# Patient Record
Sex: FEMALE | Race: WHITE | Employment: OTHER | URBAN - METROPOLITAN AREA
[De-identification: names, ages, dates, MRNs, and addresses within clinical notes are randomized per-mention and may not be internally consistent; named-entity substitution may affect disease eponyms.]

---

## 2021-12-26 PROBLEM — I63.9 STROKE (HCC): Status: ACTIVE | Noted: 2021-12-26

## 2021-12-31 ENCOUNTER — HOSPITAL ENCOUNTER (INPATIENT)
Age: 86
LOS: 20 days | Discharge: HOME | DRG: 057 | End: 2022-01-20
Attending: INTERNAL MEDICINE | Admitting: FAMILY MEDICINE
Payer: MEDICARE

## 2021-12-31 DIAGNOSIS — M17.0 PRIMARY OSTEOARTHRITIS OF BOTH KNEES: Chronic | ICD-10-CM

## 2021-12-31 DIAGNOSIS — I10 ESSENTIAL HYPERTENSION: Chronic | ICD-10-CM

## 2021-12-31 DIAGNOSIS — N39.0 URINARY TRACT INFECTION DUE TO EXTENDED-SPECTRUM BETA LACTAMASE (ESBL) PRODUCING ESCHERICHIA COLI: ICD-10-CM

## 2021-12-31 DIAGNOSIS — G81.94 HEMIPARESIS OF LEFT NONDOMINANT SIDE DUE TO ACUTE CEREBROVASCULAR DISEASE (HCC): ICD-10-CM

## 2021-12-31 DIAGNOSIS — I67.89 HEMIPARESIS OF LEFT NONDOMINANT SIDE DUE TO ACUTE CEREBROVASCULAR DISEASE (HCC): ICD-10-CM

## 2021-12-31 DIAGNOSIS — B96.29 URINARY TRACT INFECTION DUE TO EXTENDED-SPECTRUM BETA LACTAMASE (ESBL) PRODUCING ESCHERICHIA COLI: ICD-10-CM

## 2021-12-31 DIAGNOSIS — I63.9 ACUTE ISCHEMIC STROKE (HCC): Primary | ICD-10-CM

## 2021-12-31 DIAGNOSIS — E78.2 HYPERCHOLESTEROLEMIA WITH HYPERTRIGLYCERIDEMIA: Chronic | ICD-10-CM

## 2021-12-31 DIAGNOSIS — Z78.9 IMPAIRED MOBILITY AND ADLS: ICD-10-CM

## 2021-12-31 DIAGNOSIS — Z74.09 IMPAIRED MOBILITY AND ADLS: ICD-10-CM

## 2021-12-31 DIAGNOSIS — Z16.12 URINARY TRACT INFECTION DUE TO EXTENDED-SPECTRUM BETA LACTAMASE (ESBL) PRODUCING ESCHERICHIA COLI: ICD-10-CM

## 2021-12-31 PROCEDURE — 77030040393 HC DRSG OPTIFOAM GENT MDII -B

## 2021-12-31 PROCEDURE — 74011250637 HC RX REV CODE- 250/637: Performed by: FAMILY MEDICINE

## 2021-12-31 PROCEDURE — 74011000250 HC RX REV CODE- 250: Performed by: FAMILY MEDICINE

## 2021-12-31 PROCEDURE — 2709999900 HC NON-CHARGEABLE SUPPLY

## 2021-12-31 PROCEDURE — 65310000000 HC RM PRIVATE REHAB

## 2021-12-31 RX ORDER — POTASSIUM CHLORIDE 750 MG/1
20 TABLET, FILM COATED, EXTENDED RELEASE ORAL
Status: DISCONTINUED | OUTPATIENT
Start: 2022-01-01 | End: 2021-12-31

## 2021-12-31 RX ORDER — MONTELUKAST SODIUM 10 MG/1
10 TABLET ORAL DAILY
Status: DISCONTINUED | OUTPATIENT
Start: 2022-01-01 | End: 2022-01-20 | Stop reason: HOSPADM

## 2021-12-31 RX ORDER — DORZOLAMIDE HCL 20 MG/ML
1 SOLUTION/ DROPS OPHTHALMIC 3 TIMES DAILY
Status: DISCONTINUED | OUTPATIENT
Start: 2021-12-31 | End: 2022-01-20 | Stop reason: HOSPADM

## 2021-12-31 RX ORDER — SERTRALINE HYDROCHLORIDE 50 MG/1
50 TABLET, FILM COATED ORAL DAILY
Status: DISCONTINUED | OUTPATIENT
Start: 2022-01-01 | End: 2022-01-20 | Stop reason: HOSPADM

## 2021-12-31 RX ORDER — BISACODYL 5 MG
10 TABLET, DELAYED RELEASE (ENTERIC COATED) ORAL
Status: DISCONTINUED | OUTPATIENT
Start: 2021-12-31 | End: 2022-01-20 | Stop reason: HOSPADM

## 2021-12-31 RX ORDER — ATORVASTATIN CALCIUM 40 MG/1
80 TABLET, FILM COATED ORAL
Status: DISCONTINUED | OUTPATIENT
Start: 2021-12-31 | End: 2022-01-04

## 2021-12-31 RX ORDER — ADHESIVE BANDAGE
30 BANDAGE TOPICAL DAILY PRN
Status: DISCONTINUED | OUTPATIENT
Start: 2021-12-31 | End: 2022-01-20 | Stop reason: HOSPADM

## 2021-12-31 RX ORDER — OXYBUTYNIN CHLORIDE 5 MG/1
10 TABLET, EXTENDED RELEASE ORAL
Status: DISCONTINUED | OUTPATIENT
Start: 2021-12-31 | End: 2022-01-15

## 2021-12-31 RX ORDER — LOPERAMIDE HYDROCHLORIDE 2 MG/1
2 CAPSULE ORAL AS NEEDED
Status: DISCONTINUED | OUTPATIENT
Start: 2021-12-31 | End: 2022-01-20 | Stop reason: HOSPADM

## 2021-12-31 RX ORDER — DOCUSATE SODIUM 100 MG/1
100 CAPSULE, LIQUID FILLED ORAL 2 TIMES DAILY
Status: DISCONTINUED | OUTPATIENT
Start: 2021-12-31 | End: 2022-01-20 | Stop reason: HOSPADM

## 2021-12-31 RX ORDER — POTASSIUM CHLORIDE 20 MEQ/1
20 TABLET, EXTENDED RELEASE ORAL
Status: COMPLETED | OUTPATIENT
Start: 2022-01-01 | End: 2022-01-05

## 2021-12-31 RX ORDER — ACETAMINOPHEN 325 MG/1
650 TABLET ORAL
Status: DISCONTINUED | OUTPATIENT
Start: 2021-12-31 | End: 2022-01-13

## 2021-12-31 RX ORDER — ASPIRIN 325 MG
325 TABLET ORAL DAILY
Status: DISCONTINUED | OUTPATIENT
Start: 2022-01-01 | End: 2022-01-04

## 2021-12-31 RX ORDER — LATANOPROST 50 UG/ML
1 SOLUTION/ DROPS OPHTHALMIC
Status: DISCONTINUED | OUTPATIENT
Start: 2021-12-31 | End: 2022-01-20 | Stop reason: HOSPADM

## 2021-12-31 RX ORDER — CLOPIDOGREL BISULFATE 75 MG/1
75 TABLET ORAL DAILY
Status: DISCONTINUED | OUTPATIENT
Start: 2022-01-01 | End: 2022-01-04

## 2021-12-31 RX ADMIN — DORZOLAMIDE HYDROCHLORIDE 1 DROP: 20 SOLUTION/ DROPS OPHTHALMIC at 21:23

## 2021-12-31 RX ADMIN — LATANOPROST 1 DROP: 50 SOLUTION OPHTHALMIC at 21:23

## 2021-12-31 RX ADMIN — ATORVASTATIN CALCIUM 80 MG: 40 TABLET, FILM COATED ORAL at 21:23

## 2021-12-31 RX ADMIN — OXYBUTYNIN CHLORIDE 10 MG: 5 TABLET, EXTENDED RELEASE ORAL at 21:23

## 2021-12-31 RX ADMIN — DORZOLAMIDE HYDROCHLORIDE 1 DROP: 20 SOLUTION/ DROPS OPHTHALMIC at 18:18

## 2021-12-31 RX ADMIN — DOCUSATE SODIUM 100 MG: 100 CAPSULE, LIQUID FILLED ORAL at 18:18

## 2021-12-31 RX ADMIN — ACETAMINOPHEN 650 MG: 325 TABLET ORAL at 19:40

## 2022-01-01 LAB
ALBUMIN SERPL-MCNC: 2.7 G/DL (ref 3.4–5)
ALBUMIN/GLOB SERPL: 0.8 {RATIO} (ref 0.8–1.7)
ALP SERPL-CCNC: 80 U/L (ref 45–117)
ALT SERPL-CCNC: 44 U/L (ref 13–56)
ANION GAP SERPL CALC-SCNC: 7 MMOL/L (ref 3–18)
APPEARANCE UR: CLEAR
AST SERPL-CCNC: 41 U/L (ref 10–38)
BASOPHILS # BLD: 0 K/UL (ref 0–0.1)
BASOPHILS NFR BLD: 0 % (ref 0–2)
BILIRUB SERPL-MCNC: 0.4 MG/DL (ref 0.2–1)
BILIRUB UR QL: NEGATIVE
BUN SERPL-MCNC: 25 MG/DL (ref 7–18)
BUN/CREAT SERPL: 33 (ref 12–20)
CALCIUM SERPL-MCNC: 8.5 MG/DL (ref 8.5–10.1)
CHLORIDE SERPL-SCNC: 106 MMOL/L (ref 100–111)
CO2 SERPL-SCNC: 27 MMOL/L (ref 21–32)
COLOR UR: YELLOW
CREAT SERPL-MCNC: 0.76 MG/DL (ref 0.6–1.3)
DIFFERENTIAL METHOD BLD: ABNORMAL
EOSINOPHIL # BLD: 0.1 K/UL (ref 0–0.4)
EOSINOPHIL NFR BLD: 1 % (ref 0–5)
ERYTHROCYTE [DISTWIDTH] IN BLOOD BY AUTOMATED COUNT: 13.3 % (ref 11.6–14.5)
GLOBULIN SER CALC-MCNC: 3.3 G/DL (ref 2–4)
GLUCOSE SERPL-MCNC: 94 MG/DL (ref 74–99)
GLUCOSE UR STRIP.AUTO-MCNC: NEGATIVE MG/DL
HCT VFR BLD AUTO: 38.1 % (ref 35–45)
HGB BLD-MCNC: 12.6 G/DL (ref 12–16)
HGB UR QL STRIP: NEGATIVE
IMM GRANULOCYTES # BLD AUTO: 0 K/UL (ref 0–0.04)
IMM GRANULOCYTES NFR BLD AUTO: 0 % (ref 0–0.5)
KETONES UR QL STRIP.AUTO: NEGATIVE MG/DL
LEUKOCYTE ESTERASE UR QL STRIP.AUTO: NEGATIVE
LYMPHOCYTES # BLD: 1.1 K/UL (ref 0.9–3.6)
LYMPHOCYTES NFR BLD: 16 % (ref 21–52)
MAGNESIUM SERPL-MCNC: 1.6 MG/DL (ref 1.6–2.6)
MCH RBC QN AUTO: 28.6 PG (ref 24–34)
MCHC RBC AUTO-ENTMCNC: 33.1 G/DL (ref 31–37)
MCV RBC AUTO: 86.4 FL (ref 78–100)
MONOCYTES # BLD: 0.8 K/UL (ref 0.05–1.2)
MONOCYTES NFR BLD: 12 % (ref 3–10)
NEUTS SEG # BLD: 5 K/UL (ref 1.8–8)
NEUTS SEG NFR BLD: 71 % (ref 40–73)
NITRITE UR QL STRIP.AUTO: NEGATIVE
NRBC # BLD: 0 K/UL (ref 0–0.01)
NRBC BLD-RTO: 0 PER 100 WBC
PH UR STRIP: 7.5 [PH] (ref 5–8)
PLATELET # BLD AUTO: 198 K/UL (ref 135–420)
PMV BLD AUTO: 11.5 FL (ref 9.2–11.8)
POTASSIUM SERPL-SCNC: 3.8 MMOL/L (ref 3.5–5.5)
PROT SERPL-MCNC: 6 G/DL (ref 6.4–8.2)
PROT UR STRIP-MCNC: NEGATIVE MG/DL
RBC # BLD AUTO: 4.41 M/UL (ref 4.2–5.3)
SODIUM SERPL-SCNC: 140 MMOL/L (ref 136–145)
SP GR UR REFRACTOMETRY: 1.02 (ref 1–1.03)
UROBILINOGEN UR QL STRIP.AUTO: 0.2 EU/DL (ref 0.2–1)
WBC # BLD AUTO: 7.1 K/UL (ref 4.6–13.2)

## 2022-01-01 PROCEDURE — 85025 COMPLETE CBC W/AUTO DIFF WBC: CPT

## 2022-01-01 PROCEDURE — 65310000000 HC RM PRIVATE REHAB

## 2022-01-01 PROCEDURE — 80053 COMPREHEN METABOLIC PANEL: CPT

## 2022-01-01 PROCEDURE — 97166 OT EVAL MOD COMPLEX 45 MIN: CPT

## 2022-01-01 PROCEDURE — 74011250637 HC RX REV CODE- 250/637: Performed by: FAMILY MEDICINE

## 2022-01-01 PROCEDURE — 97530 THERAPEUTIC ACTIVITIES: CPT

## 2022-01-01 PROCEDURE — 2709999900 HC NON-CHARGEABLE SUPPLY

## 2022-01-01 PROCEDURE — 96125 COGNITIVE TEST BY HC PRO: CPT

## 2022-01-01 PROCEDURE — 83735 ASSAY OF MAGNESIUM: CPT

## 2022-01-01 PROCEDURE — 36415 COLL VENOUS BLD VENIPUNCTURE: CPT

## 2022-01-01 PROCEDURE — 81003 URINALYSIS AUTO W/O SCOPE: CPT

## 2022-01-01 PROCEDURE — 92522 EVALUATE SPEECH PRODUCTION: CPT

## 2022-01-01 PROCEDURE — 96105 ASSESSMENT OF APHASIA: CPT

## 2022-01-01 PROCEDURE — 97535 SELF CARE MNGMENT TRAINING: CPT

## 2022-01-01 RX ORDER — ONDANSETRON 4 MG/1
4 TABLET, ORALLY DISINTEGRATING ORAL
Status: DISCONTINUED | OUTPATIENT
Start: 2022-01-01 | End: 2022-01-20 | Stop reason: HOSPADM

## 2022-01-01 RX ORDER — HYDRALAZINE HYDROCHLORIDE 25 MG/1
25 TABLET, FILM COATED ORAL
Status: DISCONTINUED | OUTPATIENT
Start: 2022-01-01 | End: 2022-01-20 | Stop reason: HOSPADM

## 2022-01-01 RX ORDER — LOSARTAN POTASSIUM 25 MG/1
25 TABLET ORAL DAILY
Status: DISCONTINUED | OUTPATIENT
Start: 2022-01-01 | End: 2022-01-04

## 2022-01-01 RX ADMIN — POTASSIUM CHLORIDE 20 MEQ: 1500 TABLET, EXTENDED RELEASE ORAL at 10:15

## 2022-01-01 RX ADMIN — HYDRALAZINE HYDROCHLORIDE 25 MG: 25 TABLET, FILM COATED ORAL at 14:36

## 2022-01-01 RX ADMIN — DOCUSATE SODIUM 100 MG: 100 CAPSULE, LIQUID FILLED ORAL at 10:15

## 2022-01-01 RX ADMIN — OXYBUTYNIN CHLORIDE 10 MG: 5 TABLET, EXTENDED RELEASE ORAL at 21:53

## 2022-01-01 RX ADMIN — LATANOPROST 1 DROP: 50 SOLUTION OPHTHALMIC at 21:57

## 2022-01-01 RX ADMIN — ACETAMINOPHEN 650 MG: 325 TABLET ORAL at 00:32

## 2022-01-01 RX ADMIN — MAGNESIUM HYDROXIDE 30 ML: 400 SUSPENSION ORAL at 11:16

## 2022-01-01 RX ADMIN — SERTRALINE 50 MG: 50 TABLET, FILM COATED ORAL at 10:15

## 2022-01-01 RX ADMIN — CLOPIDOGREL BISULFATE 75 MG: 75 TABLET ORAL at 10:15

## 2022-01-01 RX ADMIN — DORZOLAMIDE HYDROCHLORIDE 1 DROP: 20 SOLUTION/ DROPS OPHTHALMIC at 21:56

## 2022-01-01 RX ADMIN — DORZOLAMIDE HYDROCHLORIDE 1 DROP: 20 SOLUTION/ DROPS OPHTHALMIC at 10:17

## 2022-01-01 RX ADMIN — DORZOLAMIDE HYDROCHLORIDE 1 DROP: 20 SOLUTION/ DROPS OPHTHALMIC at 17:08

## 2022-01-01 RX ADMIN — LOSARTAN POTASSIUM 25 MG: 25 TABLET, FILM COATED ORAL at 11:07

## 2022-01-01 RX ADMIN — ASPIRIN 325 MG ORAL TABLET 325 MG: 325 PILL ORAL at 10:15

## 2022-01-01 RX ADMIN — ATORVASTATIN CALCIUM 80 MG: 40 TABLET, FILM COATED ORAL at 21:53

## 2022-01-01 RX ADMIN — MONTELUKAST 10 MG: 10 TABLET, FILM COATED ORAL at 10:15

## 2022-01-01 NOTE — ROUTINE PROCESS
Hanna Mcgregor is a 82 y.o.  female admitted on 12/31/2021 from [place]. Report received from [facility], [address] [place] [address]. Transportation was provided by ambulance, and the patient was transferred to her room via [employer]. Patient was assisted to bed with assist of 2. The patient was oriented to her room. Fall risk precautions were discussed with the patient; she was instructed to call for help prior to getting up. The following fall risk precautions were initiated: bed/ chair alarms, door signage, yellow bracelet and socks as well as completion of the [place] tool in the patient's room. Four eyes nurse skin assessment was performed by Manuela Romo LPN and Lazaro Foster.  Skin problems noted: WINSTON Reinoso LPN

## 2022-01-01 NOTE — PROGRESS NOTES
Problem: Pressure Injury - Risk of  Goal: *Prevention of pressure injury  Description: Document Lebron Scale and appropriate interventions in the flowsheet. Outcome: Progressing Towards Goal  Note: Pressure Injury Interventions:  Sensory Interventions: Assess changes in LOC    Moisture Interventions: Absorbent underpads    Activity Interventions: Chair cushion,Increase time out of bed,PT/OT evaluation,Pressure redistribution bed/mattress(bed type)    Mobility Interventions: HOB 30 degrees or less    Nutrition Interventions: Document food/fluid/supplement intake    Friction and Shear Interventions: Apply protective barrier, creams and emollients                Problem: Patient Education: Go to Patient Education Activity  Goal: Patient/Family Education  Outcome: Progressing Towards Goal     Problem: Falls - Risk of  Goal: *Absence of Falls  Description: Document Yadira Fall Risk and appropriate interventions in the flowsheet.   Outcome: Progressing Towards Goal  Note: Fall Risk Interventions:  Mobility Interventions: Assess mobility with egress test         Medication Interventions: Assess postural VS orthostatic hypotension,Bed/chair exit alarm    Elimination Interventions: Bed/chair exit alarm,Call light in reach              Problem: Patient Education: Go to Patient Education Activity  Goal: Patient/Family Education  Outcome: Progressing Towards Goal     Problem: General Medical Care Plan  Goal: *Vital signs within specified parameters  Outcome: Progressing Towards Goal  Goal: *Labs within defined limits  Outcome: Progressing Towards Goal  Goal: *Absence of infection signs and symptoms  Outcome: Progressing Towards Goal  Goal: *Optimal pain control at patient's stated goal  Outcome: Progressing Towards Goal  Goal: *Skin integrity maintained  Outcome: Progressing Towards Goal  Goal: *Fluid volume balance  Outcome: Progressing Towards Goal  Goal: *Optimize nutritional status  Outcome: Progressing Towards Goal  Goal: *Anxiety reduced or absent  Outcome: Progressing Towards Goal  Goal: *Progressive mobility and function (eg: ADL's)  Outcome: Progressing Towards Goal     Problem: Patient Education: Go to Patient Education Activity  Goal: Patient/Family Education  Outcome: Progressing Towards Goal     Problem: Patient Education: Go to Patient Education Activity  Goal: Patient/Family Education  Outcome: Progressing Towards Goal

## 2022-01-01 NOTE — CONSULTS
Comprehensive Nutrition Assessment    Type and Reason for Visit: Initial,Positive nutrition screen,Consult    Nutrition Recommendations/Plan:   - Add supplement: Ensure Enlive TID  - Continue all other nutrition interventions. Encourage/ monitor po intake of meals and supplements. Monitor diet tolerance. Nutrition Assessment:  Pt reported poor appetite/ po intake PTA and since admission. Ate very little from breakfast today; trying to eat lunch at time of visit. Had spit up/ tiny amount of vomit right before visiting with pt; informed RN. Pt has been nauseated today. Pt agreeable to nutrition supplement. Malnutrition Assessment:  Malnutrition Status: At risk for malnutrition (specify) (poor po intake PTA)      Nutrition History and Allergies: Past medical hx:  HTN, HLD, major depression, GERD, recent CVA. Poor appetite/ meal intake PTA to acute care hospital and while in the hospital.   UBW is 132, 135-136 lb; pt reported having lost wt PTA. No known food allergies     Estimated Daily Nutrient Needs:  Energy (kcal): 4635-0820; Weight Used for Energy Requirements: Admission (62.7 kg)  Protein (g): 50-63; Weight Used for Protein Requirements: Admission (x0.8-1)  Fluid (ml/day): 6716-1642; Method Used for Fluid Requirements: 1 ml/kcal      Nutrition Related Findings:  BM 12/28. No edema. Pertinent meds: KCl.    K low normal, being replaced      Wounds:    None       Current Nutrition Therapies:  ADULT DIET Regular; 3 carb choices (45 gm/meal); Low Fat/Low Chol/High Fiber/ANGELINE    Anthropometric Measures:  · Height:  4' 11\" (149.9 cm)  · Current Body Wt:  62.7 kg (138 lb 3.7 oz) (12/28/2021)   · Admission Body Wt:  138 lb 3.7 oz    · Usual Body Wt:   (135-136 lb)     · Ideal Body Wt:  95 lbs:  145.5 %   · Adjusted Body Weight:   ; Weight Adjustment for: No adjustment    · BMI Category: Overweight (BMI 25.0-29. 9)       Nutrition Diagnosis:   · Inadequate oral intake related to early satiety,altered GI function (nausea, little vomiting) as evidenced by intake 0-25%,poor intake prior to admission      Nutrition Interventions:   Food and/or Nutrient Delivery: Continue current diet,Start oral nutrition supplement,Mineral supplement  Nutrition Education and Counseling: No recommendations at this time,Education not indicated  Coordination of Nutrition Care: Continue to monitor while inpatient    Goals:  PO nutrition intake will meet >75% of patient's estimated nutrition needs within the next 7 days       Nutrition Monitoring and Evaluation:   Behavioral-Environmental Outcomes: None identified  Food/Nutrient Intake Outcomes: Diet advancement/tolerance,Food and nutrient intake,Vitamin/mineral intake,Supplement intake  Physical Signs/Symptoms Outcomes: Biochemical data,Meal time behavior,GI status,Nausea/vomiting,Nutrition focused physical findings    Discharge Planning:     Too soon to determine     Electronically signed by Sumaya Mcocy RD on 1/1/2022 at 1:55 PM    Contact: [phone]

## 2022-01-01 NOTE — H&P
History & Physical    Patient: Dmitriy Zarate MRN: [id number]  CSN: [id number]    YOB: 1935  Age: 82 y.o. Sex: female      DOA: 12/31/2021  Inpatient rehab    Impairment group :Stroke:Left body involvement (Rt Brain)  EPHRAIM: stroke  Diagnosis: cerebral infarction,unspecified    HPI:     Dmitriy Zarate is a 82 y.o.  female who has history of hypertension hyperlipidemia major depression. Patient was admitted to [facility] 12/26/2021 after she wake up from sleep and felt unsteady nausea and vertigo with left hearing loss    Patient in the ER had sign of cerebellar dysfunction ataxia of the left upper extremity left lower extremity and dysmetria    CT scan in the ER was negative no acute stroke blood pressure was elevated to 211/99. Patient had follow-up MRI confirming acute stroke acute infarct in the right corona radiata. Patient loaded with Plavix and started on aspirin 81 mg daily atorvastatin dose was increased    Patient had dizziness was treated with eye patch. Echo showed left ventricular hypertrophy mild to moderate aortic stenosis    Patient had PT and OT evaluation was recommendation for inpatient rehab patient was admitted to inpatient rehab at [facility] 12/31/2021     Past medical history:  Depression  Hyperlipidemia  Gastroesophageal flux disease  Chronic arthritis  Rotator cuff injury bilateral shoulderGlucoma    Past surgical   Hysterectomy  Vaginal vault repair    Family history   Mother had Crohn's disease. No colon cancer in the family    Social History     Socioeconomic History    Marital status:        Prior to Admission medications    Medication Sig Start Date End Date Taking? Authorizing Provider   aspirin (ASPIRIN) 325 mg tablet Take 1 Tablet by mouth daily. 1/1/22   Jose Manuel Feng MD   atorvastatin (LIPITOR) 80 mg tablet Take 1 Tablet by mouth nightly.  12/31/21   Jose Manuel Feng MD   clopidogreL (PLAVIX) 75 mg tab Take 1 Tablet by mouth daily. 1/1/22   Yolanda Heath MD   dorzolamide (TRUSOPT) 2 % ophthalmic solution Administer 1 Drop to both eyes three (3) times daily. 12/31/21   Yolanda Heath MD   potassium chloride SR (K-TAB) 20 mEq tablet Take 1 Tablet by mouth daily for 5 days. 12/31/21 1/5/22  Yolanda Heath MD   latanoprost (XALATAN) 0.005 % ophthalmic solution Administer 1 Drop to both eyes. Aidee Abrams MD   montelukast (SINGULAIR) 10 mg tablet Take 10 mg by mouth daily. Aidee Abrams MD   sertraline (ZOLOFT) 50 mg tablet Take 50 mg by mouth daily. Aidee Abrams MD   loperamide (IMODIUM) 2 mg capsule Take 2 mg by mouth as needed for Diarrhea. Aidee Abrams MD   mirabegron ER (Myrbetriq) 25 mg ER tablet Take 25 mg by mouth daily. Aidee Abrams MD   oxybutynin chloride XL (DITROPAN XL) 10 mg CR tablet Take 10 mg by mouth nightly. Aidee Abrams MD   cetirizine (ZYRTEC) 10 mg tablet Take 10 mg by mouth daily. Aidee Abrams MD       Allergies   Allergen Reactions    Novocain [Procaine] Swelling       Review of Systems  GENERAL: Patient alert, awake and oriented times 3, able to communicate full sentences and not in distress. HEENT: No change in vision, no earache, tinnitus, sore throat or sinus congestion. wear glasses h/o glucoma   NECK: No pain or stiffness. CARDIOVASCULAR: No chest pain or pressure. No palpitations. Hypertension  PULMONARY: No shortness of breath, cough or wheeze. GASTROINTESTINAL: No abdominal pain, nausea, vomiting or diarrhea, melena or       bright red blood per rectum. H/o reflux  GENITOURINARY: positive  frequency, urgency, hesitancy . Slight  dysuria. started 2 days ago   MUSCULOSKELETAL: B/L Rotator cuff injury   DERMATOLOGIC: No rash, no itching, no lesions. ENDOCRINE: No polyuria, polydipsia, no heat or cold intolerance. No recent change in    weight. HEMATOLOGICAL: No anemia or easy bruising or bleeding.    NEUROLOGIC: No headache, seizures, dizziness and Lt sided weakness   PSYCHIATRIC: h/o depression, anxiety,no mood disorder, no loss of interest in normal       activity or change in sleep pattern. Physical Exam:     Physical Exam:  Visit Vitals  BP (!) 149/75 (BP 1 Location: Right upper arm, BP Patient Position: Supine)   Pulse 70   Temp 97.4 °F (36.3 °C)   Resp 16   SpO2 94%           Temp (24hrs), Av.6 °F (36.4 °C), Min:97.3 °F (36.3 °C), Max:98.2 °F (36.8 °C)    No intake/output data recorded. 1901 -  0700  In: 5 [P.O.:540]  Out: -     General:  Alert, cooperative, no distress, appears stated age. Head:  Normocephalic, without obvious abnormality, atraumatic. Eyes:  Conjunctivae/corneas clear. PERRL, EOMs intact. Nose: Nares normal. No drainage or sinus tenderness. Throat: Lips, mucosa, and tongue dry   Neck: Supple, symmetrical, trachea midline, no adenopathy, thyroid: no enlargement/tenderness/nodules, no carotid bruit and no JVD. Back:   ROM normal. No CVA tenderness. Lungs:   Clear to auscultation bilaterally. Chest wall:  No tenderness or deformity. Heart:  Regular rate and rhythm, S1, S2 normal, no murmur, click, rub or gallop. Abdomen: Soft, non-tender. Bowel sounds normal. No masses,  No organomegaly. Extremities: Extremities normal, atraumatic, no cyanosis or edema. Pulses: 2+ and symmetric all extremities. Skin: Skin color, texture, turgor normal.    Neurologic: CNII-XII intact. No focal motor or sensory deficit. RUE4+/5 . LUE 4/5. RLE4+/5 LLE  3/5       Labs Reviewed: All lab results for the last 24 hours reviewed.   CT, MRI, CXR and EKG    Procedures/imaging: see electronic medical records for all procedures/Xrays and details which were not copied into this note but were reviewed prior to creation of Plan  Recent Results (from the past 24 hour(s))   CBC WITH AUTOMATED DIFF    Collection Time: 22  5:27 AM   Result Value Ref Range    WBC 7.1 4.6 - 13.2 K/uL    RBC 4.41 4.20 - 5.30 M/uL    HGB 12.6 12.0 - 16.0 g/dL    HCT 38.1 35.0 - 45.0 %    MCV 86.4 78.0 - 100.0 FL    MCH 28.6 24.0 - 34.0 PG    MCHC 33.1 31.0 - 37.0 g/dL    RDW 13.3 11.6 - 14.5 %    PLATELET 277 725 - 543 K/uL    MPV 11.5 9.2 - 11.8 FL    NRBC 0.0 0  WBC    ABSOLUTE NRBC 0.00 0.00 - 0.01 K/uL    NEUTROPHILS 71 40 - 73 %    LYMPHOCYTES 16 (L) 21 - 52 %    MONOCYTES 12 (H) 3 - 10 %    EOSINOPHILS 1 0 - 5 %    BASOPHILS 0 0 - 2 %    IMMATURE GRANULOCYTES 0 0.0 - 0.5 %    ABS. NEUTROPHILS 5.0 1.8 - 8.0 K/UL    ABS. LYMPHOCYTES 1.1 0.9 - 3.6 K/UL    ABS. MONOCYTES 0.8 0.05 - 1.2 K/UL    ABS. EOSINOPHILS 0.1 0.0 - 0.4 K/UL    ABS. BASOPHILS 0.0 0.0 - 0.1 K/UL    ABS. IMM. GRANS. 0.0 0.00 - 0.04 K/UL    DF AUTOMATED     METABOLIC PANEL, COMPREHENSIVE    Collection Time: 01/01/22  5:27 AM   Result Value Ref Range    Sodium 140 136 - 145 mmol/L    Potassium 3.8 3.5 - 5.5 mmol/L    Chloride 106 100 - 111 mmol/L    CO2 27 21 - 32 mmol/L    Anion gap 7 3.0 - 18 mmol/L    Glucose 94 74 - 99 mg/dL    BUN 25 (H) 7.0 - 18 MG/DL    Creatinine 0.76 0.6 - 1.3 MG/DL    BUN/Creatinine ratio 33 (H) 12 - 20      GFR est AA >60 >60 ml/min/1.73m2    GFR est non-AA >60 >60 ml/min/1.73m2    Calcium 8.5 8.5 - 10.1 MG/DL    Bilirubin, total 0.4 0.2 - 1.0 MG/DL    ALT (SGPT) 44 13 - 56 U/L    AST (SGOT) 41 (H) 10 - 38 U/L    Alk.  phosphatase 80 45 - 117 U/L    Protein, total 6.0 (L) 6.4 - 8.2 g/dL    Albumin 2.7 (L) 3.4 - 5.0 g/dL    Globulin 3.3 2.0 - 4.0 g/dL    A-G Ratio 0.8 0.8 - 1.7     MAGNESIUM    Collection Time: 01/01/22  5:27 AM   Result Value Ref Range    Magnesium 1.6 1.6 - 2.6 mg/dL         Assessment/Plan     Active Problems:    CVA (cerebral vascular accident) ([facility] [address] (12/31/2021)         Plan  Right CVA with residual left-sided weakness  Aspirin 325 mg was Plavix 75 mg once daily for 3 months and then aspirin  Follow-up neurology after discharge from rehab  Lipitor 80 mg nightly  PT and OT  Speech evaluation  Fall precaution aspiration precaution    H/o hypertension  Patient not on any blood pressure medication  Start losartan 25 mg daily  Monitor blood pressure use hydralazine 25 mg 3 times daily as needed systolic blood pressure above 160    Hyperlipidemia  Lipitor 80 mg nightly follow-up liver function    Depression and anxiety  Zoloft 50 mg daily    Glaucoma  Xalatan eyedrops 1 drop both eyes at bedtime    Urinary incontinence  Patient has some symptom of urinary tract infection  Recheck UA  Continue Ditropan XL 10 mg once a day    Follow-up lab work CBC CMP mag    DVT/GI Prophylaxis: SCD's and H2B/PPI  Part of this note was dictated use Dragon medical software. Please excuse errors that have escaped final proofreading.     Time for admission more than 65 minutes included review previous record,hospital record ,test result previous discharge jaylene , ,discussion with patient and exam. Discussion with nursing staff and documentation    Marie Shultz MD  1/1/2022  9:35 AM

## 2022-01-01 NOTE — ROUTINE PROCESS
SHIFT CHANGE NOTE FOR [facility]    Bedside and Verbal shift change report given to 405Sharon Medeiros Sarbjit (oncoming nurse) by Lidya Hassan RN (offgoing nurse). Report included the following information SBAR, Kardex, MAR and Recent Results. Situation:   Code Status: Full Code   Hospital Day: 1   Problem List:   Hospital Problems  Never Reviewed          Codes Class Noted POA    CVA (cerebral vascular accident) [facility]) ICD-10-CM: I63.9  ICD-9-CM: 434.91  12/31/2021 Unknown              Background:   Past Medical History: No past medical history on file.      Assessment:   Changes in Assessment throughout shift: No change to previous assessment     Patient has a central line: no Reasons if yes:  n/a  Insertion date: n/a Last dressing date: n/a   Patient has Edwards Cath: no Reasons if yes:  n/a   Insertion date: n/a     Last Vitals:     Vitals:    12/31/21 1612 12/31/21 2044 12/31/21 2128   BP: (!) 156/88 (!) 182/95 (!) 157/76   Pulse: 84 77    Resp: 16 18    Temp: 97.3 °F (36.3 °C) 97.5 °F (36.4 °C)    SpO2: 96% 95%         PAIN    Pain Assessment    Pain Intensity 1: 0 (01/01/22 0404) Pain Intensity 1: 2 (12/29/14 1105)    Pain Location 1: Head Pain Location 1: Abdomen    Pain Intervention(s) 1: Medication (see MAR) Pain Intervention(s) 1: Medication (see MAR)  Patient Stated Pain Goal: 0 Patient Stated Pain Goal: 0  o Intervention effective: yes  o Other actions taken for pain: Medication (see MAR)     Skin Assessment  Skin color    Condition/Temperature    Integrity    Turgor    Weekly Pressure Ulcer Documentation  Pressure  Injury Documentation: No Pressure Injury Noted-Pressure Ulcer Prevention Initiated  Wound Prevention & Protection Methods  Orientation of wound Orientation of Wound Prevention: Posterior  Location of Prevention Location of Wound Prevention: Buttocks  Dressing Present Dressing Present : No  Dressing Status    Wound Offloading       INTAKE/OUPUT  Date 12/31/21 0700 - 01/01/22 0659 01/01/22 0700 - 01/02/22 0659   Shift 0700-1859 1900-0659 24 Hour Total 8412-9364 9645-8916 24 Hour Total   INTAKE   P.O. 240 200 440        P. O. 240 200 440      Shift Total 240 200 440      OUTPUT   Urine           Urine Occurrence(s) 0 x 3 x 3 x      Emesis/NG output           Emesis Occurrence(s)  0 x 0 x      Stool           Stool Occurrence(s) 0 x 0 x 0 x      Shift Total          200 440      Weight (kg)             Recommendations:  1. Patient needs and requests: pain management; toileting    2. Pending tests/procedures: routine labs     3. Functional Level/Equipment: Partial (two people) /      Fall Precautions:   Fall risk precautions were reinforced with the patient; she was instructed to call for help prior to getting up. The following fall risk precautions were continued: bed/ chair alarms, door signage, yellow bracelet and socks as well as update of the Belinda Prieto tool in the patient's room. Yadira Score: 3    HEALS Safety Check    A safety check occurred in the patient's room between off going nurse and oncoming nurse listed above. The safety check included the below items  Area Items   H  High Alert Medications - Verify all high alert medication drips (heparin, PCA, etc.)   E  Equipment - Suction is set up for ALL patients (with norah)  - Red plugs utilized for all equipment (IV pumps, etc.)  - WOWs wiped down at end of shift.  - Room stocked with oxygen, suction, and other unit-specific supplies   A  Alarms - Bed alarm is set for fall risk patients  - Ensure chair alarm is in place and activated if patient is up in a chair   L  Lines - Check IV for any infiltration  - Edwards bag is empty if patient has a Edwards   - Tubing and IV bags are labeled   S  Safety   - Room is clean, patient is clean, and equipment is clean. - Hallways are clear from equipment besides carts.    - Fall bracelet on for fall risk patients  - Ensure room is clear and free of clutter  - Suction is set up for ALL patients (with norah)  - Hallways are clear from equipment besides carts.    - Isolation precautions followed, supplies available outside room, sign posted     Doron Aviles RN

## 2022-01-01 NOTE — PROGRESS NOTES
Problem: Mobility Impaired (Adult and Pediatric)  Goal: *Therapy Goal (Edit Goal, Insert Text)  Description: Physical Therapy Short Term Goals  Initiated 1/1/2022 and to be accomplished within 7 day(s) on 1/7/2022  1. Patient will move from supine to sit and sit to supine  in bed with minimal assistance/contact guard assist.    2.  Patient will transfer from bed to chair and chair to bed with minimal assistance using the least restrictive device. 3.  Patient will perform sit to stand with minimal assistance. 4.  Patient will ambulate with minimal assistance/contact guard assist for 50 feet with the least restrictive device before needing seated rest.   5.  Patient will participate in formal balance assessment Caroline Query Balance Scale/Postural Assessment Scale for Stroke). Physical Therapy Long Term Goals  Initiated 1/1/2022 and to be accomplished within 21 day(s) on 1/22/2022  1. Patient will move from supine to sit and sit to supine , scoot up and down, and roll side to side in bed with modified independence. 2.  Patient will transfer from bed to chair and chair to bed with modified independence using the least restrictive device. 3.  Patient will perform sit to stand with modified independence. 4.  Patient will ambulate with modified independence for 150 feet with the least restrictive device. 5.  Patient will negotiate one curb step with CG/SBA , using appropriate AD for safe community mobility. Outcome: Progressing Towards Goal   PHYSICAL THERAPY EVALUATION    Patient: Ingrid Galvan (61 y.o. female)  Date: 1/1/2022  Diagnosis: CVA (cerebral vascular accident) [facility]) [I63.9] <principal problem not specified>  Precautions: Fall  Chart, physical therapy assessment, plan of care and goals were reviewed. Time in:0810  Time out:0910  Time In: 0940  Time Out: 1010    Patient seen for: Balance activities; Patient education;Transfer training; Wheelchair mobility    Pain:  Patient indicating soreness bilat knees with initial standing and sit<->stand transitions that eases as she continues to move and \"loosen up. \"    Patient identified with name and : yes    SUBJECTIVE:     Patient stated I have been trying to go to the bathroom.  Patient agreeable to treatment. Reporting inconsistent lightheadedness/dizziness that has been helped at times with eye patch but other times not helped. Patient denied significant lightheadedness/dizziness during first session with transferring to [facility] and with gait, however, after eating her breakfast reporting increased dizziness and nausea. BP taken and was elevated so patient assisted back to bed and nurse notified. Patient reporting improved symptoms once lying down (less nausea and dizziness). Patient appropriate in her conversation with PT in relaying information about her situation and hospital course. 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 Problem List:    Decreased strength B LE, left LE more impaired than Right  [x]     Decreased strength trunk/core  [x]     Decreased AROM   [x]     Decreased PROM  [x]    Decreased endurance  [x]     Decreased balance sitting  [x]     Decreased balance standing  [x]     Pain   [x]     Slow ambulation velocity  [x]    Decreased coordination  [x]    Decreased safety awareness  [x]      Functional Limitations:   Decreased independence with bed mobility  [x]     Decreased independence with functional transfers  [x]     Decreased independence with ambulation  [x]     Decreased independence with stair negotiation  [x]       Previous Functional Level: Mod I with rollator for gait, transfers, bed mobility. Had aide assist her with shower transfer and showering.  Lives at ILF, uses elevator to go to 3rd floor where she exercises multiple times per week on arm/leg stepper machine for at least 20 minute durations. States that she fixes herself breakfast usually, otherwise walks to dining room for meals. Home Environment: Home Environment: Independent living  [address] [place] [address] St: One story  Living Alone: Yes  Support Systems: Child(jovany)  Patient Expects to be Discharged to[de-identified] Independent living facility  Current DME Used/Available at Home: Walker, rolling  Tub or Shower Type: Shower (small step to step over)    Barriers to Learning/Limitations: yes;  sensory deficits-vision/hearing/speech and physical  Compensate with: visual, verbal, tactile, kinesthetic cues/model         Outcome Measures: See functional scores, balance measures appropriate for future session.       MMT Initial Assessment   Right Lower Extremity Left Lower Extremity   Hip Flexion 4 3   Knee Extension 4+ 4   Knee Flexion 4+ 4   Ankle Dorsiflexion 5 4+   0/5 No palpable muscle contraction  1/5 Palpable muscle contraction, no joint movement  2-/5 Less than full range of motion in gravity eliminated position  2/5 Able to complete full range of motion in gravity eliminated position  2+/5 Able to initiate movement against gravity  3-/5 More than half but not full range of motion against gravity  3/5 Able to complete full range of motion against gravity  3+/5 Completes full range of motion against gravity with minimal resistance  4-/5 Completes full range of motion against gravity with minimal-moderate resistance  4/5 Completes full range of motion against gravity with moderate resistance  4+/5 Completes full range of motion against gravity with moderate-maximum resistance  5/5 Completes full range of motion against gravity with maximum resistance        GROSS ASSESSMENT Initial Assessment 1/1/2022   AROM Generally decreased, functional   Strength Generally decreased, functional   Coordination Within functional limits Tone Normal   Sensation Impaired (impaired to light touch bilat toes)   PROM Generally decreased, functional       POSTURE Initial Assessment 1/1/2022   Posture (WDL) Exceptions to [facility]   Posture Assessment Forward head;Scapular winging;Rounded shoulders       BALANCE Initial Assessment 1/1/2022    Sitting - Static: Good (unsupported)  Sitting - Dynamic: Good (unsupported); Occassional;Fair (occasional)  Standing - Static: Fair;Occasional;Poor  Standing - Dynamic : Impaired  Other (comment): standing balance to be further assessed       BED/CHAIR/WHEELCHAIR TRANSFERS Initial Assessment 1/1/2022   Rolling Right 5 (Stand-by assistance)   Rolling Left 5 (Stand-by assistance)   Supine to Sit 3 (Moderate assistance)   Sit to Stand Moderate assistance   Sit to Supine 4 (Minimal assistance)   Transfer Assist Score 3 (Moderate assistance )   Transfer Type Other: stand step with RW   Comments  Bed mobility with head of bed flat and without bedrails. Assistance at upper trunk for supine->sit, cues for pushing through bilat UE rather than attempt to grab for PT for assistance. Min A for repositioning back into bed for sit->supine. Stand step with RW cues for safe use of RW (not stepping outside JIL, turning RW completely), needing mod A for steadying support and RW management for safety. Sit to stand needing mod A from bed surface and BSC, needing mod/max A to get up into standing from w/c height surface.     Car Transfer Not tested (Patient feeling nauseous during second session)   Car Type NT       WHEELCHAIR MOBILITY/MANAGEMENT Initial Assessment 1/1/2022   Able to Propel 55 feet   Assist Level 2 (mod/max A using bilat feet, occasionally UE to propel)   Curbs/ramps assistance required 0 (Not tested)   Wheelchair set up assistance required 2 (Maximal assistance)   Wheelchair management Manages left brake,Manages right brake (needing assistance)   Comments Patient with slow propulsion, at times delayed lifting left LE to propel with feet. GAIT Initial Assessment 1/1/2022   Gait Description (WDL) Exceptions to WDL   Gait Abnormalities Decreased step clearance; Step to gait;Trendelenburg;Trunk sway increased (genu valgum bilat, knees braced together entire gait cycle)       WALKING INDEPENDENCE Initial Assessment 1/1/2022   Assistive device Walker, rolling,Gait belt   Ambulation assistance - level surface 3 (Moderate assistance)   Distance 18 Feet (ft)   Comments  Patient with very forward flexed trunk and gaze, RW pushed too far ahead needing cues and assistance at trunk for safety. Ambulation assistance - unlevel surface  (NT)       STEPS/STAIRS Initial Assessment 1/1/2022   Steps/Stairs ambulated 0   Rail Use  (NT )   Assistance Level 0 (Not tested)   Comments  Patient not yet tested on curb/stairs due to increased nausea when seen second session. Curbs/Ramps  (NT)       ASSESSMENT :  Based on the objective data described above, the patient presents with decreased LE strength (left worse than right), impaired balance, endurance, posture, increased knee pain bilaterally with transitional movements in weightbearing, leading to impaired gait and difficulty performing safe and independent functional mobility. Patient will benefit from skilled intervention to address the above impairments. Patient's rehabilitation potential is considered to be Good  Factors which may influence rehabilitation potential include:   []         None noted  []         Mental ability/status  [x]         Medical condition  [x]         Home/family situation and support systems  []         Safety awareness  [x]         Pain tolerance/management  []         Other:      PLAN :  See STG and LTG above. Order received from MD for physical therapy services and chart reviewed. Pt to be seen 5 times per week for 3 hours of total therapy per day for 3 weeks.   Thank you for the referral.    Pt would benefit from skilled physical therapy in order to improve independent functional mobility within the home. Interventions may include range of motion (AROM, PROM B LE/trunk), motor function (B LE/trunk strengthening/coordination), activity tolerance (vitals, oxygen saturation levels), bed mobility training, balance activities, gait training (progressive ambulation program), wheelchair management and functional transfer training. Discharge Recommendations: Home Health and caregiver aide as appropriate  Further Equipment Recommendations for Discharge: rolling walker, may need w/c pending progress       Activity Tolerance:   Fair due to fatigue, dizziness/nausea during second session. Please refer to the flowsheet for vital signs taken during this treatment. Patient Vitals during session:   Position Pulse BP SpO2   01/01/22 0957 Supine 88 (!) 171/92 --   01/01/22 0945 Sitting in w/c 87 (!) 162/97 93 %         After treatment:   [x] Patient left in no apparent distress in bed  [] Patient left in no apparent distress sitting up in chair  [] Patient left in no apparent distress in w/c mobilizing under own power  [] Patient left in no apparent distress dining area  [] Patient left in no apparent distress mobilizing under own power  [x] Call bell left within reach, son in room  [x] Nursing notified  [] Caregiver present  [] Bed alarm activated   [] Chair alarm activated. COMMUNICATION/EDUCATION:   [x]         Fall prevention education was provided and the patient/caregiver indicated understanding. [x]         Patient/family have participated as able in goal setting and plan of care. [x]         Patient/family agree to work toward stated goals and plan of care. []         Patient understands intent and goals of therapy, but is neutral about his/her participation. []         Patient is unable to participate in goal setting and plan of care.     Thank you for this referral.  Brii Rogers, PT  1/1/2022

## 2022-01-01 NOTE — ROUTINE PROCESS
0800 Pt. Awake sitting up in bed no change in assessment . 0930 Pt. Sitting up in chair eating breakfast.  1200 Dr. Perez Numbers notified of elevated BP and c/o nausea. 1330 able to transfer from bed to chair with assist.  1500 no change in assessment. 1800 Pt. Sitting up in bed eating dinner.

## 2022-01-01 NOTE — PROGRESS NOTES
conducted an initial consultation and Spiritual Assessment for Radha Doyle, who is a 82 y.o.,female. Patients Primary Language is: [place]. According to the patients EMR Zoroastrianism Affiliation is: Tenriism. The reason the Patient came to the hospital is:   Patient Active Problem List    Diagnosis Date Noted    CVA (cerebral vascular accident) ([facility] [address] 12/31/2021    Stroke ([facility] [address] 12/26/2021        The  provided the following Interventions:  Initiated a relationship of care and support with patient in room 191 of the rehab unit. Listened empathically as patient shared her story and hopes for being in the rehab unit. Patient was having breakfast at the time of this visit. There is no advance directive on file for patient. Patient is hopefull for a short recovery time so that she can get back to her home. Provided information about Spiritual Care Services. Offered prayer and assurance of continued prayers on patients behalf. The following outcomes were achieved:  Patient shared limited information about her medical narrative and spiritual journey/beliefs. Patient processed feeling about current hospitalization. Patient expressed gratitude for pastoral care visit. Assessment:  Patient does not have any Yazidism/cultural needs that will affect patients preferences in health care. There are no further spiritual or Yazidism issues which require Spiritual Care Services interventions at this time. Plan:  Chaplains will continue to follow and will provide pastoral care on an as needed/requested basis    . Alexandre Castro   Spiritual Care   [phone]

## 2022-01-01 NOTE — ROUTINE PROCESS
SHIFT CHANGE NOTE FOR [facility]    Bedside and Verbal shift change report given to Scotty NOEL (oncoming nurse) by Raynald Cabot, LPN (offgoing nurse). Report included the following information SBAR, Kardex, MAR and Recent Results. Situation:   Code Status: Full Code   Hospital Day: 0   Problem List:   Hospital Problems  Never Reviewed          Codes Class Noted POA    CVA (cerebral vascular accident) [facility]) ICD-10-CM: I63.9  ICD-9-CM: 434.91  12/31/2021 Unknown              Background:   Past Medical History: No past medical history on file.      Assessment:   Changes in Assessment throughout shift:       Patient has a central line: no Reasons if yes: na  Insertion date:na Last dressing date:na   Patient has Ovalle Cath: no Reasons if yes: na   Insertion date:na  Shift ovalle care completed: NO     Last Vitals:     Vitals:    12/31/21 1612   BP: (!) 156/88   Pulse: 84   Resp: 16   Temp: 97.3 °F (36.3 °C)   SpO2: 96%        PAIN    Pain Assessment    Pain Intensity 1: 1 (12/31/21 1941) Pain Intensity 1: 2 (12/29/14 1105)    Pain Location 1: Head Pain Location 1: Abdomen    Pain Intervention(s) 1: Medication (see MAR) Pain Intervention(s) 1: Medication (see MAR)  Patient Stated Pain Goal: 0 Patient Stated Pain Goal: 0  o Intervention effective: yes  o Other actions taken for pain: Medication (see MAR)     Skin Assessment  Skin color    Condition/Temperature    Integrity    Turgor    Weekly Pressure Ulcer Documentation  Pressure  Injury Documentation: No Pressure Injury Noted-Pressure Ulcer Prevention Initiated  Wound Prevention & Protection Methods  Orientation of wound Orientation of Wound Prevention: Posterior  Location of Prevention Location of Wound Prevention: Buttocks  Dressing Present Dressing Present : No  Dressing Status    Wound Offloading       INTAKE/OUPUT  Date 12/30/21 1900 - 12/31/21 0659(Not Admitted) 12/31/21 0700 - 01/01/22 0659   Shift 9791-0043 24 Hour Total 0277-7820 6518-1433 24 Hour Total INTAKE   P.O.   240  240     P. O.   240  240   Shift Total   240  240   OUTPUT   Urine          Urine Occurrence(s)   0 x  0 x   Stool          Stool Occurrence(s)   0 x  0 x   Shift Total        NET   240  240   Weight (kg)            Recommendations:  1. Patient needs and requests: met    2. Pending tests/procedures: labs     3. Functional Level/Equipment: Partial (two people) /      Fall Precautions:   Fall risk precautions were reinforced with the patient; she was instructed to call for help prior to getting up. The following fall risk precautions were continued: bed/ chair alarms, door signage, yellow bracelet and socks as well as update of the Prakash Braxtons tool in the patient's room. Yadira Score: 3    HEALS Safety Check    A safety check occurred in the patient's room between off going nurse and oncoming nurse listed above. The safety check included the below items  Area Items   H  High Alert Medications - Verify all high alert medication drips (heparin, PCA, etc.)   E  Equipment - Suction is set up for ALL patients (with norah)  - Red plugs utilized for all equipment (IV pumps, etc.)  - WOWs wiped down at end of shift.  - Room stocked with oxygen, suction, and other unit-specific supplies   A  Alarms - Bed alarm is set for fall risk patients  - Ensure chair alarm is in place and activated if patient is up in a chair   L  Lines - Check IV for any infiltration  - Edwards bag is empty if patient has a Edwards   - Tubing and IV bags are labeled   S  Safety   - Room is clean, patient is clean, and equipment is clean. - Hallways are clear from equipment besides carts. - Fall bracelet on for fall risk patients  - Ensure room is clear and free of clutter  - Suction is set up for ALL patients (with norah)  - Hallways are clear from equipment besides carts.    - Isolation precautions followed, supplies available outside room, sign posted     Neisha Alfonso LPN

## 2022-01-01 NOTE — PROGRESS NOTES
Problem: Self Care Deficits Care Plan (Adult)  Goal: *Therapy Goal (Edit Goal, Insert Text)  2022 1324 by Hank Bailey OT  Note: Occupational Therapy Goals   Long Term Goals  Initiated 22 and to be accomplished within 3 week(s)  1. Pt will perform self-feeding with independence. 2. Pt will perform grooming with independence. 3. Pt will perform UB bathing with supervision. 4. Pt will perform LB bathing with supervision. 5. Pt will perform tub/shower transfer with supervision. 6. Pt will perform UB dressing with Axel using compensatory strategies. 7. Pt will perform LB dressing with Axel and AE. 8. Pt will perform toileting task with Axel and AE. 9. Pt will perform toilet transfer with Axel and AE. Short Term Goals   Initiated 22 and to be accomplished within 7 day(s), reassess 22  1. Pt will perform self-feeding with Axel. 2. Pt will perform grooming with Axel. 3. Pt will perform UB bathing with SBA. 4. Pt will perform LB bathing with Fidel and UE support. 5. Pt will perform tub/shower transfer with Fidel and AE. 6. Pt will perform UB dressing with SBA using compensatory strategies. 7. Pt will perform LB dressing with Fidel and AE. 8. Pt will perform toileting task with Fidel and AE. 9. Pt will perform toilet transfer with Fidel and AE.   2022 1324 by Hank Bailey OT  Outcome: Progressing Towards Goal  Goal: Interventions  Outcome: Progressing Towards Goal   OCCUPATIONAL THERAPY EVALUATION    Patient: Bill Johnson 82 y.o. Date: 2022  Primary Diagnosis: CVA (cerebral vascular accident) [facility]) [I63.9]    Patient identified with name and : yes    Past Medical History: No past medical history on file. Precautions: falls    Time In: 1030  Time Out[de-identified] 1130    Pain:  Pt reports 0/10 pain or discomfort prior to treatment. Pt reports 0/10 pain or discomfort post treatment. SUBJECTIVE:   Patient stated not fuzzy but it's not quite in focus.  (referring to vision in L eye)    OBJECTIVE DATA SUMMARY:   Observed Jill's nodes to R hand digit 1 due to osteoarthritis and deformities  Pt demonstrates BLE deformities of IR of hips, prior rotator cuff injuries that limited BUE AROM prior to hospitalization (chronic)    [x]  Right hand dominant   []  Left hand dominant    Therapy Diagnosis:   Difficulty with ADLs  [x]     Difficulty with functional transfers  [x]     Difficulty with ambulation  [x]     Difficulty with IADLs  [x]       Problem List:    Decreased strength B UE  [x]     Decreased strength trunk/core  [x]     Decreased AROM   [x]     Decreased endurance  [x]     Decreased balance sitting  []     Decreased balance standing  [x]     Pain   []     Decreased PROM  []       Functional Limitations:   Decreased independence with ADL  [x]     Decreased independence with functional transfers  [x]     Decreased independence with ambulation  [x]     Decreased independence with IADL  [x]       Previous Functional Level: Pt prior independent with self cares with exception of bathing. Pt has aid that assists with showering Tues. And Friday. Pt performs functional mobility with rollator independently.     Home Environment: Home Situation  Home Environment: Independent living  One/Two Story Residence: One story  Living Alone: Yes  Support Systems: Child(jovany)  Patient Expects to be Discharged to[de-identified] Independent living facility  Current DME Used/Available at Home: Walker, rolling  Tub or Shower Type: Shower (small step to step over)    Barriers to Learning/Limitations: None  Compensate with: visual, verbal, tactile, kinesthetic cues/model    Outcome Measures:      MMT Initial Assessment   Right Upper Extremity  Left Upper Extremity    UE AROM WFL with exception of BUE shoulders due to prior rotator cuff injuries, ~5 degrees AROM shoulder flexion WFL with exception of BUE shoulders due to prior rotator cuff injuries, ~5 degrees AROM shoulder flexion   Shoulder flexion 2-/5 (chronic due to prior rotator cuff injuries) 2-/5 (chronic due to prior rotator cuff injuries)   Shoulder extension 2-/5 (chronic due to prior rotator cuff injuries) 2-/5 (chronic due to prior rotator cuff injuries)   Shoulder ABDuction     Shoulder ADDUction     Elbow Flexion 4-/5 3+/5   Elbow Extension 4-/5 3+/5   Wrist Extension/Flexion      3+/5 3+/5   0/5 No palpable muscle contraction  1/5 Palpable muscle contraction, no joint movement  2-/5 Less than full range of motion in gravity eliminated position  2/5 Able to complete full range of motion in gravity eliminated position  2+/5 Able to initiate movement against gravity  3-/5 More than half but not full range of motion against gravity  3/5 Able to complete full range of motion against gravity  3+/5 Completes full range of motion against gravity with minimal resistance  4-/5 Completes full range of motion against gravity with minimal resistance  4/5 Completes full range of motion against gravity with moderate resistance  5/5 Completes full range of motion against gravity with maximum resistance    Sensation: appears Intact  Coordination: BUE impaired    FIM SCORES Initial Assessment   Bladder - level of assist     Bladder - accident frequency score     Bowel - level of assist     Bowel - accident frequency score     Please see IRC Interdisciplinary Eval: Coordination/Balance Section for details regarding FIM score description.     COGNITION/PERCEPTION Initial Assessment   Reading Status Literate   Writing Status [facility]   Arousal/Alertness Alert   Orientation Level Oriented X4   Visual Fields     Praxis     Body Scheme       COMPREHENSION MODE Initial Assessment   Primary Mode of Comprehension Auditory   Hearing Aide None   Corrective Lenses Reading glasses   Score 5     EXPRESSION Initial Assessment   Primary Mode of Expression Verbal   Score 6   Comments       SOCIAL INTERACTION/PRAGMATICS Initial Assessment   Score 6   Comments       PROBLEM SOLVING Initial Assessment   Score 5   Comments       MEMORY Initial Assessment   Score 6   Comments appears intact     EATING Initial Assessment   Functional Level 5   Comments       GROOMING Initial Assessment   Functional Level 5    Oral Hygiene FIM:5   Tasks completed by patient Oral care, washing hands   Comments       BATHING Initial Assessment   Functional Level 4   Body parts patient bathed     Comments Pt performed UB bathing via sponge bath with Fidel to wash back and LB bathing with modA to wash BLE feet and buttocks thoroughly at [address] and assistance for stability     TUB/SHOWER TRANSFER INDEPENDENCE Initial Assessment   Score 0 (NT due to safety concerns)   Comments       UPPER BODY DRESSING/UNDRESSING Initial Assessment   Functional Level 4   Items applied/Steps completed Bra (3 steps),Pullover (4 steps)   Comments Pt performed UB dressing with Fidel to don bra in seated position at EOB       LOWER BODY DRESSING/UNDRESSING Initial Assessment   Functional Level 2    Sock and/or Shoe Management FIM:1   Items applied/Steps completed Elastic waist pants (3 steps),Shoe, left (1 step),Shoe, right (1 step),Sock, left (1 step),Sock, right (1 step),Underpants (3 steps)   Comments Pt completed LB dressing with maxA to don BLE socks and TA to don pull tab brief, pt thread BLE feet through pants and pulled up over BLE hips and buttocks with modA.      TOILETING Initial Assessment   Functional Level 2   Comments Pt performed toileting with FWW to [facility] with maxA to perform toileting hygiene and CM due to decreased stability and strength     TOILET TRANSFER INDEPENDENCE Initial Assessment   Transfer score 3   Comments Pt performed toilet transfer with modA using FWW, pt required assistance due to instability and deformity of BLE legs (IR of BLE hips) and decreased strength and activity tolerance     INSTRUMENTAL ADL Initial Assessment (PLOF)   Meal preparation Independent   Homemaking Independent   Medicine Management Independent Financial Management Independent        ASSESSMENT :  Based on the objective data described above, the patient presents with impaired standing balance, impaired activity tolerance, Left side weakness, impaired left vision, decreased BUE strength, impaired BUE FM coordination secondary to arthritis. Pt would benefit from skilled occupational therapy in order to improve independent functional mobility/ADLs,/IADLs within the home. Interventions may include range of motion (AROM, PROM B UE), motor function (B UE/ strengthening/coordination), activity tolerance (vitals, oxygen saturation levels), balance training, ADL/IADL training and functional transfer training. Please see IRC; Interdisciplinary Eval, Care Plan, and Patient Education for further information regarding occupational therapy examination and plan of care. PLAN :  Recommendations and Planned Interventions:  [x]               Self Care Training                   [x]        Therapeutic Activities  [x]               Functional Mobility Training    [x]        Cognitive Retraining  [x]               Therapeutic Exercises            [x]        Endurance Activities  [x]               Balance Training                     [x]        Neuromuscular Re-Education  [x]               Visual/Perceptual Training      [x]   Home Safety Training  [x]               Patient Education                    [x]        Family Training/Education  []               Other (comment):    Frequency/Duration: Patient will be followed by occupational therapy 1-2 times per day/4-7 days per week to address goals. Discharge Recommendations: Home Health  Further Equipment Recommendations for Discharge: bedside commode and N/A     Please refer to the flow sheet for vital signs taken during this treatment.   After treatment:   [] Patient left in no apparent distress sitting up in chair  [x] Patient left in no apparent distress in bed  [x] Call bell left within reach  [] Nursing notified  [] Caregiver present  [] Bed alarm activated    COMMUNICATION/EDUCATION:   [x] Home safety education was provided and the patient/caregiver indicated understanding. [x] Patient/family have participated as able in goal setting and plan of care. [x] Patient/family agree to work toward stated goals and plan of care. [x] Patient understands intent and goals of therapy, but is neutral about his/her participation. [] Patient is unable to participate in goal setting and plan of care. Order received from MD for occupational therapy services and chart reviewed. Pt to be seen 5 times per week for 3 hours of total therapy per day for 3 weeks.   Thank you for the referral.    Thank you for this referral.  Columba Reeder OT

## 2022-01-01 NOTE — PROGRESS NOTES
Speech LAnguage Pathology evaluation    Patient: Mil Jon (46 y.o. female)  Date: 1/1/2022  Primary Diagnosis: CVA (cerebral vascular accident) [facility]) [I63.9]       Precautions:    Fall  Time in: 1300  Time Out: 1345    Pain:  Pain Scale 1: Visual  Pain Intensity 1: 0     SUBJECTIVE:   Patient stated I live at [facility]. OBJECTIVE:   No past medical history on file. No past surgical history on file. Prior Level of Function/Home Situation:  Patient received some help with ADLs and finances.   Home Situation  Home Environment: Independent living  One/Two Story Residence: One story  Living Alone: Yes  Support Systems: Child(jovany)  Patient Expects to be Discharged to[de-identified] Independent living facility  Current DME Used/Available at Home: Luzchandan Khan, rollator  Tub or Shower Type: Shower (grab bars and seat in and outside shower)  Mental Status:  Neurologic State: Alert  Orientation Level: Disoriented X4  Cognition: Follows commands  Perception:  (Patient reports things are \"blurry\" on the left.)  Perseveration: No perseveration noted  Safety/Judgement: Fall prevention  Motor Speech:  Oral-Motor Structure/Motor Speech  Face: No impairment  Labial: No impairment  Dentition: Upper & lower dentures  Oral Hygiene: WFL  Lingual: No impairment  Velum: No impairment  Mandible: No impairment  Dysarthric Characteristics: None  Intelligibility: No impairment  Intonation: WFL  Rate: WFl  Prosody: WFL  Overall Impairment Severity: None  Language Comprehension and Expression:  Auditory Comprehension  Auditory Impairment: No   Hearing Aid: None  Verbal Expression  Primary Mode of Expression: Verbal  Initiation: No impairment  Naming: No impairment  Sentence Formulation: No impairment  Conversation: No impairment  Overall Impairment: None  Reading Comprehension  Visual Impairment: Glasses/contacts  Pre-Morbid Reading Status: Literate  Scanning/Tracking : No impairment  Words : Yes  Sentence: No Impairment  Paragraph : No impairment  Oral Reading: No impairment  Overall Impairment Severity: None  Written Expression  Pre-Morbid Dominant Hand: Right  Pre-Morbid Writing Skills: Impaired  Neuro-Linguistics:  Verbal Reasoning Tasks: No Impairment  Verbal Problem Solving: No Impairment  Verbal Organization: No Impairment  Thought Organization: WFL  Mathematical:  (DNT)  Memory:  (Mild impairment-reported to be her baseline)  Attention : No Impairement  Pragmatics:  Pragmatics Impairment: No impairment  Voice:  Patient's voice quality is WFL for her age and gender. After treatment:   [x]              Patient left in no apparent distress sitting up in chair  []              Patient left in no apparent distress in bed  [x]              Call bell left within reach  [x]              Nursing notified  []              Caregiver present  []              Bed alarm activated    ASSESSMENT:   Based on the objective data described below, the patient presents with speech, language and cognitive skills at her reported baseline. Patient will not be followed by SLP at this time. Patients rehabilitation potential is considered to be Excellent  Factors which may influence rehabilitation potential include:   []              None noted  []              Mental ability/status  [x]              Medical condition  []              Home/family situation and support systems  [x]              Safety awareness  []              Pain tolerance/management  []              Other:     PLAN:   Recommendations and Planned Interventions:  Mrs. Chalino March demonstrates speech, language and cognitive functioning which are at her prior baseline. Frequency/Duration: Patient will be followed by speech-language pathology 1 time for evaluation only. Discharge Recommendations: Home Health    Estimated LOS: 2-3 weeks. COMMUNICATION/EDUCATION:   [x]  Patient/family have participated as able in goal setting and plan of care.   []  Patient/family agree to work toward stated goals and plan of care. []  Patient understands intent and goals of therapy, but is neutral about his/her participation. []  Patient is unable to participate in goal setting and plan of care.     Thank you for this referral.  Farida Bagley, SLP  Time Calculation: 45 mins

## 2022-01-02 LAB
ALBUMIN SERPL-MCNC: 3.1 G/DL (ref 3.4–5)
ALBUMIN/GLOB SERPL: 0.9 {RATIO} (ref 0.8–1.7)
ALP SERPL-CCNC: 89 U/L (ref 45–117)
ALT SERPL-CCNC: 46 U/L (ref 13–56)
ANION GAP SERPL CALC-SCNC: 7 MMOL/L (ref 3–18)
AST SERPL-CCNC: 40 U/L (ref 10–38)
BILIRUB SERPL-MCNC: 0.6 MG/DL (ref 0.2–1)
BUN SERPL-MCNC: 21 MG/DL (ref 7–18)
BUN/CREAT SERPL: 27 (ref 12–20)
CALCIUM SERPL-MCNC: 9.3 MG/DL (ref 8.5–10.1)
CHLORIDE SERPL-SCNC: 106 MMOL/L (ref 100–111)
CO2 SERPL-SCNC: 27 MMOL/L (ref 21–32)
CREAT SERPL-MCNC: 0.77 MG/DL (ref 0.6–1.3)
GLOBULIN SER CALC-MCNC: 3.4 G/DL (ref 2–4)
GLUCOSE SERPL-MCNC: 90 MG/DL (ref 74–99)
POTASSIUM SERPL-SCNC: 4 MMOL/L (ref 3.5–5.5)
PROT SERPL-MCNC: 6.5 G/DL (ref 6.4–8.2)
SODIUM SERPL-SCNC: 140 MMOL/L (ref 136–145)

## 2022-01-02 PROCEDURE — 74011250636 HC RX REV CODE- 250/636: Performed by: FAMILY MEDICINE

## 2022-01-02 PROCEDURE — 65310000000 HC RM PRIVATE REHAB

## 2022-01-02 PROCEDURE — 36415 COLL VENOUS BLD VENIPUNCTURE: CPT

## 2022-01-02 PROCEDURE — 2709999900 HC NON-CHARGEABLE SUPPLY

## 2022-01-02 PROCEDURE — 74011250637 HC RX REV CODE- 250/637: Performed by: FAMILY MEDICINE

## 2022-01-02 PROCEDURE — 80053 COMPREHEN METABOLIC PANEL: CPT

## 2022-01-02 RX ORDER — MECLIZINE HCL 12.5 MG 12.5 MG/1
12.5 TABLET ORAL 2 TIMES DAILY
Status: DISCONTINUED | OUTPATIENT
Start: 2022-01-02 | End: 2022-01-14

## 2022-01-02 RX ADMIN — LOSARTAN POTASSIUM 25 MG: 25 TABLET, FILM COATED ORAL at 09:13

## 2022-01-02 RX ADMIN — POTASSIUM CHLORIDE 20 MEQ: 1500 TABLET, EXTENDED RELEASE ORAL at 09:14

## 2022-01-02 RX ADMIN — CLOPIDOGREL BISULFATE 75 MG: 75 TABLET ORAL at 09:14

## 2022-01-02 RX ADMIN — HYDRALAZINE HYDROCHLORIDE 25 MG: 25 TABLET, FILM COATED ORAL at 21:44

## 2022-01-02 RX ADMIN — ATORVASTATIN CALCIUM 80 MG: 40 TABLET, FILM COATED ORAL at 21:44

## 2022-01-02 RX ADMIN — DORZOLAMIDE HYDROCHLORIDE 1 DROP: 20 SOLUTION/ DROPS OPHTHALMIC at 09:18

## 2022-01-02 RX ADMIN — MECLIZINE 12.5 MG: 12.5 TABLET ORAL at 17:24

## 2022-01-02 RX ADMIN — OXYBUTYNIN CHLORIDE 10 MG: 5 TABLET, EXTENDED RELEASE ORAL at 21:45

## 2022-01-02 RX ADMIN — DORZOLAMIDE HYDROCHLORIDE 1 DROP: 20 SOLUTION/ DROPS OPHTHALMIC at 15:03

## 2022-01-02 RX ADMIN — LATANOPROST 1 DROP: 50 SOLUTION OPHTHALMIC at 21:51

## 2022-01-02 RX ADMIN — DORZOLAMIDE HYDROCHLORIDE 1 DROP: 20 SOLUTION/ DROPS OPHTHALMIC at 21:47

## 2022-01-02 RX ADMIN — SERTRALINE 50 MG: 50 TABLET, FILM COATED ORAL at 09:14

## 2022-01-02 RX ADMIN — DOCUSATE SODIUM 100 MG: 100 CAPSULE, LIQUID FILLED ORAL at 17:27

## 2022-01-02 RX ADMIN — ONDANSETRON 4 MG: 4 TABLET, ORALLY DISINTEGRATING ORAL at 15:03

## 2022-01-02 RX ADMIN — ASPIRIN 325 MG ORAL TABLET 325 MG: 325 PILL ORAL at 09:14

## 2022-01-02 RX ADMIN — MONTELUKAST 10 MG: 10 TABLET, FILM COATED ORAL at 09:14

## 2022-01-02 NOTE — ROUTINE PROCESS
SHIFT CHANGE NOTE FOR [facility]    Bedside and Verbal shift change report given to BERT Nelson (oncoming nurse) by Zuleima Chua (offgoing nurse). Report included the following information SBAR, Kardex, MAR and Recent Results. Situation:   Code Status: Full Code   Hospital Day: 2   Problem List:   Hospital Problems  Never Reviewed          Codes Class Noted POA    CVA (cerebral vascular accident) [facility]) ICD-10-CM: I63.9  ICD-9-CM: 434.91  12/31/2021 Unknown              Background:   Past Medical History: No past medical history on file.      Assessment:   Changes in Assessment throughout shift: No change to previous assessment    Patient has a central line: no   Patient has Edwards Cath: no      Last Vitals:     Vitals:    01/01/22 1510 01/01/22 2141 01/02/22 0800 01/02/22 0910   BP: (!) 172/88 (!) 152/80 (!) 145/83 (!) 145/72   Pulse: 87 82 77 90   Resp: 18 18 18    Temp: 97.7 °F (36.5 °C) 97.7 °F (36.5 °C) 97.8 °F (36.6 °C)    SpO2: 94% 93% 97%    Weight:       Height:            PAIN    Pain Assessment    Pain Intensity 1: 0 (01/02/22 0747) Pain Intensity 1: 2 (12/29/14 1105)    Pain Location 1: Head Pain Location 1: Abdomen    Pain Intervention(s) 1: Medication (see MAR) Pain Intervention(s) 1: Medication (see MAR)  Patient Stated Pain Goal: 0 Patient Stated Pain Goal: 0  o Intervention effective: yes  o Other actions taken for pain:       Skin Assessment  Skin color    Condition/Temperature    Integrity    Turgor    Weekly Pressure Ulcer Documentation  Pressure  Injury Documentation: No Pressure Injury Noted-Pressure Ulcer Prevention Initiated  Wound Prevention & Protection Methods  Orientation of wound Orientation of Wound Prevention: Posterior  Location of Prevention Location of Wound Prevention: Buttocks,Sacrum/Coccyx  Dressing Present Dressing Present : No  Dressing Status    Wound Offloading Wound Offloading (Prevention Methods): Bed, pressure redistribution/air,Pillows,Repositioning     INTAKE/OUPUT  Date 01/01/22 0700 - 01/02/22 0659 01/02/22 0700 - 01/03/22 0659   Shift 9221-8666 2980-0775 24 Hour Total 3881-4448 0088-2552 24 Hour Total   INTAKE   P.O. 720 240 960        P. O. 720 240 960      Shift Total(mL/kg) 720(11.5) 240(3.8) 960(15.3)      OUTPUT   Urine(mL/kg/hr)           Urine Occurrence(s) 6 x 5 x 11 x 1 x  1 x   Emesis/NG output           Emesis Occurrence(s) 1 x  1 x      Stool           Stool Occurrence(s) 2 x 0 x 2 x 0 x  0 x   Shift Total(mL/kg)          240 960      Weight (kg) 62.7 62.7 62.7 62.7 62.7 62.7       Recommendations:  1. Patient needs and requests: Eye patch    2. Pending tests/procedures: Routine labs     3. Functional Level/Equipment: Partial (one person) / Bed (comment); Wheelchair    Fall Precautions:   Fall risk precautions were reinforced with the patient; she was instructed to call for help prior to getting up. The following fall risk precautions were continued: bed/ chair alarms, door signage, yellow bracelet and socks as well as update of the Zaida Abo tool in the patient's room. Yadira Score: 3    HEALS Safety Check    A safety check occurred in the patient's room between off going nurse and oncoming nurse listed above. The safety check included the below items  Area Items   H  High Alert Medications - Verify all high alert medication drips (heparin, PCA, etc.)   E  Equipment - Suction is set up for ALL patients (with yanker)  - Red plugs utilized for all equipment (IV pumps, etc.)  - WOWs wiped down at end of shift.  - Room stocked with oxygen, suction, and other unit-specific supplies   A  Alarms - Bed alarm is set for fall risk patients  - Ensure chair alarm is in place and activated if patient is up in a chair   L  Lines - Check IV for any infiltration  - Edwards bag is empty if patient has a Edwards   - Tubing and IV bags are labeled   S  Safety   - Room is clean, patient is clean, and equipment is clean. - Hallways are clear from equipment besides carts.    - Fall bracelet on for fall risk patients  - Ensure room is clear and free of clutter  - Suction is set up for ALL patients (with norah)  - Hallways are clear from equipment besides carts.    - Isolation precautions followed, supplies available outside room, sign posted     Nisa Carmona

## 2022-01-02 NOTE — PROGRESS NOTES
Problem: Falls - Risk of  Goal: *Absence of Falls  Description: Document [place] Tyson Fall Risk and appropriate interventions in the flowsheet.   Outcome: Progressing Towards Goal  Note: Fall Risk Interventions:  Mobility Interventions: Assess mobility with egress test,Bed/chair exit alarm,Communicate number of staff needed for ambulation/transfer,OT consult for ADLs,Patient to call before getting OOB,PT Consult for mobility concerns,PT Consult for assist device competence,Strengthening exercises (ROM-active/passive),Utilize gait belt for transfers/ambulation,Utilize walker, cane, or other assistive device         Medication Interventions: Bed/chair exit alarm,Evaluate medications/consider consulting pharmacy,Patient to call before getting OOB,Teach patient to arise slowly,Utilize gait belt for transfers/ambulation    Elimination Interventions: Bed/chair exit alarm,Call light in reach,Patient to call for help with toileting needs,Stay With Me (per policy)              Problem: Inpatient Rehab (Adult)  Goal: *LTG: Avoids infection 100% of time related to deficits  Outcome: Progressing Towards Goal  Goal: *LTG: Verbalize understanding of diagnosis and risk factors for recurring stroke  Outcome: Progressing Towards Goal  Goal: *LTG: Absence of DVT during hospitalization  Outcome: Progressing Towards Goal  Goal: *LTG: Maintains Skin Integrity With No Evidence of Pressure Injury 100% of Time  Outcome: Progressing Towards Goal

## 2022-01-02 NOTE — ROUTINE PROCESS
SHIFT CHANGE NOTE FOR [facility]    Bedside and Verbal shift change report given to Eric Pina (oncoming nurse) by Sherin Huddleston RN (offgoing nurse). Report included the following information SBAR, Kardex, MAR and Recent Results. Situation:   Code Status: Full Code   Hospital Day: 2   Problem List:   Hospital Problems  Never Reviewed          Codes Class Noted POA    CVA (cerebral vascular accident) [facility]) ICD-10-CM: I63.9  ICD-9-CM: 434.91  12/31/2021 Unknown              Background:   Past Medical History: No past medical history on file.      Assessment:   Changes in Assessment throughout shift: No change to previous assessment     Patient has a central line: no Reasons if yes: n/a  Insertion date:n/a Last dressing date:n/a   Patient has Ovalle Cath: no Reasons if yes: n/a   Insertion date:n/a  Shift ovalle care completed: NO     Last Vitals:     Vitals:    01/01/22 1410 01/01/22 1411 01/01/22 1510 01/01/22 2141   BP:   (!) 172/88 (!) 152/80   Pulse:   87 82   Resp:   18 18   Temp:   97.7 °F (36.5 °C) 97.7 °F (36.5 °C)   SpO2:   94% 93%   Weight:  62.7 kg (138 lb 3.2 oz)     Height: 4' 11\" (1.499 m)           PAIN    Pain Assessment    Pain Intensity 1: 0 (01/02/22 0401) Pain Intensity 1: 2 (12/29/14 1105)    Pain Location 1: Head Pain Location 1: Abdomen    Pain Intervention(s) 1: Medication (see MAR) Pain Intervention(s) 1: Medication (see MAR)  Patient Stated Pain Goal: 0 Patient Stated Pain Goal: 0  o Intervention effective: no complaints of pain  o Other actions taken for pain:       Skin Assessment  Skin color    Condition/Temperature    Integrity    Turgor    Weekly Pressure Ulcer Documentation  Pressure  Injury Documentation: No Pressure Injury Noted-Pressure Ulcer Prevention Initiated  Wound Prevention & Protection Methods  Orientation of wound Orientation of Wound Prevention: Posterior  Location of Prevention Location of Wound Prevention: Buttocks,Sacrum/Coccyx  Dressing Present Dressing Present : No  Dressing Status    Wound Offloading Wound Offloading (Prevention Methods): Bed, pressure reduction mattress     INTAKE/OUPUT  Date 01/01/22 0700 - 01/02/22 0659 01/02/22 0700 - 01/03/22 0659   Shift 0700-1859 1900-0659 24 Hour Total 0700-1859 1900-0659 24 Hour Total   INTAKE   P.O. 720 120 840        P. O. 720 120 840      Shift Total(mL/kg) 720(11.5) 120(1.9) 840(13.4)      OUTPUT   Urine(mL/kg/hr)           Urine Occurrence(s) 6 x 4 x 10 x      Emesis/NG output           Emesis Occurrence(s) 1 x  1 x      Stool           Stool Occurrence(s) 2 x 0 x 2 x      Shift Total(mL/kg)          120 840      Weight (kg) 62.7 62.7 62.7 62.7 62.7 62.7       Recommendations:  1. Patient needs and requests: assistance with ADL's    2. Pending tests/procedures: none     3. Functional Level/Equipment: Partial (one person) / Marlene Bocanegra; Wheelchair;Bedside Commode    Fall Precautions:   Fall risk precautions were reinforced with the patient; she was instructed to call for help prior to getting up. The following fall risk precautions were continued: bed/ chair alarms, door signage, yellow bracelet and socks as well as update of the Rubi Duverney tool in the patient's room. Yadira Score: 3    HEALS Safety Check    A safety check occurred in the patient's room between off going nurse and oncoming nurse listed above.     The safety check included the below items  Area Items   H  High Alert Medications - Verify all high alert medication drips (heparin, PCA, etc.)   E  Equipment - Suction is set up for ALL patients (with yanker)  - Red plugs utilized for all equipment (IV pumps, etc.)  - WOWs wiped down at end of shift.  - Room stocked with oxygen, suction, and other unit-specific supplies   A  Alarms - Bed alarm is set for fall risk patients  - Ensure chair alarm is in place and activated if patient is up in a chair   L  Lines - Check IV for any infiltration  - Edwards bag is empty if patient has a Edwards   - Tubing and IV bags are labeled   S  Safety   - Room is clean, patient is clean, and equipment is clean. - Hallways are clear from equipment besides carts. - Fall bracelet on for fall risk patients  - Ensure room is clear and free of clutter  - Suction is set up for ALL patients (with davieker)  - Hallways are clear from equipment besides carts.    - Isolation precautions followed, supplies available outside room, sign posted     Nicholas Roach, BERT

## 2022-01-02 NOTE — PROGRESS NOTES
Progress Note    Patient: Rosendo Lang MRN: [id number]  CSN: [id number]    YOB: 1935  Age: 82 y.o. Sex: female    DOA: 12/31/2021 LOS:  LOS: 2 days                    Subjective:   Inpatient rehab   Impairment group :Stroke:Left body involvement (Rt Brain)  EPHRAIM: stroke  Diagnosis: cerebral infarction,unspecified    Review of systems  General: No fevers or chills. Cardiovascular: No chest pain or pressure. No palpitations. Pulmonary: No shortness of breath, cough or wheeze. Gastrointestinal: POSITIVE abdominal pain, AND  Nausea after eating, vomiting or diarrhea. Genitourinary: positive frequency, urgency, hesitancy or dysuria. Urine analysis negative  Musculoskeletal: No joint or muscle pain, no back pain, no recent trauma. Neurologic: No headache,generalized weakness dizziness when moving head. Pt wearing lt [facility] patch at [facility] helpmhjordan sx discussed with nursing staffw trip kraus pt if they have patch     Objective:     Physical Exam:  Visit Vitals  BP (!) 158/86 (BP 1 Location: Right upper arm, BP Patient Position: Supine)   Pulse 81   Temp 98.4 °F (36.9 °C)   Resp 18   Ht 4' 11\" (1.499 m)   Wt 62.7 kg (138 lb 3.2 oz)   SpO2 94%   BMI 27.91 kg/m²        General:         Alert,  no acute distress    HEENT: NC, Atraumatic. PERRLA, anicteric sclerae. Lungs: CTA Bilaterally. No Wheezing/Rhonchi/Rales. Heart:  Regular  rhythm,  No murmur, No Rubs, No Gallops  Abdomen: Soft, Non distended, Non tender.  +Bowel sounds, no HSM. Had BM   Extremities: No lower limb edema   Psych:   . Not anxious or agitated. Neurologic:  CN 2-12 grossly intact, Alert and oriented X 3. No acute neurological                                 Deficits,     Intake and Output:  Current Shift:  No intake/output data recorded.   Last three shifts:  12/31 1901 - 01/02 0700  In: 1260 [P.O.:1260]  Out: -     Labs: Results:       Chemistry Recent Labs     01/02/22  0559 01/01/22  0527 12/31/21  0046   GLU 90 94 89   NA 140 140 138   K 4.0 3.8 3.6    106 105   CO2 27 27 27   BUN 21* 25* 31*   CREA 0.77 0.76 0.7   CA 9.3 8.5 9.3   AGAP 7 7 7   BUCR 27* 33*  --    AP 89 80  --    TP 6.5 6.0*  --    ALB 3.1* 2.7* 3.0*   GLOB 3.4 3.3  --    AGRAT 0.9 0.8  --       CBC w/Diff Recent Labs     01/01/22  0527 12/31/21  0046   WBC 7.1 7.3   RBC 4.41 4.58   HGB 12.6 13.1   HCT 38.1 39.5    203   GRANS 71 63.6   LYMPH 16* 22.0*   EOS 1 0.8      Cardiac Enzymes No results for input(s): CPK, CKND1, GUSTABO in the last 72 hours. No lab exists for component: CKRMB, TROIP   Coagulation No results for input(s): PTP, INR, APTT, INREXT in the last 72 hours. Lipid Panel Lab Results   Component Value Date/Time    Cholesterol, total 220 (H) 12/27/2021 01:25 AM    HDL Cholesterol 48 12/27/2021 01:25 AM    LDL, calculated 136 (H) 12/27/2021 01:25 AM    Triglyceride 182 (H) 12/27/2021 01:25 AM    CHOL/HDL Ratio 4.6 (H) 12/27/2021 01:25 AM      BNP No results for input(s): BNPP in the last 72 hours.    Liver Enzymes Recent Labs     01/02/22  0559   TP 6.5   ALB 3.1*   AP 89      Thyroid Studies No results found for: T4, T3U, TSH, TSHEXT       Procedures/imaging: see electronic medical records for all procedures/Xrays and details which were not copied into this note but were reviewed prior to creation of Plan    Medications:   Current Facility-Administered Medications   Medication Dose Route Frequency    losartan (COZAAR) tablet 25 mg  25 mg Oral DAILY    hydrALAZINE (APRESOLINE) tablet 25 mg  25 mg Oral TID PRN    ondansetron (ZOFRAN ODT) tablet 4 mg  4 mg Oral Q8H PRN    docusate sodium (COLACE) capsule 100 mg  100 mg Oral BID    magnesium hydroxide (MILK OF MAGNESIA) 400 mg/5 mL oral suspension 30 mL  30 mL Oral DAILY PRN    bisacodyL (DULCOLAX) tablet 10 mg  10 mg Oral Q48H PRN    aspirin tablet 325 mg  325 mg Oral DAILY    atorvastatin (LIPITOR) tablet 80 mg  80 mg Oral QHS    clopidogreL (PLAVIX) tablet 75 mg  75 mg Oral DAILY  dorzolamide (TRUSOPT) 2 % ophthalmic solution 1 Drop  1 Drop Both Eyes TID    latanoprost (XALATAN) 0.005 % ophthalmic solution 1 Drop  1 Drop Both Eyes QHS    loperamide (IMODIUM) capsule 2 mg  2 mg Oral PRN    montelukast (SINGULAIR) tablet 10 mg  10 mg Oral DAILY    oxybutynin chloride XL (DITROPAN XL) tablet 10 mg  10 mg Oral QHS    sertraline (ZOLOFT) tablet 50 mg  50 mg Oral DAILY    potassium chloride (K-DUR, KLOR-CON M20) SR tablet 20 mEq  20 mEq Oral DAILY WITH BREAKFAST    acetaminophen (TYLENOL) tablet 650 mg  650 mg Oral Q4H PRN       Assessment/Plan     Active Problems:    CVA (cerebral vascular accident) ([address] (12/31/2021)        Plan  Right CVA with residual left-sided weakness  Aspirin 325 mg was Plavix 75 mg once daily for 3 months and then aspirin  Follow-up neurology after discharge from rehab  Lipitor 80 mg nightly  PT and OT  Speech evaluation  Fall precaution aspiration precaution  Try Antivert for dizziness  Continue to monitor sx   Discussed with nursing staff       H/o hypertension  Patient not on any blood pressure medication  Start losartan 25 mg daily  Monitor blood pressure use hydralazine 25 mg 3 times daily as needed systolic blood pressure above 160  Recheck BMP in AM might increase losartan dose   Check vitamin d 25 hydroxy level     Hyperlipidemia  Lipitor 80 mg nightly follow-up liver function     Depression and anxiety  Zoloft 50 mg daily     Glaucoma  Xalatan eyedrops 1 drop both eyes at bedtime     Urinary incontinence  Patient has some symptom of urinary tract infection  Recheck UA negative for infection   Continue Ditropan XL 10 mg once a day     Mason Montana MD  1/2/2022 3:41 PM

## 2022-01-02 NOTE — ROUTINE PROCESS
SHIFT CHANGE NOTE FOR [facility]    Bedside and Verbal shift change report given to Juan Diego Felix RN (oncoming nurse) by Britney Crews RN (offgoing nurse). Report included the following information SBAR, Kardex, MAR and Recent Results. Situation:   Code Status: Full Code   Hospital Day: 1   Problem List:   Hospital Problems  Never Reviewed          Codes Class Noted POA    CVA (cerebral vascular accident) [facility]) ICD-10-CM: I63.9  ICD-9-CM: 434.91  12/31/2021 Unknown              Background:   Past Medical History: No past medical history on file.      Assessment:   Changes in Assessment throughout shift: No change to previous assessment     Patient has a central line: no Reasons if yes: na  Insertion date:na Last dressing date:na   Patient has Ovalle Cath: no Reasons if yes: na   Insertion date:na  Shift ovalle care completed: NO     Last Vitals:     Vitals:    01/01/22 1317 01/01/22 1410 01/01/22 1411 01/01/22 1510   BP: (!) 173/96   (!) 172/88   Pulse:    87   Resp:    18   Temp:    97.7 °F (36.5 °C)   SpO2:    94%   Weight:   62.7 kg (138 lb 3.2 oz)    Height:  4' 11\" (1.499 m)          PAIN    Pain Assessment    Pain Intensity 1: 0 (01/01/22 1600) Pain Intensity 1: 2 (12/29/14 1105)    Pain Location 1: Head Pain Location 1: Abdomen    Pain Intervention(s) 1: Medication (see MAR) Pain Intervention(s) 1: Medication (see MAR)  Patient Stated Pain Goal: 0 Patient Stated Pain Goal: 0  o Intervention effective: yes  o Other actions taken for pain:       Skin Assessment  Skin color    Condition/Temperature    Integrity    Turgor    Weekly Pressure Ulcer Documentation  Pressure  Injury Documentation: No Pressure Injury Noted-Pressure Ulcer Prevention Initiated  Wound Prevention & Protection Methods  Orientation of wound Orientation of Wound Prevention: Posterior  Location of Prevention Location of Wound Prevention: Buttocks  Dressing Present Dressing Present : No  Dressing Status    Wound Offloading  INTAKE/OUPUT  Date 12/31/21 1900 - 01/01/22 0659 01/01/22 0700 - 01/02/22 0659   Shift 1900-0659 24 Hour Total 0700-1859 1900-0659 24 Hour Total   INTAKE   P.O. 300 540 720  720     P. O. 300 540 720  720   Shift Total(mL/kg) 300 540 720(11.5)  720(11.5)   OUTPUT   Urine          Urine Occurrence(s) 4 x 4 x 6 x  6 x   Emesis/NG output          Emesis Occurrence(s) 0 x 0 x 1 x  1 x   Stool          Stool Occurrence(s) 0 x 0 x 2 x  2 x   Shift Total(mL/kg)         540 720  720   Weight (kg)   62.7 62.7 62.7       Recommendations:  1. Patient needs and requests: na    2. Pending tests/procedures: na     3. Functional Level/Equipment:   /      Fall Precautions:   Fall risk precautions were reinforced with the patient; she was instructed to call for help prior to getting up. The following fall risk precautions were continued: bed/ chair alarms, door signage, yellow bracelet and socks as well as update of the Perdomo Hedge tool in the patient's room. Yadira Score: 3    HEALS Safety Check    A safety check occurred in the patient's room between off going nurse and oncoming nurse listed above. The safety check included the below items  Area Items   H  High Alert Medications - Verify all high alert medication drips (heparin, PCA, etc.)   E  Equipment - Suction is set up for ALL patients (with yanker)  - Red plugs utilized for all equipment (IV pumps, etc.)  - WOWs wiped down at end of shift.  - Room stocked with oxygen, suction, and other unit-specific supplies   A  Alarms - Bed alarm is set for fall risk patients  - Ensure chair alarm is in place and activated if patient is up in a chair   L  Lines - Check IV for any infiltration  - Edwards bag is empty if patient has a Edwards   - Tubing and IV bags are labeled   S  Safety   - Room is clean, patient is clean, and equipment is clean. - Hallways are clear from equipment besides carts.    - Fall bracelet on for fall risk patients  - Ensure room is clear and free of clutter  - Suction is set up for ALL patients (with norah)  - Hallways are clear from equipment besides carts.    - Isolation precautions followed, supplies available outside room, sign posted     Keenan Skinner RN

## 2022-01-03 PROBLEM — Z74.09 IMPAIRED MOBILITY AND ADLS: Status: ACTIVE | Noted: 2021-12-26

## 2022-01-03 PROBLEM — I67.89 VERTIGO DUE TO ACUTE CEREBROVASCULAR DISEASE: Status: ACTIVE | Noted: 2021-12-26

## 2022-01-03 PROBLEM — I67.89 HEMIPARESIS OF LEFT NONDOMINANT SIDE DUE TO ACUTE CEREBROVASCULAR DISEASE (HCC): Status: ACTIVE | Noted: 2021-12-26

## 2022-01-03 PROBLEM — G81.94 HEMIPARESIS OF LEFT NONDOMINANT SIDE DUE TO ACUTE CEREBROVASCULAR DISEASE (HCC): Status: ACTIVE | Noted: 2021-12-26

## 2022-01-03 PROBLEM — R42 VERTIGO DUE TO ACUTE CEREBROVASCULAR DISEASE: Status: ACTIVE | Noted: 2021-12-26

## 2022-01-03 PROBLEM — I67.89 ATAXIA DUE TO ACUTE CEREBROVASCULAR DISEASE: Status: ACTIVE | Noted: 2021-12-26

## 2022-01-03 PROBLEM — Z78.9 IMPAIRED MOBILITY AND ADLS: Status: ACTIVE | Noted: 2021-12-26

## 2022-01-03 PROBLEM — R27.0 ATAXIA DUE TO ACUTE CEREBROVASCULAR DISEASE: Status: ACTIVE | Noted: 2021-12-26

## 2022-01-03 LAB
25(OH)D3 SERPL-MCNC: 23.1 NG/ML (ref 30–100)
ANION GAP SERPL CALC-SCNC: 4 MMOL/L (ref 3–18)
BUN SERPL-MCNC: 20 MG/DL (ref 7–18)
BUN/CREAT SERPL: 27 (ref 12–20)
CALCIUM SERPL-MCNC: 9.1 MG/DL (ref 8.5–10.1)
CHLORIDE SERPL-SCNC: 107 MMOL/L (ref 100–111)
CO2 SERPL-SCNC: 25 MMOL/L (ref 21–32)
CREAT SERPL-MCNC: 0.75 MG/DL (ref 0.6–1.3)
GLUCOSE BLD STRIP.AUTO-MCNC: 103 MG/DL (ref 70–110)
GLUCOSE SERPL-MCNC: 95 MG/DL (ref 74–99)
POTASSIUM SERPL-SCNC: 4 MMOL/L (ref 3.5–5.5)
SODIUM SERPL-SCNC: 136 MMOL/L (ref 136–145)

## 2022-01-03 PROCEDURE — 97116 GAIT TRAINING THERAPY: CPT

## 2022-01-03 PROCEDURE — 99232 SBSQ HOSP IP/OBS MODERATE 35: CPT | Performed by: INTERNAL MEDICINE

## 2022-01-03 PROCEDURE — 82306 VITAMIN D 25 HYDROXY: CPT

## 2022-01-03 PROCEDURE — 74011250636 HC RX REV CODE- 250/636: Performed by: FAMILY MEDICINE

## 2022-01-03 PROCEDURE — 80048 BASIC METABOLIC PNL TOTAL CA: CPT

## 2022-01-03 PROCEDURE — 2709999900 HC NON-CHARGEABLE SUPPLY

## 2022-01-03 PROCEDURE — 65310000000 HC RM PRIVATE REHAB

## 2022-01-03 PROCEDURE — 97530 THERAPEUTIC ACTIVITIES: CPT

## 2022-01-03 PROCEDURE — 82962 GLUCOSE BLOOD TEST: CPT

## 2022-01-03 PROCEDURE — 97110 THERAPEUTIC EXERCISES: CPT

## 2022-01-03 PROCEDURE — 36415 COLL VENOUS BLD VENIPUNCTURE: CPT

## 2022-01-03 PROCEDURE — 97535 SELF CARE MNGMENT TRAINING: CPT

## 2022-01-03 PROCEDURE — 74011250637 HC RX REV CODE- 250/637: Performed by: FAMILY MEDICINE

## 2022-01-03 RX ADMIN — MECLIZINE 12.5 MG: 12.5 TABLET ORAL at 08:56

## 2022-01-03 RX ADMIN — ASPIRIN 325 MG ORAL TABLET 325 MG: 325 PILL ORAL at 08:55

## 2022-01-03 RX ADMIN — POTASSIUM CHLORIDE 20 MEQ: 1500 TABLET, EXTENDED RELEASE ORAL at 08:56

## 2022-01-03 RX ADMIN — DORZOLAMIDE HYDROCHLORIDE 1 DROP: 20 SOLUTION/ DROPS OPHTHALMIC at 17:21

## 2022-01-03 RX ADMIN — MONTELUKAST 10 MG: 10 TABLET, FILM COATED ORAL at 08:56

## 2022-01-03 RX ADMIN — LOSARTAN POTASSIUM 25 MG: 25 TABLET, FILM COATED ORAL at 08:56

## 2022-01-03 RX ADMIN — ATORVASTATIN CALCIUM 80 MG: 40 TABLET, FILM COATED ORAL at 21:37

## 2022-01-03 RX ADMIN — DORZOLAMIDE HYDROCHLORIDE 1 DROP: 20 SOLUTION/ DROPS OPHTHALMIC at 08:56

## 2022-01-03 RX ADMIN — DOCUSATE SODIUM 100 MG: 100 CAPSULE, LIQUID FILLED ORAL at 17:19

## 2022-01-03 RX ADMIN — DOCUSATE SODIUM 100 MG: 100 CAPSULE, LIQUID FILLED ORAL at 08:55

## 2022-01-03 RX ADMIN — CLOPIDOGREL BISULFATE 75 MG: 75 TABLET ORAL at 08:56

## 2022-01-03 RX ADMIN — OXYBUTYNIN CHLORIDE 10 MG: 5 TABLET, EXTENDED RELEASE ORAL at 21:37

## 2022-01-03 RX ADMIN — SERTRALINE 50 MG: 50 TABLET, FILM COATED ORAL at 08:56

## 2022-01-03 RX ADMIN — DORZOLAMIDE HYDROCHLORIDE 1 DROP: 20 SOLUTION/ DROPS OPHTHALMIC at 21:37

## 2022-01-03 RX ADMIN — MECLIZINE 12.5 MG: 12.5 TABLET ORAL at 17:19

## 2022-01-03 RX ADMIN — LATANOPROST 1 DROP: 50 SOLUTION OPHTHALMIC at 21:37

## 2022-01-03 NOTE — PROGRESS NOTES
[x] Psychology  [] Social Work [] Recreational Therapy    INTERVENTION  UNITS/TIME OF SERVICE   Assessment  January 3, 2022   Supportive Counseling    Orientation    Discharge Planning    Resource Linkage              Progress/Current Status    Patient seen for Psychological Evaluation as requested on admission to ARU by MD.  Patient found sitting upright in wheelchair in bedroom and immediately responsive to my contact. Patient able to describe her feelings of stress related to stroke occurrence and need for hospitalization, though significantly denies feeling clinically depressed nor even highly anxious. She does recall feeling depressed after the passing of her spouse, approximately one and one half years ago and she is currently Rx Zoloft for continued stability. But, interestingly, she seems to have no awareness of Rx for same. Patient is already described by ST as essentially functioning at her baseline of function; on this date, she is oriented to day, month and year and understands where she is and why is admitted. She hopes to return to her Senior Living apartment and furthermore feels well supported by her five children, including three residing locally. Patient will be monitored for any emotional an/kris behavioral difficulties that might impact her therapy effort. Further education about stroke recovery will help her to identify realistic goals for self in recovery and thereby minimize frustration.     Emmie Lyles, THE [facility] 1/3/2022 11:16 AM

## 2022-01-03 NOTE — ROUTINE PROCESS
SHIFT CHANGE NOTE FOR [facility]    Bedside and Verbal shift change report given to Sheree Lake (oncoming nurse) by Jazzy Schultz RN (offgoing nurse). Report included the following information SBAR, Kardex, MAR and Recent Results. Situation:   Code Status: Full Code   Hospital Day: 3   Problem List:   Hospital Problems  Never Reviewed          Codes Class Noted POA    CVA (cerebral vascular accident) [facility]) ICD-10-CM: I63.9  ICD-9-CM: 434.91  12/31/2021 Unknown              Background:   Past Medical History: No past medical history on file.      Assessment:   Changes in Assessment throughout shift: No change to previous assessment     Patient has a central line: no Reasons if yes: n/a  Insertion date:n/a Last dressing date:n/a   Patient has Ovalle Cath: no Reasons if yes: n/a   Insertion date:n/a  Shift ovalle care completed: N/A     Last Vitals:     Vitals:    01/02/22 0910 01/02/22 1531 01/02/22 2136 01/02/22 2230   BP: (!) 145/72 (!) 158/86 (!) 169/86 139/87   Pulse: 90 81 81 95   Resp:  18 20    Temp:  98.4 °F (36.9 °C) 97.8 °F (36.6 °C)    SpO2:  94% 96%    Weight:       Height:            PAIN    Pain Assessment    Pain Intensity 1: 0 (01/03/22 0329) Pain Intensity 1: 2 (12/29/14 1105)    Pain Location 1: Head Pain Location 1: Abdomen    Pain Intervention(s) 1: Medication (see MAR) Pain Intervention(s) 1: Medication (see MAR)  Patient Stated Pain Goal: 0 Patient Stated Pain Goal: 0  o Intervention effective: no complaints of pain overnight  o Other actions taken for pain:       Skin Assessment  Skin color    Condition/Temperature    Integrity    Turgor    Weekly Pressure Ulcer Documentation  Pressure  Injury Documentation: No Pressure Injury Noted-Pressure Ulcer Prevention Initiated  Wound Prevention & Protection Methods  Orientation of wound Orientation of Wound Prevention: Posterior  Location of Prevention Location of Wound Prevention: Buttocks,Sacrum/Coccyx  Dressing Present Dressing Present : No  Dressing Status    Wound Offloading Wound Offloading (Prevention Methods): Bed, pressure reduction mattress     INTAKE/OUPUT  Date 01/02/22 0700 - 01/03/22 0659 01/03/22 0700 - 01/04/22 0659   Shift 0700-1859 1900-0659 24 Hour Total 0700-1859 1900-0659 24 Hour Total   INTAKE   P.O. 720 120 840        P. O. 720 120 840      Shift Total(mL/kg) 720(11.5) 120(1.9) 840(13.4)      OUTPUT   Urine(mL/kg/hr)           Urine Occurrence(s) 4 x 4 x 8 x      Stool           Stool Occurrence(s) 0 x 0 x 0 x      Shift Total(mL/kg)          120 840      Weight (kg) 62.7 62.7 62.7 62.7 62.7 62.7       Recommendations:  1. Patient needs and requests: assistance with ADL, incontinence care    2. Pending tests/procedures: none     3. Functional Level/Equipment: Partial (one person) / Wheelchair    Fall Precautions:   Fall risk precautions were reinforced with the patient; she was instructed to call for help prior to getting up. The following fall risk precautions were continued: bed/ chair alarms, door signage, yellow bracelet and socks as well as update of the Vernia Colder tool in the patient's room. Yadira Score: 3    HEALS Safety Check    A safety check occurred in the patient's room between off going nurse and oncoming nurse listed above.     The safety check included the below items  Area Items   H  High Alert Medications - Verify all high alert medication drips (heparin, PCA, etc.)   E  Equipment - Suction is set up for ALL patients (with yanker)  - Red plugs utilized for all equipment (IV pumps, etc.)  - WOWs wiped down at end of shift.  - Room stocked with oxygen, suction, and other unit-specific supplies   A  Alarms - Bed alarm is set for fall risk patients  - Ensure chair alarm is in place and activated if patient is up in a chair   L  Lines - Check IV for any infiltration  - Edwards bag is empty if patient has a Edwards   - Tubing and IV bags are labeled   S  Safety   - Room is clean, patient is clean, and equipment is clean.  - Hallways are clear from equipment besides carts. - Fall bracelet on for fall risk patients  - Ensure room is clear and free of clutter  - Suction is set up for ALL patients (with norah)  - Hallways are clear from equipment besides carts.    - Isolation precautions followed, supplies available outside room, sign posted     Giovanni Neves RN

## 2022-01-03 NOTE — ROUTINE PROCESS
SHIFT CHANGE NOTE FOR [facility]    Bedside and Verbal shift change report given to BERT Randolph (oncoming nurse) by Gunner Tijerina RN (offgoing nurse). Report included the following information SBAR, Kardex, MAR and Recent Results. 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      Assessment:   Changes in Assessment throughout shift: No change to previous assessment     Patient has a central line: no Reasons if yes: n/a  Insertion date:n/a Last dressing date:n/a   Patient has Ovalle Cath: no Reasons if yes: n/a   Insertion date:n/a  Shift ovalle care completed: N/A     Last Vitals:     Vitals:    01/02/22 2230 01/03/22 0809 01/03/22 1505 01/03/22 1623   BP: 139/87 138/79 135/83 (!) 160/86   Pulse: 95 68 (!) 105 84   Resp:  16  16   Temp:  97.6 °F (36.4 °C)  (!) 96.6 °F (35.9 °C)   SpO2:  95% 95% 97%   Weight:       Height:            PAIN    Pain Assessment    Pain Intensity 1: 0 (01/03/22 1719) Pain Intensity 1: 2 (12/29/14 1105)    Pain Location 1: Head Pain Location 1: Abdomen    Pain Intervention(s) 1: Medication (see MAR) Pain Intervention(s) 1: Medication (see MAR)  Patient Stated Pain Goal: 0 Patient Stated Pain Goal: 0  o Intervention effective: no complaints of pain overnight  o Other actions taken for pain:       Skin Assessment  Skin color    Condition/Temperature    Integrity    Turgor    Weekly Pressure Ulcer Documentation  Pressure  Injury Documentation: No Pressure Injury Noted-Pressure Ulcer Prevention Initiated  Wound Prevention & Protection Methods  Orientation of wound Orientation of Wound Prevention: Posterior  Location of Prevention Location of Wound Prevention: Buttocks,Sacrum/Coccyx  Dressing Present Dressing Present : No  Dressing Status    Wound Offloading Wound Offloading (Prevention Methods): Bed, pressure redistribution/air     INTAKE/OUPUT  Date 01/02/22 0700 - 01/03/22 0659 01/03/22 0700 - 01/04/22 0659   Shift 2330-0981 6578-6440 24 Hour Total 0365-7809 0615-3029 24 Hour Total   INTAKE   P.O. 720 120 840 720  720     P. O. 720 120 840 720  720   Shift Total(mL/kg) 720(11.5) 120(1.9) 840(13.4) 720(11.5)  720(11.5)   OUTPUT   Urine(mL/kg/hr)           Urine Occurrence(s) 4 x 4 x 8 x 4 x  4 x   Stool           Stool Occurrence(s) 0 x 0 x 0 x 0 x  0 x   Shift Total(mL/kg)          120 840 720  720   Weight (kg) 62.7 62.7 62.7 62.7 62.7 62.7       Recommendations:  1. Patient needs and requests: assistance with ADL, incontinence care    2. Pending tests/procedures: none     3. Functional Level/Equipment: Partial (one person) / Wheelchair    Fall Precautions:   Fall risk precautions were reinforced with the patient; she was instructed to call for help prior to getting up. The following fall risk precautions were continued: bed/ chair alarms, door signage, yellow bracelet and socks as well as update of the Logan tool in the patient's room. Yadira Score: 3    HEALS Safety Check    A safety check occurred in the patient's room between off going nurse and oncoming nurse listed above.     The safety check included the below items  Area Items   H  High Alert Medications - Verify all high alert medication drips (heparin, PCA, etc.)   E  Equipment - Suction is set up for ALL patients (with norah)  - Red plugs utilized for all equipment (IV pumps, etc.)  - WOWs wiped down at end of shift.  - Room stocked with oxygen, suction, and other unit-specific supplies   A  Alarms - Bed alarm is set for fall risk patients  - Ensure chair alarm is in place and activated if patient is up in a chair   L  Lines - Check IV for any infiltration  - Edwards bag is empty if patient has a Edwards   - Tubing and IV bags are labeled   S  Safety   - Room is clean, patient is clean, and equipment is clean. - Hallways are clear from equipment besides carts. - Fall bracelet on for fall risk patients  - Ensure room is clear and free of clutter  - Suction is set up for ALL patients (with norah)  - Hallways are clear from equipment besides carts.    - Isolation precautions followed, supplies available outside room, sign posted     Sumanth Frazier RN

## 2022-01-03 NOTE — PROGRESS NOTES
Problem: Mobility Impaired (Adult and Pediatric)  Goal: *Therapy Goal (Edit Goal, Insert Text)  Description: Physical Therapy Short Term Goals  Initiated 1/1/2022 and to be accomplished within 7 day(s) on 1/7/2022  1. Patient will move from supine to sit and sit to supine  in bed with minimal assistance/contact guard assist.    2.  Patient will transfer from bed to chair and chair to bed with minimal assistance using the least restrictive device. 3.  Patient will perform sit to stand with minimal assistance. 4.  Patient will ambulate with minimal assistance/contact guard assist for 50 feet with the least restrictive device before needing seated rest.   5.  Patient will participate in formal balance assessment Ruddy Boys Balance Scale/Postural Assessment Scale for Stroke). Physical Therapy Long Term Goals  Initiated 1/1/2022 and to be accomplished within 21 day(s) on 1/22/2022  1. Patient will move from supine to sit and sit to supine , scoot up and down, and roll side to side in bed with modified independence. 2.  Patient will transfer from bed to chair and chair to bed with modified independence using the least restrictive device. 3.  Patient will perform sit to stand with modified independence. 4.  Patient will ambulate with modified independence for 150 feet with the least restrictive device. 5.  Patient will negotiate one curb step with CG/SBA , using appropriate AD for safe community mobility. Outcome: Progressing Towards Goal    PHYSICAL THERAPY TREATMENT    Patient: Stevan Vásquez (68 y.o. female)  Date: 1/3/2022  Diagnosis: CVA (cerebral vascular accident) [facility]) [I63.9] <principal problem not specified>  Precautions: Fall  Chart, physical therapy assessment, plan of care and goals were reviewed. Time In:1430  Time Out:1535    Patient seen for: Balance activities;Gait training;Patient education;Transfer training    Pain:  Pt pain was reported as no c/o pain pre-treatment.   Pt pain was reported as no c/o pain post-treatment. Intervention: N/A    Patient identified with name and : yes    SUBJECTIVE:      Pt notes, \"they don't hurt;I get shots in them,\" re: B knees and history of arthritis. Pt reports she has a history of glaucoma in her left eye with some difficulty focusing/blurriness with reading but notes increased difficulty since this hospitalization with pt c/o feeling \"dizzy,\" with longer gait trial.  Vital signs WNL. Pt reports she was utilizing an eye patch during therapy at the hospital noting it appeared to help with her vision. OBJECTIVE DATA SUMMARY:    Objective:       TRANSFERS Daily Assessment     Transfer Type: Other  Other: stand step with RW  Transfer Assistance : 3 (Moderate assistance )  Sit to Stand Assistance: Moderate assistance  Pt requires moderate assistance for lift off and transition from 1/4 to full stand due to functional weakness and B LE arthritis with impaired ROM and requiring increased time to transition through movement pattern. Pt also requires moderate assistance for slow controlled descent with stand to sit. GAIT Daily Assessment    Gait Description (WDL) Exceptions to WDL    Gait Abnormalities Decreased step clearance (B genu valgum, Forward Flexed, Rounded Shoulders)    Assistive device Walker, rolling    Ambulation assistance - level surface 3 (Moderate assistance)    Distance 12 Feet (ft) x 1 trial, 25 Feet x 1 trial, 23 Feet x 1 trial    Ambulation assistance- uneven surface  NT    Comments Pt requires minimal to moderate assistance for balance and weight shift ambulating with forward flexed posture requiring maximal cuing to remain within RW.         BALANCE Daily Assessment     Sitting - Static: Good (unsupported)  Sitting - Dynamic: Good (unsupported)  Standing - Static: Fair  Standing - Dynamic : Impaired        WHEELCHAIR MOBILITY Daily Assessment     Pt requires supervision with intermittent minimal assistance negotiating obstacles with intermittent verbal cues for attending to left LE with w/c propulsion with B LEs. THERAPEUTIC EXERCISE Daily Assessment     Seated Strength Trainin Sets of 10 Repetitions:  Right and Left LAQ  Right and Left Alternate Hip Flexion  Right and Left Toe and Calf Raises  Pt requires verbal cues throughout for slow, controlled movements        Pt reports no c/o dizziness with fixed gaze tracking vertical and horizontal movements but reports \"blurry,\" vision with head turns while tracking horizontally. ASSESSMENT:  Pt is slowly progressing with functional mobility ambulating further distances than evaluation but demonstrating ongoing functional weakness and decreased safety pushing RW and rollator away requiring maximal cuing to remain close to assistive device for safety. Progression toward goals:  []      Improving appropriately and progressing toward goals  [x]      Improving slowly and progressing toward goals  []      Not making progress toward goals and plan of care will be adjusted      PLAN:  Patient continues to benefit from skilled intervention to address the above impairments. Continue treatment per established plan of care. Emphasize functional strengthening to promote increased safety and independence with mobility. Discharge Recommendations:  Home Physical Therapy with 24 hour supervision  Further Equipment Recommendations for Discharge:  small adult rolling walker      Estimated Discharge Date: 2022    Activity Tolerance:   Fair  Please refer to the flowsheet for vital signs taken during this treatment.     After treatment:   [] Patient left in no apparent distress in bed  [x] Patient left in no apparent distress sitting up in chair  [] Patient left in no apparent distress in w/c mobilizing under own power  [] Patient left in no apparent distress dining area  [] Patient left in no apparent distress mobilizing under own power  [x] Call bell left within reach  [] Nursing notified  [] Caregiver present  [] Bed alarm activated   [x] Chair alarm activated      Harsha Bustillo PT, DPT  1/3/2022

## 2022-01-03 NOTE — PROGRESS NOTES
[address]  [address] [place] [address], [address]     INPATIENT REHABILITATION  DAILY PROGRESS NOTE     Date: 1/3/2022    Name: Jackie Ramon Age / Sex: 82 y.o. 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 Blood pressure fairly controlled. Patient's Complaint:   No significant medical complaints    Pain Control: no current joint or muscle symptoms, essentially pain-free      Objective:     Vital Signs:  Patient Vitals for the past 24 hrs:   BP Temp Pulse Resp SpO2   01/03/22 1623 (!) 160/86 (!) 96.6 °F (35.9 °C) 84 16 97 %   01/03/22 1505 135/83 -- (!) 105 -- 95 %   01/03/22 0809 138/79 97.6 °F (36.4 °C) 68 16 95 %   01/02/22 2230 139/87 -- 95 -- --   01/02/22 2136 (!) 169/86 97.8 °F (36.6 °C) 81 20 96 %        Physical Examination:  GENERAL SURVEY: Patient is awake, alert, oriented x 3, laying comfortably on the bed, not in acute respiratory distress. HEENT: pink palpebral conjunctivae, anicteric sclerae, no nasoaural discharge, moist oral mucosa  NECK: supple, no jugular venous distention, no palpable lymph nodes  CHEST/LUNGS: symmetrical chest expansion, good air entry, clear breath sounds  HEART: adynamic precordium, good S1 S2, no S3, regular rhythm, no murmurs  ABDOMEN: flat, bowel sounds appreciated, soft, non-tender  EXTREMITIES: (+) limitation ROM of both shoulders due to history of rotator cuff injury, pink nailbeds, no edema, full and equal pulses, no calf tenderness   NEUROLOGICAL EXAM: The patient is awake, alert and oriented x3, able to answer questions fairly appropriately, able to follow 1 and 2 step commands. Able to tell time from the wall clock.   Cranial nerves II-XII are grossly intact. No gross sensory deficit. Motor strength is 4-/5 on the RUE (except right shoulder), 3+/5 on the LUE (except left shoulder), 4+/5 on the RLE, 4/5 on the LLE. Current Medications:  Current Facility-Administered Medications   Medication Dose Route Frequency    meclizine (ANTIVERT) tablet 12.5 mg  12.5 mg Oral BID    losartan (COZAAR) tablet 25 mg  25 mg Oral DAILY    hydrALAZINE (APRESOLINE) tablet 25 mg  25 mg Oral TID PRN    ondansetron (ZOFRAN ODT) tablet 4 mg  4 mg Oral Q8H PRN    docusate sodium (COLACE) capsule 100 mg  100 mg Oral BID    magnesium hydroxide (MILK OF MAGNESIA) 400 mg/5 mL oral suspension 30 mL  30 mL Oral DAILY PRN    bisacodyL (DULCOLAX) tablet 10 mg  10 mg Oral Q48H PRN    aspirin tablet 325 mg  325 mg Oral DAILY    atorvastatin (LIPITOR) tablet 80 mg  80 mg Oral QHS    clopidogreL (PLAVIX) tablet 75 mg  75 mg Oral DAILY    dorzolamide (TRUSOPT) 2 % ophthalmic solution 1 Drop  1 Drop Both Eyes TID    latanoprost (XALATAN) 0.005 % ophthalmic solution 1 Drop  1 Drop Both Eyes QHS    loperamide (IMODIUM) capsule 2 mg  2 mg Oral PRN    montelukast (SINGULAIR) tablet 10 mg  10 mg Oral DAILY    oxybutynin chloride XL (DITROPAN XL) tablet 10 mg  10 mg Oral QHS    sertraline (ZOLOFT) tablet 50 mg  50 mg Oral DAILY    potassium chloride (K-DUR, KLOR-CON M20) SR tablet 20 mEq  20 mEq Oral DAILY WITH BREAKFAST    acetaminophen (TYLENOL) tablet 650 mg  650 mg Oral Q4H PRN       Allergies:   Allergies   Allergen Reactions    Novocain [Procaine] Swelling       Lab/Data Review:  Recent Results (from the past 24 hour(s))   METABOLIC PANEL, BASIC    Collection Time: 01/03/22  6:19 AM   Result Value Ref Range    Sodium 136 136 - 145 mmol/L    Potassium 4.0 3.5 - 5.5 mmol/L    Chloride 107 100 - 111 mmol/L    CO2 25 21 - 32 mmol/L    Anion gap 4 3.0 - 18 mmol/L    Glucose 95 74 - 99 mg/dL    BUN 20 (H) 7.0 - 18 MG/DL    Creatinine 0.75 0.6 - 1.3 MG/DL    BUN/Creatinine ratio 27 (H) 12 - 20      GFR est AA >60 >60 ml/min/1.73m2    GFR est non-AA >60 >60 ml/min/1.73m2    Calcium 9.1 8.5 - 10.1 MG/DL   VITAMIN D, 25 HYDROXY    Collection Time: 01/03/22  6:19 AM   Result Value Ref Range    Vitamin D 25-Hydroxy 23.1 (L) 30 - 100 ng/mL       Assessment:     Primary Rehabilitation Diagnosis  1. Impaired Mobility and ADLs  2. 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 Justification for continued stay: Good progression towards established rehabilitation goals. 2. Medical Issues being followed closely:    [x]  Fall and safety precautions     []  Wound Care     [x]  Bowel and Bladder Function     [x]  Fluid Electrolyte and Nutrition Balance     []  Pain Control      3. Issues that 24 hour rehabilitation nursing is following:    [x]  Fall and safety precautions     []  Wound Care     [x]  Bowel and Bladder Function     [x]  Fluid Electrolyte and Nutrition Balance     []  Pain Control      [x]  Assistance with and education on in-room safety with transfers to and from the bed, wheelchair, toilet and shower. 4. Acute rehabilitation plan of care:    [x]  Continue current care and rehab. [x]  Physical Therapy           [x]  Occupational Therapy           []  Speech Therapy     []  Hold Rehab until further notice     5. Medications:    [x]  MAR Reviewed     [x]  Continue Present Medications     6. Chemical DVT Prophylaxis:      []  Enoxaparin     []  Unfractionated Heparin     []  Warfarin     []  NOAC     []  Aspirin     [x]  None     7.  Mechanical DVT Prophylaxis:      [x]  RADHAMES Stockings     [x]  Sequential Compression Device     []  None     8. GI Prophylaxis:      []  PPI     []  H2 Blocker     [x]  None / Not indicated     9. Code status:    [x]  Full code     []  Partial code     []  Do not intubate     []  Do not resuscitate     10. Diet:  Specifications  3 carb choices (45 gm/meal); Low fat/Low cholesterol/High fiber/ANGELINE   Solids (consistency)  Regular   Liquids (consistency)  Thin   Fluid Restriction  None     11. Orders:   Right CVA with residual left-sided weakness  Aspirin 325 mg was Plavix 75 mg once daily for 3 months and then aspirin  Follow-up neurology after discharge from rehab  Lipitor 80 mg nightly  PT and OT  Speech evaluation  Fall precaution aspiration precaution  Try Antivert for dizziness  Continue to monitor sx   Discussed with nursing staff       H/o hypertension  Patient not on any blood pressure medication  Start losartan 25 mg daily  Monitor blood pressure use hydralazine 25 mg 3 times daily as needed systolic blood pressure above 160  Recheck BMP in AM might increase losartan dose   Check vitamin d 25 hydroxy level     Hyperlipidemia  Lipitor 80 mg nightly follow-up liver function     Depression and anxiety  Zoloft 50 mg daily     Glaucoma  Xalatan eyedrops 1 drop both eyes at bedtime     Urinary incontinence  Patient has some symptom of urinary tract infection  Recheck UA negative for infection   Continue Ditropan XL 10 mg once a day      12. Personal Protective Equipment (N95 face mask and eye goggles) was used while interacting with the patient. Patient was using a surgical mask. 15. Patient's progress in rehabilitation and medical issues discussed with the patient. All questions answered to the best of my ability. Care plan discussed with patient and nurse. 14. Total clinical care time is 30 minutes, including review of chart including all labs, radiology, past medical history, and discussion with patient.  Greater than 50% of my time was spent in coordination of care and counseling.       Signed:    Soni Atkins MD    January 3, 2022

## 2022-01-03 NOTE — PROGRESS NOTES
Problem: Inpatient Rehab (Adult)  Goal: *LTG: Avoids injury/falls 100% of time related to deficits  Outcome: Progressing Towards Goal  Goal: *LTG: Avoids infection 100% of time related to deficits  Outcome: Progressing Towards Goal  Goal: *LTG: Verbalize understanding of diagnosis and risk factors for recurring stroke  Outcome: Progressing Towards Goal  Goal: *LTG: Absence of DVT during hospitalization  Outcome: Progressing Towards Goal  Goal: *LTG: Maintains Skin Integrity With No Evidence of Pressure Injury 100% of Time  Outcome: Progressing Towards Goal

## 2022-01-03 NOTE — PROGRESS NOTES
Problem: Self Care Deficits Care Plan (Adult)  Goal: *Therapy Goal (Edit Goal, Insert Text)  Description: Occupational Therapy Goals   Long Term Goals  Initiated 22 and to be accomplished within 3 week(s)  1. Pt will perform self-feeding with independence. 2. Pt will perform grooming with independence. 3. Pt will perform UB bathing with supervision. 4. Pt will perform LB bathing with supervision. 5. Pt will perform tub/shower transfer with supervision. 6. Pt will perform UB dressing with Axel using compensatory strategies. 7. Pt will perform LB dressing with Axel and AE. 8. Pt will perform toileting task with Axel and AE. 9. Pt will perform toilet transfer with Axel and AE. Short Term Goals   Initiated 22 and to be accomplished within 7 day(s), reassess 22  1. Pt will perform self-feeding with Axel. 2. Pt will perform grooming with Axel. 3. Pt will perform UB bathing with SBA. 4. Pt will perform LB bathing with Fidel and UE support. 5. Pt will perform tub/shower transfer with Fidel and AE. 6. Pt will perform UB dressing with SBA using compensatory strategies. 7. Pt will perform LB dressing with Fidel and AE. 8. Pt will perform toileting task with Fidel and AE. 9. Pt will perform toilet transfer with Fidel and AE. Outcome: Progressing Towards Goal  Goal: Interventions  Outcome: Progressing Towards Goal   Occupational Therapy TREATMENT    Patient: Rodney Teixeira   82 y.o. Patient identified with name and : yes    Date: 1/3/2022    First Tx Session  Time In: 08  Time Out[de-identified] 9495        Diagnosis: CVA (cerebral vascular accident) [facility]) [I63.9]   Precautions: Fall  Chart, occupational therapy assessment, plan of care, and goals were reviewed. Pain:  Pt reports 0/10 pain or discomfort prior to treatment. Pt reports 0/10 pain or discomfort post treatment. Intervention Provided:       SUBJECTIVE:   Patient stated do I have underwear? Jabari Matthews    OBJECTIVE DATA SUMMARY: FEEDING/EATING Daily Assessment    Feeding/Eating  Feeding/Eating Assistance: 4 (Minimal assistance) (pt requires setup, cutting up food due to impaired BUE FM)  Comments: Pt may benefit from rocker knife     GROOMING Daily Assessment     Pt donned partial dentures however declined oral care until after breakfast     UPPER BODY BATHING Daily Assessment    Upper Body Bathing  Bathing Assistance, Upper: 5 (Stand-by assistance)  Comments: Pt performed [facility]B bathing [facility] Daily Assessment    Lower Body Bathing  Bathing Assistance, Lower : 4 (Minimal assistance)  Position Performed: Seated edge of bed;Standing  Adaptive Equipment: Walker  Comments: Pt performed LB bathing via sponge bath with Fidel for stability at [facility] and washing buttocks      TOILETING Daily Assessment    Toileting  Toileting Assistance (FIM Score):  (Pt declined)     UPPER BODY DRESSING Daily Assessment    Upper Body Dressing   Dressing Assistance : 5 (Supervision) (setup)  Pt performed UB dressing at EOB with good dynamic sitting balance and use of compensatory strategies to don bra. LOWER BODY DRESSING Daily Assessment    Lower Body Dressing   Dressing Assistance : 2 (Maximal assistance)  Leg Crossed Method Used: No  Position Performed: Seated edge of bed;Seated in chair  Adaptive Equipment Used: Walker  Comments: Pt donned compression stockings at EOB with TA, pt required maxA to thread BLE feet through underwear and pants and don BLE socks for safety prior to standing. Pt required multiple attempts to pull up pants over BLE hips and buttocks secondary to fatigue and decreased standing tolerance at [facility]     MOBILITY/TRANSFERS Daily Assessment     Pt performed EOB to w/c transfer with FWW and min-modA for assistance to stand and stability.        ASSESSMENT:  Pt is making progress improving use of compensatory strategies for increased safety and independence with self cares due to fatigue and previous rotator cuff injuries and arthritic joints. Progression toward goals:  [x]          Improving appropriately and progressing toward goals  []          Improving slowly and progressing toward goals  []          Not making progress toward goals and plan of care will be adjusted     PLAN:  Patient continues to benefit from skilled intervention to address the above impairments. Continue treatment per established plan of care. Discharge Recommendations:  Home Health  Further Equipment Recommendations for Discharge: bedside commode      Activity Tolerance:  fair      Estimated LOS:1/21/22    Please refer to the flowsheet for vital signs taken during this treatment. After treatment:   []  Patient left in no apparent distress sitting up in chair   []  Patient left in no apparent distress in bed  []  Call bell left within reach  []  Nursing notified  []  Caregiver present  []  Bed alarm activated    COMMUNICATION/EDUCATION:   [x] Home safety education was provided and the patient/caregiver indicated understanding. [] Patient/family have participated as able in goal setting and plan of care. [x] Patient/family agree to work toward stated goals and plan of care. [] Patient understands intent and goals of therapy, but is neutral about his/her participation. [] Patient is unable to participate in goal setting and plan of care.       Nayeli Park, OT

## 2022-01-03 NOTE — REHAB NOTE
[facility] PHYSICAL REHABILITATION  [address] [place] [address], [address]     [address]  OVERALL PLAN OF CARE    Name: Stevan Vásquez CSN: [id number]   Age: 82 y.o. MRN: [id number]   Sex: female Admit Date: 12/31/2021     Primary Rehabilitation Diagnosis  1. Impaired Mobility and ADLs  2. 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 Physical Therapy              A. Intensity: 1 hour per day              B. Frequency: 5 times per week              C. Duration: 3 weeks              D. Long Term Goals:    1. Patient will move from supine to sit and sit to supine , scoot up and down, and roll side to side in bed with modified independence. 2.  Patient will transfer from bed to chair and chair to bed with modified independence using the least restrictive device. 3.  Patient will perform sit to stand with modified independence. 4.  Patient will ambulate with modified independence for 150 feet with the least restrictive device. 5.  Patient will negotiate one curb step with CG/SBA , using appropriate AD for safe community mobility.     E. Interventions: Interventions may include range of motion (AROM, PROM B LE/trunk), motor function (B LE/trunk strengthening/coordination), activity tolerance (vitals, oxygen saturation levels), bed mobility training, balance activities, gait training (progressive ambulation program), wheelchair management and functional transfer training. II. Occupational Therapy              A. Intensity: 1.5 hour per day              B. Frequency: 5 times per week              C. Duration: 3 weeks              D. Long Term Goals:    1. Pt will perform self-feeding with independence. 2. Pt will perform grooming with independence. 3. Pt will perform UB bathing with supervision. 4. Pt will perform LB bathing with supervision. 5. Pt will perform tub/shower transfer with supervision. 6. Pt will perform UB dressing with Axel using compensatory strategies. 7. Pt will perform LB dressing with Axel and AE. 8. Pt will perform toileting task with Axel and AE. 9. Pt will perform toilet transfer with Axel and AE.   E. Interventions: Interventions may include range of motion (AROM, PROM B UE), motor function (B UE/ strengthening/coordination), activity tolerance (vitals, oxygen saturation levels), balance training, ADL/IADL training and functional transfer training. PHYSICIAN'S ASSESSMENT OF FINDINGS:    Are the established goals sufficient for achieving the optimal level of function? [x]  Yes      []  No    What changes would you recommend to the goals as written? None      Are the interventions noted sufficient for achieving the optimal level of function? [x]  Yes      []  No    What changes would you recommend to the interventions noted? If therapy staff is unable to provide 3 hr of total therapy per day in 5 days due to medical issues or decreased patient tolerance, may modify treatment schedule to 15 hr/week.       Estimated length of stay: 2 weeks      Medical rehabilitation prognosis:    []  Excellent     [x]  Good     []  Fair     []  Guarded       Discharge Destination:     [x]  Home     []  [address]     []  [facility]     []  [address]     []  [address] [facility]     []  Other:       Signed:    Arnel Howard MD    January 2, 2022

## 2022-01-04 PROBLEM — E78.2 HYPERCHOLESTEROLEMIA WITH HYPERTRIGLYCERIDEMIA: Chronic | Status: ACTIVE | Noted: 2021-12-27

## 2022-01-04 LAB
HCT VFR BLD AUTO: 38.2 % (ref 35–45)
HGB BLD-MCNC: 12.6 G/DL (ref 12–16)

## 2022-01-04 PROCEDURE — 74011250637 HC RX REV CODE- 250/637: Performed by: INTERNAL MEDICINE

## 2022-01-04 PROCEDURE — 74011250637 HC RX REV CODE- 250/637: Performed by: FAMILY MEDICINE

## 2022-01-04 PROCEDURE — 85018 HEMOGLOBIN: CPT

## 2022-01-04 PROCEDURE — 97535 SELF CARE MNGMENT TRAINING: CPT

## 2022-01-04 PROCEDURE — 97530 THERAPEUTIC ACTIVITIES: CPT

## 2022-01-04 PROCEDURE — 36415 COLL VENOUS BLD VENIPUNCTURE: CPT

## 2022-01-04 PROCEDURE — 99232 SBSQ HOSP IP/OBS MODERATE 35: CPT | Performed by: INTERNAL MEDICINE

## 2022-01-04 PROCEDURE — 97150 GROUP THERAPEUTIC PROCEDURES: CPT

## 2022-01-04 PROCEDURE — 65310000000 HC RM PRIVATE REHAB

## 2022-01-04 PROCEDURE — 74011250636 HC RX REV CODE- 250/636: Performed by: FAMILY MEDICINE

## 2022-01-04 RX ORDER — LOSARTAN POTASSIUM 25 MG/1
25 TABLET ORAL DAILY
Status: DISCONTINUED | OUTPATIENT
Start: 2022-01-05 | End: 2022-01-05

## 2022-01-04 RX ORDER — ATORVASTATIN CALCIUM 40 MG/1
80 TABLET, FILM COATED ORAL
Status: DISCONTINUED | OUTPATIENT
Start: 2022-01-04 | End: 2022-01-20 | Stop reason: HOSPADM

## 2022-01-04 RX ORDER — CLOPIDOGREL BISULFATE 75 MG/1
75 TABLET ORAL
Status: DISCONTINUED | OUTPATIENT
Start: 2022-01-05 | End: 2022-01-20 | Stop reason: HOSPADM

## 2022-01-04 RX ORDER — ASPIRIN 325 MG
325 TABLET ORAL
Status: DISCONTINUED | OUTPATIENT
Start: 2022-01-05 | End: 2022-01-20 | Stop reason: HOSPADM

## 2022-01-04 RX ORDER — MELATONIN
2000 DAILY
Status: DISCONTINUED | OUTPATIENT
Start: 2022-01-04 | End: 2022-01-20 | Stop reason: HOSPADM

## 2022-01-04 RX ADMIN — CLOPIDOGREL BISULFATE 75 MG: 75 TABLET ORAL at 08:25

## 2022-01-04 RX ADMIN — MECLIZINE 12.5 MG: 12.5 TABLET ORAL at 17:28

## 2022-01-04 RX ADMIN — BISACODYL 10 MG: 5 TABLET, COATED ORAL at 22:18

## 2022-01-04 RX ADMIN — DOCUSATE SODIUM 100 MG: 100 CAPSULE, LIQUID FILLED ORAL at 17:28

## 2022-01-04 RX ADMIN — MECLIZINE 12.5 MG: 12.5 TABLET ORAL at 08:24

## 2022-01-04 RX ADMIN — ASPIRIN 325 MG ORAL TABLET 325 MG: 325 PILL ORAL at 08:25

## 2022-01-04 RX ADMIN — DOCUSATE SODIUM 100 MG: 100 CAPSULE, LIQUID FILLED ORAL at 08:24

## 2022-01-04 RX ADMIN — LOSARTAN POTASSIUM 25 MG: 25 TABLET, FILM COATED ORAL at 08:25

## 2022-01-04 RX ADMIN — LATANOPROST 1 DROP: 50 SOLUTION OPHTHALMIC at 22:22

## 2022-01-04 RX ADMIN — ATORVASTATIN CALCIUM 80 MG: 40 TABLET, FILM COATED ORAL at 22:18

## 2022-01-04 RX ADMIN — SERTRALINE 50 MG: 50 TABLET, FILM COATED ORAL at 08:25

## 2022-01-04 RX ADMIN — DORZOLAMIDE HYDROCHLORIDE 1 DROP: 20 SOLUTION/ DROPS OPHTHALMIC at 17:28

## 2022-01-04 RX ADMIN — POTASSIUM CHLORIDE 20 MEQ: 1500 TABLET, EXTENDED RELEASE ORAL at 08:25

## 2022-01-04 RX ADMIN — DORZOLAMIDE HYDROCHLORIDE 1 DROP: 20 SOLUTION/ DROPS OPHTHALMIC at 10:44

## 2022-01-04 RX ADMIN — CHOLECALCIFEROL TAB 25 MCG (1000 UNIT) 2000 UNITS: 25 TAB at 08:25

## 2022-01-04 RX ADMIN — OXYBUTYNIN CHLORIDE 10 MG: 5 TABLET, EXTENDED RELEASE ORAL at 22:18

## 2022-01-04 RX ADMIN — DORZOLAMIDE HYDROCHLORIDE 1 DROP: 20 SOLUTION/ DROPS OPHTHALMIC at 22:18

## 2022-01-04 RX ADMIN — ACETAMINOPHEN 650 MG: 325 TABLET ORAL at 08:25

## 2022-01-04 RX ADMIN — MONTELUKAST 10 MG: 10 TABLET, FILM COATED ORAL at 08:25

## 2022-01-04 NOTE — ROUTINE PROCESS
SHIFT CHANGE NOTE FOR [place]    Bedside and Verbal shift change report given to Deneen Clark RN (oncoming nurse) by Lucas Daniels RN (offgoing nurse). Report included the following information SBAR, Kardex, MAR and Recent Results. Situation:   Code Status: Full Code   Hospital Day: 4   Problem List:   Hospital Problems  Date Reviewed: 1/3/2022          Codes Class Noted POA    Essential hypertension (Chronic) ICD-10-CM: I10  ICD-9-CM: 401.9  Unknown Yes        Hypercholesterolemia with hypertriglyceridemia (Chronic) ICD-10-CM: E78.2  ICD-9-CM: 272.2  12/27/2021 Yes    Overview Signed 1/4/2022 12:02 AM by Author MD Thao     Lipid profile (12/27/2021) showed , , HDL 48,              * (Principal) [facility] ICD-10-CM: I63.9  ICD-9-CM: 434.91  12/26/2021 Yes    Overview Signed 1/3/2022 11:59 PM by Author MD Thao     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      Assessment:   Changes in Assessment throughout shift: No change to previous assessment    Patient has a central line: no   Patient has Edwards Cath: no      Last Vitals:     Vitals:    01/03/22 2021 01/04/22 0728 01/04/22 0810 01/04/22 1531   BP: (!) 148/79 138/67 (!) 147/80 (!) 140/87   Pulse: 73 92 77 89   Resp: 18 16  18   Temp: 97 °F (36.1 °C) 98.3 °F (36.8 °C)  97.7 °F (36.5 °C)   SpO2: 95% 95% 95% 96%   Weight:       Height:            PAIN    Pain Assessment    Pain Intensity 1: 7 (01/04/22 0815) Pain Intensity 1: 2 (12/29/14 1105)    Pain Location 1: Knee Pain Location 1: Abdomen    Pain Intervention(s) 1: Medication (see MAR) Pain Intervention(s) 1: Medication (see MAR)  Patient Stated Pain Goal: 0 Patient Stated Pain Goal: 0  o Intervention effective: yes  o Other actions taken for pain: Medication (see MAR)     Skin Assessment  Skin color    Condition/Temperature    Integrity    Turgor    Weekly Pressure Ulcer Documentation  Pressure  Injury Documentation: No Pressure Injury Noted-Pressure Ulcer Prevention Initiated  Wound Prevention & Protection Methods  Orientation of wound Orientation of Wound Prevention: Posterior  Location of Prevention Location of Wound Prevention: Buttocks,Sacrum/Coccyx  Dressing Present Dressing Present : No  Dressing Status    Wound Offloading Wound Offloading (Prevention Methods): Adaptive equipment,Bed, pressure redistribution/air,Bed, pressure reduction mattress,Repositioning,Pillows,Elevate heels,Wheelchair,Turning     INTAKE/OUPUT  Date 01/03/22 0700 - 01/04/22 0659 01/04/22 0700 - 01/05/22 0659   Shift [id number] 0301-3401 24 Hour Total 0067-4095 8222-6879 24 Hour Total   INTAKE   P.O. 720  720 480  480     P. O. 720  720 480  480   Shift Total(mL/kg) 720(11.5)  720(11.5) 480(7.7)  480(7.7)   OUTPUT   Urine(mL/kg/hr)           Urine Occurrence(s) 4 x 5 x 9 x 2 x  2 x   Emesis/NG output           Emesis Occurrence(s)  0 x 0 x      Stool           Stool Occurrence(s) 0 x 0 x 0 x 0 x  0 x   Shift Total(mL/kg)           720 480  480   Weight (kg) 62.7 62.7 62.7 62.7 62.7 62.7       Recommendations:  1. Patient needs and requests: pain management, ADL assistance, toileting assistance, meal set-up d/t vision impairment    2. Pending tests/procedures: none at this time     3. Functional Level/Equipment: Partial (one person) / Bed (comment); Wheelchair    Fall Precautions:   Fall risk precautions were reinforced with the patient; she was instructed to call for help prior to getting up.  The following fall risk precautions were continued: bed/ chair alarms, door signage, yellow bracelet and socks as well as update of the Brea Ponce tool in the patient's room. Yadira Score: 3    HEALS Safety Check    A safety check occurred in the patient's room between off going nurse and oncoming nurse listed above. The safety check included the below items  Area Items   H  High Alert Medications - Verify all high alert medication drips (heparin, PCA, etc.)   E  Equipment - Suction is set up for ALL patients (with norah)  - Red plugs utilized for all equipment (IV pumps, etc.)  - WOWs wiped down at end of shift.  - Room stocked with oxygen, suction, and other unit-specific supplies   A  Alarms - Bed alarm is set for fall risk patients  - Ensure chair alarm is in place and activated if patient is up in a chair   L  Lines - Check IV for any infiltration  - Edwards bag is empty if patient has a Edwards   - Tubing and IV bags are labeled   S  Safety   - Room is clean, patient is clean, and equipment is clean. - Hallways are clear from equipment besides carts. - Fall bracelet on for fall risk patients  - Ensure room is clear and free of clutter  - Suction is set up for ALL patients (with norah)  - Hallways are clear from equipment besides carts.    - Isolation precautions followed, supplies available outside room, sign posted     Pankaj Reeves RN

## 2022-01-04 NOTE — PROGRESS NOTES
01/04/22 0832   EENT   EENT (WDL) X   Visual Impairment   (bilateral glaucoma; reports worsening blurred vision)   Visual Aid Glasses; At bedside     PERRLA; no changes to neuro assessment from previous. Dr. Lucho Cline was notified.

## 2022-01-04 NOTE — CONSULTS
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 Patient otherwise admits to occasional alcohol consumption, but no history of substance use nor dependency, otherwise.       OBJECTIVE  GENERAL OBSERVATIONS  Willingness to participate in program: [x] good   [] fair [] indifferent [] poor    General Appearance:  Patient observed casually and appropriately dressed and groomed and immediately responsive and engaged on my contact    Sensory Impairments:  Patient may have some problems with auditory reception or, on the other hand, has difficulties in hearing with others using facial mask; she is able to voluntarily move extremities but complains of frustration with left side weakness    Denominational Affiliation:  Spiritism    Admission Assessment  Discharge Status   [x] alert  [] lethargic  [] difficult to arouse  [] fluctuating  [] other: Level of Consciousness [x] alert  [] lethargic  [] difficult to arouse  [] fluctuating  [] other:   [x] person  [x] place  [x] time  [x] situation Oriented [x] person  [x] place  [x] time  [x] situation   [x] within normal limits  [] impaired       [] mild        [] moderate        [] severe Attention [x] within normal limits  [] impaired       [] mild [] moderate        [] severe   [x] within normal limits  [x] impaired       [x] mild        [] moderate        [] severe Memory [x] within normal limits  [x] impaired       [x] mild        [] moderate        [] severe   [x] appropriate to situation  [] depressed  [] anxious  [] angry   [] fearful  [] emotionally labile  [x] other: Patient is currently denying acute feelings of anxiety nor depression, but admits to frustration with left side weakness Mood [x] appropriate to situation  [] depressed  [] anxious  [] angry   [] fearful  [] emotionally labile  [] other:   [x] appropriate  [] flat  [] inappropriate to content of speech Affect [x] appropriate  [] flat  [] inappropriate to content of speech   [x] appropriate  [] aggressive/agitated  [] withdrawn  [] inappropriate  [x] other: She remains calm and composed, appearing neither restless nor agitated Behavior [x] appropriate  [] aggressive/agitated  [] withdrawn  [] inappropriate  [] other:   [] good  [x] limited  [] denial  [] none Insight Into Illness [] good  [x] limited  [] denial  [] none   [x] intact  [x] impaired       [x] mild        [] moderate        [] severe       Describe: Patient will benefit from cues and prompts with novel and/or complex instruction Cognition [x] intact  [x] impaired       [x] mild        [] moderate        [] severe       Describe:    [x] coping  [] demonstrates poor adjustment  [] undetermined       As evidenced by:  In spite of frustration with circumstances, she remains motivated to improve and return to prior residence where she feels well supported Patient Adjustment to Disability [x] coping  [] demonstrates poor adjustment  [] undetermined       As evidenced by:    [] coping  [] demonstrates poor adjustment  [x] undetermined      As evidenced by: Not available on evaluation but patient reportedly feels well supported by her children residing both locally and out of town Family Adjustment to Disability [x] coping  [] demonstrates poor adjustment  [] undetermined      As evidenced by:      ASSESSMENT  Clinical Impression:  Patient is an entirely pleasant and cooperative, 82year old,  (one and one half years ago), 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 She now resides in Senior apartment living in [place] in residence that is one level with no steps to enter. She and spouse relocated there prior to his passing; and, she reports feeling well satisfied with that setting and expecting to return post hospitalization. In fact, she has previously had therapy services in situ and knows that same will be available to her after this hospitalization. Patient reportedly feels well supported by her five children, including three residing locally, one in [place] and another in [place]. She has some, albeit limited insight about all aspects of current stroke occurrence and will benefit from further education to best understand the parameters of recovery. As well, she will be able to minimize frustration if she identifies realistic goals for herself during her acute rehabilitation effort. Emotionally, patient presents as calm and composed, neither displaying nor reporting symptoms of acute emotional distress with anxiety nor depression. But, she was previously diagnosed with Major Depressive Disorder, presumably after the loss of her spouse and she was stabilized on Zoloft, which continues for her on ARU. Interestingly, she stated that she is not aware of Rx for same. At this time, again, she suggests feeling more frustrated than clinically depressed nor anxious with post stroke sequelae. She will be monitored for emotional and/or behavioral difficulties while on ARU and be encouraged to persevere.     Cognitively, patient presents as alert and oriented, able to understand inquiry, albeit with some hearing difficulty noted as possible, and responds with intelligibility. Patient has already been identified as essentially functioning at her baseline of ability. She did have some problems with immediate repetition of new information (but, perhaps secondary to conversation while wearing facial masks), but was fully oriented and recall of new information after delay was good. Regardless, she will benefit from cues, prompts, repetition, redirection and reinforcement, especially with novel and/or complex instruction in therapy. Patient Strengths:  Alert, oriented, pleasant, cooperative and motivated to improve and return to home    Patient Preferences:  Patient expects to return to her senior living residence in [facility] Potential:  Good    Educational Needs: Under each heading list the specific items in which the patient or family will need education/training.  Example: hip precautions, use of walker, ADL equipment, neglect, judgment, adjustment, etc.     Special considerations or accommodations for teaching:  [] Yes     [] No     [] NA  If Yes, explain: Patient reports feeling frustrated with perceived limitation secondary to left side weakness Discharge Status    Completed Demonstrated/ Verbalized Understanding    Yes No Yes No   Info regarding disability:  [] [] [] []   Adjustment:  [] [] [] []   Cognition:  [] [] [] []   Other: [] [] [] []   Other: [] [] [] []   If education not completed, explain: [] [] [] []     PLAN  Problem: Limited insight about all stroke recovery  Long Term Goal: Increase insight about recovery  Intervention: Patient/stroke education  At Discharge - LTG Achieved: [x] Yes [] No If not achieved, explain:    Problem: Expressed frustration with post stroke sequelae  Long Term Goal: Minimize frustration in recovery  Intervention: Identify realistic goals for self in recovery  At Discharge - LTG Achieved: [x] Yes [] No If not achieved, explain:    Problem: History of mood disorder  Long Term Goal: Maintain mood stability  Intervention: Rx and support , as necessary  At Discharge - LTG Achieved: [x] Yes [] No If not achieved, explain:    Problem: Self concept and self esteem  Long Term Goal: Maximize self concept and esteem  Intervention: Positive reinforcement and ego support  At Discharge - LTG Achieved: [x] Yes [] No If not achieved, explain:    Problem: Safety and pace in recovery  Long Term Goal: Understand all safety recommendations and pace in recovery  Intervention: Patient education and reinforcement  At Discharge - LTG Achieved: [x] Yes [] No If not achieved, explain:    Jr Mcduffie, THE [facility]  1/4/2022 8:06 AM    DISCHARGE STATUS    Clinical Impressions: Patient participated in scheduled therapy on ARU and there was no report that she was resisting recommendations for same. She improved well enough to return to her residence, as she had hoped, and will have continued outpatient support. Patient was seen once for follow up in support , after initial evaluation completed, and maintained that she was coping satisfactorily with demands placed on her. I was not informed of any emergent problems occurring during her continued stay on ARU and she was not seen for further support.     Follow-up Services Recommended Purpose                 Jr Mcduffie, PHD  Discharge Date/Time:

## 2022-01-04 NOTE — PROGRESS NOTES
Physical Therapy Attempt Note    Attempted to see pt for scheduled PT treatment at 0800. Pt presents supine in bed with HOB elevated in no apparent distress but reports she isn't sure how she is feeling. Pt elaborates to note she feels light headed and that she has been back and forth a few times to the commode overnight. Pt also notes B LE \"arthritis,\" pain. Pt's vital signs taken and nursing staff notified. Pt c/o increased blurry vision compared to baseline although she is able to demonstrates smooth saccades and tracking with no nystagmus noted. Pt states, \"I feel sort of like I did when I called,\" re: to come to the hospital.  Pt's nursing staff notified and aware and was assessing pt when PT left room with pt noting she did not feel well enough to participate in skilled PT intervention. Will re-attempt treatment as able.     Louis Seen PT, DPT

## 2022-01-04 NOTE — PROGRESS NOTES
Problem: Pressure Injury - Risk of  Goal: *Prevention of pressure injury  Description: Document Lebron Scale and appropriate interventions in the flowsheet. Outcome: Progressing Towards Goal  Note: Pressure Injury Interventions:  Sensory Interventions: Float heels,Keep linens dry and wrinkle-free,Check visual cues for pain,Chair cushion,Minimize linen layers    Moisture Interventions: Minimize layers,Apply protective barrier, creams and emollients    Activity Interventions: Chair cushion,Increase time out of bed,Pressure redistribution bed/mattress(bed type),PT/OT evaluation    Mobility Interventions: Chair cushion,HOB 30 degrees or less,PT/OT evaluation,Pressure redistribution bed/mattress (bed type)    Nutrition Interventions: Document food/fluid/supplement intake,Offer support with meals,snacks and hydration    Friction and Shear Interventions: Apply protective barrier, creams and emollients,HOB 30 degrees or less,Minimize layers,Transferring/repositioning devices                Problem: Patient Education: Go to Patient Education Activity  Goal: Patient/Family Education  Outcome: Progressing Towards Goal     Problem: Falls - Risk of  Goal: *Absence of Falls  Description: Document Yadira Fall Risk and appropriate interventions in the flowsheet.   Outcome: Progressing Towards Goal  Note: Fall Risk Interventions:  Mobility Interventions: Bed/chair exit alarm,PT Consult for mobility concerns,Patient to call before getting OOB,OT consult for ADLs,Strengthening exercises (ROM-active/passive),Utilize walker, cane, or other assistive device,Utilize gait belt for transfers/ambulation         Medication Interventions: Patient to call before getting OOB,Bed/chair exit alarm,Teach patient to arise slowly,Utilize gait belt for transfers/ambulation    Elimination Interventions: Bed/chair exit alarm,Call light in reach,Patient to call for help with toileting needs,Toilet paper/wipes in reach,Toileting schedule/hourly rounds,Stay With Me (per policy)              Problem: Patient Education: Go to Patient Education Activity  Goal: Patient/Family Education  Outcome: Progressing Towards Goal     Problem: General Medical Care Plan  Goal: *Vital signs within specified parameters  Outcome: Progressing Towards Goal  Goal: *Labs within defined limits  Outcome: Progressing Towards Goal  Goal: *Absence of infection signs and symptoms  Outcome: Progressing Towards Goal  Goal: *Optimal pain control at patient's stated goal  Outcome: Progressing Towards Goal  Goal: *Skin integrity maintained  Outcome: Progressing Towards Goal  Goal: *Fluid volume balance  Outcome: Progressing Towards Goal  Goal: *Optimize nutritional status  Outcome: Progressing Towards Goal  Goal: *Anxiety reduced or absent  Outcome: Progressing Towards Goal  Goal: *Progressive mobility and function (eg: ADL's)  Outcome: Progressing Towards Goal     Problem: Patient Education: Go to Patient Education Activity  Goal: Patient/Family Education  Outcome: Progressing Towards Goal     Problem: Inpatient Rehab (Adult)  Goal: *LTG: Avoids injury/falls 100% of time related to deficits  Outcome: Progressing Towards Goal  Goal: *LTG: Avoids infection 100% of time related to deficits  Outcome: Progressing Towards Goal  Goal: *LTG: Verbalize understanding of diagnosis and risk factors for recurring stroke  Outcome: Progressing Towards Goal  Goal: *LTG: Absence of DVT during hospitalization  Outcome: Progressing Towards Goal  Goal: *LTG: Maintains Skin Integrity With No Evidence of Pressure Injury 100% of Time  Outcome: Progressing Towards Goal  Goal: Interventions  Outcome: Progressing Towards Goal     Problem: Patient Education: Go to Patient Education Activity  Goal: Patient/Family Education  Outcome: Progressing Towards Goal

## 2022-01-04 NOTE — ROUTINE PROCESS
SHIFT CHANGE NOTE FOR [facility]    Bedside and Verbal shift change report given to [address] (oncoming nurse) by Milagros Perez RN (offgoing nurse). Report included the following information SBAR, Kardex, MAR and Recent Results. Situation:   Code Status: Full Code   Hospital Day: 4   Problem List:   Hospital Problems  Date Reviewed: 1/3/2022          Codes Class Noted POA    Essential hypertension (Chronic) ICD-10-CM: I10  ICD-9-CM: 401.9  Unknown Yes        Hypercholesterolemia with hypertriglyceridemia (Chronic) ICD-10-CM: E78.2  ICD-9-CM: 272.2  12/27/2021 Yes    Overview Signed 1/4/2022 12:02 AM by Salvador Rosenbaum MD     Lipid profile (12/27/2021) showed , , HDL 48,              * (Principal) Acute ischemic stroke ([facility] ICD-10-CM: I63.9  ICD-9-CM: 434.91  12/26/2021 Yes    Overview Signed 1/3/2022 11:59 PM by Salvador Rosenbaum MD     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      Assessment:   Changes in Assessment throughout shift: No change to previous assessment     Patient has a central line: no Reasons if yes: n/a  Insertion date:n/a Last dressing date:n/a   Patient has Ovalle Cath: no Reasons if yes: n/a   Insertion date:n/a  Shift ovalle care completed: N/A     Last Vitals:     Vitals:    01/03/22 0809 01/03/22 1505 01/03/22 1623 01/03/22 2021   BP: 138/79 135/83 (!) 160/86 (!) 148/79   Pulse: 68 (!) 105 84 73   Resp: 16  16 18   Temp: 97.6 °F (36.4 °C)  (!) 96.6 °F (35.9 °C) 97 °F (36.1 °C)   SpO2: 95% 95% 97% 95%   Weight:       Height:            PAIN    Pain Assessment    Pain Intensity 1: 0 (01/04/22 0404) Pain Intensity 1: 2 (12/29/14 7045)    Pain Location 1: Head Pain Location 1: Abdomen    Pain Intervention(s) 1: Medication (see MAR) Pain Intervention(s) 1: Medication (see MAR)  Patient Stated Pain Goal: 0 Patient Stated Pain Goal: 0  o Intervention effective: no complaints of pain overnight  o Other actions taken for pain:       Skin Assessment  Skin color    Condition/Temperature    Integrity    Turgor    Weekly Pressure Ulcer Documentation  Pressure  Injury Documentation: No Pressure Injury Noted-Pressure Ulcer Prevention Initiated  Wound Prevention & Protection Methods  Orientation of wound Orientation of Wound Prevention: Posterior  Location of Prevention Location of Wound Prevention: Buttocks,Sacrum/Coccyx  Dressing Present Dressing Present : No  Dressing Status    Wound Offloading Wound Offloading (Prevention Methods): Bed, pressure reduction mattress,Elevate heels,Pillows     INTAKE/OUPUT  Date 01/03/22 0700 - 01/04/22 0659 01/04/22 0700 - 01/05/22 0659   Shift 4479-6221 7442-0944 24 Hour Total 9498-8396 0679-9276 24 Hour Total   INTAKE   P.O. 720  720        P. O. 720  720      Shift Total(mL/kg) 720(11.5)  720(11.5)      OUTPUT   Urine(mL/kg/hr)           Urine Occurrence(s) 4 x 5 x 9 x      Emesis/NG output           Emesis Occurrence(s)  0 x 0 x      Stool           Stool Occurrence(s) 0 x 0 x 0 x      Shift Total(mL/kg)           720      Weight (kg) 62.7 62.7 62.7 62.7 62.7 62.7       Recommendations:  1. Patient needs and requests: assistance with ADL, incontinence care    2. Pending tests/procedures: none     3. Functional Level/Equipment: Partial (one person) / Bed (comment); Bedside Commode;Walker; Wheelchair    Fall Precautions:   Fall risk precautions were reinforced with the patient; she was instructed to call for help prior to getting up.  The following fall risk precautions were continued: bed/ chair alarms, door signage, yellow bracelet and socks as well as update of the Dennie Oak tool in the patient's room. Yadira Score: 3    HEALS Safety Check    A safety check occurred in the patient's room between off going nurse and oncoming nurse listed above. The safety check included the below items  Area Items   H  High Alert Medications - Verify all high alert medication drips (heparin, PCA, etc.)   E  Equipment - Suction is set up for ALL patients (with norah)  - Red plugs utilized for all equipment (IV pumps, etc.)  - WOWs wiped down at end of shift.  - Room stocked with oxygen, suction, and other unit-specific supplies   A  Alarms - Bed alarm is set for fall risk patients  - Ensure chair alarm is in place and activated if patient is up in a chair   L  Lines - Check IV for any infiltration  - Edwards bag is empty if patient has a Edwards   - Tubing and IV bags are labeled   S  Safety   - Room is clean, patient is clean, and equipment is clean. - Hallways are clear from equipment besides carts. - Fall bracelet on for fall risk patients  - Ensure room is clear and free of clutter  - Suction is set up for ALL patients (with norah)  - Hallways are clear from equipment besides carts.    - Isolation precautions followed, supplies available outside room, sign posted     Mana Hanks, RN

## 2022-01-04 NOTE — ROUTINE PROCESS
[facility]    Bedside and Verbal shift change report given to BERT Nelson (oncoming nurse) by Caroline Larson RN (offgoing nurse). Report included the following information SBAR, Kardex, MAR and Recent Results. Situation:   Code Status: Full Code   Hospital Day: 4   Problem List:   Hospital Problems  Date Reviewed: 1/4/2022          Codes Class Noted POA    Essential hypertension (Chronic) ICD-10-CM: I10  ICD-9-CM: 401.9  Unknown Yes        Hypercholesterolemia with hypertriglyceridemia (Chronic) ICD-10-CM: E78.2  ICD-9-CM: 272.2  12/27/2021 Yes    Overview Signed 1/4/2022 12:02 AM by Brock Salas MD     Lipid profile (12/27/2021) showed , , HDL 48,              * (Principal) Acute [facility] stroke ([facility] ICD-10-CM: I63.9  ICD-9-CM: 434.91  12/26/2021 Yes    Overview Signed 1/3/2022 11:59 PM by Brock Salas MD     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      Assessment:   Changes in Assessment throughout shift: No change to previous assessment    Patient has a central line: no   Patient has Edwards Cath: no      Last Vitals:     Vitals:    01/03/22 2021 01/04/22 0728 01/04/22 0810 01/04/22 1531   BP: (!) 148/79 138/67 (!) 147/80 (!) 140/87   Pulse: 73 92 77 89   Resp: 18 16  18   Temp: 97 °F (36.1 °C) 98.3 °F (36.8 °C)  97.7 °F (36.5 °C)   SpO2: 95% 95% 95% 96%   Weight:       Height:            PAIN    Pain Assessment    Pain Intensity 1: 0 (01/04/22 2936) Pain Intensity 1: 2 (12/29/14 1105)    Pain Location 1: Knee Pain Location 1: Abdomen    Pain Intervention(s) 1: Medication (see MAR) Pain Intervention(s) 1: Medication (see MAR)  Patient Stated Pain Goal: 0 Patient Stated Pain Goal: 0  o Intervention effective: yes  o Other actions taken for pain: Medication (see MAR)     Skin Assessment  Skin color    Condition/Temperature    Integrity    Turgor    Weekly Pressure Ulcer Documentation  Pressure  Injury Documentation: No Pressure Injury Noted-Pressure Ulcer Prevention Initiated  Wound Prevention & Protection Methods  Orientation of wound Orientation of Wound Prevention: Posterior  Location of Prevention Location of Wound Prevention: Buttocks,Sacrum/Coccyx  Dressing Present Dressing Present : No  Dressing Status    Wound Offloading Wound Offloading (Prevention Methods): Adaptive equipment,Bed, pressure redistribution/air,Bed, pressure reduction mattress,Repositioning,Pillows,Elevate heels,Wheelchair,Turning     INTAKE/OUPUT  Date 01/03/22 0700 - 01/04/22 0659 01/04/22 0700 - 01/05/22 0659   Shift 0287-4259 5392-0196 24 Hour Total 0590-7619 6976-6929 24 Hour Total   INTAKE   P.O. 720  720 720  720     P. O. 720  720 720  720   Shift Total(mL/kg) 720(11.5)  720(11.5) 720(11.5)  720(11.5)   OUTPUT   Urine(mL/kg/hr)           Urine Occurrence(s) 4 x 5 x 9 x 3 x  3 x   Emesis/NG output           Emesis Occurrence(s)  0 x 0 x      Stool           Stool Occurrence(s) 0 x 0 x 0 x 0 x  0 x   Shift Total(mL/kg)           720 720  720   Weight (kg) 62.7 62.7 62.7 62.7 62.7 62.7       Recommendations:  1. Patient needs and requests: pain management, ADL assistance, toileting assistance, meal set-up d/t vision impairment    2. Pending tests/procedures: none at this time     3. Functional Level/Equipment: Partial (one person) / Bed (comment); Wheelchair    Fall Precautions:   Fall risk precautions were reinforced with the patient; she was instructed to call for help prior to getting up.  The following fall risk precautions were continued: bed/ chair alarms, door signage, yellow bracelet and socks as well as update of the Jessica Litter tool in the patient's room. Yadira Score: 3    HEALS Safety Check    A safety check occurred in the patient's room between off going nurse and oncoming nurse listed above. The safety check included the below items  Area Items   H  High Alert Medications - Verify all high alert medication drips (heparin, PCA, etc.)   E  Equipment - Suction is set up for ALL patients (with norah)  - Red plugs utilized for all equipment (IV pumps, etc.)  - WOWs wiped down at end of shift.  - Room stocked with oxygen, suction, and other unit-specific supplies   A  Alarms - Bed alarm is set for fall risk patients  - Ensure chair alarm is in place and activated if patient is up in a chair   L  Lines - Check IV for any infiltration  - Edwards bag is empty if patient has a Edwards   - Tubing and IV bags are labeled   S  Safety   - Room is clean, patient is clean, and equipment is clean. - Hallways are clear from equipment besides carts. - Fall bracelet on for fall risk patients  - Ensure room is clear and free of clutter  - Suction is set up for ALL patients (with norah)  - Hallways are clear from equipment besides carts.    - Isolation precautions followed, supplies available outside room, sign posted     Calvin Brenner RN

## 2022-01-04 NOTE — PROGRESS NOTES
[address]  [address] [place] [address], [address]     INPATIENT REHABILITATION  DAILY PROGRESS NOTE     Date: 1/4/2022    Name: Memo Cam Age / Sex: 82 y.o. 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 Blood pressure fairly controlled. Patient's Complaint:   No significant medical complaints    Pain Control: no current joint or muscle symptoms, essentially pain-free      Objective:     Vital Signs:  Patient Vitals for the past 24 hrs:   BP Temp Pulse Resp SpO2   01/04/22 1531 (!) 140/87 97.7 °F (36.5 °C) 89 18 96 %   01/04/22 0810 (!) 147/80 -- 77 -- 95 %   01/04/22 0728 138/67 98.3 °F (36.8 °C) 92 16 95 %   01/03/22 2021 (!) 148/79 97 °F (36.1 °C) 73 18 95 %        Physical Examination:  GENERAL SURVEY: Patient is awake, alert, oriented x 3, laying comfortably on the bed, not in acute respiratory distress. HEENT: pink palpebral conjunctivae, anicteric sclerae, no nasoaural discharge, moist oral mucosa  NECK: supple, no jugular venous distention, no palpable lymph nodes  CHEST/LUNGS: symmetrical chest expansion, good air entry, clear breath sounds  HEART: adynamic precordium, good S1 S2, no S3, regular rhythm, no murmurs  ABDOMEN: flat, bowel sounds appreciated, soft, non-tender  EXTREMITIES: (+) limitation ROM of both shoulders due to history of rotator cuff injury, pink nailbeds, no edema, full and equal pulses, no calf tenderness   NEUROLOGICAL EXAM: The patient is awake, alert and oriented x3, able to answer questions fairly appropriately, able to follow 1 and 2 step commands. Able to tell time from the wall clock. Cranial nerves II-XII are grossly intact. No gross sensory deficit.   Motor strength is 4-/5 on the RUE (except right shoulder), 3+/5 on the LUE (except left shoulder), 4+/5 on the RLE, 4/5 on the LLE. Current Medications:  Current Facility-Administered Medications   Medication Dose Route Frequency    cholecalciferol (VITAMIN D3) (1000 Units /25 mcg) tablet 2,000 Units  2,000 Units Oral DAILY    meclizine (ANTIVERT) tablet 12.5 mg  12.5 mg Oral BID    losartan (COZAAR) tablet 25 mg  25 mg Oral DAILY    hydrALAZINE (APRESOLINE) tablet 25 mg  25 mg Oral TID PRN    ondansetron (ZOFRAN ODT) tablet 4 mg  4 mg Oral Q8H PRN    docusate sodium (COLACE) capsule 100 mg  100 mg Oral BID    magnesium hydroxide (MILK OF MAGNESIA) 400 mg/5 mL oral suspension 30 mL  30 mL Oral DAILY PRN    bisacodyL (DULCOLAX) tablet 10 mg  10 mg Oral Q48H PRN    aspirin tablet 325 mg  325 mg Oral DAILY    atorvastatin (LIPITOR) tablet 80 mg  80 mg Oral QHS    clopidogreL (PLAVIX) tablet 75 mg  75 mg Oral DAILY    dorzolamide (TRUSOPT) 2 % ophthalmic solution 1 Drop  1 Drop Both Eyes TID    latanoprost (XALATAN) 0.005 % ophthalmic solution 1 Drop  1 Drop Both Eyes QHS    loperamide (IMODIUM) capsule 2 mg  2 mg Oral PRN    montelukast (SINGULAIR) tablet 10 mg  10 mg Oral DAILY    oxybutynin chloride XL (DITROPAN XL) tablet 10 mg  10 mg Oral QHS    sertraline (ZOLOFT) tablet 50 mg  50 mg Oral DAILY    potassium chloride (K-DUR, KLOR-CON M20) SR tablet 20 mEq  20 mEq Oral DAILY WITH BREAKFAST    acetaminophen (TYLENOL) tablet 650 mg  650 mg Oral Q4H PRN       Allergies:   Allergies   Allergen Reactions    Novocain [Procaine] Swelling       Functional Progress:    OCCUPATIONAL THERAPY    ON ADMISSION MOST RECENT   Eating  Functional Level: 5   Eating  Functional Level: 5     Grooming  Functional Level: 5   Grooming  Functional Level: 5     Bathing  Functional Level: 4   Bathing  Functional Level: 4     Upper Body Dressing  Functional Level: 4   Upper Body Dressing  Functional Level: 4     Lower Body Dressing  Functional Level: 2   Lower Body Dressing  Functional Level: 2     Toileting  Functional Level: 2   Toileting  Functional Level: 2     Toilet Transfers  Toilet Transfer Score: 3   Toilet Transfers  Toilet Transfer Score: 3     Tub /Shower Transfers  Tub/Shower Transfer Score: 0 (NT due to safety concerns)   Tub/Shower Transfers  Tub/Shower Transfer Score: 0 (NT due to safety concerns)       Legend:   7 - Independent   6 - Modified Independent   5 - Standby Assistance / Supervision / Set-up   4 - Minimum Assistance / Contact Guard Assistance   3 - Moderate Assistance   2 - Maximum Assistance   1 - Total Assistance / Dependent       Lab/Data Review:  Recent Results (from the past 24 hour(s))   GLUCOSE, POC    Collection Time: 01/03/22  8:31 PM   Result Value Ref Range    Glucose (POC) 103 70 - 110 mg/dL   HGB & HCT    Collection Time: 01/04/22  5:59 AM   Result Value Ref Range    HGB 12.6 12.0 - 16.0 g/dL    HCT 38.2 35.0 - 45.0 %       Assessment:     Primary Rehabilitation Diagnosis  1. Impaired Mobility and ADLs  2. 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 Justification for continued stay: Good progression towards established rehabilitation goals. 2. Medical Issues being followed closely:    [x]  Fall and safety precautions     []  Wound Care     [x]  Bowel and Bladder Function     [x]  Fluid Electrolyte and Nutrition Balance     []  Pain Control      3.  Issues that 24 hour rehabilitation nursing is following:    [x] Fall and safety precautions     []  Wound Care     [x]  Bowel and Bladder Function     [x]  Fluid Electrolyte and Nutrition Balance     []  Pain Control      [x]  Assistance with and education on in-room safety with transfers to and from the bed, wheelchair, toilet and shower. 4. Acute rehabilitation plan of care:    [x]  Continue current care and rehab. [x]  Physical Therapy           [x]  Occupational Therapy           []  Speech Therapy     []  Hold Rehab until further notice     5. Medications:    [x]  MAR Reviewed     [x]  Continue Present Medications     6. Chemical DVT Prophylaxis:      []  Enoxaparin     []  Unfractionated Heparin     []  Warfarin     []  NOAC     []  Aspirin     [x]  None     7. Mechanical DVT Prophylaxis:      [x]  RADHAMES Stockings     [x]  Sequential Compression Device     []  None     8. GI Prophylaxis:      []  PPI     []  H2 Blocker     [x]  None / Not indicated     9. Code status:    [x]  Full code     []  Partial code     []  Do not intubate     []  Do not resuscitate     10. Diet:  Specifications  3 carb choices (45 gm/meal); Low fat/Low cholesterol/High fiber/ANGELINE   Solids (consistency)  Regular   Liquids (consistency)  Thin   Fluid Restriction  None     11.  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   > Continue:    > Aspirin 325 mg PO once daily with breakfast    > Clopidogrel 75 mg PO once daily with dinner (STOP DATE: 3/27/2022)    > Meclizine 12.5 mg PO BID (for dizziness)       > Essential hypertension   > On 1/1/2022, started:    > Losartan 25 mg PO once daily (9AM)    > Hydralazine 25 mg PO TID PRN for SBP greater than 160 mmHg   > Continue:    > Losartan 25 mg PO once daily (9AM)    > Hydralazine 25 mg PO TID PRN for SBP greater than 160 mmHg     > Hyperlipidemia   > Continue Atorvastatin 80 mg PO q HS     > Depression and anxiety   > Continue Sertraline 50 mg PO once daily     > Glaucoma   >Continue Latanoprost eyedrops 1 drop both eyes q HS     > Urinary incontinence   > Urinalysis (1/1/2022): WNL   > Continue Oxybutynin XL 10 mg PO once daily      12. Personal Protective Equipment (N95 face mask and eye goggles) was used while interacting with the patient. Patient was using a surgical mask. 15. Patient's progress in rehabilitation and medical issues discussed with the patient. All questions answered to the best of my ability. Care plan discussed with patient and nurse. 14. Total clinical care time is 30 minutes, including review of chart including all labs, radiology, past medical history, and discussion with patient. Greater than 50% of my time was spent in coordination of care and counseling.       Signed:    Dotty Bañuelos MD    January 4, 2022

## 2022-01-04 NOTE — PROGRESS NOTES
Problem: Self Care Deficits Care Plan (Adult)  Goal: *Therapy Goal (Edit Goal, Insert Text)  Description: Occupational Therapy Goals   Long Term Goals  Initiated 22 and to be accomplished within 3 week(s)  1. Pt will perform self-feeding with independence. 2. Pt will perform grooming with independence. 3. Pt will perform UB bathing with supervision. 4. Pt will perform LB bathing with supervision. 5. Pt will perform tub/shower transfer with supervision. 6. Pt will perform UB dressing with Axel using compensatory strategies. 7. Pt will perform LB dressing with Axel and AE. 8. Pt will perform toileting task with Axel and AE. 9. Pt will perform toilet transfer with Axel and AE. Short Term Goals   Initiated 22 and to be accomplished within 7 day(s), reassess 22  1. Pt will perform self-feeding with Axel. 2. Pt will perform grooming with Axel. 3. Pt will perform UB bathing with SBA. 4. Pt will perform LB bathing with Fidel and UE support. 5. Pt will perform tub/shower transfer with Fidel and AE. 6. Pt will perform UB dressing with SBA using compensatory strategies. 7. Pt will perform LB dressing with Fidel and AE. 8. Pt will perform toileting task with Fidel and AE. 9. Pt will perform toilet transfer with Fidel and AE. Outcome: Progressing Towards Goal  Goal: Interventions  Outcome: Progressing Towards Goal   Occupational Therapy TREATMENT    Patient: Dima Neves   82 y.o. Patient identified with name and : yes    Date: 2022    First Tx Session  Time In: 80  Time Out[de-identified] 1200        Diagnosis: CVA (cerebral vascular accident) [facility]) [I63.9]   Precautions: Fall  Chart, occupational therapy assessment, plan of care, and goals were reviewed. Pain:  Pt reports 0/10 pain or discomfort prior to treatment. Pt reports -/10 pain or discomfort post treatment. Intervention Provided:       SUBJECTIVE:   Patient stated do you know where [facility]? .    Pt reported Left eye still being as blurry as this morning following receiving eye drops    OBJECTIVE DATA SUMMARY:     THERAPEUTIC ACTIVITY Daily Assessment    Pt engaged in UB task at tabletop which involved grasping clothespins of various resistance and securing to yard stick up to 7\" using bilateral hands. Pt engaged in table top activity which involved grasping medium pegs and securing to peg board at table top crossing midline and stretching across table with 1\" wrist weights. TOILETING Daily Assessment    Toileting  Toileting Assistance (FIM Score): 3 (Moderate assistance )  Cues: Physical assistance for pants down;Physical assistance for pants up;Verbal cues provided  Adaptive Equipment: Elevated seat;Walker     MOBILITY/TRANSFERS Daily Assessment     Pt performed toilet transfer with min-modA at [facility] for stability and physical assistance to stand. ASSESSMENT:  Pt performed UB therapeutic activity with purpose of improving BUE FM coordination and dexterity and BUE strength as well as increased safety and independence with toileting with AE. Progression toward goals:  [x]          Improving appropriately and progressing toward goals  []          Improving slowly and progressing toward goals  []          Not making progress toward goals and plan of care will be adjusted     PLAN:  Patient continues to benefit from skilled intervention to address the above impairments. Continue treatment per established plan of care. Discharge Recommendations:  Home Health  Further Equipment Recommendations for Discharge: bedside commode      Activity Tolerance:  fair      Estimated LOS:1/21/22    Please refer to the flowsheet for vital signs taken during this treatment.   After treatment:   [x]  Patient left in no apparent distress sitting up in chair   []  Patient left in no apparent distress in bed  [x]  Call bell left within reach  []  Nursing notified  []  Caregiver present  []  Bed alarm activated    COMMUNICATION/EDUCATION:   [] Home safety education was provided and the patient/caregiver indicated understanding. [] Patient/family have participated as able in goal setting and plan of care. [x] Patient/family agree to work toward stated goals and plan of care. [] Patient understands intent and goals of therapy, but is neutral about his/her participation. [] Patient is unable to participate in goal setting and plan of care.       Florencio Olvera, OT

## 2022-01-05 PROCEDURE — 97530 THERAPEUTIC ACTIVITIES: CPT

## 2022-01-05 PROCEDURE — 99232 SBSQ HOSP IP/OBS MODERATE 35: CPT | Performed by: INTERNAL MEDICINE

## 2022-01-05 PROCEDURE — 97542 WHEELCHAIR MNGMENT TRAINING: CPT

## 2022-01-05 PROCEDURE — 74011250636 HC RX REV CODE- 250/636: Performed by: FAMILY MEDICINE

## 2022-01-05 PROCEDURE — 97110 THERAPEUTIC EXERCISES: CPT

## 2022-01-05 PROCEDURE — 97116 GAIT TRAINING THERAPY: CPT

## 2022-01-05 PROCEDURE — 74011250637 HC RX REV CODE- 250/637: Performed by: FAMILY MEDICINE

## 2022-01-05 PROCEDURE — 97535 SELF CARE MNGMENT TRAINING: CPT

## 2022-01-05 PROCEDURE — 2709999900 HC NON-CHARGEABLE SUPPLY

## 2022-01-05 PROCEDURE — 74011250637 HC RX REV CODE- 250/637: Performed by: INTERNAL MEDICINE

## 2022-01-05 PROCEDURE — 65310000000 HC RM PRIVATE REHAB

## 2022-01-05 RX ORDER — LOSARTAN POTASSIUM 50 MG/1
50 TABLET ORAL DAILY
Status: DISCONTINUED | OUTPATIENT
Start: 2022-01-06 | End: 2022-01-20 | Stop reason: HOSPADM

## 2022-01-05 RX ORDER — LOSARTAN POTASSIUM 25 MG/1
25 TABLET ORAL ONCE
Status: COMPLETED | OUTPATIENT
Start: 2022-01-05 | End: 2022-01-05

## 2022-01-05 RX ADMIN — LATANOPROST 1 DROP: 50 SOLUTION OPHTHALMIC at 21:27

## 2022-01-05 RX ADMIN — CHOLECALCIFEROL TAB 25 MCG (1000 UNIT) 2000 UNITS: 25 TAB at 07:55

## 2022-01-05 RX ADMIN — MECLIZINE 12.5 MG: 12.5 TABLET ORAL at 07:55

## 2022-01-05 RX ADMIN — LOSARTAN POTASSIUM 25 MG: 25 TABLET, FILM COATED ORAL at 14:20

## 2022-01-05 RX ADMIN — OXYBUTYNIN CHLORIDE 10 MG: 5 TABLET, EXTENDED RELEASE ORAL at 21:25

## 2022-01-05 RX ADMIN — SERTRALINE 50 MG: 50 TABLET, FILM COATED ORAL at 07:55

## 2022-01-05 RX ADMIN — POTASSIUM CHLORIDE 20 MEQ: 1500 TABLET, EXTENDED RELEASE ORAL at 07:55

## 2022-01-05 RX ADMIN — CLOPIDOGREL 75 MG: 75 TABLET, FILM COATED ORAL at 17:58

## 2022-01-05 RX ADMIN — ASPIRIN 325 MG ORAL TABLET 325 MG: 325 PILL ORAL at 07:55

## 2022-01-05 RX ADMIN — DORZOLAMIDE HYDROCHLORIDE 1 DROP: 20 SOLUTION/ DROPS OPHTHALMIC at 18:00

## 2022-01-05 RX ADMIN — ATORVASTATIN CALCIUM 80 MG: 40 TABLET, FILM COATED ORAL at 21:25

## 2022-01-05 RX ADMIN — DORZOLAMIDE HYDROCHLORIDE 1 DROP: 20 SOLUTION/ DROPS OPHTHALMIC at 21:27

## 2022-01-05 RX ADMIN — MECLIZINE 12.5 MG: 12.5 TABLET ORAL at 17:58

## 2022-01-05 RX ADMIN — MONTELUKAST 10 MG: 10 TABLET, FILM COATED ORAL at 07:55

## 2022-01-05 RX ADMIN — DORZOLAMIDE HYDROCHLORIDE 1 DROP: 20 SOLUTION/ DROPS OPHTHALMIC at 07:57

## 2022-01-05 RX ADMIN — LOSARTAN POTASSIUM 25 MG: 25 TABLET, FILM COATED ORAL at 08:12

## 2022-01-05 NOTE — ROUTINE PROCESS
SHIFT CHANGE NOTE FOR [facility]    Bedside and Verbal shift change report given to Meaghan Calderon (oncoming nurse) by Jose Casillas RN (offgoing nurse). Report included the following information SBAR, Kardex, MAR and Recent Results. Situation:   Code Status: Full Code   Hospital Day: 5   Problem List:   Hospital Problems  Date Reviewed: 1/4/2022          Codes Class Noted POA    Essential hypertension (Chronic) ICD-10-CM: I10  ICD-9-CM: 401.9  Unknown Yes        Hypercholesterolemia with hypertriglyceridemia (Chronic) ICD-10-CM: E78.2  ICD-9-CM: 272.2  12/27/2021 Yes    Overview Signed 1/4/2022 12:02 AM by Annabel Jaramillo MD     Lipid profile (12/27/2021) showed , , HDL 48,              * (Principal) [facility] ICD-10-CM: I63.9  ICD-9-CM: 434.91  12/26/2021 Yes    Overview Signed 1/3/2022 11:59 PM by Annabel Jaramillo MD     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      Assessment:   Changes in Assessment throughout shift: No change to previous assessment     Patient has a central line: no Reasons if yes: n/a  Insertion date:n/a Last dressing date:n/a   Patient has Ovalle Cath: no Reasons if yes: n/a   Insertion date:n/a  Shift ovalle care completed: N/A     Last Vitals:     Vitals:    01/04/22 0728 01/04/22 0810 01/04/22 1531 01/04/22 2054   BP: 138/67 (!) 147/80 (!) 140/87 (!) 153/83   Pulse: 92 77 89 76   Resp: 16  18 18   Temp: 98.3 °F (36.8 °C)  97.7 °F (36.5 °C) 97 °F (36.1 °C)   SpO2: 95% 95% 96% 92%   Weight:       Height:            PAIN    Pain Assessment    Pain Intensity 1: 0 (01/05/22 0354) Pain Intensity 1: 2 (12/29/14 7694)    Pain Location 1: Knee Pain Location 1: Abdomen    Pain Intervention(s) 1: Medication (see MAR) Pain Intervention(s) 1: Medication (see MAR)  Patient Stated Pain Goal: 0 Patient Stated Pain Goal: 0  o Intervention effective: no complaints of pain ovrnight  o Other actions taken for pain:       Skin Assessment  Skin color    Condition/Temperature    Integrity    Turgor    Weekly Pressure Ulcer Documentation  Pressure  Injury Documentation: No Pressure Injury Noted-Pressure Ulcer Prevention Initiated  Wound Prevention & Protection Methods  Orientation of wound Orientation of Wound Prevention: Posterior  Location of Prevention Location of Wound Prevention: Buttocks,Sacrum/Coccyx  Dressing Present Dressing Present : No  Dressing Status    Wound Offloading Wound Offloading (Prevention Methods): Adaptive equipment,Wheelchair,Walker     INTAKE/OUPUT  Date 01/04/22 0700 - 01/05/22 0659 01/05/22 0700 - 01/06/22 0659   Shift 5587-7935 3403-2690 24 Hour Total 1825-0226 2047-2719 24 Hour Total   INTAKE   P.O. 720 120 840        P. O. 720 120 840      Shift Total(mL/kg) 720(11.5) 120(1.9) 840(13.4)      OUTPUT   Urine(mL/kg/hr)           Urine Occurrence(s) 3 x 8 x 11 x      Stool           Stool Occurrence(s) 0 x 2 x 2 x      Shift Total(mL/kg)          120 840      Weight (kg) 62.7 62.7 62.7 62.7 62.7 62.7       Recommendations:  1. Patient needs and requests: incontinence care, assistance with ADL's    2. Pending tests/procedures: none at this time     3. Functional Level/Equipment: Partial (one person) / Bedside Commode;Walker; Wheelchair    Fall Precautions:   Fall risk precautions were reinforced with the patient; she was instructed to call for help prior to getting up. The following fall risk precautions were continued: bed/ chair alarms, door signage, yellow bracelet and socks as well as update of the Sidonie Deisi tool in the patient's room.    Yadira Score: 3    HEALS Safety Check    A safety check occurred in the patient's room between off going nurse and oncoming nurse listed above. The safety check included the below items  Area Items   H  High Alert Medications - Verify all high alert medication drips (heparin, PCA, etc.)   E  Equipment - Suction is set up for ALL patients (with norah)  - Red plugs utilized for all equipment (IV pumps, etc.)  - WOWs wiped down at end of shift.  - Room stocked with oxygen, suction, and other unit-specific supplies   A  Alarms - Bed alarm is set for fall risk patients  - Ensure chair alarm is in place and activated if patient is up in a chair   L  Lines - Check IV for any infiltration  - Edwards bag is empty if patient has a Edwards   - Tubing and IV bags are labeled   S  Safety   - Room is clean, patient is clean, and equipment is clean. - Hallways are clear from equipment besides carts. - Fall bracelet on for fall risk patients  - Ensure room is clear and free of clutter  - Suction is set up for ALL patients (with norah)  - Hallways are clear from equipment besides carts.    - Isolation precautions followed, supplies available outside room, sign posted     Benny Kelly RN

## 2022-01-05 NOTE — PROGRESS NOTES
Problem: Self Care Deficits Care Plan (Adult)  Goal: *Therapy Goal (Edit Goal, Insert Text)  Description: Occupational Therapy Goals   Long Term Goals  Initiated 22 and to be accomplished within 3 week(s)  1. Pt will perform self-feeding with independence. 2. Pt will perform grooming with independence. 3. Pt will perform UB bathing with supervision. 4. Pt will perform LB bathing with supervision. 5. Pt will perform tub/shower transfer with supervision. 6. Pt will perform UB dressing with Aexl using compensatory strategies. 7. Pt will perform LB dressing with Axel and AE. 8. Pt will perform toileting task with Axel and AE. 9. Pt will perform toilet transfer with Axel and AE. Short Term Goals   Initiated 22 and to be accomplished within 7 day(s), reassess 22  1. Pt will perform self-feeding with Axel. 2. Pt will perform grooming with Axel. 3. Pt will perform UB bathing with SBA. 4. Pt will perform LB bathing with Fidel and UE support. 5. Pt will perform tub/shower transfer with Fidel and AE. 6. Pt will perform UB dressing with SBA using compensatory strategies. 7. Pt will perform LB dressing with Fidel and AE. 8. Pt will perform toileting task with Fidel and AE. 9. Pt will perform toilet transfer with Fidel and AE. Outcome: Progressing Towards Goal  Goal: Interventions  Outcome: Progressing Towards Goal   Occupational Therapy TREATMENT    Patient: Julián Lacey   82 y.o. Patient identified with name and : yes    Date: 2022    First Tx Session  Time In: 0812  Time Out[de-identified] 2465    Second Tx Session  Time In: 1330  Time Out[de-identified] 2640    Diagnosis: CVA (cerebral vascular accident) [facility]) [I63.9]   Precautions: Fall  Chart, occupational therapy assessment, plan of care, and goals were reviewed. Pain:  Pt reports 0 /10 pain or discomfort prior to treatment. Pt reports -/10 pain or discomfort post treatment.    Intervention Provided:       SUBJECTIVE:   Patient stated I had a rough night. I kept peeing and peeing and then I had a bowel movement too.     OBJECTIVE DATA SUMMARY:     THERAPEUTIC ACTIVITY Daily Assessment    Pt engaged in group activity with one other pt which involved stringing beads onto string using one hand to stabilize bead and the opposite to string using fine pincer grasp. THERAPEUTIC EXERCISE Daily Assessment    Pt completed BUE strengthening with 3# dowel bicep curls 20 reps, 3 sets with RB's in between sets. IADL Daily Assessment    Pt engaged in simulated med management task which involved grasping small manipulatives and sorting beads into pill box from pill bottles according to instructions on bottles. Pt demonstrated difficulty opening and closing pill box tabs and required increased time due to decreased BUE hand strength. FEEDING/EATING Daily Assessment    Feeding/Eating  Feeding/Eating Assistance: 5 (Supervision/setup)  Comments: Pt required supervision/Axel following setup opening syrups, cream and coffee lid     GROOMING Daily Assessment     Pt combed hair and washed face with setup.      UPPER BODY BATHING Daily Assessment    Upper Body Bathing  Bathing Assistance, Upper: 5 (Supervision)  Upper Body : Compensatory technique training  Position Performed: Seated in chair  Adaptive Equipment: Tub bench  Comments: Pt performed UB showering with supervision in seated position      LOWER BODY BATHING Daily Assessment    Lower Body Bathing  Bathing Assistance, Lower : 4 (Minimal assistance)  Adaptive Equipment: Grab bar  Position Performed: Seated in chair;Standing  Comments: Pt performed LB showering in seated position to wash lower BLE legs using crossed leg method and washing buttocks and eric area in standing with use of grab bar for stability and min A to wash buttocks following setup of nonskid surface below BLE feet for fall prevention     TOILETING Daily Assessment    Toileting  Toileting Assistance (FIM Score): 4 (Minimal assistance)  Cues: Physical assistance for pants up;Verbal cues provided  Adaptive Equipment: Elevated seat;Walker     UPPER BODY DRESSING Daily Assessment    Upper Body Dressing   Dressing Assistance : 5 (Supervision)  Comments: Pt required setup for UB dressing from w/c     LOWER BODY DRESSING Daily Assessment    Lower Body Dressing   Dressing Assistance : 2 (Maximal assistance)  Leg Crossed Method Used: No  Position Performed: Seated in chair;Standing  Adaptive Equipment Used: Grab bar  Comments: Pt required maxA to thread BLE feet through underwear and pants and ABLE socks for sake of time this date and due to fatigue     MOBILITY/TRANSFERS Daily Assessment    Functional Transfers  Toilet Transfer : Elevated seat;Stand pivot transfer with walker  Amount of Assistance Required: 3 (Moderate assistance)  Tub or Shower Type: Shower  Amount of Assistance Required: 3 (Moderate assistance)  Adaptive Equipment: Tub transfer bench       ASSESSMENT:  Pt demonstrates difficulty grasping items with fine pincer grasp with poor accuracy dropping items on floor. Pt is making progress increasing BUE strength and activity tolerance for self cares. Progression toward goals:  [x]          Improving appropriately and progressing toward goals  []          Improving slowly and progressing toward goals  []          Not making progress toward goals and plan of care will be adjusted     PLAN:  Patient continues to benefit from skilled intervention to address the above impairments. Continue treatment per established plan of care. Discharge Recommendations: Home Health  Further Equipment Recommendations for Discharge: bedside commode      Activity Tolerance:  fair      Estimated LOS:1/21/22    Please refer to the flowsheet for vital signs taken during this treatment.   After treatment:   [x]  Patient left in no apparent distress sitting up in chair   []  Patient left in no apparent distress in bed  [x]  Call bell left within reach  [] Nursing notified  []  Caregiver present  []  Bed alarm activated    COMMUNICATION/EDUCATION:   [] Home safety education was provided and the patient/caregiver indicated understanding. [] Patient/family have participated as able in goal setting and plan of care. [x] Patient/family agree to work toward stated goals and plan of care. [] Patient understands intent and goals of therapy, but is neutral about his/her participation. [] Patient is unable to participate in goal setting and plan of care.       Nelida Aquino, OT

## 2022-01-05 NOTE — PROGRESS NOTES
[facility] PHYSICAL REHABILITATION  [address] [place] [address], [address]     INPATIENT REHABILITATION  DAILY PROGRESS NOTE     Date: 1/5/2022    Name: Dinorah Carreon Age / Sex: 82 y.o. 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 Blood pressure fairly controlled. Patient's Complaint:   No significant medical complaints    Pain Control: no current joint or muscle symptoms, essentially pain-free      Objective:     Vital Signs:  Patient Vitals for the past 24 hrs:   BP Temp Pulse Resp SpO2   01/05/22 0725 (!) 170/99 97.3 °F (36.3 °C) 82 18 93 %   01/04/22 2054 (!) 153/83 97 °F (36.1 °C) 76 18 92 %   01/04/22 1531 (!) 140/87 97.7 °F (36.5 °C) 89 18 96 %        Physical Examination:  GENERAL SURVEY: Patient is awake, alert, oriented x 3, laying comfortably on the bed, not in acute respiratory distress. HEENT: pink palpebral conjunctivae, anicteric sclerae, no nasoaural discharge, moist oral mucosa  NECK: supple, no jugular venous distention, no palpable lymph nodes  CHEST/LUNGS: symmetrical chest expansion, good air entry, clear breath sounds  HEART: adynamic precordium, good S1 S2, no S3, regular rhythm, no murmurs  ABDOMEN: flat, bowel sounds appreciated, soft, non-tender  EXTREMITIES: (+) limitation ROM of both shoulders due to history of rotator cuff injury, pink nailbeds, no edema, full and equal pulses, no calf tenderness   NEUROLOGICAL EXAM: The patient is awake, alert and oriented x3, able to answer questions fairly appropriately, able to follow 1 and 2 step commands. Able to tell time from the wall clock. Cranial nerves II-XII are grossly intact. No gross sensory deficit.   Motor strength is 4-/5 on the RUE (except right shoulder), 3+/5 on the LUE (except left shoulder), 4+/5 on the RLE, 4/5 on the LLE. Current Medications:  Current Facility-Administered Medications   Medication Dose Route Frequency    cholecalciferol (VITAMIN D3) (1000 Units /25 mcg) tablet 2,000 Units  2,000 Units Oral DAILY    aspirin tablet 325 mg  325 mg Oral DAILY WITH BREAKFAST    atorvastatin (LIPITOR) tablet 80 mg  80 mg Oral QHS    clopidogreL (PLAVIX) tablet 75 mg  75 mg Oral DAILY WITH DINNER    losartan (COZAAR) tablet 25 mg  25 mg Oral DAILY    meclizine (ANTIVERT) tablet 12.5 mg  12.5 mg Oral BID    hydrALAZINE (APRESOLINE) tablet 25 mg  25 mg Oral TID PRN    ondansetron (ZOFRAN ODT) tablet 4 mg  4 mg Oral Q8H PRN    docusate sodium (COLACE) capsule 100 mg  100 mg Oral BID    magnesium hydroxide (MILK OF MAGNESIA) 400 mg/5 mL oral suspension 30 mL  30 mL Oral DAILY PRN    bisacodyL (DULCOLAX) tablet 10 mg  10 mg Oral Q48H PRN    dorzolamide (TRUSOPT) 2 % ophthalmic solution 1 Drop  1 Drop Both Eyes TID    latanoprost (XALATAN) 0.005 % ophthalmic solution 1 Drop  1 Drop Both Eyes QHS    loperamide (IMODIUM) capsule 2 mg  2 mg Oral PRN    montelukast (SINGULAIR) tablet 10 mg  10 mg Oral DAILY    oxybutynin chloride XL (DITROPAN XL) tablet 10 mg  10 mg Oral QHS    sertraline (ZOLOFT) tablet 50 mg  50 mg Oral DAILY    acetaminophen (TYLENOL) tablet 650 mg  650 mg Oral Q4H PRN       Allergies:   Allergies   Allergen Reactions    Novocain [Procaine] Swelling       Functional Progress:    PHYSICAL THERAPY    ON ADMISSION MOST RECENT   Wheelchair Mobility/Management  Able to Propel (ft): 55 feet  Functional Level: 2 (mod/max A using bilat feet, occasionally UE to propel)  Curbs/Ramps Assist Required (FIM Score): 0 (Not tested)  Wheelchair Setup Assist Required : 2 (Maximal assistance)  Wheelchair Management: Manages left brake,Manages right brake (needing assistance) Wheelchair Mobility/Management  Able to Propel (ft): 55 feet  Functional Level: 2 (mod/max A using bilat feet, occasionally UE to propel)  Curbs/Ramps Assist Required (FIM Score): 0 (Not tested)  Wheelchair Setup Assist Required : 2 (Maximal assistance)  Wheelchair Management: Manages left brake,Manages right brake (needing assistance)     Gait  Amount of Assistance: 3 (Moderate assistance)  Distance (ft): 18 Feet (ft)  Assistive Device: Walker, rolling,Gait belt Gait  Amount of Assistance: 3 (Moderate assistance)  Distance (ft): 12 Feet (ft)  Assistive Device: Walker, rolling     Balance-Sitting/Standing  Sitting - Static: Good (unsupported)  Sitting - Dynamic: Good (unsupported),Occassional,Fair (occasional)  Standing - Static: Fair,Occasional,Poor  Standing - Dynamic : Impaired  Other (comment): standing balance to be further assessed Balance-Sitting/Standing  Sitting - Static: Good (unsupported)  Sitting - Dynamic: Good (unsupported)  Standing - Static: Fair  Standing - Dynamic : Impaired  Other (comment): standing balance to be further assessed     Bed/Mat Mobility  Rolling Right : 5 (Stand-by assistance)  Rolling Left : 5 (Stand-by assistance)  Supine to Sit : 3 (Moderate assistance)  Sit to Supine : 4 (Minimal assistance) Bed/Mat Mobility  Rolling Right : 5 (Stand-by assistance)  Rolling Left : 5 (Stand-by assistance)  Supine to Sit : 3 (Moderate assistance)  Sit to Supine : 4 (Minimal assistance)     Transfers  Transfer Type: Other  Other: stand step with RW  Transfer Assistance : 3 (Moderate assistance )  Sit to Stand Assistance: Moderate assistance  Car Transfers: Not tested (Patient feeling nauseous during second session)  Car Type: NT Transfers  Transfer Type: Other  Other: stand step with RW  Transfer Assistance : 3 (Moderate assistance )  Sit to Stand Assistance:  Moderate assistance  Car Transfers: Not tested (Patient feeling nauseous during second session)  Car Type: NT     Steps or Stairs  Steps/Stairs Ambulated (#): 0  Level of Assist : 0 (Not tested)  Rail Use:  (NT ) Steps or Stairs  Steps/Stairs Ambulated (#): 0  Level of Assist : 0 (Not tested)  Rail Use:  (NT )         Lab/Data Review:  No results found for this or any previous visit (from the past 24 hour(s)). Assessment:     Primary Rehabilitation Diagnosis  1. Impaired Mobility and ADLs  2. 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 Justification for continued stay: Good progression towards established rehabilitation goals. 2. Medical Issues being followed closely:    [x]  Fall and safety precautions     []  Wound Care     [x]  Bowel and Bladder Function     [x]  Fluid Electrolyte and Nutrition Balance     []  Pain Control      3. Issues that 24 hour rehabilitation nursing is following:    [x]  Fall and safety precautions     []  Wound Care     [x]  Bowel and Bladder Function     [x]  Fluid Electrolyte and Nutrition Balance     []  Pain Control      [x]  Assistance with and education on in-room safety with transfers to and from the bed, wheelchair, toilet and shower. 4. Acute rehabilitation plan of care:    [x]  Continue current care and rehab. [x]  Physical Therapy           [x]  Occupational Therapy           []  Speech Therapy     []  Hold Rehab until further notice     5. Medications:    [x]  MAR Reviewed     [x]  Continue Present Medications     6. Chemical DVT Prophylaxis:      []  Enoxaparin     []  Unfractionated Heparin     []  Warfarin     []  NOAC     []  Aspirin     [x]  None     7. Mechanical DVT Prophylaxis:      [x]  RADHAMES Stockings     [x]  Sequential Compression Device     []  None     8. GI Prophylaxis:      []  PPI     []  H2 Blocker     [x]  None / Not indicated     9. Code status:    [x]  Full code     []  Partial code     []  Do not intubate     []  Do not resuscitate     10. Diet:  Specifications  3 carb choices (45 gm/meal); Low fat/Low cholesterol/High fiber/ANGELINE   Solids (consistency)  Regular   Liquids (consistency)  Thin   Fluid Restriction  None     11. 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   > Continue:    > Aspirin 325 mg PO once daily with breakfast    > Clopidogrel 75 mg PO once daily with dinner (STOP DATE: 3/27/2022)    > Meclizine 12.5 mg PO BID (for dizziness)       > Essential hypertension   > On 1/1/2022, started:    > Losartan 25 mg PO once daily (9AM)    > Hydralazine 25 mg PO TID PRN for SBP greater than 160 mmHg   > Continue:    > Increase Losartan from 25 mg to 50 mg PO once daily (9AM)    > Hydralazine 25 mg PO TID PRN for SBP greater than 160 mmHg     > Hyperlipidemia   > Continue Atorvastatin 80 mg PO q HS     > Depression and anxiety   > Continue Sertraline 50 mg PO once daily     > Glaucoma   > Continue Latanoprost eyedrops 1 drop both eyes q HS     > Urinary incontinence   > Urinalysis (1/1/2022): WNL   > Continue Oxybutynin XL 10 mg PO once daily      12. Personal Protective Equipment (N95 face mask and eye goggles) was used while interacting with the patient. Patient was using a surgical mask. 15. Patient's progress in rehabilitation and medical issues discussed with the patient. All questions answered to the best of my ability. Care plan discussed with patient and nurse. 14. Total clinical care time is 30 minutes, including review of chart including all labs, radiology, past medical history, and discussion with patient.  Greater than 50% of my time was spent in coordination of care and counseling.       Signed:    Johny Luna MD    January 5, 2022

## 2022-01-05 NOTE — INTERDISCIPLINARY ROUNDS
[facility] PHYSICAL REHABILITATION  [address] [place] [address] [employer] [facility], [address]    INPATIENT REHABILITATION  PRE-TEAM CONFERENCE SUMMARY     Date of Conference: 1/6/2022    Patient Information:        Name: Mil Jon Age / Sex: 82 y.o. / female   CSN: [id number] MRN: [id number]   [facility] Date: 12/31/2021 Length of Stay: 5 days     Primary Rehabilitation Diagnosis  1. Impaired Mobility and ADLs  2.  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 (HCC) I67.89, G81.94    Essential hypertension I10    Hypercholesterolemia with hypertriglyceridemia E78.2    Glaucoma H40.9    Depression with anxiety F41.8    Urinary incontinence R32          Therapy:     FIM SCORES Initial Assessment Weekly Progress Assessment 1/5/2022   Eating Functional Level: 5 Feeding/Eating Assistance: 5 (Supervision/setup)  Comments: Pt required supervision/Axel following setup opening syrups, cream and coffee lid   Swallowing     Grooming 5     Bathing 4 Bathing Assistance, Upper: 5 (Supervision)  Upper Body : Compensatory technique training  Position Performed: Seated in chair  Adaptive Equipment: Tub bench  Comments: Pt performed UB showering with supervision in seated position   Bathing Assistance, Lower : 4 (Minimal assistance)  Adaptive Equipment: Grab bar  Position Performed: Seated in chair;Standing  Comments: Pt performed LB showering in seated position to wash lower BLE legs using crossed leg method and washing buttocks and eric area in standing with use of grab bar for stability and min A to wash buttocks following setup of nonskid surface below BLE feet for fall prevention   Upper Body Dressing Functional Level: 4  Items Applied/Steps Completed: Bra (3 steps),Pullover (4 steps)  Comments: Pt performed UB dressing with Fidel to don bra in seated position at [address] : 5 (Supervision)  Comments: Pt required setup for UB dressing from w/c   Lower Body Dressing Functional Level: 2  Items Applied/Steps Completed: Elastic waist pants (3 steps),Shoe, left (1 step),Shoe, right (1 step),Sock, left (1 step),Sock, right (1 step),Underpants (3 steps)  Comments: Pt completed LB dressing with maxA to don BLE socks and TA to don pull tab brief, pt thread BLE feet through pants and pulled up over BLE hips and buttocks with modA.  Dressing Assistance : 2 (Maximal assistance)  Leg Crossed Method Used: No  Position Performed: Seated in chair;Standing  Adaptive Equipment Used: Grab bar  Comments: Pt required maxA to thread BLE feet through underwear and pants and ABLE socks for sake of time this date and due to fatigue   Toileting Functional Level: 2  Comments: Pt performed toileting with FWW to [facility] with maxA to perform toileting hygiene and CM due to decreased stability and strength Toileting Assistance (FIM Score): 4 (Minimal assistance)  Cues: Physical assistance for pants up;Verbal cues provided  Adaptive Equipment: Elevated seat;Walker   Bladder 0 0   Bowel  0 0   Toilet Transfer [place] Toilet Transfer Score: 3  Comments: Pt performed toilet transfer with modA using FWW, pt required assistance due to instability and deformity of BLE legs (IR of BLE hips) and decreased strength and activity tolerance 3 (Moderate assistance)   Tub/Shower Transfer [place] Tub or Shower Type: Shower (small step to step over)  Tub/Shower Transfer Score: 0 (NT due to safety concerns) Shower  3 (Moderate assistance)   Comprehension Primary Mode of Comprehension: Auditory  Score: 5     5   Expression Primary Mode of Expression: Verbal  Score: 6     6   Social Interaction Score: 6  6   Problem Solving Score: 5  6   Memory Score: 6  Comments: appears intact  6     FIM SCORES Initial Assessment Weekly Progress Assessment 1/5/2022   Bed/Chair/Wheelchair Transfers Transfer Type: Other  Other: stand step with RW  Transfer Assistance : 3 (Moderate assistance )  Sit to Stand Assistance: Moderate assistance  Car Transfers: Not tested (Patient feeling nauseous during second session)  Car Type: NT Transfer Type: Other (stand step w/RW)  Transfer Assistance : 3 (Moderate assistance )  Sit to Stand Assistance:  Moderate assistance   Bed Mobility Rolling Right 5 (Stand-by assistance)   Rolling Left 5 (Stand-by assistance)   Supine to Sit 3 (Moderate assistance)   Sit to Stand Moderate assistance   Sit to Supine 4 (Minimal assistance)    Rolling Right    5 (stand-by assistance)   Rolling Left    5 (stand-by assistance)   Supine to Sit    3 (Moderate assistance)   Sit to Stand   Moderate assistance   Sit to Supine    4 (minimal assistance)      Locomotion (W/C) Able to Propel (ft): 55 feet  Functional Level: 2 (mod/max A using bilat feet, occasionally UE to propel)  Curbs/Ramps Assist Required (FIM Score): 0 (Not tested)  Wheelchair Setup Assist Required : 2 (Maximal assistance)  Wheelchair Management: Manages left brake,Manages right brake (needing assistance) Function  (mod A for steering; needed a couple brief rest breaks) uses B LE's and occasional UE to propel  Setup Assistance  2 (Maximal assistance)   Wheelchair management: manages right brake, manages left brake (with assistance and vc's)   Locomotion (W/C distance)   50 feet (2nd trial; 40 ft 1st trial)   Locomotion (Walk) 3 (Moderate assistance) 3 (Moderate assistance) (and w/c follow for safety)  Walker, rolling;Gait belt   Locomotion (Walk dist.) 18 Feet (ft) 24 Feet (ft) (24 ft 2nd trial; 20 ft 1st trial)   Steps/Stairs Steps/Stairs Ambulated (#): 0  Level of Assist : 0 (Not tested)  Rail Use:  (NT )  NT this week         Nursing:     Neuro:   AAA&O x   4         Respiratory:   [x] WNL   [] O2 LPM:   Other:  Peripheral Vascular:   [] TEDS present   [] Edema present ____ Grade   Cardiac:   [x] WNL   [] Other  Genitourinary:   [x] continent   [] incontinent   [] ovalle  Abdominal __1/5_____ LBM  GI: ___Reg____ Diet __thin____ Liquids _____ tube feeds  Musculoskeletal: ____ ROM Transfers ___WC__ Assistive Device Used  __1 person assist__ Level of Assistance  Skin Integumentary:   [x] Intact   [] Not Intact   __________Preventative Measures  Details______________________________________________________________  Pain: [x] Controlled   [] Not Controlled   Pain Meds:   [] Scheduled   [x] PRN        Interdisciplinary Team Goals:     1. Discipline  Physical Therapy    Goal  pt will perform sit to stand with min A from bed & wheelchair; and perform stand step transfers with RW with min A bed <> w/c    Barrier  B LE weakness, impaired standing balance, impaired gait pattern, decreased activity tolerance, and decreased coordination    Intervention  therex, therapeutic activities, balance training, transfers training, and pt education    Goal written by:   SAVANNAH Beavers     2. Discipline  Occupational Therapy    Goal  Pt will perform toileting with AE and consistent Fidel. Barrier Impaired BUE strength, arthritis, BLE hip IR, impaired stability, decreased activity tolerance, decreased coordination    Intervention  AE training, ADL training, BUE strengthening    Goal written by:  Radu Hogue MS, OTR/L     3. Discipline  Speech Therapy    Goal      Barrier      Intervention      Goal written by:       4. Discipline  Nursing    Goal  The patient will have reduced dizziness. Barrier  frequent dizziness    Intervention  close monitoring    Goal written by:  Lucas Daniels MSN RN AGCNS-BC     5.   Discipline  Clinical Psychology    Goal  Minimize stress and frustration with increased dependency    Barrier  Frustration with perceived, increased dependency post stroke    Intervention  Patient education and support , as needed; Rx    Goal written by: Regina Platt, PhD         Disposition / Discharge Planning: Follow-up services:  [x] Physical Therapy             [x] Occupational Therapy       [] Speech Therapy           [] Skilled Nursing      [] Medical Social Worker   [] Aide        [] Outpatient      [] vs   [x] Home Health  [] vs       [] to progress to outpatient       [] with 24-hour supervision       [x] with 24-hour assistance   [] [place]   [facility] [place]:  small adult RW   Estimated discharge date:  1/21/2022   Discharge Location:  [] Home  [] versus    [] Merged with [facility]    [] [address]   [] Other:           Electronic Signatures:      Signature Date Signed   Physical Therapist    Estela Lamas, MPT  01/05/2022   Occupational Therapist   Júnior Summers, MS, OTR/L  1/6/2022   Speech Therapist         Recreational Therapist    Herlinda Spivey, CTRS 1/5/2022   Nursing    Patricia Khan MSN RN AGCNS-BC  1/5/2022   Clinical Psychologist    Regina Platt, PhD  1/5/2022    Physician    Clemente Andino MD   1/5/2022        DEVORA Alvarez 1/6/2022     Opportunity to share with family/caregiver[] YES [] NO    Relationship to patient____________________________________________________      The above information has been reviewed with the patient in a language that they can understand. Opportunity for comments and questions has been provided and a signed attestation has been scanned into the \"media tab\" of the EMR.       Patient Signature: ______________________________________________________    Date Signed: __________________________________________________________

## 2022-01-05 NOTE — PROGRESS NOTES
Pt's son requested sw to room to answer questions regarding dc planning. Son states that he is making arrangements for the pt to return to [facility] - independent living. Son states that he has coordinated for an overnight caregiver if needed and is hoping to make space for the caregiver to stay if needed. Sw answered questions and agreed to follow up later this week with additional information.

## 2022-01-05 NOTE — PROGRESS NOTES
Problem: Mobility Impaired (Adult and Pediatric)  Goal: *Therapy Goal (Edit Goal, Insert Text)  Description: Physical Therapy Short Term Goals  Initiated 1/1/2022 and to be accomplished within 7 day(s) on 1/7/2022  1. Patient will move from supine to sit and sit to supine  in bed with minimal assistance/contact guard assist.    2.  Patient will transfer from bed to chair and chair to bed with minimal assistance using the least restrictive device. 3.  Patient will perform sit to stand with minimal assistance. 4.  Patient will ambulate with minimal assistance/contact guard assist for 50 feet with the least restrictive device before needing seated rest.   5.  Patient will participate in formal balance assessment Sofia Blander Balance Scale/Postural Assessment Scale for Stroke). Physical Therapy Long Term Goals  Initiated 1/1/2022 and to be accomplished within 21 day(s) on 1/22/2022  1. Patient will move from supine to sit and sit to supine , scoot up and down, and roll side to side in bed with modified independence. 2.  Patient will transfer from bed to chair and chair to bed with modified independence using the least restrictive device. 3.  Patient will perform sit to stand with modified independence. 4.  Patient will ambulate with modified independence for 150 feet with the least restrictive device. 5.  Patient will negotiate one curb step with CG/SBA , using appropriate AD for safe community mobility. Outcome: Progressing Towards Goal   PHYSICAL THERAPY TREATMENT    Patient: Dima Neves (78 y.o. female)  Date: 1/5/2022  Diagnosis: CVA (cerebral vascular accident) [facility]) [I63.9] Acute ischemic stroke [facility])  Precautions: Fall  Chart, physical therapy assessment, plan of care and goals were reviewed. Time In:1102  Time [id number]    Patient seen for: Gait training; Therapeutic exercise;Transfer training; Wheelchair mobility; Patient education    Pain:  Pt pain was reported as 0/10 pre-treatment.   Pt pain was reported as 0/10 post-treatment. Intervention: none indicated    Patient identified with name and : yes    SUBJECTIVE:      Pt agreeable to participate in PT. Pt stated \"I'm sleepy today. I slept well until the stool softener kicked in and then I was up and down to the toilet the rest of the night. \"  Pt noted less c/o dizziness with gait today, but expressed blurred vision with longer gait distance. OBJECTIVE DATA SUMMARY:    Objective: pt participated in exercises to help with dizziness with head turns by following pen with eyes while turning head to L and R in small range. Pt has good peripheral vision in right eye, but object (pen) gets blurry as pen goes further to the right in the peripheral vision range. TRANSFERS Daily Assessment     Transfer Type: Other (stand step w/RW)  Transfer Assistance : 3 (Moderate assistance )  Sit to Stand Assistance: Moderate assistance; min A from slightly higher surface with increased scoot to edge of mat table (x 3 reps)  Small, slow steps, B knee valgum, decreased wt shift onto L LE and L LE externally rotated. After 2 reps of standing from mat table, pt reported feeling \"a little light-headed, but the room is slowly spinning\"         GAIT Daily Assessment    Gait Description (WDL) Exceptions to WDL    Gait Abnormalities Decreased step clearance (B genu valgum, Forward Flexed, L leg eversion)    Assistive device Walker, rolling;Gait belt    Ambulation assistance - level surface 3 (Moderate assistance) (and w/c follow for safety)    Distance 24 Feet (ft) (24 ft 2nd trial; 20 ft 1st trial)    Ambulation assistance- uneven surface NT     Comments Pt with slow, small steps, decreased wt shift onto L LE, L LE externally rotated; RW veering to the R during 1st trial. Pt educated to focus on an object ahead of her to help decreased dizziness with gait.   Pt did not report any dizziness with gait training, but did note increased blurriness in vision in R eye with longer gait distance. BALANCE Daily Assessment     Sitting - Static: Good (unsupported)  Sitting - Dynamic: Good (unsupported)  Standing - Static: Fair (with RW support)  Standing - Dynamic : Impaired        WHEELCHAIR MOBILITY Daily Assessment     Able to Propel (ft): 50 feet (2nd trial; 40 ft 1st trial)  Functional Level:  (mod A for steering; needed a couple brief rest breaks)  Curbs/Ramps Assist Required (FIM Score): 0 (Not tested)  Wheelchair Setup Assist Required : 2 (Maximal assistance)  Wheelchair Management: Manages left brake;Manages right brake        THERAPEUTIC EXERCISES Daily Assessment     Extremity: Both  Exercise Type #1: Seated lower extremity strengthening (AROM against gravity and with red t-band)  Sets Performed: 2  Reps Performed: 10  Level of Assist: Contact guard assistance   Exercises included: marches, LAQ's, hip abd, HS curls, heel-toe raises seated, and L foot inversion against resistance band. ASSESSMENT:  Pt tolerated session with slow pace and frequent rest breaks. Pt with less c/o dizziness with gait training today, but did experience dizziness toward end of session after several transfer training activities. B/P taken seated (111/75), with initial standing (91/68), and after 1-2 minutes of standing (121/82). BP dropped with initial standing but then went up after standing for 1-2 minutes. Pt expressed increased blurred vision in right eye when ambulating a little longer distance. Progression toward goals:  []      Improving appropriately and progressing toward goals  [x]      Improving slowly and progressing toward goals  []      Not making progress toward goals and plan of care will be adjusted      PLAN:  Patient continues to benefit from skilled intervention to address the above impairments. Continue treatment per established plan of care.   Discharge Recommendations:  Home Health PT with 24 hr supervision  Further Equipment Recommendations for Discharge:  small adult rolling walker      Estimated Discharge Date: 2022    Activity Tolerance:   Fair; no c/o increased pain or nausea; did experience some dizziness with activity as well as blurred vision increased.    vital signs taken during this treatment:  In sitting, post activity: 111/75, HR 95  In standing, after initial standin/68,   In standing, after standing for 1-2 minutes: 121/82,     After treatment:   [] Patient left in no apparent distress in bed  [x] Patient left in no apparent distress sitting up in chair, with lunch tray set up in front of her  [] Patient left in no apparent distress in w/c mobilizing under own power  [] Patient left in no apparent distress dining area  [] Patient left in no apparent distress mobilizing under own power  [x] Call bell left within reach  [] Nursing notified  [] Caregiver present  [] Bed alarm activated   [x] Chair alarm activated      Jany Bell, PT  2022

## 2022-01-06 PROCEDURE — 97116 GAIT TRAINING THERAPY: CPT

## 2022-01-06 PROCEDURE — 97530 THERAPEUTIC ACTIVITIES: CPT

## 2022-01-06 PROCEDURE — 74011250636 HC RX REV CODE- 250/636: Performed by: FAMILY MEDICINE

## 2022-01-06 PROCEDURE — 65310000000 HC RM PRIVATE REHAB

## 2022-01-06 PROCEDURE — 99232 SBSQ HOSP IP/OBS MODERATE 35: CPT | Performed by: INTERNAL MEDICINE

## 2022-01-06 PROCEDURE — 97110 THERAPEUTIC EXERCISES: CPT

## 2022-01-06 PROCEDURE — 74011250637 HC RX REV CODE- 250/637: Performed by: FAMILY MEDICINE

## 2022-01-06 PROCEDURE — 97535 SELF CARE MNGMENT TRAINING: CPT

## 2022-01-06 PROCEDURE — 74011250637 HC RX REV CODE- 250/637: Performed by: INTERNAL MEDICINE

## 2022-01-06 RX ADMIN — MECLIZINE 12.5 MG: 12.5 TABLET ORAL at 18:24

## 2022-01-06 RX ADMIN — DORZOLAMIDE HYDROCHLORIDE 1 DROP: 20 SOLUTION/ DROPS OPHTHALMIC at 21:02

## 2022-01-06 RX ADMIN — ASPIRIN 325 MG ORAL TABLET 325 MG: 325 PILL ORAL at 09:52

## 2022-01-06 RX ADMIN — ATORVASTATIN CALCIUM 80 MG: 40 TABLET, FILM COATED ORAL at 21:01

## 2022-01-06 RX ADMIN — DORZOLAMIDE HYDROCHLORIDE 1 DROP: 20 SOLUTION/ DROPS OPHTHALMIC at 09:08

## 2022-01-06 RX ADMIN — MECLIZINE 12.5 MG: 12.5 TABLET ORAL at 09:52

## 2022-01-06 RX ADMIN — LOSARTAN POTASSIUM 50 MG: 50 TABLET, FILM COATED ORAL at 09:52

## 2022-01-06 RX ADMIN — DOCUSATE SODIUM 100 MG: 100 CAPSULE, LIQUID FILLED ORAL at 18:24

## 2022-01-06 RX ADMIN — DORZOLAMIDE HYDROCHLORIDE 1 DROP: 20 SOLUTION/ DROPS OPHTHALMIC at 17:20

## 2022-01-06 RX ADMIN — OXYBUTYNIN CHLORIDE 10 MG: 5 TABLET, EXTENDED RELEASE ORAL at 21:01

## 2022-01-06 RX ADMIN — CHOLECALCIFEROL TAB 25 MCG (1000 UNIT) 2000 UNITS: 25 TAB at 09:52

## 2022-01-06 RX ADMIN — LATANOPROST 1 DROP: 50 SOLUTION OPHTHALMIC at 21:02

## 2022-01-06 RX ADMIN — CLOPIDOGREL 75 MG: 75 TABLET, FILM COATED ORAL at 18:24

## 2022-01-06 RX ADMIN — SERTRALINE 50 MG: 50 TABLET, FILM COATED ORAL at 09:52

## 2022-01-06 RX ADMIN — MONTELUKAST 10 MG: 10 TABLET, FILM COATED ORAL at 09:52

## 2022-01-06 RX ADMIN — DOCUSATE SODIUM 100 MG: 100 CAPSULE, LIQUID FILLED ORAL at 09:52

## 2022-01-06 NOTE — PROGRESS NOTES
Nutrition Note      Pt reported poor/fair appetite and meal intake; sometimes only taking a few bites from meals. C/o food tasting too salty, but has low Na diet restriction. Denied having any food preferences. Pt stated that she felt this way with the meals at the hospital [facility]. Likes the Ensure drinks; consuming 2 out of the 3 daily. Noted 4 unopened bottles at bed side. Pt agreeable to having ensure supplement decreased to 2 daily and receiving Magic cup supplement 2x daily. Received report from  about patient's son asking if oral nutrition supplement can be modified/ reassessed; relayed report to  of what was discussed/ agreed upon with pt today. BM 1/5. Slowly progressing towards nutrition goals. Nutrition Recommendations/Plan:   - Modify supplement: decrease Ensure Enlive to BID (each one provide 350 kcal, 20 gm protein) and add Magic cup BID (each provides 290 kcal, 9 gm protein). - Continue all other nutrition interventions. Encourage/ monitor po intake of meals and supplements. Monitor diet tolerance.       Electronically signed by Filiberto Randhawa RD on 1/6/2022 at 5:16 PM    Contact: [phone]

## 2022-01-06 NOTE — PROGRESS NOTES
Problem: Mobility Impaired (Adult and Pediatric)  Goal: *Therapy Goal (Edit Goal, Insert Text)  Description: Physical Therapy Short Term Goals  Initiated 1/1/2022 and to be accomplished within 7 day(s) on 1/7/2022  1. Patient will move from supine to sit and sit to supine  in bed with minimal assistance/contact guard assist.    2.  Patient will transfer from bed to chair and chair to bed with minimal assistance using the least restrictive device. 3.  Patient will perform sit to stand with minimal assistance. 4.  Patient will ambulate with minimal assistance/contact guard assist for 50 feet with the least restrictive device before needing seated rest.   5.  Patient will participate in formal balance assessment Carolyn Rigoberto Balance Scale/Postural Assessment Scale for Stroke). Physical Therapy Long Term Goals  Initiated 1/1/2022 and to be accomplished within 21 day(s) on 1/22/2022  1. Patient will move from supine to sit and sit to supine , scoot up and down, and roll side to side in bed with modified independence. 2.  Patient will transfer from bed to chair and chair to bed with modified independence using the least restrictive device. 3.  Patient will perform sit to stand with modified independence. 4.  Patient will ambulate with modified independence for 150 feet with the least restrictive device. 5.  Patient will negotiate one curb step with CG/SBA , using appropriate AD for safe community mobility. Outcome: Progressing Towards Goal   PHYSICAL THERAPY TREATMENT    Patient: Dinorah Carreon (59 y.o. female)  Date: 1/6/2022  Diagnosis: CVA (cerebral vascular accident) [facility]) [I63.9] Acute ischemic stroke [facility])  Precautions: Fall  Chart, physical therapy assessment, plan of care and goals were reviewed. Time In:1336  Time Out:1511    Patient seen for: Wheelchair mobility;Transfer training;Gait training; Therapeutic exercise (bed mobility)    Pain:  Pt pain was reported as no c/o pre-treatment.   Pt pain was reported as c/o B knee arthritis pain but not rated post-treatment. Intervention: rest    Patient identified with name and : yes     SUBJECTIVE:      Pt reports having arthritis all over and having torn B rotator cuffs. OBJECTIVE DATA SUMMARY:    Objective:     BED/MAT MOBILITY Daily Assessment     Supine to Sit : 4 (Contact guard assistance)  Sit to Supine :  (SBA)  Pt performed bed mobility on left side of mat table and performed sit to supine with SBA. Pt performed supine to sit on left side of mat table with SBA/CGA and pt required sitting rest break due to dizziness from sitting up quickly. TRANSFERS Daily Assessment     Transfer Type: Other  Other: stand step txfr with RW  Transfer Assistance : 3 (Moderate assistance )  Sit to Stand Assistance: Moderate assistance  Pt performed sit to stand from w/c x5 requiring mod assist for anterior and up motion with pt pushing up from B arm rests. Pt performed sit to stand from edge of mat table with B hands on distal femurs with min assist for anterior and up motion. Pt performed stand step txfr w/c<->mat table and w/c<->BSC with RW and min/mod assist for balance and v/c for erect posture. GAIT Daily Assessment    Gait Description (WDL) Exceptions to WDL    Gait Abnormalities Decreased step clearance;Shuffling gait; Step to gait    Assistive device Walker, rolling;Gait belt    Ambulation assistance - level surface 3 (Moderate assistance)    Distance 36 Feet (ft) (15ft)    Ambulation assistance- uneven surface      Comments Pt ambulated 15ft at start of session with RW and mod assist for safety and max v/c for increased right LE step length and erect posture. Pt then ambulated 36ft after performing sit to stand trials with RW and min assist for balance and safety, with initial v/c for increased BLE step length and remaining within base of RW for erect posture.          BALANCE Daily Assessment     Sitting - Static: Good (unsupported)  Sitting - Dynamic: Good (unsupported)  Standing - Static: Fair (with RW)  Standing - Dynamic : Impaired        WHEELCHAIR MOBILITY Daily Assessment     Able to Propel (ft): 56 feet  Functional Level:  (supervision)  Curbs/Ramps Assist Required (FIM Score): 0 (Not tested)  Wheelchair Management: Manages left brake;Manages right brake  Pt propelled w/c from room to gym with BUE and BLE with supervision. THERAPEUTIC EXERCISES Daily Assessment       Supine bridging 2x10, BLE hip abd and heel slides with min assist on left LE 2x10 and SAQ 2x10        ASSESSMENT:  Pt demo'd improved posture and gait tolerance after performing sit to stand trials to loosen up BLE. Pt c/o being tired at start of session but declined being tired or wanting to go to bed at end of session. Pt's posture improved with gait and txfrs by end of session. Progression toward goals:  []      Improving appropriately and progressing toward goals  [x]      Improving slowly and progressing toward goals  []      Not making progress toward goals and plan of care will be adjusted      PLAN:  Patient continues to benefit from skilled intervention to address the above impairments. Continue treatment per established plan of care. Discharge Recommendations:  Home Health  Further Equipment Recommendations for Discharge:  rolling walker      Estimated Discharge Date: 1/21/2022    Activity Tolerance:   Fair/fair+  Please refer to the flowsheet for vital signs taken during this treatment.     After treatment:   [] Patient left in no apparent distress in bed  [x] Patient left in no apparent distress sitting up in chair  [] Patient left in no apparent distress in w/c mobilizing under own power  [] Patient left in no apparent distress dining area  [] Patient left in no apparent distress mobilizing under own power  [x] Call bell left within reach  [] Nursing notified  [] Caregiver present  [] Bed alarm activated   [x] Chair alarm activated      Emily Simpson PTA  1/6/2022

## 2022-01-06 NOTE — ROUTINE PROCESS
SHIFT CHANGE NOTE FOR [facility]    Bedside and Verbal shift change report given to Mark Nicole RN (oncoming nurse) by Ky Ramirez RN (offgoing nurse). Report included the following information SBAR, Kardex, MAR and Recent Results. Situation:   Code Status: Full Code   Hospital Day: 6   Problem List:   Hospital Problems  Date Reviewed: 1/6/2022          Codes Class Noted POA    Essential hypertension (Chronic) ICD-10-CM: I10  ICD-9-CM: 401.9  Unknown Yes        Hypercholesterolemia with hypertriglyceridemia (Chronic) ICD-10-CM: E78.2  ICD-9-CM: 272.2  12/27/2021 Yes    Overview Signed 1/4/2022 12:02 AM by Johnathan Gutierrez MD     Lipid profile (12/27/2021) showed , , HDL 48,              * (Principal) Acute ischemic stroke ([facility] [address] ICD-10-CM: I63.9  ICD-9-CM: 434.91  12/26/2021 Yes    Overview Signed 1/3/2022 11:59 PM by Johnathan Gutierrez MD     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      Assessment:   Changes in Assessment throughout shift: No change to previous assessment     Patient has a central line: no Reasons if yes: na  Insertion date:na Last dressing date:na   Patient has Ovalle Cath: no Reasons if yes: na   Insertion date:na  Shift ovalle care completed: NO     Last Vitals:     Vitals:    01/05/22 1541 01/05/22 2122 01/06/22 0800 01/06/22 1600   BP: (!) 145/83 (!) 103/56 139/84 114/69   Pulse: 89 74 77 90   Resp: 16 18 18 18   Temp: 97.7 °F (36.5 °C) 98.4 °F (36.9 °C) 97.8 °F (36.6 °C) 97.5 °F (36.4 °C)   SpO2: 93% 94% 95% 96%   Weight:       Height:            PAIN    Pain Assessment    Pain Intensity 1: 0 (01/06/22 1600) Pain Intensity 1: 2 (12/29/14 1105)    Pain Location 1: Knee Pain Location 1: Abdomen    Pain Intervention(s) 1: Medication (see MAR) Pain Intervention(s) 1: Medication (see MAR)  Patient Stated Pain Goal: 0 Patient Stated Pain Goal: 0  o Intervention effective: yes  o Other actions taken for pain:       Skin Assessment  Skin color    Condition/Temperature    Integrity    Turgor    Weekly Pressure Ulcer Documentation  Pressure  Injury Documentation: No Pressure Injury Noted-Pressure Ulcer Prevention Initiated  Wound Prevention & Protection Methods  Orientation of wound Orientation of Wound Prevention: Posterior  Location of Prevention Location of Wound Prevention: Buttocks,Sacrum/Coccyx  Dressing Present Dressing Present : No  Dressing Status    Wound Offloading Wound Offloading (Prevention Methods): Bed, pressure redistribution/air     INTAKE/OUPUT  Date 01/05/22 0700 - 01/06/22 0659 01/06/22 0700 - 01/07/22 0659   Shift [id number] 3120-1838 24 Hour Total 9508-5786 9372-9843 24 Hour Total   INTAKE   P.O. 720  720 1200  1200     P. O. 720  720 1200  1200   Shift Total(mL/kg) 720(11.5)  720(11.5) 1200(19.1)  1200(19.1)   OUTPUT   Urine(mL/kg/hr)           Urine Occurrence(s) 2 x 2 x 4 x 3 x  3 x   Stool           Stool Occurrence(s) 0 x 0 x 0 x 1 x  1 x   Shift Total(mL/kg)           720 1200  1200   Weight (kg) 62.7 62.7 62.7 62.7 62.7 62.7       Recommendations:  1. Patient needs and requests: na    2. Pending tests/procedures: na     3. Functional Level/Equipment:   /      Fall Precautions:   Fall risk precautions were reinforced with the patient; she was instructed to call for help prior to getting up. The following fall risk precautions were continued: bed/ chair alarms, door signage, yellow bracelet and socks as well as update of the Presentigo Bridgett tool in the patient's room.    Yadira Score: 3    HEALS Safety Check    A safety check occurred in the patient's room between off going nurse and oncoming nurse listed above. The safety check included the below items  Area Items   H  High Alert Medications - Verify all high alert medication drips (heparin, PCA, etc.)   E  Equipment - Suction is set up for ALL patients (with norah)  - Red plugs utilized for all equipment (IV pumps, etc.)  - WOWs wiped down at end of shift.  - Room stocked with oxygen, suction, and other unit-specific supplies   A  Alarms - Bed alarm is set for fall risk patients  - Ensure chair alarm is in place and activated if patient is up in a chair   L  Lines - Check IV for any infiltration  - Edwards bag is empty if patient has a Edwards   - Tubing and IV bags are labeled   S  Safety   - Room is clean, patient is clean, and equipment is clean. - Hallways are clear from equipment besides carts. - Fall bracelet on for fall risk patients  - Ensure room is clear and free of clutter  - Suction is set up for ALL patients (with norah)  - Hallways are clear from equipment besides carts.    - Isolation precautions followed, supplies available outside room, sign posted     Selina Mckeon RN

## 2022-01-06 NOTE — ROUTINE PROCESS
0800 Pt. Awake sitting up in bed no change in assessment pt. Reported to be feeling fine. 0930 Pt. Sitting up in chair eating breakfast.  1200 with therapy. 1330 able to transfer from bed to chair with assist.  1500 no change in assessment. 1800 Pt. Sitting up in chair eating dinner.

## 2022-01-06 NOTE — ROUTINE PROCESS
SHIFT CHANGE NOTE FOR [facility]    Bedside and Verbal shift change report given to Phu Ramos RN (oncoming nurse) by David Guerrero LPN (offgoing nurse). Report included the following information SBAR, Kardex, MAR and Recent Results. Situation:   Code Status: Full Code   Hospital Day: 5   Problem List:   Hospital Problems  Date Reviewed: 1/5/2022          Codes Class Noted POA    Essential hypertension (Chronic) ICD-10-CM: I10  ICD-9-CM: 401.9  Unknown Yes        Hypercholesterolemia with hypertriglyceridemia (Chronic) ICD-10-CM: E78.2  ICD-9-CM: 272.2  12/27/2021 Yes    Overview Signed 1/4/2022 12:02 AM by Jeanette Rosa MD     Lipid profile (12/27/2021) showed , , HDL 48,              * (Principal) Acute ischemic stroke ([facility] [address] ICD-10-CM: I63.9  ICD-9-CM: 434.91  12/26/2021 Yes    Overview Signed 1/3/2022 11:59 PM by Jeanette Rosa MD     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      Assessment:   Changes in Assessment throughout shift:       Patient has a central line: no Reasons if yes: n/a  Insertion date:n/a Last dressing date:n/a   Patient has Ovalle Cath: no Reasons if yes: n/a   Insertion date:n/a  Shift ovalle care completed: N/A     Last Vitals:     Vitals:    01/05/22 0725 01/05/22 1345 01/05/22 1541 01/05/22 2122   BP: (!) 170/99 125/80 (!) 145/83 (!) 103/56   Pulse: 82  89 74   Resp: 18  16 18   Temp: 97.3 °F (36.3 °C)  97.7 °F (36.5 °C) 98.4 °F (36.9 °C)   SpO2: 93%  93% 94%   Weight:       Height:            PAIN    Pain Assessment    Pain Intensity 1: 0 (01/05/22 2000) Pain Intensity 1: 2 (12/29/14 8685)    Pain Location 1: Knee Pain Location 1: Abdomen    Pain Intervention(s) 1: Medication (see MAR) Pain Intervention(s) 1: Medication (see MAR)  Patient Stated Pain Goal: 0 Patient Stated Pain Goal: 0  o Intervention effective: no complaints of pain ovrnight  o Other actions taken for pain:       Skin Assessment  Skin color    Condition/Temperature    Integrity    Turgor    Weekly Pressure Ulcer Documentation  Pressure  Injury Documentation: No Pressure Injury Noted-Pressure Ulcer Prevention Initiated  Wound Prevention & Protection Methods  Orientation of wound Orientation of Wound Prevention: Posterior  Location of Prevention Location of Wound Prevention: Buttocks,Sacrum/Coccyx  Dressing Present Dressing Present : No  Dressing Status    Wound Offloading Wound Offloading (Prevention Methods): Adaptive equipment,Wheelchair,Walker     INTAKE/OUPUT  Date 01/04/22 1900 - 01/05/22 0659 01/05/22 0700 - 01/06/22 0659   Shift 3549-9214 24 Hour Total 3876-5992 3975-9251 24 Hour Total   INTAKE   P.O. 120 840 720  720     P. O. 120 840 720  720   Shift Total(mL/kg) 120(1.9) 840(13.4) 720(11.5)  720(11.5)   OUTPUT   Urine(mL/kg/hr)          Urine Occurrence(s) 8 x 11 x 2 x 0 x 2 x   Stool          Stool Occurrence(s) 2 x 2 x 0 x 0 x 0 x   Shift Total(mL/kg)         840 720  720   Weight (kg) 62.7 62.7 62.7 62.7 62.7       Recommendations:  1. Patient needs and requests: incontinence care, assistance with ADL's    2. Pending tests/procedures: none at this time     3. Functional Level/Equipment:   /      Fall Precautions:   Fall risk precautions were reinforced with the patient; she was instructed to call for help prior to getting up. The following fall risk precautions were continued: bed/ chair alarms, door signage, yellow bracelet and socks as well as update of the [place] tool in the patient's room.    Yadira Score: 3    HEALS Safety Check    A safety check occurred in the patient's room between off going nurse and oncoming nurse listed above. The safety check included the below items  Area Items   H  High Alert Medications - Verify all high alert medication drips (heparin, PCA, etc.)   E  Equipment - Suction is set up for ALL patients (with norah)  - Red plugs utilized for all equipment (IV pumps, etc.)  - WOWs wiped down at end of shift.  - Room stocked with oxygen, suction, and other unit-specific supplies   A  Alarms - Bed alarm is set for fall risk patients  - Ensure chair alarm is in place and activated if patient is up in a chair   L  Lines - Check IV for any infiltration  - Edwards bag is empty if patient has a Edwards   - Tubing and IV bags are labeled   S  Safety   - Room is clean, patient is clean, and equipment is clean. - Hallways are clear from equipment besides carts. - Fall bracelet on for fall risk patients  - Ensure room is clear and free of clutter  - Suction is set up for ALL patients (with norah)  - Hallways are clear from equipment besides carts.    - Isolation precautions followed, supplies available outside room, sign posted     Michelet Nunes LPN

## 2022-01-06 NOTE — INTERDISCIPLINARY ROUNDS
[facility] PHYSICAL REHABILITATION  [address] [place] [address], [address]    INPATIENT REHABILITATION  TEAM CONFERENCE SUMMARY     Date of Conference: 1/6/2022    Patient Information:        Name: Nehal Nam Age / Sex: 82 y.o. / female   CSN: [id number] MRN: [id number]   [facility] Date: 12/31/2021 Length of Stay: 6 days     Primary Rehabilitation Diagnosis  1. Impaired Mobility and ADLs  2. 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 (HCC) I67.89, G81.94    Essential hypertension I10    Hypercholesterolemia with hypertriglyceridemia E78.2    Glaucoma H40.9    Depression with anxiety F41.8    Urinary incontinence R32          Therapy:     FIM SCORES Initial Assessment Weekly Progress Assessment 1/6/2022   Eating Functional Level: 5     Swallowing     Grooming 5     Bathing 4        Upper Body Dressing Functional Level: 4  Items Applied/Steps Completed: Bra (3 steps),Pullover (4 steps)  Comments: Pt performed UB dressing with Fidel to don bra in seated position at EOB     Lower Body Dressing Functional Level: 2  Items Applied/Steps Completed: Elastic waist pants (3 steps),Shoe, left (1 step),Shoe, right (1 step),Sock, left (1 step),Sock, right (1 step),Underpants (3 steps)  Comments: Pt completed LB dressing with maxA to don BLE socks and TA to don pull tab brief, pt thread BLE feet through pants and pulled up over BLE hips and buttocks with modA.      Toileting Functional Level: 2  Comments: Pt performed toileting with FWW to [facility] with maxA to perform toileting hygiene and CM due to decreased stability and strength     Bladder 0 0   Bowel  0 0   Toilet Transfer [place] Toilet Transfer Score: 3  Comments: Pt performed toilet transfer with modA using [facility], pt required assistance due to instability and deformity of BLE legs (IR of BLE hips) and decreased strength and activity tolerance     Tub/Shower Transfer [place] Tub or Shower Type: Shower (small step to step over)  Tub/Shower Transfer Score: 0 (NT due to safety concerns)        Comprehension Primary Mode of Comprehension: Auditory  Score: 5 Auditory  65   Expression Primary Mode of Expression: Verbal  Score: 6 Verbal  66   Social Interaction Score: 6 66   Problem Solving Score: 5 46   Memory Score: 6  Comments: appears intact 56     FIM SCORES Initial Assessment Weekly Progress Assessment 1/6/2022   Bed/Chair/Wheelchair Transfers Transfer Type: Other  Other: stand step with RW  Transfer Assistance : 3 (Moderate assistance )  Sit to Stand Assistance: Moderate assistance  Car Transfers: Not tested (Patient feeling nauseous during second session)  Car Type: NT Transfer Type: Other  Other: stand step txfr with RW  Transfer Assistance : 3 (Moderate assistance )  Sit to Stand Assistance:  Moderate assistance   Bed Mobility Rolling Right 5 (Stand-by assistance)   Rolling Left 5 (Stand-by assistance)   Supine to Sit 3 (Moderate assistance)   Sit to Stand Moderate assistance   Sit to Supine 4 (Minimal assistance)    Rolling Right    5 (stand-by assistance)   Rolling Left    5 (stand-by assistance)   Supine to Sit   4 (Contact guard assistance)3 (Moderate assistance)   Sit to Stand   Moderate assistance   Sit to Supine    (SBA)4 (minimal assistance)      Locomotion (W/C) Able to Propel (ft): 55 feet  Functional Level: 2 (mod/max A using bilat feet, occasionally UE to propel)  Curbs/Ramps Assist Required (FIM Score): 0 (Not tested)  Wheelchair Setup Assist Required : 2 (Maximal assistance)  Wheelchair Management: Manages left brake,Manages right brake (needing assistance) Function  (supervision) uses B LE's and occasional UE to propel  Setup Assistance      Wheelchair management: manages right brake, manages left brake (with assistance and vc's)   Locomotion (W/C distance)   56 feet   Locomotion (Walk) 3 (Moderate assistance) 3 (Moderate assistance)  Walker, rolling;Gait belt   Locomotion (Walk dist.) 18 Feet (ft) 36 Feet (ft) (15ft)   Steps/Stairs Steps/Stairs Ambulated (#): 0  Level of Assist : 0 (Not tested)  Rail Use:  (NT )  NT this week         Nursing:     Neuro:   AAA&O x   4         Respiratory:   [x] WNL   [] O2 LPM:   Other:  Peripheral Vascular:   [] TEDS present   [] Edema present ____ Grade   Cardiac:   [x] WNL   [] Other  Genitourinary:   [x] continent   [] incontinent   [] ovalle  Abdominal __1/5_____ LBM  GI: ___Reg____ Diet __thin____ Liquids _____ tube feeds  Musculoskeletal: ____ ROM Transfers ___WC__ Assistive Device Used  __1 person assist__ Level of Assistance  Skin Integumentary:   [x] Intact   [] Not Intact   __________Preventative Measures  Details______________________________________________________________  Pain: [x] Controlled   [] Not Controlled   Pain Meds:   [] Scheduled   [x] PRN        Interdisciplinary Team Goals:     1. Discipline  Physical Therapy    Goal  pt will perform sit to stand with min A from bed & wheelchair; and perform stand step transfers with RW with min A bed <> w/c    Barrier  B LE weakness, impaired standing balance, impaired gait pattern, decreased activity tolerance, and decreased coordination    Intervention  therex, therapeutic activities, balance training, transfers training, and pt education    Goal written by:   SAVANNAH Kwon     2. Discipline  Occupational Therapy    Goal  Pt will perform toileting with AE and consistent Fidel. Barrier Impaired BUE strength, arthritis, BLE hip IR, impaired stability, decreased activity tolerance, decreased coordination    Intervention  AE training, ADL training, BUE strengthening    Goal written by:  Tiffanie Jett, MS, OTR/L     3.  Discipline  Speech Therapy    Goal      Barrier Intervention      Goal written by:       4. Discipline  Nursing    Goal  The patient will have reduced dizziness. Barrier  frequent dizziness    Intervention  close monitoring    Goal written by:  Gabrielle Prieto MSN RN AGCNS-BC     5. Discipline  Clinical Psychology    Goal  Minimize stress and frustration with increased dependency    Barrier  Frustration with perceived, increased dependency post stroke    Intervention  Patient education and support , as needed; Rx    Goal written by:  Wilver Rodriguez, PhD         Disposition / Discharge Planning:      Follow-up services:  [x] Physical Therapy             [x] Occupational Therapy       [] Speech Therapy           [] Skilled Nursing      [] Medical Social Worker   [] Aide        [] Outpatient      [] vs   [x] Home Health  [] vs       [] to progress to outpatient       [] with 24-hour supervision       [x] with 24-hour assistance   [] Sarwat DAMIAN recommendations:  small adult RW   Estimated discharge date:  1/21/2022   Discharge Location:  [x] Home  [] versus    [] Sarwat Triplett    [] [address]   [] Other:           Interdisciplinary team rounds were held this PM with the following team members:       Name   Physical Therapist    Annmarie Edmond, PT, DPT     Occupational Therapist    Junaid Martinez MSOTR/L   Nursing    Umer Jefferson, BERT   Physician    Lida Treviño MD        DEVORA Kumar          Signed:  Lida Treviño MD    January 6, 2022

## 2022-01-06 NOTE — PROGRESS NOTES
[facility] PHYSICAL REHABILITATION  [address] [place] [address], [address]     INPATIENT REHABILITATION  DAILY PROGRESS NOTE     Date: 1/6/2022    Name: Gaby Orellana Age / Sex: 82 y.o. 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 Blood pressure controlled. Team conference was held at bedside this PM.     Patient's Complaint:   No significant medical complaints    Pain Control: no current joint or muscle symptoms, essentially pain-free      Objective:     Vital Signs:  Patient Vitals for the past 24 hrs:   BP Temp Pulse Resp SpO2   01/06/22 0800 139/84 97.8 °F (36.6 °C) 77 18 95 %   01/05/22 2122 (!) 103/56 98.4 °F (36.9 °C) 74 18 94 %   01/05/22 1541 (!) 145/83 97.7 °F (36.5 °C) 89 16 93 %        Physical Examination:  GENERAL SURVEY: Patient is awake, alert, oriented x 3, laying comfortably on the bed, not in acute respiratory distress. HEENT: pink palpebral conjunctivae, anicteric sclerae, no nasoaural discharge, moist oral mucosa  NECK: supple, no jugular venous distention, no palpable lymph nodes  CHEST/LUNGS: symmetrical chest expansion, good air entry, clear breath sounds  HEART: adynamic precordium, good S1 S2, no S3, regular rhythm, no murmurs  ABDOMEN: flat, bowel sounds appreciated, soft, non-tender  EXTREMITIES: (+) limitation ROM of both shoulders due to history of rotator cuff injury, pink nailbeds, no edema, full and equal pulses, no calf tenderness   NEUROLOGICAL EXAM: The patient is awake, alert and oriented x3, able to answer questions fairly appropriately, able to follow 1 and 2 step commands. Able to tell time from the wall clock. Cranial nerves II-XII are grossly intact. No gross sensory deficit.   Motor strength is 4-/5 on the RUE (except right shoulder), 3+/5 on the LUE (except left shoulder), 4+/5 on the RLE, 4/5 on the LLE. Current Medications:  Current Facility-Administered Medications   Medication Dose Route Frequency    losartan (COZAAR) tablet 50 mg  50 mg Oral DAILY    cholecalciferol (VITAMIN D3) (1000 Units /25 mcg) tablet 2,000 Units  2,000 Units Oral DAILY    aspirin tablet 325 mg  325 mg Oral DAILY WITH BREAKFAST    atorvastatin (LIPITOR) tablet 80 mg  80 mg Oral QHS    clopidogreL (PLAVIX) tablet 75 mg  75 mg Oral DAILY WITH DINNER    meclizine (ANTIVERT) tablet 12.5 mg  12.5 mg Oral BID    hydrALAZINE (APRESOLINE) tablet 25 mg  25 mg Oral TID PRN    ondansetron (ZOFRAN ODT) tablet 4 mg  4 mg Oral Q8H PRN    docusate sodium (COLACE) capsule 100 mg  100 mg Oral BID    magnesium hydroxide (MILK OF MAGNESIA) 400 mg/5 mL oral suspension 30 mL  30 mL Oral DAILY PRN    bisacodyL (DULCOLAX) tablet 10 mg  10 mg Oral Q48H PRN    dorzolamide (TRUSOPT) 2 % ophthalmic solution 1 Drop  1 Drop Both Eyes TID    latanoprost (XALATAN) 0.005 % ophthalmic solution 1 Drop  1 Drop Both Eyes QHS    loperamide (IMODIUM) capsule 2 mg  2 mg Oral PRN    montelukast (SINGULAIR) tablet 10 mg  10 mg Oral DAILY    oxybutynin chloride XL (DITROPAN XL) tablet 10 mg  10 mg Oral QHS    sertraline (ZOLOFT) tablet 50 mg  50 mg Oral DAILY    acetaminophen (TYLENOL) tablet 650 mg  650 mg Oral Q4H PRN       Allergies:   Allergies   Allergen Reactions    Novocain [Procaine] Swelling       Functional Progress:    OCCUPATIONAL THERAPY    ON ADMISSION MOST RECENT   Eating  Functional Level: 5   Eating  Functional Level: 5     Grooming  Functional Level: 5   Grooming  Functional Level: 5     Bathing  Functional Level: 4   Bathing  Functional Level: 4     Upper Body Dressing  Functional Level: 4   Upper Body Dressing  Functional Level: 4     Lower Body Dressing  Functional Level: 2   Lower Body Dressing  Functional Level: 2     Toileting  Functional Level: 2   Toileting  Functional Level: 5     Toilet Transfers  Toilet Transfer Score: 3   Toilet Transfers  Toilet Transfer Score: 3     Tub /Shower Transfers  Tub/Shower Transfer Score: 0 (NT due to safety concerns)   Tub/Shower Transfers  Tub/Shower Transfer Score: 0 (NT due to safety concerns)       Legend:   7 - Independent   6 - Modified Independent   5 - Standby Assistance / Supervision / Set-up   4 - Minimum Assistance / Contact Guard Assistance   3 - Moderate Assistance   2 - Maximum Assistance   1 - Total Assistance / Dependent       Lab/Data Review:  No results found for this or any previous visit (from the past 24 hour(s)). Assessment:     Primary Rehabilitation Diagnosis  1. Impaired Mobility and ADLs  2. 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 Justification for continued stay: Good progression towards established rehabilitation goals. 2. Medical Issues being followed closely:    [x]  Fall and safety precautions     []  Wound Care     [x]  Bowel and Bladder Function     [x]  Fluid Electrolyte and Nutrition Balance     []  Pain Control      3. Issues that 24 hour rehabilitation nursing is following:    [x]  Fall and safety precautions     []  Wound Care     [x]  Bowel and Bladder Function     [x]  Fluid Electrolyte and Nutrition Balance     []  Pain Control      [x]  Assistance with and education on in-room safety with transfers to and from the bed, wheelchair, toilet and shower. 4. Acute rehabilitation plan of care:    [x]  Continue current care and rehab. [x]  Physical Therapy           [x]  Occupational Therapy           []  Speech Therapy     []  Hold Rehab until further notice     5. Medications:    [x]  MAR Reviewed     [x]  Continue Present Medications     6. Chemical DVT Prophylaxis:      []  Enoxaparin     []  Unfractionated Heparin     []  Warfarin     []  NOAC     []  Aspirin     [x]  None     7. Mechanical DVT Prophylaxis:      [x]  RADHAMES Stockings     [x]  Sequential Compression Device     []  None     8. GI Prophylaxis:      []  PPI     []  H2 Blocker     [x]  None / Not indicated     9. Code status:    [x]  Full code     []  Partial code     []  Do not intubate     []  Do not resuscitate     10. Diet:  Specifications  3 carb choices (45 gm/meal); Low fat/Low cholesterol/High fiber/ANGELINE   Solids (consistency)  Regular   Liquids (consistency)  Thin   Fluid Restriction  None     11. 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   > Continue:    > Aspirin 325 mg PO once daily with breakfast    > Clopidogrel 75 mg PO once daily with dinner (STOP DATE: 3/27/2022)    > Meclizine 12.5 mg PO BID (for dizziness)     > Essential hypertension   > On 1/1/2022, started:    > Losartan 25 mg PO once daily (9AM)    > Hydralazine 25 mg PO TID PRN for SBP greater than 160 mmHg   > On 1/5/2022, increased Losartan from 25 mg to 50 mg PO once daily (9AM)   > Continue:    > Losartan 50 mg PO once daily (9AM)    > Hydralazine 25 mg PO TID PRN for SBP greater than 160 mmHg     > Hyperlipidemia   > Continue Atorvastatin 80 mg PO q HS     > Depression and anxiety   > Continue Sertraline 50 mg PO once daily     > Glaucoma   > Continue Latanoprost eyedrops 1 drop both eyes q HS     > Urinary incontinence   > Urinalysis (1/1/2022): WNL   > Continue Oxybutynin XL 10 mg PO once daily      12.  Personal Protective Equipment (N95 face mask and eye goggles) was used while interacting with the patient. Patient was using a surgical mask. 15. Patient's progress in rehabilitation and medical issues discussed with the patient. All questions answered to the best of my ability. Care plan discussed with patient and nurse. 14. Total clinical care time is 30 minutes, including review of chart including all labs, radiology, past medical history, and discussion with patient. Greater than 50% of my time was spent in coordination of care and counseling.       Signed:    Kath Abdul MD    January 6, 2022

## 2022-01-06 NOTE — PROGRESS NOTES
Problem: Self Care Deficits Care Plan (Adult)  Goal: *Therapy Goal (Edit Goal, Insert Text)  Description: Occupational Therapy Goals   Long Term Goals  Initiated 22 and to be accomplished within 3 week(s)  1. Pt will perform self-feeding with independence. 2. Pt will perform grooming with independence. 3. Pt will perform UB bathing with supervision. 4. Pt will perform LB bathing with supervision. 5. Pt will perform tub/shower transfer with supervision. 6. Pt will perform UB dressing with Axel using compensatory strategies. 7. Pt will perform LB dressing with Axel and AE. 8. Pt will perform toileting task with Axel and AE. 9. Pt will perform toilet transfer with Axel and AE. Short Term Goals   Initiated 22 and to be accomplished within 7 day(s), reassess 22  1. Pt will perform self-feeding with Axel. 2. Pt will perform grooming with Axel. 3. Pt will perform UB bathing with SBA. 4. Pt will perform LB bathing with Fidel and UE support. 5. Pt will perform tub/shower transfer with Fidel and AE. 6. Pt will perform UB dressing with SBA using compensatory strategies. 7. Pt will perform LB dressing with Fidel and AE. 8. Pt will perform toileting task with Fidel and AE. 9. Pt will perform toilet transfer with Fidel and AE. Outcome: Progressing Towards Goal  Goal: Interventions  Outcome: Progressing Towards Goal   Occupational Therapy TREATMENT    Patient: Mil Jon   82 y.o. Patient identified with name and : yes    Date: 2022    First Tx Session  Time In: 0944  Time Out[de-identified] 1130      Diagnosis: CVA (cerebral vascular accident) [facility]) [I63.9]   Precautions: Fall  Chart, occupational therapy assessment, plan of care, and goals were reviewed. Pain:  Pt reports 0/10 pain or discomfort prior to treatment. Pt reports 0/10 pain or discomfort post treatment. Intervention Provided:       SUBJECTIVE:   Patient stated my hands have arthritis.     OBJECTIVE DATA SUMMARY: THERAPEUTIC ACTIVITY Daily Assessment    Pt engaged in activity at table top which involved grasping small pegs and securing to peg board using modified fine pincer grasp. Pt demonstrated abiltiy to form shape of sailboat from visual model however required mod VC's for problem solving. Pt also demonstrated difficulty grasping pegs and decreased accuracy with task dropping pegs and spilling container of pegs. OTR modified task for pt and provided stool as table due to pt unable to reach table top with using opposite arm to assist due to decreased BUE sh flexion. Following modification pt demonstrated improved performance with task. Pt performed BUE FM task using pincer grasp to secure various shape pieces into slots of corresponding shapes to play game of perfection. Pt completed task with decreased accuracy due to difficulty with coordination and FM control. TOILETING Daily Assessment     Pt performed toileting with modA for clothing management and hygiene. Pt demonstrated increased difficulty maneuvering FWW and taking steps due to arthritis and decreased strength. MOBILITY/TRANSFERS Daily Assessment     Pt performed toilet transfer with min-modA for sit to stand from bed with FWW.        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 Pt reported feeling weaker this date while performing toilet transfer. Pt demonstrates difficulty with FM coordination and manipulating small objects due to arthritis. Progression toward goals:  [x]          Improving appropriately and progressing toward goals  []          Improving slowly and progressing toward goals  []          Not making progress toward goals and plan of care will be adjusted     PLAN:  Patient continues to benefit from skilled intervention to address the above impairments. Continue treatment per established plan of care.   Discharge Recommendations:  Home Health  Further Equipment Recommendations for Discharge: bedside commode      Activity Tolerance:  good      Estimated LOS:1/6/2022    Please refer to the flowsheet for vital signs taken during this treatment. After treatment:   [x]  Patient left in no apparent distress sitting up in chair   []  Patient left in no apparent distress in bed  []  Call bell left within reach  []  Nursing notified  []  Caregiver present  []  Bed alarm activated    COMMUNICATION/EDUCATION:   [] Home safety education was provided and the patient/caregiver indicated understanding. [] Patient/family have participated as able in goal setting and plan of care. [x] Patient/family agree to work toward stated goals and plan of care. [] Patient understands intent and goals of therapy, but is neutral about his/her participation. [] Patient is unable to participate in goal setting and plan of care.       Elena Pereira, OT

## 2022-01-06 NOTE — ROUTINE PROCESS
SHIFT CHANGE NOTE FOR [facility]    Bedside and Verbal shift change report given to Anisha Barber RN (oncoming nurse) by Juan Lamb RN (offgoing nurse). Report included the following information SBAR, Kardex, MAR and Recent Results. Situation:   Code Status: Full Code   Hospital Day: 6   Problem List:   Hospital Problems  Date Reviewed: 1/5/2022          Codes Class Noted POA    Essential hypertension (Chronic) ICD-10-CM: I10  ICD-9-CM: 401.9  Unknown Yes        Hypercholesterolemia with hypertriglyceridemia (Chronic) ICD-10-CM: E78.2  ICD-9-CM: 272.2  12/27/2021 Yes    Overview Signed 1/4/2022 12:02 AM by Nikunj Gann MD     Lipid profile (12/27/2021) showed , , HDL 48,              * (Principal) Acute ischemic stroke ([facility] [address] ICD-10-CM: I63.9  ICD-9-CM: 434.91  12/26/2021 Yes    Overview Signed 1/3/2022 11:59 PM by Nikunj Gann MD     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      Assessment:   Changes in Assessment throughout shift: No change to previous assessment     Patient has a central line: no Reasons if yes: n/a  Insertion date:n/a Last dressing date:n/a   Patient has Ovalle Cath: no Reasons if yes: n/a   Insertion date:n/a  Shift ovalle care completed: N/A     Last Vitals:     Vitals:    01/05/22 0725 01/05/22 1345 01/05/22 1541 01/05/22 2122   BP: (!) 170/99 125/80 (!) 145/83 (!) 103/56   Pulse: 82  89 74   Resp: 18  16 18   Temp: 97.3 °F (36.3 °C)  97.7 °F (36.5 °C) 98.4 °F (36.9 °C)   SpO2: 93%  93% 94%   Weight:       Height:            PAIN    Pain Assessment    Pain Intensity 1: 0 (01/06/22 0400) Pain Intensity 1: 2 (12/29/14 1105)    Pain Location 1: Knee Pain Location 1: Abdomen    Pain Intervention(s) 1: Medication (see MAR) Pain Intervention(s) 1: Medication (see MAR)  Patient Stated Pain Goal: 0 Patient Stated Pain Goal: 0  o Intervention effective: no complaints of pain ovrnight  o Other actions taken for pain:       Skin Assessment  Skin color    Condition/Temperature    Integrity    Turgor    Weekly Pressure Ulcer Documentation  Pressure  Injury Documentation: No Pressure Injury Noted-Pressure Ulcer Prevention Initiated  Wound Prevention & Protection Methods  Orientation of wound Orientation of Wound Prevention: Posterior  Location of Prevention Location of Wound Prevention: Buttocks,Sacrum/Coccyx  Dressing Present Dressing Present : No  Dressing Status    Wound Offloading Wound Offloading (Prevention Methods): Bed, pressure redistribution/air     INTAKE/OUPUT  Date 01/05/22 0700 - 01/06/22 0659 01/06/22 0700 - 01/07/22 0659   Shift 0593-0187 4452-8312 24 Hour Total 9538-8969 0934-5543 24 Hour Total   INTAKE   P.O. 720  720        P. O. 720  720      Shift Total(mL/kg) 720(11.5)  720(11.5)      OUTPUT   Urine(mL/kg/hr)           Urine Occurrence(s) 2 x 2 x 4 x      Stool           Stool Occurrence(s) 0 x 0 x 0 x      Shift Total(mL/kg)           720      Weight (kg) 62.7 62.7 62.7 62.7 62.7 62.7       Recommendations:  1. Patient needs and requests: toileting, assistance with ADL's    2. Pending tests/procedures: labs    3. Functional Level/Equipment: Partial (one person) /      Fall Precautions:   Fall risk precautions were reinforced with the patient; she was instructed to call for help prior to getting up. The following fall risk precautions were continued: bed/ chair alarms, door signage, yellow bracelet and socks as well as update of the Nielsen Blades tool in the patient's room.    Yadira Score: 3    HEALS Safety Check    A safety check occurred in the patient's room between off going nurse and oncoming nurse listed above. The safety check included the below items  Area Items   H  High Alert Medications - Verify all high alert medication drips (heparin, PCA, etc.)   E  Equipment - Suction is set up for ALL patients (with norah)  - Red plugs utilized for all equipment (IV pumps, etc.)  - WOWs wiped down at end of shift.  - Room stocked with oxygen, suction, and other unit-specific supplies   A  Alarms - Bed alarm is set for fall risk patients  - Ensure chair alarm is in place and activated if patient is up in a chair   L  Lines - Check IV for any infiltration  - Edwards bag is empty if patient has a Edwards   - Tubing and IV bags are labeled   S  Safety   - Room is clean, patient is clean, and equipment is clean. - Hallways are clear from equipment besides carts. - Fall bracelet on for fall risk patients  - Ensure room is clear and free of clutter  - Suction is set up for ALL patients (with norah)  - Hallways are clear from equipment besides carts.    - Isolation precautions followed, supplies available outside room, sign posted     Aneesh Hung RN

## 2022-01-07 LAB — SARS-COV-2, NAA: NOT DETECTED

## 2022-01-07 PROCEDURE — 97530 THERAPEUTIC ACTIVITIES: CPT

## 2022-01-07 PROCEDURE — 97110 THERAPEUTIC EXERCISES: CPT

## 2022-01-07 PROCEDURE — U0003 INFECTIOUS AGENT DETECTION BY NUCLEIC ACID (DNA OR RNA); SEVERE ACUTE RESPIRATORY SYNDROME CORONAVIRUS 2 (SARS-COV-2) (CORONAVIRUS DISEASE [COVID-19]), AMPLIFIED PROBE TECHNIQUE, MAKING USE OF HIGH THROUGHPUT TECHNOLOGIES AS DESCRIBED BY CMS-2020-01-R: HCPCS

## 2022-01-07 PROCEDURE — 97116 GAIT TRAINING THERAPY: CPT

## 2022-01-07 PROCEDURE — 99232 SBSQ HOSP IP/OBS MODERATE 35: CPT | Performed by: INTERNAL MEDICINE

## 2022-01-07 PROCEDURE — 74011250636 HC RX REV CODE- 250/636: Performed by: FAMILY MEDICINE

## 2022-01-07 PROCEDURE — 97535 SELF CARE MNGMENT TRAINING: CPT

## 2022-01-07 PROCEDURE — 74011250637 HC RX REV CODE- 250/637: Performed by: INTERNAL MEDICINE

## 2022-01-07 PROCEDURE — 65310000000 HC RM PRIVATE REHAB

## 2022-01-07 PROCEDURE — 74011250637 HC RX REV CODE- 250/637: Performed by: FAMILY MEDICINE

## 2022-01-07 RX ADMIN — CLOPIDOGREL 75 MG: 75 TABLET, FILM COATED ORAL at 18:28

## 2022-01-07 RX ADMIN — ATORVASTATIN CALCIUM 80 MG: 40 TABLET, FILM COATED ORAL at 20:14

## 2022-01-07 RX ADMIN — ASPIRIN 325 MG ORAL TABLET 325 MG: 325 PILL ORAL at 09:47

## 2022-01-07 RX ADMIN — DORZOLAMIDE HYDROCHLORIDE 1 DROP: 20 SOLUTION/ DROPS OPHTHALMIC at 22:04

## 2022-01-07 RX ADMIN — DOCUSATE SODIUM 100 MG: 100 CAPSULE, LIQUID FILLED ORAL at 18:28

## 2022-01-07 RX ADMIN — OXYBUTYNIN CHLORIDE 10 MG: 5 TABLET, EXTENDED RELEASE ORAL at 22:04

## 2022-01-07 RX ADMIN — ACETAMINOPHEN 650 MG: 325 TABLET ORAL at 20:08

## 2022-01-07 RX ADMIN — DORZOLAMIDE HYDROCHLORIDE 1 DROP: 20 SOLUTION/ DROPS OPHTHALMIC at 17:06

## 2022-01-07 RX ADMIN — LOSARTAN POTASSIUM 50 MG: 50 TABLET, FILM COATED ORAL at 09:47

## 2022-01-07 RX ADMIN — CHOLECALCIFEROL TAB 25 MCG (1000 UNIT) 2000 UNITS: 25 TAB at 09:47

## 2022-01-07 RX ADMIN — SERTRALINE 50 MG: 50 TABLET, FILM COATED ORAL at 09:47

## 2022-01-07 RX ADMIN — MECLIZINE 12.5 MG: 12.5 TABLET ORAL at 18:28

## 2022-01-07 RX ADMIN — MECLIZINE 12.5 MG: 12.5 TABLET ORAL at 09:47

## 2022-01-07 RX ADMIN — MONTELUKAST 10 MG: 10 TABLET, FILM COATED ORAL at 09:47

## 2022-01-07 RX ADMIN — DORZOLAMIDE HYDROCHLORIDE 1 DROP: 20 SOLUTION/ DROPS OPHTHALMIC at 09:52

## 2022-01-07 RX ADMIN — DOCUSATE SODIUM 100 MG: 100 CAPSULE, LIQUID FILLED ORAL at 09:47

## 2022-01-07 RX ADMIN — LATANOPROST 1 DROP: 50 SOLUTION OPHTHALMIC at 22:04

## 2022-01-07 NOTE — PROGRESS NOTES
[address]  [address] [place] [address], [address]     INPATIENT REHABILITATION  DAILY PROGRESS NOTE     Date: 1/7/2022    Name: Radha Doyle Age / Sex: 82 y.o. 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 Blood pressure controlled. Patient's Complaint:   No significant medical complaints    Pain Control: no current joint or muscle symptoms, essentially pain-free      Objective:     Vital Signs:  Patient Vitals for the past 24 hrs:   BP Temp Pulse Resp SpO2   01/07/22 0821 125/82 98.8 °F (37.1 °C) 83 18 98 %   01/06/22 2100 108/69 98.5 °F (36.9 °C) 82 18 96 %   01/06/22 1600 114/69 97.5 °F (36.4 °C) 90 18 96 %        Physical Examination:  GENERAL SURVEY: Patient is awake, alert, oriented x 3, laying comfortably on the bed, not in acute respiratory distress. HEENT: pink palpebral conjunctivae, anicteric sclerae, no nasoaural discharge, moist oral mucosa  NECK: supple, no jugular venous distention, no palpable lymph nodes  CHEST/LUNGS: symmetrical chest expansion, good air entry, clear breath sounds  HEART: adynamic precordium, good S1 S2, no S3, regular rhythm, no murmurs  ABDOMEN: flat, bowel sounds appreciated, soft, non-tender  EXTREMITIES: (+) limitation ROM of both shoulders due to history of rotator cuff injury, pink nailbeds, no edema, full and equal pulses, no calf tenderness   NEUROLOGICAL EXAM: The patient is awake, alert and oriented x3, able to answer questions fairly appropriately, able to follow 1 and 2 step commands. Able to tell time from the wall clock. Cranial nerves II-XII are grossly intact. No gross sensory deficit.   Motor strength is 4-/5 on the RUE (except right shoulder), 3+/5 on the LUE (except left shoulder), 4+/5 on the RLE, 4/5 on the LLE. Current Medications:  Current Facility-Administered Medications   Medication Dose Route Frequency    losartan (COZAAR) tablet 50 mg  50 mg Oral DAILY    cholecalciferol (VITAMIN D3) (1000 Units /25 mcg) tablet 2,000 Units  2,000 Units Oral DAILY    aspirin tablet 325 mg  325 mg Oral DAILY WITH BREAKFAST    atorvastatin (LIPITOR) tablet 80 mg  80 mg Oral QHS    clopidogreL (PLAVIX) tablet 75 mg  75 mg Oral DAILY WITH DINNER    meclizine (ANTIVERT) tablet 12.5 mg  12.5 mg Oral BID    hydrALAZINE (APRESOLINE) tablet 25 mg  25 mg Oral TID PRN    ondansetron (ZOFRAN ODT) tablet 4 mg  4 mg Oral Q8H PRN    docusate sodium (COLACE) capsule 100 mg  100 mg Oral BID    magnesium hydroxide (MILK OF MAGNESIA) 400 mg/5 mL oral suspension 30 mL  30 mL Oral DAILY PRN    bisacodyL (DULCOLAX) tablet 10 mg  10 mg Oral Q48H PRN    dorzolamide (TRUSOPT) 2 % ophthalmic solution 1 Drop  1 Drop Both Eyes TID    latanoprost (XALATAN) 0.005 % ophthalmic solution 1 Drop  1 Drop Both Eyes QHS    loperamide (IMODIUM) capsule 2 mg  2 mg Oral PRN    montelukast (SINGULAIR) tablet 10 mg  10 mg Oral DAILY    oxybutynin chloride XL (DITROPAN XL) tablet 10 mg  10 mg Oral QHS    sertraline (ZOLOFT) tablet 50 mg  50 mg Oral DAILY    acetaminophen (TYLENOL) tablet 650 mg  650 mg Oral Q4H PRN       Allergies: Allergies   Allergen Reactions    Novocain [Procaine] Swelling       Lab/Data Review:  Recent Results (from the past 24 hour(s))   SARS-COV-2 BY MARTÍNEZ    Collection Time: 01/07/22  9:17 AM   Result Value Ref Range    SARS-CoV-2, MARTÍNEZ Not detected NOTD           Assessment:     Primary Rehabilitation Diagnosis  1. Impaired Mobility and ADLs  2.  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 Justification for continued stay: Good progression towards established rehabilitation goals. 2. Medical Issues being followed closely:    [x]  Fall and safety precautions     []  Wound Care     [x]  Bowel and Bladder Function     [x]  Fluid Electrolyte and Nutrition Balance     []  Pain Control      3. Issues that 24 hour rehabilitation nursing is following:    [x]  Fall and safety precautions     []  Wound Care     [x]  Bowel and Bladder Function     [x]  Fluid Electrolyte and Nutrition Balance     []  Pain Control      [x]  Assistance with and education on in-room safety with transfers to and from the bed, wheelchair, toilet and shower. 4. Acute rehabilitation plan of care:    [x]  Continue current care and rehab. [x]  Physical Therapy           [x]  Occupational Therapy           []  Speech Therapy     []  Hold Rehab until further notice     5. Medications:    [x]  MAR Reviewed     [x]  Continue Present Medications     6. Chemical DVT Prophylaxis:      []  Enoxaparin     []  Unfractionated Heparin     []  Warfarin     []  NOAC     []  Aspirin     [x]  None     7. Mechanical DVT Prophylaxis:      [x]  RADHAMES Stockings     [x]  Sequential Compression Device     []  None     8. GI Prophylaxis:      []  PPI     []  H2 Blocker     [x]  None / Not indicated     9. Code status:    [x]  Full code     []  Partial code     []  Do not intubate     []  Do not resuscitate     10. Diet:  Specifications  3 carb choices (45 gm/meal); Low fat/Low cholesterol/High fiber/ANGELINE   Solids (consistency)  Regular   Liquids (consistency)  Thin   Fluid Restriction  None     11.  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   > Continue:    > Aspirin 325 mg PO once daily with breakfast    > Clopidogrel 75 mg PO once daily with dinner (STOP DATE: 3/27/2022)    > Meclizine 12.5 mg PO BID (for dizziness)     > Essential hypertension   > On 1/1/2022, started:    > Losartan 25 mg PO once daily (9AM)    > Hydralazine 25 mg PO TID PRN for SBP greater than 160 mmHg   > On 1/5/2022, increased Losartan from 25 mg to 50 mg PO once daily (9AM)   > Continue:    > Losartan 50 mg PO once daily (9AM)    > Hydralazine 25 mg PO TID PRN for SBP greater than 160 mmHg     > Hyperlipidemia   > Continue Atorvastatin 80 mg PO q HS     > Depression and anxiety   > Continue Sertraline 50 mg PO once daily     > Glaucoma   > Continue Latanoprost eyedrops 1 drop both eyes q HS     > Urinary incontinence   > Urinalysis (1/1/2022): WNL   > Continue Oxybutynin XL 10 mg PO once daily    > COVID-19 ruled out by laboratory testing   > Prior to admission and upon admission to the ARU, patient did not have any fever, desaturation, cough or difficulty breathing    > SARS-CoV-2 by [facility]) (1/7/2022): Not detected      12. Personal Protective Equipment (N95 face mask and eye goggles) was used while interacting with the patient. Patient was using a surgical mask. 15. Patient's progress in rehabilitation and medical issues discussed with the patient. All questions answered to the best of my ability. Care plan discussed with patient and nurse. 14. Total clinical care time is 30 minutes, including review of chart including all labs, radiology, past medical history, and discussion with patient. Greater than 50% of my time was spent in coordination of care and counseling.       Signed:    Braxton Boyd MD    January 7, 2022

## 2022-01-07 NOTE — PROGRESS NOTES
Problem: Mobility Impaired (Adult and Pediatric)  Goal: *Therapy Goal (Edit Goal, Insert Text)  Description: Physical Therapy Short Term Goals  Initiated 1/1/2022, updated 1/7/2022 and to be accomplished within 7 day(s) on 1/14/2022  1. Patient will move from supine to sit and sit to supine  in bed with minimal assistance/contact guard assist.  Goal met 1/7/2022  Updated goal: Patient will perform supine<->sit supervision level   2. Patient will transfer from bed to chair and chair to bed with minimal assistance using the least restrictive device. Goal met 1/7/2022  Updated goal: Patient will perform bed<->chair transfer with SBA using RW. 3.  Patient will perform sit to stand with minimal assistance. Goal met 1/7/2022  Updated goal: Patient will perform sit to stand with SBA>   4. Patient will ambulate with minimal assistance/contact guard assist for 50 feet with the least restrictive device before needing seated rest. Goal ongoing  5. Patient will participate in formal balance assessment [place] Balance Scale/Postural Assessment Scale for Stroke). Goal ongoing    Physical Therapy Long Term Goals  Initiated 1/1/2022 and to be accomplished within 21 day(s) on 1/22/2022  1. Patient will move from supine to sit and sit to supine , scoot up and down, and roll side to side in bed with modified independence. 2.  Patient will transfer from bed to chair and chair to bed with modified independence using the least restrictive device. 3.  Patient will perform sit to stand with modified independence. 4.  Patient will ambulate with modified independence for 150 feet with the least restrictive device. 5.  Patient will negotiate one curb step with CG/SBA , using appropriate AD for safe community mobility.        Outcome: Progressing Towards Goal   PHYSICAL THERAPY TREATMENT    Patient: Angela Kidd (48 y.o. female)  Date: 1/7/2022  Diagnosis: CVA (cerebral vascular accident) [facility]) [I63.9] Acute ischemic stroke [facility])  Precautions: Fall  Chart, physical therapy assessment, plan of care and goals were reviewed. Time In:1445  Time Out:1515    Patient seen for: Transfer training; Therapeutic exercise (bed mobility)    Pain:  Pt pain was reported as c/o B knee pain, not rated pre-treatment. Pt pain was reported as knee pain not rated post-treatment. Intervention: resting    Patient identified with name and : yes    SUBJECTIVE:      Pt reports being tired and B knee pain but willing to participate in BLE exercises. OBJECTIVE DATA SUMMARY:    Objective:   BED/MAT MOBILITY Daily Assessment     Supine to Sit : 5 (Stand-by assistance)  Sit to Supine : 5 (Supervision)  Pt performed bed mobility on left side of mat table with S/SBA, pt self assisted left LE over EOM. TRANSFERS Daily Assessment     Transfer Type: Other  Other: stand step txfr with RW  Transfer Assistance : 4 (Minimal assistance)  Sit to Stand Assistance: Minimal assistance  Pt performed sit to stand from w/c with min assist for anterior wt shift and stand step txfr w/c<->mat table with RW and min assist for balance. Pt required v/c to back up completely to w/c and reach back with BUE to arm rests for UE support. BALANCE Daily Assessment     Sitting - Static: Good (unsupported)  Sitting - Dynamic: Good (unsupported)  Standing - Static: Fair (with RW)  Standing - Dynamic : Impaired          THERAPEUTIC EXERCISES Daily Assessment       Supine bridging 2x10, hooklying hip abd with RTB and hip add with ball 2x15, BLE hip abd 2x10        ASSESSMENT:  Pt demo'd improvement with bed mobility today. Pt required v/c for erect posture with txfr.    Progression toward goals:  []      Improving appropriately and progressing toward goals  [x]      Improving slowly and progressing toward goals  []      Not making progress toward goals and plan of care will be adjusted      PLAN:  Patient continues to benefit from skilled intervention to address the above impairments. Continue treatment per established plan of care. Discharge Recommendations:  Home Health  Further Equipment Recommendations for Discharge:  rolling walker and wheelchair 18 inch      Estimated Discharge Date: 1/21/2022    Activity Tolerance:   fair  Please refer to the flowsheet for vital signs taken during this treatment.     After treatment:   [] Patient left in no apparent distress in bed  [x] Patient left in no apparent distress sitting up in chair  [] Patient left in no apparent distress in w/c mobilizing under own power  [] Patient left in no apparent distress dining area  [] Patient left in no apparent distress mobilizing under own power  [x] Call bell left within reach  [] Nursing notified  [] Caregiver present  [] Bed alarm activated   [x] Chair alarm activated      Madhuri Rodriguez PTA  1/7/2022

## 2022-01-07 NOTE — PROGRESS NOTES
Problem: Self Care Deficits Care Plan (Adult)  Goal: *Therapy Goal (Edit Goal, Insert Text)  Description: Occupational Therapy Goals   Long Term Goals  Initiated 1/1/22 and to be accomplished within 3 week(s), by 1/22/2022  1. Pt will perform self-feeding with independence. 2. Pt will perform grooming with independence. 3. Pt will perform UB bathing with supervision. 4. Pt will perform LB bathing with supervision. 5. Pt will perform tub/shower transfer with supervision. 6. Pt will perform UB dressing with Axel using compensatory strategies. 7. Pt will perform LB dressing with Axel and AE. 8. Pt will perform toileting task with Axel and AE. 9. Pt will perform toilet transfer with Axel and AE. Short Term Goals   Initiated 1/1/22 (reassessed on 1/7/2022) and to be accomplished within 7 day(s), to be reassessed by 1/14/2022  1. Pt will perform self-feeding with Axel. 2. Pt will perform grooming with Axel. 3. Pt will perform UB bathing with SBA. (Goal met 1/7/2022)      Upgraded goal: Pt will perform UB bathing with set-up/ supv. 4. Pt will perform LB bathing with Fidel and UE support. (Goal met 1/7/2022)      Upgraded goal: Pt will perform LB bathing with CGA using AE and/ or compensatory strategies as needed. 5. Pt will perform tub/shower transfer with Fidel and AE. 6. Pt will perform UB dressing with SBA using compensatory strategies. (Goal met 1/7/2022)      Upgraded goal: Pt will perform UB dressing with supv using compensatory strategies as needed. 7. Pt will perform LB dressing with Fidel and AE. 8. Pt will perform toileting task with Fidel and AE. (Goal met 1/7/2022; continue goal for consistency)  9. Pt will perform toilet transfer with Fidel and AE.    Outcome: Progressing Towards Goal  Goal: Interventions  Outcome: Progressing Towards Goal   OT WEEKLY PROGRESS NOTE  Patient Kristi Browning   Time Spent With Patient  Time In: 0815  Time Out: 0930  Patient Seen For[de-identified] AM;ADLs;Other (see progress notes)    Medical Diagnosis:  CVA (cerebral vascular accident) ([facility] [address] [I63.9] Acute ischemic stroke ([facility] [address]     Pain at start of tx: 0/10 pain or discomfort. Pain at stop of tx: 0/10 pain or discomfort. Patient identified with name and : yes  Subjective: \"I would like to wash my hair today\"         Objective: VS /82, HR 83 bpm, RR 18 bpm, SPO2 98% on room air  Outcome Measures: AROM: RUE/ LUE (generally decreased) functional; secondary to arthritic joints; RUE/ LUE AROM (impaired) for shoulder flexion due to prior rotator cuff injuries (per pt report). COGNITION/PERCEPTION Initial Assessment Weekly Progress Assessment 2022   Premorbid Reading Status Literate  Literate   Premorbid Writing Status Impaired     Arousal/Alertness       Orientation Level Oriented X4  Oriented X4   Visual Fields       Praxis       Body Scheme       COMPREHENSION MODE Initial Assessment Weekly Progress Assessment 2022   Primary Mode of Comprehension Auditory Auditory   Hearing Aide None  None   Corrective Lenses Reading glasses  Reading glasses   Score 5 6     EXPRESSION Initial Assessment Weekly Progress Assessment 2022   Primary Mode of Expression Verbal Verbal   Score 6 6   Comments         SOCIAL INTERACTION/ PRAGMATICS Initial Assessment Weekly Progress Assessment 2022   Score 6 6   Comments         PROBLEM SOLVING Initial Assessment Weekly Progress Assessment 2022   Score 5 5   Comments         MEMORY Initial Assessment Weekly Progress Assessment 2022   Score 6 6   Comments appears intact appears intact     EATING Initial Assessment Weekly Progress Assessment 2022   Functional Level 5 Feeding/Eating  Feeding/Eating Assistance: 5 (Supervision/setup)  Comments: Pt required set-up for breakfast tray; assistance for opening small containers and lids.     Comments         GROOMING Initial Assessment Weekly Progress Assessment 2022   Functional Level 5 Grooming  Grooming Assistance : 5 (Supervision)  Comments: Set-up level for washing face using washcloth while seated on tub bench during ADL shower; pt brushed hair seated wheelchair level. Tasks completed by patient       Comments         BATHING Initial Assessment Weekly Progress Assessment 1/7/2022   Functional Level 4    Upper Body Bathing  Bathing Assistance, Upper: 5 (Supervision)  Upper Body : Compensatory technique training  Position Performed: Seated in chair  Adaptive Equipment: Tub bench  Comments: Pt performed UB bathing at supv level while seated on tub bench (ADL shower). Lower Body Bathing  Bathing Assistance, Lower : 4 (Minimal assistance)  Adaptive Equipment: Grab bar  Position Performed: Seated in chair  Adaptive Equipment: Tub bench (hand held shower nozzle)  Comments: Pt performed LB showering seated on tub bench; verbal cues/ edu for use of weight shift while in seated position for performing eric-care using washcloth. Pt requiring assistance to thoroughly wash sacrum and distal BLE's. Body parts patient bathed       Comments         TUB/SHOWER TRANSFER INDEPENDENCE Initial Assessment Weekly Progress Assessment 1/7/2022   Score 0 (NT due to safety concerns) Functional Transfers  Toilet Transfer : Elevated seat (stand step transfer using RW )  Amount of Assistance Required: 3 (Moderate assistance)  Tub or Shower Type: Shower  Amount of Assistance Required: 3 (Moderate assistance)  Adaptive Equipment: Tub transfer bench; Wheelchair;Grab bars   Comments         UPPER BODY DRESSING/UNDRESSING Initial Assessment Weekly Progress Assessment 1/7/2022   Functional Level 4 Upper Body Dressing   Dressing Assistance : 5 (Supervision)  Comments: UB dressing performed supv/ SBA level for donning pullover shirt. Verbal cues with increased time for fastening bra closure and for adjusting straps over shoulders. Items applied/Steps completed Bra (3 steps),Pullover (4 steps)  Pullover (4 steps);  Bra (3 steps)   Comments Pt performed UB dressing with Fidel to don bra in seated position at EOB       LOWER BODY DRESSING/UNDRESSING Initial Assessment Weekly Progress Assessment 1/7/2022   Functional Level 2 Lower Body Dressing   Dressing Assistance : 2 (Maximal assistance)  Leg Crossed Method Used: No  Position Performed: Seated in chair;Standing  Adaptive Equipment Used: Grab bar (wheelchair)  Comments: Pt required Max A to thread underwear and elastic waist pants over bilateral distal lower extremities. Min to Mod A to transition sit to stand; use of grab bar for steadying and support; Min A for pulling underwear/ pants over hips to waist level. Items applied/Steps completed Elastic waist pants (3 steps),Shoe, left (1 step),Shoe, right (1 step),Sock, left (1 step),Sock, right (1 step),Underpants (3 steps)  Elastic waist pants (3 steps), Underpants (3 steps)   Comments Pt completed LB dressing with maxA to don BLE socks and TA to don pull tab brief, pt thread BLE feet through pants and pulled up over BLE hips and buttocks with modA. TOILETING Initial Assessment Weekly Progress Assessment 1/7/2022   Functional Level 2 Toileting  Toileting Assistance (FIM Score): 4 (Minimal assistance)  Cues: Physical assistance for pants up;Verbal cues provided  Adaptive Equipment: Elevated seat;Walker   Comments Pt performed toileting with FWW to [facility] with maxA to perform toileting hygiene and CM due to decreased stability and strength  BSC at the bedside. TOILET TRANSFER INDEPENDENCE Initial Assessment Weekly Progress Assessment 1/7/2022   Transfer score 3 Functional Transfers  Toilet Transfer : Elevated seat (stand step transfer using RW )  Amount of Assistance Required: 3 (Moderate assistance)  Tub or Shower Type: Shower  Amount of Assistance Required: 3 (Moderate assistance)  Adaptive Equipment: Tub transfer bench; Wheelchair;Grab bars   Comments Pt performed toilet transfer with modA using FWW, pt required assistance due to instability and deformity of BLE legs (IR of BLE hips) and decreased strength and activity tolerance              ASSESSMENT:  Pt making slow steady progression towards goals having met 4/9 STG's this reporting period. Therapist reassessed POC and updated goals based on patient's functional ability and demonstrated performance. ADL shower performed using tub bench. UB bathing performed supv level seated; LB bathing performed with Min A for thoroughly washing sacrum and bilateral distal lower extremities. Edu/ instruction for use of weight shifting side to side while seated on tub bench for washing eric-area and sacrum using washcloth. Functional stand step transfers performed Mod A using RW (gait belt for safety); edu and verbal cues for hand placement and for standing within frame of walker for increased stability and base of support. Pt will continue to benefit from skilled OT interventions to address decreased (I) with ADL's, decreased strength/ endurance, impaired functional mobility/ transfers, decreased activity tolerance, decreased BUE range of motion, and impaired [facility] impacting pt's (I) and safety with ADL's. Progression toward goals:  []          Improving appropriately and progressing toward goals  [x]          Improving slowly and progressing toward goals  []          Not making progress toward goals and plan of care will be adjusted     PLAN:  Patient continues to benefit from skilled intervention to address the above impairments. Continue treatment per established plan of care. Discharge Recommendations:  Home Health with assistance  Further Equipment Recommendations for Discharge:  bedside commode; TBD pending progress     Please refer to the flow sheet for vital signs taken during this treatment.   After treatment:   [x]  Patient left in no apparent distress sitting up in wheelchair with needs met  []  Patient left in no apparent distress in bed  [x]  Call bell left within reach  [x]  Nursing notified  []  Caregiver present  [x]  Wheelchair alarm activated    COMMUNICATION/EDUCATION:   [] Home safety education was provided and the patient/caregiver indicated understanding. [x] Patient/family have participated as able in goal setting and plan of care. [x] Patient/family agree to work toward stated goals and plan of care. [] Patient understands intent and goals of therapy, but is neutral about his/her participation. [] Patient is unable to participate in goal setting and plan of care. Plan of Care: Please see Care Plan for updated STG/LTGs.    Family Training:  to be scheduled  Estimated LOS: 1/22/2022    Gladis Valencia OT  1/7/2022

## 2022-01-07 NOTE — PROGRESS NOTES
Problem: Mobility Impaired (Adult and Pediatric)  Goal: *Therapy Goal (Edit Goal, Insert Text)  Description: Physical Therapy Short Term Goals  Initiated 1/1/2022, updated 1/7/2022 and to be accomplished within 7 day(s) on 1/14/2022  1. Patient will move from supine to sit and sit to supine  in bed with minimal assistance/contact guard assist.  Goal met 1/7/2022  Updated goal: Patient will perform supine<->sit supervision level   2. Patient will transfer from bed to chair and chair to bed with minimal assistance using the least restrictive device. Goal met 1/7/2022  Updated goal: Patient will perform bed<->chair transfer with SBA using RW. 3.  Patient will perform sit to stand with minimal assistance. Goal met 1/7/2022  Updated goal: Patient will perform sit to stand with SBA>   4. Patient will ambulate with minimal assistance/contact guard assist for 50 feet with the least restrictive device before needing seated rest. Goal ongoing  5. Patient will participate in formal balance assessment Jenean Ed Balance Scale/Postural Assessment Scale for Stroke). Goal ongoing    Physical Therapy Long Term Goals  Initiated 1/1/2022 and to be accomplished within 21 day(s) on 1/22/2022  1. Patient will move from supine to sit and sit to supine , scoot up and down, and roll side to side in bed with modified independence. 2.  Patient will transfer from bed to chair and chair to bed with modified independence using the least restrictive device. 3.  Patient will perform sit to stand with modified independence. 4.  Patient will ambulate with modified independence for 150 feet with the least restrictive device. 5.  Patient will negotiate one curb step with CG/SBA , using appropriate AD for safe community mobility.        Outcome: Progressing Towards Goal   PHYSICAL THERAPY WEEKLY PROGRESS NOTE    Patient: Tito Benitez (81 y.o. female)  Date: 1/7/2022  Diagnosis: CVA (cerebral vascular accident) [facility]) [I63.9] Acute ischemic stroke [facility])  Precautions: Fall  Chart, physical therapy assessment, plan of care and goals were reviewed. Time in:1210  Time out:1240  Time In: 2576  Time Out: 8091    Patient seen for: Gait training;Balance activities; Patient education;Transfer training; Wheelchair mobility      Pain:  Patient indicating pain in bilat knees that is chronic, affecting her primarily with sit<->stand transition. Patient identified with name and : yes    SUBJECTIVE:     Patient agreeable to treatment. Reporting feeling more tired today. OBJECTIVE DATA SUMMARY:       GROSS ASSESSMENT Weekly Progress Assessment 2022   AROM Generally decreased, functional   Strength Generally decreased, functional   Coordination Within functional limits   Tone Normal   Sensation Impaired (impaired to light touch bilat toes)   PROM Generally decreased, functional       POSTURE Weekly Progress Assessment 2022   Posture (WDL) Exceptions to [facility]   Posture Assessment Forward head;Scapular winging;Rounded shoulders; Increased;Trunk flexion;Kyphosis       BALANCE Weekly Progress Assessment 2022    Sitting - Static: Good (unsupported)  Sitting - Dynamic: Good (unsupported)  Standing - Static: Fair (using RW, cues for more upright posture)  Standing - Dynamic : Impaired     BED/CHAIR/WHEELCHAIR TRANSFERS Initial Assessment Weekly Progress Assessment 2022   Rolling Right 5 (Stand-by assistance) 5 (Supervision)   Rolling Left 5 (Stand-by assistance) 5 (Supervision)   Supine to Sit 3 (Moderate assistance) 4 (Contact guard assistance)   Sit to Stand Moderate assistance Minimal assistance   Sit to Supine 4 (Minimal assistance)  (SBA)   Transfer Type Other Other   Transfer Assistance Needed 3 (Moderate assistance ) 4 (Minimal assistance)   Comments    Stand step transfers with RW needing min A needing cues for more upright posture and safe hand placement for sit to stand.     Car Transfer Not tested (Patient feeling nauseous during second session) Not tested (patient fatiguing quickly today)   Car Type NT NT       [facility] MOBILITY/MANAGEMENT Initial Assessment Weekly Progress Assessment 1/7/2022   Able to Propel (dist) 55 feet 60 feet   Assistance Required 2 (mod/max A using bilat feet, occasionally UE to propel) SBA   Curbs/ramps assistance required 0 (Not tested) 0 (Not tested)   Wheelchair set up assistance required 2 (Maximal assistance)  Mod A   Wheelchair management Manages left brake,Manages right brake (needing assistance) Manages left brake;Manages right brake (brake extender used)   Comments   using bilat feet and UE to propel, extra time       GAIT Weekly Progress Assessment 1/7/2022   Gait Description (WDL) Exceptions to WDL   Gait Abnormalities Decreased step clearance;Shuffling gait; Step to gait (genu valgum bilateral knees)       WALKING INDEPENDENCE Initial Assessment Weekly Progress Assessment 1/7/2022   Assistive device Walker, rolling,Gait belt Walker, rolling;Gait belt   Ambulation assistance - level surface 3 (Moderate assistance) 4 (Minimal assistance)   Distance 18 Feet (ft)  (18-20 ft bouts today)   Comments   Gait with RW min A for safety, cues for stepping closer to middle of RW. Ambulation assistance - unlevel surfaces  (NT)  (NT)       STEPS/STAIRS Initial Assessment Weekly Progress Assessment 1/7/2022   Steps/Stairs ambulated 0 0   Assistance Required   0 (Not tested)   Rail Use  (NT )  (NT)   Comments    NT due to decreased standing endurance/tolerance. Curbs/Ramps  (NT)  (NT)       Neuro Re-Education:  Oculomotor testing performed with patient reporting double vision with tracking object in her right visual field in vertical direction, corresponding dizziness. Patient also performing horizontal and vertical head turns with increased dizziness reported.    *Recommending ongoing assessment, establishment of HEP including habituation and gaze stabilization to improve report of dizziness with head turns/mobility. Patient trialed eye patch for gait but no significant improvement reported. ASSESSMENT:  Patient continues to have slow progress, particularly for gait and standing tolerance/endurance. Will continue to progress patient as she is able to tolerate. Goals updated based on progress. Currently not consistently performing gait for household distances. Progression toward goals:  []      Improving appropriately and progressing toward goals  [x]      Improving slowly and progressing toward goals  []      Not making progress toward goals and plan of care will be adjusted     PLAN:  Patient continues to benefit from skilled intervention to address the above impairments. Continue treatment per established plan of care. Discharge Recommendations:  Home Health  Further Equipment Recommendations for Discharge:  rolling walker and wheelchair     Estimated Discharge Date: 1/21/2022    Activity Tolerance:   Fair due to fatigue and pain.    After treatment:   [] Patient left in no apparent distress in bed  [x] Patient left in no apparent distress sitting up in chair  [] Patient left in no apparent distress in w/c mobilizing under own power  [] Patient left in no apparent distress dining area  [] Patient left in no apparent distress mobilizing under own power  [x] Call bell left within reach  [x] Nursing notified  [] Caregiver present  [] Bed alarm activated   [x] Chair alarm activated      Gion Roger, PT  1/7/2022

## 2022-01-07 NOTE — ROUTINE PROCESS
SHIFT CHANGE NOTE FOR [facility]    Bedside and Verbal shift change report given to Helen Casas RN (oncoming nurse) by Jennifer Tian RN (offgoing nurse). Report included the following information SBAR, Kardex, MAR and Recent Results. Situation:   Code Status: Full Code   Hospital Day: 7   Problem List:   Hospital Problems  Date Reviewed: 1/6/2022          Codes Class Noted POA    Essential hypertension (Chronic) ICD-10-CM: I10  ICD-9-CM: 401.9  Unknown Yes        Hypercholesterolemia with hypertriglyceridemia (Chronic) ICD-10-CM: E78.2  ICD-9-CM: 272.2  12/27/2021 Yes    Overview Signed 1/4/2022 12:02 AM by Daisy Hager MD     Lipid profile (12/27/2021) showed , , HDL 48,              * (Principal) [facility] ischemic [facility] ICD-10-CM: I63.9  ICD-9-CM: 434.91  12/26/2021 Yes    Overview Signed 1/3/2022 11:59 PM by Daisy Hager MD     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      Assessment:   Changes in Assessment throughout shift: No change to previous assessment     Patient has a central line: no Reasons if yes: na  Insertion date:na Last dressing date:na   Patient has Ovalle Cath: no Reasons if yes: na   Insertion date:na  Shift ovalle care completed: NO     Last Vitals:     Vitals:    01/05/22 2122 01/06/22 0800 01/06/22 1600 01/06/22 2100   BP: (!) 103/56 139/84 114/69 108/69   Pulse: 74 77 90 82   Resp: 18 18 18 18   Temp: 98.4 °F (36.9 °C) 97.8 °F (36.6 °C) 97.5 °F (36.4 °C) 98.5 °F (36.9 °C)   SpO2: 94% 95% 96% 96%   Weight:       Height:            PAIN    Pain Assessment    Pain Intensity 1: 0 (01/07/22 0400) Pain Intensity 1: 2 (12/29/14 1105)    Pain Location 1: Knee Pain Location 1: Abdomen    Pain Intervention(s) 1: Medication (see MAR) Pain Intervention(s) 1: Medication (see MAR)  Patient Stated Pain Goal: 0 Patient Stated Pain Goal: 0  o Intervention effective: yes  o Other actions taken for pain:       Skin Assessment  Skin color    Condition/Temperature    Integrity    Turgor    Weekly Pressure Ulcer Documentation  Pressure  Injury Documentation: No Pressure Injury Noted-Pressure Ulcer Prevention Initiated  Wound Prevention & Protection Methods  Orientation of wound Orientation of Wound Prevention: Posterior  Location of Prevention Location of Wound Prevention: Buttocks,Sacrum/Coccyx  Dressing Present Dressing Present : No  Dressing Status    Wound Offloading Wound Offloading (Prevention Methods): Bed, pressure redistribution/air     INTAKE/OUPUT  Date 01/06/22 0700 - 01/07/22 0659 01/07/22 0700 - 01/08/22 0659   Shift 3559-6635 9624-1447 24 Hour Total 3900-2031 0670-9090 24 Hour Total   INTAKE   P.O. 1200  1200        P. O. 1200  1200      Shift Total(mL/kg) 1200(19.1)  1200(19.1)      OUTPUT   Urine(mL/kg/hr)           Urine Occurrence(s) 3 x 3 x 6 x      Stool           Stool Occurrence(s) 1 x 0 x 1 x      Shift Total(mL/kg)         NET 1200  1200      Weight (kg) 62.7 62.7 62.7 62.7 62.7 62.7       Recommendations:  1. Patient needs and requests: toileting    2. Pending tests/procedures: na     3. Functional Level/Equipment: Partial (one person) /      Fall Precautions:   Fall risk precautions were reinforced with the patient; she was instructed to call for help prior to getting up. The following fall risk precautions were continued: bed/ chair alarms, door signage, yellow bracelet and socks as well as update of the Jessiac Litter tool in the patient's room.    Yadira Score: 3    HEALS Safety Check    A safety check occurred in the patient's room between off going nurse and oncoming nurse listed above.    The safety check included the below items  Area Items   H  High Alert Medications - Verify all high alert medication drips (heparin, PCA, etc.)   E  Equipment - Suction is set up for ALL patients (with norah)  - Red plugs utilized for all equipment (IV pumps, etc.)  - WOWs wiped down at end of shift.  - Room stocked with oxygen, suction, and other unit-specific supplies   A  Alarms - Bed alarm is set for fall risk patients  - Ensure chair alarm is in place and activated if patient is up in a chair   L  Lines - Check IV for any infiltration  - Edwards bag is empty if patient has a Edwards   - Tubing and IV bags are labeled   S  Safety   - Room is clean, patient is clean, and equipment is clean. - Hallways are clear from equipment besides carts. - Fall bracelet on for fall risk patients  - Ensure room is clear and free of clutter  - Suction is set up for ALL patients (with norah)  - Hallways are clear from equipment besides carts.    - Isolation precautions followed, supplies available outside room, sign posted     Aakash Morrow RN

## 2022-01-08 PROCEDURE — 74011250636 HC RX REV CODE- 250/636: Performed by: FAMILY MEDICINE

## 2022-01-08 PROCEDURE — 97530 THERAPEUTIC ACTIVITIES: CPT

## 2022-01-08 PROCEDURE — 2709999900 HC NON-CHARGEABLE SUPPLY

## 2022-01-08 PROCEDURE — 97116 GAIT TRAINING THERAPY: CPT

## 2022-01-08 PROCEDURE — 65310000000 HC RM PRIVATE REHAB

## 2022-01-08 PROCEDURE — 74011250637 HC RX REV CODE- 250/637: Performed by: FAMILY MEDICINE

## 2022-01-08 PROCEDURE — 74011250637 HC RX REV CODE- 250/637: Performed by: INTERNAL MEDICINE

## 2022-01-08 RX ADMIN — ASPIRIN 325 MG ORAL TABLET 325 MG: 325 PILL ORAL at 09:02

## 2022-01-08 RX ADMIN — DORZOLAMIDE HYDROCHLORIDE 1 DROP: 20 SOLUTION/ DROPS OPHTHALMIC at 09:08

## 2022-01-08 RX ADMIN — OXYBUTYNIN CHLORIDE 10 MG: 5 TABLET, EXTENDED RELEASE ORAL at 21:59

## 2022-01-08 RX ADMIN — CLOPIDOGREL 75 MG: 75 TABLET, FILM COATED ORAL at 18:02

## 2022-01-08 RX ADMIN — CHOLECALCIFEROL TAB 25 MCG (1000 UNIT) 2000 UNITS: 25 TAB at 09:02

## 2022-01-08 RX ADMIN — MECLIZINE 12.5 MG: 12.5 TABLET ORAL at 18:02

## 2022-01-08 RX ADMIN — SERTRALINE 50 MG: 50 TABLET, FILM COATED ORAL at 09:02

## 2022-01-08 RX ADMIN — LATANOPROST 1 DROP: 50 SOLUTION OPHTHALMIC at 22:03

## 2022-01-08 RX ADMIN — ATORVASTATIN CALCIUM 80 MG: 40 TABLET, FILM COATED ORAL at 21:59

## 2022-01-08 RX ADMIN — DORZOLAMIDE HYDROCHLORIDE 1 DROP: 20 SOLUTION/ DROPS OPHTHALMIC at 17:08

## 2022-01-08 RX ADMIN — MECLIZINE 12.5 MG: 12.5 TABLET ORAL at 09:02

## 2022-01-08 RX ADMIN — MONTELUKAST 10 MG: 10 TABLET, FILM COATED ORAL at 09:02

## 2022-01-08 RX ADMIN — DOCUSATE SODIUM 100 MG: 100 CAPSULE, LIQUID FILLED ORAL at 09:02

## 2022-01-08 RX ADMIN — DOCUSATE SODIUM 100 MG: 100 CAPSULE, LIQUID FILLED ORAL at 18:02

## 2022-01-08 RX ADMIN — DORZOLAMIDE HYDROCHLORIDE 1 DROP: 20 SOLUTION/ DROPS OPHTHALMIC at 22:02

## 2022-01-08 NOTE — ROUTINE PROCESS
[facility]    Bedside and Verbal shift change report given to Nayeli Mackey RN (oncoming nurse) by Lisha Buckley RN (offgoing nurse). Report included the following information SBAR, Kardex, MAR and Recent Results. Situation:   Code Status: Full Code   Hospital Day: 8   Problem List:   Hospital Problems  Date Reviewed: 1/7/2022          Codes Class Noted POA    Essential hypertension (Chronic) ICD-10-CM: I10  ICD-9-CM: 401.9  Unknown Yes        Hypercholesterolemia with hypertriglyceridemia (Chronic) ICD-10-CM: E78.2  ICD-9-CM: 272.2  12/27/2021 Yes    Overview Signed 1/4/2022 12:02 AM by Eden Lutz MD     Lipid profile (12/27/2021) showed , , HDL 48,              * (Principal) Acute ischemic stroke ([facility] [address] ICD-10-CM: I63.9  ICD-9-CM: 434.91  12/26/2021 Yes    Overview Signed 1/3/2022 11:59 PM by Eden Lutz MD     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      Assessment:   Changes in Assessment throughout shift: No change to previous assessment     Patient has a central line: no Reasons if yes: na  Insertion date:na Last dressing date:na   Patient has Ovalle Cath: no Reasons if yes: na   Insertion date:na  Shift ovalle care completed: NO     Last Vitals:     Vitals:    01/07/22 1653 01/07/22 2015 01/08/22 0739 01/08/22 1534   BP: 128/81 115/70 100/73 116/77   Pulse: 76 81 72 78   Resp: 18 18 18 18   Temp: 98.6 °F (37 °C) 98.3 °F (36.8 °C) 98.2 °F (36.8 °C) 98.8 °F (37.1 °C)   SpO2: 97% 98% 94% 96%   Weight:       Height:            PAIN    Pain Assessment    Pain Intensity 1: 0 (01/08/22 1534) Pain Intensity 1: 2 (12/29/14 1105)    Pain Location 1: Knee Pain Location 1: Abdomen    Pain Intervention(s) 1: Medication (see MAR) Pain Intervention(s) 1: Medication (see MAR)  Patient Stated Pain Goal: 0 Patient Stated Pain Goal: 0  o Intervention effective: yes  o Other actions taken for pain:       Skin Assessment  Skin color    Condition/Temperature    Integrity    Turgor    Weekly Pressure Ulcer Documentation  Pressure  Injury Documentation: No Pressure Injury Noted-Pressure Ulcer Prevention Initiated  Wound Prevention & Protection Methods  Orientation of wound Orientation of Wound Prevention: Posterior  Location of Prevention Location of Wound Prevention: Buttocks,Sacrum/Coccyx  Dressing Present Dressing Present : No  Dressing Status    Wound Offloading Wound Offloading (Prevention Methods): Bed, pressure redistribution/air,Bed, pressure reduction mattress,Chair cushion,Repositioning,Wheelchair     INTAKE/OUPUT  Date 01/07/22 0700 - 01/08/22 0659 01/08/22 0700 - 01/09/22 0659   Shift 7388-8893 9680-1562 24 Hour Total 2271-5286 6450-9286 24 Hour Total   INTAKE   P.O. 1200  1200 1200  1200     P. O. 1200  1200 1200  1200   Shift Total(mL/kg) 1200(19.1)  1200(19.1) 1200(19.1)  1200(19.1)   OUTPUT   Urine(mL/kg/hr)           Urine Occurrence(s) 5 x 3 x 8 x 5 x  5 x   Stool           Stool Occurrence(s) 1 x 0 x 1 x 0 x  0 x   Shift Total(mL/kg)         NET 1200  1200 1200  1200   Weight (kg) 62.7 62.7 62.7 62.7 62.7 62.7       Recommendations:  1. Patient needs and requests: na    2. Pending tests/procedures: na     3. Functional Level/Equipment:   /      Fall Precautions:   Fall risk precautions were reinforced with the patient; she was instructed to call for help prior to getting up. The following fall risk precautions were continued: bed/ chair alarms, door signage, yellow bracelet and socks as well as update of the [employer] tool in the patient's room.    Yadira Score: 3    HEALS Safety Check    A safety check occurred in the patient's room between off going nurse and oncoming nurse listed above. The safety check included the below items  Area Items   H  High Alert Medications - Verify all high alert medication drips (heparin, PCA, etc.)   E  Equipment - Suction is set up for ALL patients (with norah)  - Red plugs utilized for all equipment (IV pumps, etc.)  - WOWs wiped down at end of shift.  - Room stocked with oxygen, suction, and other unit-specific supplies   A  Alarms - Bed alarm is set for fall risk patients  - Ensure chair alarm is in place and activated if patient is up in a chair   L  Lines - Check IV for any infiltration  - Edwards bag is empty if patient has a Edwards   - Tubing and IV bags are labeled   S  Safety   - Room is clean, patient is clean, and equipment is clean. - Hallways are clear from equipment besides carts. - Fall bracelet on for fall risk patients  - Ensure room is clear and free of clutter  - Suction is set up for ALL patients (with norah)  - Hallways are clear from equipment besides carts.    - Isolation precautions followed, supplies available outside room, sign posted     Rajan Schulz RN

## 2022-01-08 NOTE — PROGRESS NOTES
Problem: Pressure Injury - Risk of  Goal: *Prevention of pressure injury  Description: Document Lebron Scale and appropriate interventions in the flowsheet. Outcome: Progressing Towards Goal  Note: Pressure Injury Interventions:  Sensory Interventions: Assess changes in LOC,Assess need for specialty bed,Chair cushion,Keep linens dry and wrinkle-free,Maintain/enhance activity level,Minimize linen layers,Pressure redistribution bed/mattress (bed type),Turn and reposition approx. every two hours (pillows and wedges if needed)    Moisture Interventions: Absorbent underpads,Assess need for specialty bed,Check for incontinence Q2 hours and as needed,Maintain skin hydration (lotion/cream),Minimize layers    Activity Interventions: Assess need for specialty bed,Chair cushion,Increase time out of bed,Pressure redistribution bed/mattress(bed type)    Mobility Interventions: Assess need for specialty bed,Chair cushion,HOB 30 degrees or less,Pressure redistribution bed/mattress (bed type),Turn and reposition approx. every two hours(pillow and wedges)    Nutrition Interventions: Document food/fluid/supplement intake    Friction and Shear Interventions: HOB 30 degrees or less,Minimize layers,Transferring/repositioning devices                Problem: Falls - Risk of  Goal: *Absence of Falls  Description: Document Yadira Fall Risk and appropriate interventions in the flowsheet.   Outcome: Progressing Towards Goal  Note: Fall Risk Interventions:  Mobility Interventions: Bed/chair exit alarm,Communicate number of staff needed for ambulation/transfer,Patient to call before getting OOB,Utilize walker, cane, or other assistive device,Utilize gait belt for transfers/ambulation    Mentation Interventions: Adequate sleep, hydration, pain control,Bed/chair exit alarm,Door open when patient unattended,Familiar objects from home,Gait belt with transfers/ambulation,Increase mobility,Room close to nurse's station,Update white board    Medication Interventions: Bed/chair exit alarm,Teach patient to arise slowly,Utilize gait belt for transfers/ambulation,Patient to call before getting OOB    Elimination Interventions: Bed/chair exit alarm,Call light in reach,Patient to call for help with toileting needs    History of Falls Interventions: Bed/chair exit alarm,Door open when patient unattended,Room close to nurse's station,Utilize gait belt for transfer/ambulation         Problem: General Medical Care Plan  Goal: *Vital signs within specified parameters  Outcome: Progressing Towards Goal  Goal: *Labs within defined limits  Outcome: Progressing Towards Goal  Goal: *Absence of infection signs and symptoms  Outcome: Progressing Towards Goal  Goal: *Optimal pain control at patient's stated goal  Outcome: Progressing Towards Goal  Goal: *Skin integrity maintained  Outcome: Progressing Towards Goal  Goal: *Fluid volume balance  Outcome: Progressing Towards Goal  Goal: *Optimize nutritional status  Outcome: Progressing Towards Goal  Goal: *Anxiety reduced or absent  Outcome: Progressing Towards Goal  Goal: *Progressive mobility and function (eg: ADL's)  Outcome: Progressing Towards Goal     Problem: Inpatient Rehab (Adult)  Goal: *LTG: Avoids injury/falls 100% of time related to deficits  Outcome: Progressing Towards Goal  Goal: *LTG: Avoids infection 100% of time related to deficits  Outcome: Progressing Towards Goal  Goal: *LTG: Verbalize understanding of diagnosis and risk factors for recurring stroke  Outcome: Progressing Towards Goal  Goal: *LTG: Absence of DVT during hospitalization  Outcome: Progressing Towards Goal  Goal: *LTG: Maintains Skin Integrity With No Evidence of Pressure Injury 100% of Time  Outcome: Progressing Towards Goal  Goal: Interventions  Outcome: Progressing Towards Goal

## 2022-01-08 NOTE — PROGRESS NOTES
Problem: Mobility Impaired (Adult and Pediatric)  Goal: *Therapy Goal (Edit Goal, Insert Text)  Description: Physical Therapy Short Term Goals  Initiated 1/1/2022, updated 1/7/2022 and to be accomplished within 7 day(s) on 1/14/2022  1. Patient will move from supine to sit and sit to supine  in bed with minimal assistance/contact guard assist.  Goal met 1/7/2022  Updated goal: Patient will perform supine<->sit supervision level   2. Patient will transfer from bed to chair and chair to bed with minimal assistance using the least restrictive device. Goal met 1/7/2022  Updated goal: Patient will perform bed<->chair transfer with SBA using RW. 3.  Patient will perform sit to stand with minimal assistance. Goal met 1/7/2022  Updated goal: Patient will perform sit to stand with SBA>   4. Patient will ambulate with minimal assistance/contact guard assist for 50 feet with the least restrictive device before needing seated rest. Goal ongoing  5. Patient will participate in formal balance assessment Noreene Shorts Balance Scale/Postural Assessment Scale for Stroke). Goal ongoing    Physical Therapy Long Term Goals  Initiated 1/1/2022 and to be accomplished within 21 day(s) on 1/22/2022  1. Patient will move from supine to sit and sit to supine , scoot up and down, and roll side to side in bed with modified independence. 2.  Patient will transfer from bed to chair and chair to bed with modified independence using the least restrictive device. 3.  Patient will perform sit to stand with modified independence. 4.  Patient will ambulate with modified independence for 150 feet with the least restrictive device. 5.  Patient will negotiate one curb step with CG/SBA , using appropriate AD for safe community mobility.        Outcome: Progressing Towards Goal   PHYSICAL THERAPY TREATMENT    Patient: Duy Coyle (57 y.o. female)  Date: 1/8/2022  Diagnosis: CVA (cerebral vascular accident) [facility]) [I63.9] Acute ischemic stroke [facility])  Precautions: Fall  Chart, physical therapy assessment, plan of care and goals were reviewed. Time In:1040  Time Out:1130    Patient seen for: AM;Gait training;Patient education; Therapeutic exercise;Transfer training; Wheelchair mobility    Pain:  Patient could not quantify pain scale, however she grimaces with sit to stand transfers, and she holds her breath. She was educated on the need to breathe through her activities for improved success with transfer. Intervention: None    Patient identified with name and :Yes    SUBJECTIVE:      I think it's going to take time to get stronger. OBJECTIVE DATA SUMMARY:    Objective:     GROSS ASSESSMENT Daily Assessment     AROM: Generally decreased, functional  PROM: Generally decreased, functional  Strength: Generally decreased, functional  Coordination: Within functional limits  Tone: Normal  Sensation: Impaired      BED/MAT MOBILITY Daily Assessment     Rolling Right : 5 (Supervision)  Rolling Left : 5 (Supervision)  Supine to Sit : 4 (Contact guard assistance)      TRANSFERS Daily Assessment     Transfer Type: Other  Other: stand strep with RW  Transfer Assistance : 4 (Minimal assistance)  Sit to Stand Assistance: Minimal assistance  Car Transfers: Minimum assistance  Car Type: car simulator        GAIT Daily Assessment    Gait Description (WDL) Exceptions to WDL    Gait Abnormalities Decreased step clearance;Shuffling gait; Step to gait    Assistive device Walker, rolling;Gait belt    Ambulation assistance - level surface 4 (Minimal assistance)    Distance 42 ft      Ambulation assistance- uneven surface  (NT)    Comments Patient with increased arthritis of bilateral knees and knees knock during standing challenges. She is able to clear her bilateral feet into the RW, however she requires cues as she tends to keep it somewhat far away from her 2/2 poor forward flexed posture.  She is able to remain upright with cuing for approximately 1 minute during ambulation. STEPS/STAIRS Daily Assessment     Steps/Stairs ambulated  (1-4 inch step)    Assistance Required 3 (Moderate assistance)    Rail Use Left  (and SC right UE)    Comments  Patient has limited bilateral UE ROM and was able to manage to grab the left handrail adequately to achieve negotiation of one 4 inch step. Patient was able to back down off the step with minimal assistance. Curbs/Ramps          BALANCE Daily Assessment     Sitting - Static: Good (unsupported)  Sitting - Dynamic: Good (unsupported)  Standing - Static: Fair  Standing - Dynamic : Impaired        WHEELCHAIR MOBILITY Daily Assessment     Able to Propel (ft):  (60 ft)  Functional Level: 4  Curbs/Ramps Assist Required (FIM Score): 0 (Not tested)  Wheelchair Setup Assist Required : 5 (Supervision/setup) (pt not using footrests, uses BLEs for propulsion)      ASSESSMENT:  Patient is seen for deficits in strength, mobility, transfers, ambulation, safety and balance. Patient is agreeable to participation and did not agree to using the left eye patch for today's session as she reported that she did not feel it was benefiting her progress. She reports still feeling that her vision is off and that things are not clear, but the room does not spin and she is not feeling nauseated. Patient was challenged this session with transfers and safety training. Slow improvement is noted and patient's safety awareness required moderate safety cues with stand to sit transfers 2/2 moderate assistance required for controlled return to stationary surface. Continue to address transfers for improved functional independence. Patient was present during end of session and the patient asked that the therapist go over her progress with family. Therapist reported on progress and deficits and the need to continue for overall improvement to which the patient and family agreed.  Therapist asked patient and family members if they had any questions or concerns and they reported that they were satisfied with therapist's account of progression and that everyone appears to be on board with current plan of care. Progression toward goals:  []      Improving appropriately and progressing toward goals  [x]      Improving slowly and progressing toward goals  []      Not making progress toward goals and plan of care will be adjusted      PLAN:  Patient continues to benefit from skilled intervention to address the above impairments. Continue treatment per established plan of care. Discharge Recommendations:  Home health  Further Equipment Recommendations for Discharge:  RW, 18 inch WC      Estimated Discharge Date:1/21/2022    Activity Tolerance: Tolerates session fair  Please refer to the flowsheet for vital signs taken during this treatment.     After treatment:   [] Patient left in no apparent distress in bed  [x] Patient left in no apparent distress sitting up in chair  [] Patient left in no apparent distress in w/c mobilizing under own power  [] Patient left in no apparent distress dining area  [] Patient left in no apparent distress mobilizing under own power  [x] Call bell left within reach  [x] Nursing notified  [x] Caregiver present  [] Bed alarm activated   [] Chair alarm activated      Alondra Novoa  1/8/2022

## 2022-01-08 NOTE — ROUTINE PROCESS
[facility]    Bedside and Verbal shift change report given to Sheree Lake RN (oncoming nurse) by Suha Johnson RN (offgoing nurse). Report included the following information SBAR, Kardex, MAR and Recent Results. Situation:   Code Status: Full Code   Hospital Day: 8   Problem List:   Hospital Problems  Date Reviewed: 1/7/2022          Codes Class Noted POA    Essential hypertension (Chronic) ICD-10-CM: I10  ICD-9-CM: 401.9  Unknown Yes        Hypercholesterolemia with hypertriglyceridemia (Chronic) ICD-10-CM: E78.2  ICD-9-CM: 272.2  12/27/2021 Yes    Overview Signed 1/4/2022 12:02 AM by Armin Menard MD     Lipid profile (12/27/2021) showed , , HDL 48,              * (Principal) Acute ischemic stroke ([facility] [address] ICD-10-CM: I63.9  ICD-9-CM: 434.91  12/26/2021 Yes    Overview Signed 1/3/2022 11:59 PM by Armin Menard MD     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      Assessment:   Changes in Assessment throughout shift: No change to previous assessment     Patient has a central line: no Reasons if yes: na  Insertion date Last dressing date:na   Patient has Ovalle Cath: no Reasons if yes: na   Insertion date:na  Shift ovalle care completed: NO     Last Vitals:     Vitals:    01/06/22 2100 01/07/22 0821 01/07/22 1653 01/07/22 2015   BP: 108/69 125/82 128/81 115/70   Pulse: 82 83 76 81   Resp: 18 18 18 18   Temp: 98.5 °F (36.9 °C) 98.8 °F (37.1 °C) 98.6 °F (37 °C) 98.3 °F (36.8 °C)   SpO2: 96% 98% 97% 98%   Weight:       Height:            PAIN    Pain Assessment    Pain Intensity 1: 0 (01/08/22 2914) Pain Intensity 1: 2 (12/29/14 1105)    Pain Location 1: Knee Pain Location 1: Abdomen    Pain Intervention(s) 1: Medication (see MAR) Pain Intervention(s) 1: Medication (see MAR)  Patient Stated Pain Goal: 0 Patient Stated Pain Goal: 0  o Intervention effective: yes  o Other actions taken for pain: Medication (see MAR)     Skin Assessment  Skin color    Condition/Temperature    Integrity    Turgor    Weekly Pressure Ulcer Documentation  Pressure  Injury Documentation: No Pressure Injury Noted-Pressure Ulcer Prevention Initiated  Wound Prevention & Protection Methods  Orientation of wound Orientation of Wound Prevention: Posterior  Location of Prevention Location of Wound Prevention: Buttocks,Sacrum/Coccyx  Dressing Present Dressing Present : No  Dressing Status    Wound Offloading Wound Offloading (Prevention Methods): Bed, pressure redistribution/air,Bed, pressure reduction mattress,Chair cushion,Repositioning,Wheelchair     INTAKE/OUPUT  Date 01/07/22 0700 - 01/08/22 0659 01/08/22 0700 - 01/09/22 0659   Shift [id number] 5426-6539 24 Hour Total 8096-5759 9357-7319 24 Hour Total   INTAKE   P.O. 1200  1200        P. O. 1200  1200      Shift Total(mL/kg) 1200(19.1)  1200(19.1)      OUTPUT   Urine(mL/kg/hr)           Urine Occurrence(s) 5 x 3 x 8 x      Stool           Stool Occurrence(s) 1 x 0 x 1 x      Shift Total(mL/kg)         NET 1200  1200      Weight (kg) 62.7 62.7 62.7 62.7 62.7 62.7       Recommendations:  1. Patient needs and requests: Assistance to the [facility]. 2. Pending tests/procedures: None at this time. 3. Functional Level/Equipment: Partial (one person) / Bed (comment); Wheelchair    Fall Precautions:   Fall risk precautions were reinforced with the patient; she was instructed to call for help prior to getting up. The following fall risk precautions were continued: bed/ chair alarms, door signage, yellow bracelet and socks as well as update of the [employer] Boozer tool in the patient's room.    Yadira Score: 3    HEALS Safety Check    A safety check occurred in the patient's room between off going nurse and oncoming nurse listed above. The safety check included the below items  Area Items   H  High Alert Medications - Verify all high alert medication drips (heparin, PCA, etc.)   E  Equipment - Suction is set up for ALL patients (with norah)  - Red plugs utilized for all equipment (IV pumps, etc.)  - WOWs wiped down at end of shift.  - Room stocked with oxygen, suction, and other unit-specific supplies   A  Alarms - Bed alarm is set for fall risk patients  - Ensure chair alarm is in place and activated if patient is up in a chair   L  Lines - Check IV for any infiltration  - Edwards bag is empty if patient has a Edwards   - Tubing and IV bags are labeled   S  Safety   - Room is clean, patient is clean, and equipment is clean. - Hallways are clear from equipment besides carts. - Fall bracelet on for fall risk patients  - Ensure room is clear and free of clutter  - Suction is set up for ALL patients (with norah)  - Hallways are clear from equipment besides carts.    - Isolation precautions followed, supplies available outside room, sign posted     Tres Russell RN

## 2022-01-08 NOTE — ROUTINE PROCESS
0800 Pt. Awake sitting up in bed no change in assessment pt. Reported to be feeling fine. 0930 Pt. Sitting up in chair eating breakfast.  1200 no complaints. 1330 able to transfer from bed to chair with assist.  1500 no change in assessment. 1800 Pt. Sitting up in chair eating dinner.

## 2022-01-09 PROCEDURE — 74011250636 HC RX REV CODE- 250/636: Performed by: FAMILY MEDICINE

## 2022-01-09 PROCEDURE — 65310000000 HC RM PRIVATE REHAB

## 2022-01-09 PROCEDURE — 2709999900 HC NON-CHARGEABLE SUPPLY

## 2022-01-09 PROCEDURE — 74011250637 HC RX REV CODE- 250/637: Performed by: FAMILY MEDICINE

## 2022-01-09 PROCEDURE — 74011250637 HC RX REV CODE- 250/637: Performed by: INTERNAL MEDICINE

## 2022-01-09 RX ADMIN — MECLIZINE 12.5 MG: 12.5 TABLET ORAL at 09:02

## 2022-01-09 RX ADMIN — LOSARTAN POTASSIUM 50 MG: 50 TABLET, FILM COATED ORAL at 09:02

## 2022-01-09 RX ADMIN — CHOLECALCIFEROL TAB 25 MCG (1000 UNIT) 2000 UNITS: 25 TAB at 09:02

## 2022-01-09 RX ADMIN — DORZOLAMIDE HYDROCHLORIDE 1 DROP: 20 SOLUTION/ DROPS OPHTHALMIC at 21:11

## 2022-01-09 RX ADMIN — SERTRALINE 50 MG: 50 TABLET, FILM COATED ORAL at 09:02

## 2022-01-09 RX ADMIN — DORZOLAMIDE HYDROCHLORIDE 1 DROP: 20 SOLUTION/ DROPS OPHTHALMIC at 09:06

## 2022-01-09 RX ADMIN — CLOPIDOGREL 75 MG: 75 TABLET, FILM COATED ORAL at 18:03

## 2022-01-09 RX ADMIN — MECLIZINE 12.5 MG: 12.5 TABLET ORAL at 18:03

## 2022-01-09 RX ADMIN — DOCUSATE SODIUM 100 MG: 100 CAPSULE, LIQUID FILLED ORAL at 09:02

## 2022-01-09 RX ADMIN — DOCUSATE SODIUM 100 MG: 100 CAPSULE, LIQUID FILLED ORAL at 18:03

## 2022-01-09 RX ADMIN — OXYBUTYNIN CHLORIDE 10 MG: 5 TABLET, EXTENDED RELEASE ORAL at 21:11

## 2022-01-09 RX ADMIN — LATANOPROST 1 DROP: 50 SOLUTION OPHTHALMIC at 21:17

## 2022-01-09 RX ADMIN — MONTELUKAST 10 MG: 10 TABLET, FILM COATED ORAL at 09:02

## 2022-01-09 RX ADMIN — DORZOLAMIDE HYDROCHLORIDE 1 DROP: 20 SOLUTION/ DROPS OPHTHALMIC at 17:04

## 2022-01-09 RX ADMIN — ASPIRIN 325 MG ORAL TABLET 325 MG: 325 PILL ORAL at 09:02

## 2022-01-09 RX ADMIN — ATORVASTATIN CALCIUM 80 MG: 40 TABLET, FILM COATED ORAL at 21:11

## 2022-01-09 NOTE — PROGRESS NOTES
Progress Note    Patient: Duy Coyle MRN: [id number]  CSN: [id number]    YOB: 1935  Age: 82 y.o. 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 Cardiovascular: No chest pain or pressure. Pulmonary: No shortness of breath, cough or wheeze. Gastrointestinal: No abdominal pain, nausea, vomiting or diarrhea. Genitourinary: No dysuria. Musculoskeletal: No joint or muscle pain, no back pain, no recent trauma. Neurologic: No headache, generalized weakness    Objective:     Physical Exam:  Visit Vitals  BP (!) 167/94 (BP 1 Location: Right upper arm, BP Patient Position: Supine)   Pulse 75   Temp 98.7 °F (37.1 °C)   Resp 16   Ht 4' 11\" (1.499 m)   Wt 62.7 kg (138 lb 3.2 oz)   SpO2 92%   BMI 27.91 kg/m²        General:         Alert, , no acute distress    HEENT: NC, anicteric sclerae. Lungs: CTA Bilaterally. No Wheezing/Rhonchi/Rales. Heart:  Regular  rhythm,  No murmur, No Rubs, No Gallops  Abdomen: Soft, Non distended, Non tender. Extremities: No lower limb edema   Psych:   Not anxious or agitated. Neurologic:  CN 2-12 grossly intact, Alert and oriented X 3.  ,   No gross sensory deficit. Motor strength is 4-/5 on the RUE (except right shoulder), 3+/5 on the LUE (except left shoulder), 4+/5 on the RLE, 4/5 on the LLE. Intake and Output:  Current Shift:  No intake/output data recorded. Last three shifts:  01/07 1901 - 01/09 0700  In: 1560 [P.O.:1560]  Out: 0     Labs: Results:       Chemistry No results for input(s): GLU, NA, K, CL, CO2, BUN, CREA, CA, AGAP, BUCR, TBIL, AP, TP, ALB, GLOB, AGRAT in the last 72 hours.     No lab exists for component: GPT   CBC w/Diff No results for input(s): WBC, RBC, HGB, HCT, PLT, GRANS, LYMPH, EOS, HGBEXT, HCTEXT, PLTEXT, HGBEXT, HCTEXT, PLTEXT in the last 72 hours. Cardiac Enzymes No results for input(s): CPK, CKND1, GUSTABO in the last 72 hours. No lab exists for component: CKRMB, TROIP   Coagulation No results for input(s): PTP, INR, APTT, INREXT, INREXT in the last 72 hours. Lipid Panel Lab Results   Component Value Date/Time    Cholesterol, total 220 (H) 12/27/2021 01:25 AM    HDL Cholesterol 48 12/27/2021 01:25 AM    LDL, calculated 136 (H) 12/27/2021 01:25 AM    Triglyceride 182 (H) 12/27/2021 01:25 AM    CHOL/HDL Ratio 4.6 (H) 12/27/2021 01:25 AM      BNP No results for input(s): BNPP in the last 72 hours. Liver Enzymes No results for input(s): TP, ALB, TBIL, AP in the last 72 hours.     No lab exists for component: SGOT, GPT, DBIL   Thyroid Studies No results found for: T4, T3U, TSH, TSHEXT, TSHEXT       Procedures/imaging: see electronic medical records for all procedures/Xrays and details which were not copied into this note but were reviewed prior to creation of Plan    Medications:   Current Facility-Administered Medications   Medication Dose Route Frequency    losartan (COZAAR) tablet 50 mg  50 mg Oral DAILY    cholecalciferol (VITAMIN D3) (1000 Units /25 mcg) tablet 2,000 Units  2,000 Units Oral DAILY    aspirin tablet 325 mg  325 mg Oral DAILY WITH BREAKFAST    atorvastatin (LIPITOR) tablet 80 mg  80 mg Oral QHS    clopidogreL (PLAVIX) tablet 75 mg  75 mg Oral DAILY WITH DINNER    meclizine (ANTIVERT) tablet 12.5 mg  12.5 mg Oral BID    hydrALAZINE (APRESOLINE) tablet 25 mg  25 mg Oral TID PRN    ondansetron (ZOFRAN ODT) tablet 4 mg  4 mg Oral Q8H PRN    docusate sodium (COLACE) capsule 100 mg  100 mg Oral BID    magnesium hydroxide (MILK OF MAGNESIA) 400 mg/5 mL oral suspension 30 mL  30 mL Oral DAILY PRN    bisacodyL (DULCOLAX) tablet 10 mg  10 mg Oral Q48H PRN    dorzolamide (TRUSOPT) 2 % ophthalmic solution 1 Drop  1 Drop Both Eyes TID    latanoprost (XALATAN) 0.005 % ophthalmic solution 1 Drop  1 Drop Both Eyes QHS    loperamide (IMODIUM) capsule 2 mg  2 mg Oral PRN    montelukast (SINGULAIR) tablet 10 mg  10 mg Oral DAILY    oxybutynin chloride XL (DITROPAN XL) tablet 10 mg  10 mg Oral QHS    sertraline (ZOLOFT) tablet 50 mg  50 mg Oral DAILY    acetaminophen (TYLENOL) tablet 650 mg  650 mg Oral Q4H PRN       Assessment/Plan     Principal Problem:    Acute ischemic stroke ([facility] (12/26/2021)      Overview: Acute Ischemic Stroke (acute infarct in the right corona radiata) with       residual left hemiparesis    Active Problems:    Impaired mobility and ADLs (12/26/2021)      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 ([facility] (12/26/2021)      Essential hypertension ()      Hypercholesterolemia with hypertriglyceridemia (12/27/2021)      Overview: Lipid profile (12/27/2021) showed , , HDL 48,       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

## 2022-01-09 NOTE — ROUTINE PROCESS
0800 Pt. Awake sitting up in bed no change in assessment pt. Reported to be feeling fine. 0930 pt. Sitting up in chair eating breakfast.  1200 no complaints. 1330 able to transfer from bed to chair with assist.  1500 no change in assessment. 1800 Pt. Sitting up in bed eating dinner.

## 2022-01-09 NOTE — ROUTINE PROCESS
SHIFT CHANGE NOTE FOR [facility]    Bedside and Verbal shift change report given to Janel Greene RN (oncoming nurse) by Valarie Watson RN (offgoing nurse). Report included the following information SBAR, Kardex, MAR and Recent Results. Situation:   Code Status: Full Code   Hospital Day: 9   Problem List:   Hospital Problems  Date Reviewed: 1/7/2022          Codes Class Noted POA    Essential hypertension (Chronic) ICD-10-CM: I10  ICD-9-CM: 401.9  Unknown Yes        Hypercholesterolemia with hypertriglyceridemia (Chronic) ICD-10-CM: E78.2  ICD-9-CM: 272.2  12/27/2021 Yes    Overview Signed 1/4/2022 12:02 AM by Justin De Jesus MD     Lipid profile (12/27/2021) showed , , HDL 48,              * (Principal) Acute ischemic stroke ([facility] ICD-10-CM: I63.9  ICD-9-CM: 434.91  12/26/2021 Yes    Overview Signed 1/3/2022 11:59 PM by Justin De Jesus MD     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      Assessment:   Changes in Assessment throughout shift: No change to previous assessment     Patient has a central line: no Reasons if yes: na  Insertion date:na Last dressing date:na   Patient has Ovalle Cath: no Reasons if yes: na   Insertion date:na  Shift ovalle care completed: NO     Last Vitals:     Vitals:    01/08/22 1534 01/08/22 2046 01/09/22 0746 01/09/22 1606   BP: 116/77 (!) 148/80 (!) 167/94 128/71   Pulse: 78 77 75 79   Resp: 18 16 16 18   Temp: 98.8 °F (37.1 °C) 97 °F (36.1 °C) 98.7 °F (37.1 °C) 98.6 °F (37 °C)   SpO2: 96% 93% 92% 93%   Weight:       Height:            PAIN    Pain Assessment    Pain Intensity 1: 0 (01/09/22 1606) Pain Intensity 1: 2 (12/29/14 1105)    Pain Location 1: Knee Pain Location 1: Abdomen    Pain Intervention(s) 1: Medication (see MAR) Pain Intervention(s) 1: Medication (see MAR)  Patient Stated Pain Goal: 0 Patient Stated Pain Goal: 0  o Intervention effective: yes  o Other actions taken for pain:       Skin Assessment  Skin color    Condition/Temperature    Integrity    Turgor    Weekly Pressure Ulcer Documentation  Pressure  Injury Documentation: No Pressure Injury Noted-Pressure Ulcer Prevention Initiated  Wound Prevention & Protection Methods  Orientation of wound Orientation of Wound Prevention: Posterior  Location of Prevention Location of Wound Prevention: Sacrum/Coccyx  Dressing Present Dressing Present : No  Dressing Status    Wound Offloading Wound Offloading (Prevention Methods): Bed, pressure reduction mattress     INTAKE/OUPUT  Date 01/08/22 0700 - 01/09/22 0659 01/09/22 0700 - 01/10/22 0659   Shift 9276-9877 1919-0405 24 Hour Total 5842-3855 4160-0297 24 Hour Total   INTAKE   P.O. 8283 884 7199 840  840     P. O. 0059 340 3034 840  840   Shift Total(mL/kg) 1200(19.1) 360(5.7) 1560(24.9) 840(13.4)  840(13.4)   OUTPUT   Urine(mL/kg/hr)  0(0) 0(0)        Urine Voided  0 0        Urine Occurrence(s) 5 x 5 x 10 x 4 x  4 x   Emesis/NG output           Emesis Occurrence(s)    0 x  0 x   Stool           Stool Occurrence(s) 0 x 0 x 0 x 2 x  2 x   Shift Total(mL/kg)  0(0) 0(0)      NET 9834 011 4748 840  840   Weight (kg) 62.7 62.7 62.7 62.7 62.7 62.7       Recommendations:  1. Patient needs and requests: na    2. Pending tests/procedures: na     3. Functional Level/Equipment:   /      Fall Precautions:   Fall risk precautions were reinforced with the patient; she was instructed to call for help prior to getting up. The following fall risk precautions were continued: bed/ chair alarms, door signage, yellow bracelet and socks as well as update of the Lettie Duverney tool in the patient's room.    Lettie Duverney Score: 3    HEALS Safety Check    A safety check occurred in the patient's room between off going nurse and oncoming nurse listed above. The safety check included the below items  Area Items   H  High Alert Medications - Verify all high alert medication drips (heparin, PCA, etc.)   E  Equipment - Suction is set up for ALL patients (with norah)  - Red plugs utilized for all equipment (IV pumps, etc.)  - WOWs wiped down at end of shift.  - Room stocked with oxygen, suction, and other unit-specific supplies   A  Alarms - Bed alarm is set for fall risk patients  - Ensure chair alarm is in place and activated if patient is up in a chair   L  Lines - Check IV for any infiltration  - Edwards bag is empty if patient has a Edwards   - Tubing and IV bags are labeled   S  Safety   - Room is clean, patient is clean, and equipment is clean. - Hallways are clear from equipment besides carts. - Fall bracelet on for fall risk patients  - Ensure room is clear and free of clutter  - Suction is set up for ALL patients (with norah)  - Hallways are clear from equipment besides carts.    - Isolation precautions followed, supplies available outside room, sign posted     Beto Keller RN

## 2022-01-09 NOTE — PROGRESS NOTES
[facility]    Bedside and Verbal shift change report given to Jennifer Baker RN (oncoming nurse) by Queta Drew RN (offgoing nurse). Report included the following information SBAR, Kardex, MAR and Recent Results. Situation:   Code Status: Full Code   Hospital Day: 9   Problem List:   Hospital Problems  Date Reviewed: 1/7/2022          Codes Class Noted POA    Essential hypertension (Chronic) ICD-10-CM: I10  ICD-9-CM: 401.9  Unknown Yes        Hypercholesterolemia with hypertriglyceridemia (Chronic) ICD-10-CM: E78.2  ICD-9-CM: 272.2  12/27/2021 Yes    Overview Signed 1/4/2022 12:02 AM by Eduarda Law MD     Lipid profile (12/27/2021) showed , , HDL 48,              * (Principal) [facility] ([facility] ICD-10-CM: I63.9  ICD-9-CM: 434.91  12/26/2021 Yes    Overview Signed 1/3/2022 11:59 PM by Eduarda Law MD     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 Assessment:   Changes in Assessment throughout shift: No change to previous assessment     Patient has a central line: no Reasons if yes: Insertion date: Last dressing date:   Patient has Ovalle Cath:  Reasons if yes:     Insertion date:  Shift ovalle care completed:      Last Vitals:     Vitals:    01/07/22 2015 01/08/22 0739 01/08/22 1534 01/08/22 2046   BP: 115/70 100/73 116/77 (!) 148/80   Pulse: 81 72 78 77   Resp: 18 18 18 16   Temp: 98.3 °F (36.8 °C) 98.2 °F (36.8 °C) 98.8 °F (37.1 °C) 97 °F (36.1 °C)   SpO2: 98% 94% 96% 93%   Weight:       Height:            PAIN    Pain Assessment    Pain Intensity 1: 0 (01/09/22 0636) Pain Intensity 1: 2 (12/29/14 1105)    Pain Location 1: Knee Pain Location 1: Abdomen    Pain Intervention(s) 1: Medication (see MAR) Pain Intervention(s) 1: Medication (see MAR)  Patient Stated Pain Goal: 0 Patient Stated Pain Goal: 0  o Intervention effective: yes  o Other actions taken for pain:       Skin Assessment  Skin color    Condition/Temperature    Integrity    Turgor    Weekly Pressure Ulcer Documentation  Pressure  Injury Documentation: No Pressure Injury Noted-Pressure Ulcer Prevention Initiated  Wound Prevention & Protection Methods  Orientation of wound Orientation of Wound Prevention: Posterior  Location of Prevention Location of Wound Prevention: Sacrum/Coccyx  Dressing Present Dressing Present : No  Dressing Status    Wound Offloading Wound Offloading (Prevention Methods): Bed, pressure reduction mattress     INTAKE/OUPUT  Date 01/08/22 0700 - 01/09/22 0659 01/09/22 0700 - 01/10/22 0659   Shift 3594-6399 8343-3571 24 Hour Total 8415-6907 2723-5897 24 Hour Total   INTAKE   P.O. 4273 647 9108        P.O. 9250 954 5726      Shift Total(mL/kg) 1200(19.1) 360(5.7) 1560(24.9)      OUTPUT   Urine(mL/kg/hr)  0 0        Urine Voided  0 0        Urine Occurrence(s) 5 x 5 x 10 x      Stool           Stool Occurrence(s) 0 x 0 x 0 x      Shift Total(mL/kg)  0(0) 0(0)      NET 8980 384 1978      Weight (kg) 62.7 62.7 62.7 62.7 62.7 62.7       Recommendations:  1. Patient needs and requests: none    2. Pending tests/procedures: none     3. Functional Level/Equipment: Partial (one person) / Robson Spore; Wheelchair;Bedside Commode    Fall Precautions:   Fall risk precautions were reinforced with the patient; she was instructed to call for help prior to getting up. The following fall risk precautions were continued: bed/ chair alarms, door signage, yellow bracelet and socks as well as update of the Rolm Drew tool in the patient's room.    Yadira Score: 3    HEALS Safety Check    A safety check occurred in the patient's room between off going nurse and oncoming nurse listed above. The safety check included the below items  Area Items   H  High Alert Medications - Verify all high alert medication drips (heparin, PCA, etc.)   E  Equipment - Suction is set up for ALL patients (with norah)  - Red plugs utilized for all equipment (IV pumps, etc.)  - WOWs wiped down at end of shift.  - Room stocked with oxygen, suction, and other unit-specific supplies   A  Alarms - Bed alarm is set for fall risk patients  - Ensure chair alarm is in place and activated if patient is up in a chair   L  Lines - Check IV for any infiltration  - Edwards bag is empty if patient has a Edwards   - Tubing and IV bags are labeled   S  Safety   - Room is clean, patient is clean, and equipment is clean. - Hallways are clear from equipment besides carts. - Fall bracelet on for fall risk patients  - Ensure room is clear and free of clutter  - Suction is set up for ALL patients (with onrah)  - Hallways are clear from equipment besides carts.    - Isolation precautions followed, supplies available outside room, sign posted     Lylia Castleman, RN

## 2022-01-10 LAB
ANION GAP SERPL CALC-SCNC: 5 MMOL/L (ref 3–18)
BUN SERPL-MCNC: 15 MG/DL (ref 7–18)
BUN/CREAT SERPL: 24 (ref 12–20)
CALCIUM SERPL-MCNC: 9 MG/DL (ref 8.5–10.1)
CHLORIDE SERPL-SCNC: 106 MMOL/L (ref 100–111)
CO2 SERPL-SCNC: 26 MMOL/L (ref 21–32)
CREAT SERPL-MCNC: 0.62 MG/DL (ref 0.6–1.3)
GLUCOSE SERPL-MCNC: 85 MG/DL (ref 74–99)
HCT VFR BLD AUTO: 35.4 % (ref 35–45)
HGB BLD-MCNC: 11.6 G/DL (ref 12–16)
POTASSIUM SERPL-SCNC: 3.8 MMOL/L (ref 3.5–5.5)
SODIUM SERPL-SCNC: 137 MMOL/L (ref 136–145)

## 2022-01-10 PROCEDURE — 65310000000 HC RM PRIVATE REHAB

## 2022-01-10 PROCEDURE — 97110 THERAPEUTIC EXERCISES: CPT

## 2022-01-10 PROCEDURE — 2709999900 HC NON-CHARGEABLE SUPPLY

## 2022-01-10 PROCEDURE — 99232 SBSQ HOSP IP/OBS MODERATE 35: CPT | Performed by: INTERNAL MEDICINE

## 2022-01-10 PROCEDURE — 97112 NEUROMUSCULAR REEDUCATION: CPT

## 2022-01-10 PROCEDURE — 74011250637 HC RX REV CODE- 250/637: Performed by: FAMILY MEDICINE

## 2022-01-10 PROCEDURE — 97530 THERAPEUTIC ACTIVITIES: CPT

## 2022-01-10 PROCEDURE — 74011250637 HC RX REV CODE- 250/637: Performed by: INTERNAL MEDICINE

## 2022-01-10 PROCEDURE — 80048 BASIC METABOLIC PNL TOTAL CA: CPT

## 2022-01-10 PROCEDURE — 97116 GAIT TRAINING THERAPY: CPT

## 2022-01-10 PROCEDURE — 74011250636 HC RX REV CODE- 250/636: Performed by: FAMILY MEDICINE

## 2022-01-10 PROCEDURE — 97535 SELF CARE MNGMENT TRAINING: CPT

## 2022-01-10 PROCEDURE — 36415 COLL VENOUS BLD VENIPUNCTURE: CPT

## 2022-01-10 PROCEDURE — 85018 HEMOGLOBIN: CPT

## 2022-01-10 RX ADMIN — DORZOLAMIDE HYDROCHLORIDE 1 DROP: 20 SOLUTION/ DROPS OPHTHALMIC at 21:05

## 2022-01-10 RX ADMIN — CLOPIDOGREL 75 MG: 75 TABLET, FILM COATED ORAL at 17:12

## 2022-01-10 RX ADMIN — LATANOPROST 1 DROP: 50 SOLUTION OPHTHALMIC at 21:05

## 2022-01-10 RX ADMIN — MECLIZINE 12.5 MG: 12.5 TABLET ORAL at 08:51

## 2022-01-10 RX ADMIN — ASPIRIN 325 MG ORAL TABLET 325 MG: 325 PILL ORAL at 08:51

## 2022-01-10 RX ADMIN — CHOLECALCIFEROL TAB 25 MCG (1000 UNIT) 2000 UNITS: 25 TAB at 08:51

## 2022-01-10 RX ADMIN — DORZOLAMIDE HYDROCHLORIDE 1 DROP: 20 SOLUTION/ DROPS OPHTHALMIC at 09:04

## 2022-01-10 RX ADMIN — DORZOLAMIDE HYDROCHLORIDE 1 DROP: 20 SOLUTION/ DROPS OPHTHALMIC at 17:12

## 2022-01-10 RX ADMIN — SERTRALINE 50 MG: 50 TABLET, FILM COATED ORAL at 08:51

## 2022-01-10 RX ADMIN — MONTELUKAST 10 MG: 10 TABLET, FILM COATED ORAL at 08:51

## 2022-01-10 RX ADMIN — ATORVASTATIN CALCIUM 80 MG: 40 TABLET, FILM COATED ORAL at 21:05

## 2022-01-10 RX ADMIN — MECLIZINE 12.5 MG: 12.5 TABLET ORAL at 17:12

## 2022-01-10 RX ADMIN — DOCUSATE SODIUM 100 MG: 100 CAPSULE, LIQUID FILLED ORAL at 08:51

## 2022-01-10 RX ADMIN — LOSARTAN POTASSIUM 50 MG: 50 TABLET, FILM COATED ORAL at 08:50

## 2022-01-10 RX ADMIN — OXYBUTYNIN CHLORIDE 10 MG: 5 TABLET, EXTENDED RELEASE ORAL at 21:05

## 2022-01-10 RX ADMIN — DOCUSATE SODIUM 100 MG: 100 CAPSULE, LIQUID FILLED ORAL at 17:12

## 2022-01-10 NOTE — PROGRESS NOTES
[x] Psychology  [] Social Work [] Recreational Therapy    INTERVENTION  UNITS/TIME OF SERVICE   Assessment    Supportive Counseling  January 7, 2022   Orientation    Discharge Planning    Resource Linkage              Progress/Current Status    Patient seen for individual support  and follow up this date on ARU and found sitting comfortably in bedroom. Patient is observed immediately willing to engage in discussion but there continue to be delays in her response. She is offering no complaints about her integration into treatment milieu on ARU and insists that she is trying her best in scheduled therapies. She emphasized that her desire to return to her residence, where she apparently has felt well accommodated, is incentive for her to improve to the best of her ability. While she is keenly aware of various post stroke sequelae, she seems to be trying to focus on goals for herself in recovery. She hopes to regain function satisfactorily in order to resume, as before. She continues to feel well supported by her family, as well, and expects that they will assist her as necessary. Patient strongly encouraged to maintain active dialogue with treating therapists to best understand all aspects of her recovery, to maintain focus on realistic goals for herself, as well as to explore any situational stress and/or distress that may surface for her, from time to time.      Daniela Camp, THE [facility] 1/10/2022 8:33 AM

## 2022-01-10 NOTE — PROGRESS NOTES
Problem: Pressure Injury - Risk of  Goal: *Prevention of pressure injury  Description: Document Lebron Scale and appropriate interventions in the flowsheet. Outcome: Progressing Towards Goal  Note: Pressure Injury Interventions:  Sensory Interventions: Assess changes in LOC,Check visual cues for pain,Keep linens dry and wrinkle-free,Minimize linen layers    Moisture Interventions: Absorbent underpads,Minimize layers,Moisture barrier    Activity Interventions: Increase time out of bed,Pressure redistribution bed/mattress(bed type)    Mobility Interventions: HOB 30 degrees or less,Pressure redistribution bed/mattress (bed type)    Nutrition Interventions: Document food/fluid/supplement intake    Friction and Shear Interventions: HOB 30 degrees or less,Lift sheet,Minimize layers                Problem: Patient Education: Go to Patient Education Activity  Goal: Patient/Family Education  Outcome: Progressing Towards Goal     Problem: Falls - Risk of  Goal: *Absence of Falls  Description: Document Yadira Fall Risk and appropriate interventions in the flowsheet.   Outcome: Progressing Towards Goal  Note: Fall Risk Interventions:  Mobility Interventions: Bed/chair exit alarm,Patient to call before getting OOB,Utilize walker, cane, or other assistive device    Mentation Interventions: Bed/chair exit alarm,Door open when patient unattended,More frequent rounding,Room close to nurse's station    Medication Interventions: Bed/chair exit alarm,Patient to call before getting OOB,Teach patient to arise slowly    Elimination Interventions: Bed/chair exit alarm,Call light in reach,Patient to call for help with toileting needs,Stay With Me (per policy)    History of Falls Interventions: Bed/chair exit alarm,Door open when patient unattended,Room close to nurse's station         Problem: Patient Education: Go to Patient Education Activity  Goal: Patient/Family Education  Outcome: Progressing Towards Goal     Problem: General Medical Care Plan  Goal: *Vital signs within specified parameters  Outcome: Progressing Towards Goal  Goal: *Labs within defined limits  Outcome: Progressing Towards Goal  Goal: *Absence of infection signs and symptoms  Outcome: Progressing Towards Goal  Goal: *Optimal pain control at patient's stated goal  Outcome: Progressing Towards Goal  Goal: *Skin integrity maintained  Outcome: Progressing Towards Goal  Goal: *Fluid volume balance  Outcome: Progressing Towards Goal  Goal: *Optimize nutritional status  Outcome: Progressing Towards Goal  Goal: *Anxiety reduced or absent  Outcome: Progressing Towards Goal  Goal: *Progressive mobility and function (eg: ADL's)  Outcome: Progressing Towards Goal     Problem: Falls - Risk of  Goal: *Absence of Falls  Description: Document Yadira Fall Risk and appropriate interventions in the flowsheet.   Outcome: Progressing Towards Goal  Note: Fall Risk Interventions:  Mobility Interventions: Bed/chair exit alarm,Patient to call before getting OOB,Utilize walker, cane, or other assistive device    Mentation Interventions: Bed/chair exit alarm,Door open when patient unattended,More frequent rounding,Room close to nurse's station    Medication Interventions: Bed/chair exit alarm,Patient to call before getting OOB,Teach patient to arise slowly    Elimination Interventions: Bed/chair exit alarm,Call light in reach,Patient to call for help with toileting needs,Stay With Me (per policy)    History of Falls Interventions: Bed/chair exit alarm,Door open when patient unattended,Room close to nurse's station         Problem: Patient Education: Go to Patient Education Activity  Goal: Patient/Family Education  Outcome: Progressing Towards Goal

## 2022-01-10 NOTE — INTERDISCIPLINARY ROUNDS
[address]  [address] [place] [address], [address]     INPATIENT REHABILITATION  DISCHARGE RECOMMENDATION SHEET    Date: 1/10/2022     Name: Phan Andrade Age / Sex: 82 y.o. / female   CSN: [id number] MRN: [id number]   [facility] Date: 12/31/2021 Discharge Date: 1/21/2022        [facility] WALKING AIDS FOLLOW-UP SERVICES      Height  []  Straight cane  [x] DMV Handicap Placard    []  #14 ½ angel height  []  Forearm crutches OUTPATIENT    [x]  Angel height  []  Axillary crutches  []  PT    []  Standard height  []  LBQC  []  OT    []  Reclining high back  []  SBQC  []  ST       Weight  HOME HEALTH    []  Standard weight WALKERS  [x]  PT    [x]  Lightweight  []  Standard height  [x]  OT    []  High-strength lightweight  [x]  Small adult  []  ST    []  Heavy Duty   []  Tall walker  [x]  Nursing    [x]  Patient is unable to self-propel a standard weight wheelchair  [x]  Rolling walker with 5 single fixed wheels (small adult size)  []  Wound care  Location of wound:  Specify needs:      []  Anticoagulation management       Width  []  Bariatric walker  []  Diabetes management    []  Narrow (16)   []  Insulin management    [x]  Standard (18) ACCESSORIES  []  No insulin management    []  Wide (20)  []  Rear sure glide brakes  [x]  BP management    []  Other  []  10 rear wheel brake  []  CHF Telehealth       Arms  []  Tall leg extensions  []  Post-CABG care/monitoring    []  Standard  []  Other:  []  Colostomy care    [x]  Desk/Tray   []  Tube feeding    []  Removable BATHROOM EQUIPMENT  []  PICC line care      Foot/Leg Rests  [x]  Bedside commode  []  Edwards catheter care    [x]  Removable  []  Extra wide bedside commode  []  Other:     []  Elevating  []  3:1 commode WITH drop arms  []  Medical Social Worker      Other  []  Tub transfer bench  []  Aide    [x]  Brake extensions  []  Shower chair     []  Padded gloves       [x]  Antitippers           CUSHIONS OTHER     [x]  Foam cushion []  Seat belt     []  Gel cushion  []  Gait belt     []  Theresa Peg II  []  Transfer board - Size:     []  Roho  []  Oxygen     []  Other  []  CPM      []  [address]  []  Ramp     []  Hospital bed  []  Hip kit     []  Special mattress  []  Reacher     []  Trapeze bar       Preferred Local Pharmacy (not mail-order): Meds to [facility]    Physical Therapy Vanessa Gutierrez, PT, DPT   Occupational Therapy    Speech-Language Therapy    Social Work Yariel Lockwood, MSW   Nursing Karthik Broderick RN

## 2022-01-10 NOTE — PROGRESS NOTES
Problem: Self Care Deficits Care Plan (Adult)  Goal: *Therapy Goal (Edit Goal, Insert Text)  Description: Occupational Therapy Goals   Long Term Goals  Initiated 22 and to be accomplished within 3 week(s), by 2022  1. Pt will perform self-feeding with independence. 2. Pt will perform grooming with independence. 3. Pt will perform UB bathing with supervision. 4. Pt will perform LB bathing with supervision. 5. Pt will perform tub/shower transfer with supervision. 6. Pt will perform UB dressing with Axel using compensatory strategies. 7. Pt will perform LB dressing with Axel and AE. 8. Pt will perform toileting task with Axel and AE. 9. Pt will perform toilet transfer with Axel and AE. Short Term Goals   Initiated 22 (reassessed on 2022) and to be accomplished within 7 day(s), to be reassessed by 2022  1. Pt will perform self-feeding with Axel. 2. Pt will perform grooming with Axel. 3. Pt will perform UB bathing with SBA. (Goal met 2022)      Upgraded goal: Pt will perform UB bathing with set-up/ supv. 4. Pt will perform LB bathing with Fidel and UE support. (Goal met 2022)      Upgraded goal: Pt will perform LB bathing with CGA using AE and/ or compensatory strategies as needed. 5. Pt will perform tub/shower transfer with Fidel and AE. 6. Pt will perform UB dressing with SBA using compensatory strategies. (Goal met 2022)      Upgraded goal: Pt will perform UB dressing with supv using compensatory strategies as needed. 7. Pt will perform LB dressing with Fidel and AE. 8. Pt will perform toileting task with Fidel and AE. (Goal met 2022; continue goal for consistency)  9. Pt will perform toilet transfer with Fidel and AE. Outcome: Progressing Towards Goal  Goal: Interventions  Outcome: Progressing Towards Goal   Occupational Therapy TREATMENT    Patient: Evangelina Pump   82 y.o.     Patient identified with name and : yes    Date: 1/10/2022    First Tx Session  Time In: 0800  Time Out[de-identified] 4420    Second Tx Session  Time In: 1756  Time Out[de-identified] 1200    Diagnosis: CVA (cerebral vascular accident) [facility]) [I63.9]   Precautions: Fall  Chart, occupational therapy assessment, plan of care, and goals were reviewed. Pain:  Pt reports 0/10 pain or discomfort prior to treatment. Pt reports 0/10 pain or discomfort post treatment. Intervention Provided:       SUBJECTIVE:   Patient stated I was peeing all night. \", \"My left eye is off again.   Pt reported being dizzy upon supine to sit transfer    OBJECTIVE DATA SUMMARY:     GROOMING Daily Assessment    Grooming  Grooming Assistance : 5 (Supervision)  Comments: Pt performed grooming with setup at bed level     UPPER BODY BATHING Daily Assessment    Upper Body Bathing  Bathing Assistance, Upper: 5 (Supervision)  Upper Body : Compensatory technique training  Position Performed: Seated edge of bed  Comments: Pt performed UB bathing via sponge bath at EOB with supervision following setup at [address] Daily Assessment    Lower [address], Lower : 4 (Minimal assistance)  Position Performed: Seated edge of bed  Adaptive Equipment: Walker  Comments: Pt performed LB bathing at EOB with FWW in standing to wash buttocks and eric area with Fidel to wash buttocks thoroughly and in seated position to wash lower BLE feet and legs     TOILETING Daily Assessment    Toileting  Toileting Assistance (FIM Score): 5 (Supervision);4 (Minimal assistance) (CGA)  Cues: Verbal cues provided (CGA)  Adaptive Equipment: Elevated seat;Walker  X2 reps     UPPER BODY DRESSING Daily Assessment    Upper Body Dressing   Dressing Assistance : 4 (Minimal assistance)  Comments: Pt performed UB dressing at EOB with Fidel to fasten bra and donned using compensatory strategies to turn      LOWER BODY DRESSING Daily Assessment    Lower Body Dressing   Dressing Assistance : 4 (Minimal assistance)  Leg Crossed Method Used: No  Position Performed: Seated edge of bed;Standing  Adaptive Equipment Used: Walker  Comments: Pt performed LB dressing at EOB in seatred position to thread BLE feet through underwear and pants and pulled up over BLE hips at FWW alternating hands to pull up and stabilize self with FWW     MOBILITY/TRANSFERS Daily Assessment    Functional Transfers  Toilet Transfer : Elevated seat;Stand pivot transfer with walker  Amount of Assistance Required: 4 (Minimal assistance)       ASSESSMENT:  Pt performed all self cares with improved performance and safety using compensatory strategies (secondary to arthritic changes and prior rotator cuff injuries) and RB's prn at Fidel level using AE. Pt performed toilet transfer and toileting with FWW and Fidel using BSC. Pt requires minimal VC's for safety with self cares. Progression toward goals:  [x]          Improving appropriately and progressing toward goals  []          Improving slowly and progressing toward goals  []          Not making progress toward goals and plan of care will be adjusted     PLAN:  Patient continues to benefit from skilled intervention to address the above impairments. Continue treatment per established plan of care. Discharge Recommendations:  Home Health with assistance  Further Equipment Recommendations for Discharge:  bedside commode     Activity Tolerance:  Fair-good      Estimated LOS:1/21/22    Please refer to the flowsheet for vital signs taken during this treatment. After treatment:   [x]  Patient left in no apparent distress sitting up in chair   []  Patient left in no apparent distress in bed  [x]  Call bell left within reach  []  Nursing notified  []  Caregiver present  []  Bed alarm activated    COMMUNICATION/EDUCATION:   [] Home safety education was provided and the patient/caregiver indicated understanding. [] Patient/family have participated as able in goal setting and plan of care.   [x] Patient/family agree to work toward stated goals and plan of care. [] Patient understands intent and goals of therapy, but is neutral about his/her participation. [] Patient is unable to participate in goal setting and plan of care.       William Gann, OT

## 2022-01-10 NOTE — PROGRESS NOTES
[facility] PHYSICAL REHABILITATION  [address] [place] [address], [address]     INPATIENT REHABILITATION  DAILY PROGRESS NOTE     Date: 1/10/2022    Name: Duy Coyle Age / Sex: 82 y.o. 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 Blood pressure controlled. Patient's Complaint:   No significant medical complaints    Pain Control: no current joint or muscle symptoms, essentially pain-free      Objective:     Vital Signs:  Patient Vitals for the past 24 hrs:   BP Temp Pulse Resp SpO2   01/10/22 1543 (!) 117/90 98.9 °F (37.2 °C) 96 18 95 %   01/10/22 0736 138/82 98.5 °F (36.9 °C) 77 16 92 %   01/09/22 2127 107/67 97 °F (36.1 °C) 78 18 92 %        Physical Examination:  GENERAL SURVEY: Patient is awake, alert, oriented x 3, laying comfortably on the bed, not in acute respiratory distress. HEENT: pink palpebral conjunctivae, anicteric sclerae, no nasoaural discharge, moist oral mucosa  NECK: supple, no jugular venous distention, no palpable lymph nodes  CHEST/LUNGS: symmetrical chest expansion, good air entry, clear breath sounds  HEART: adynamic precordium, good S1 S2, no S3, regular rhythm, no murmurs  ABDOMEN: flat, bowel sounds appreciated, soft, non-tender  EXTREMITIES: (+) limitation ROM of both shoulders due to history of rotator cuff injury, pink nailbeds, no edema, full and equal pulses, no calf tenderness   NEUROLOGICAL EXAM: The patient is awake, alert and oriented x3, able to answer questions fairly appropriately, able to follow 1 and 2 step commands. Able to tell time from the wall clock. Cranial nerves II-XII are grossly intact. No gross sensory deficit.   Motor strength is 4-/5 on the RUE (except right shoulder), 3+/5 on the LUE (except left shoulder), 4+/5 on the RLE, 4/5 on the LLE. Current Medications:  Current Facility-Administered Medications   Medication Dose Route Frequency    losartan (COZAAR) tablet 50 mg  50 mg Oral DAILY    cholecalciferol (VITAMIN D3) (1000 Units /25 mcg) tablet 2,000 Units  2,000 Units Oral DAILY    aspirin tablet 325 mg  325 mg Oral DAILY WITH BREAKFAST    atorvastatin (LIPITOR) tablet 80 mg  80 mg Oral QHS    clopidogreL (PLAVIX) tablet 75 mg  75 mg Oral DAILY WITH DINNER    meclizine (ANTIVERT) tablet 12.5 mg  12.5 mg Oral BID    hydrALAZINE (APRESOLINE) tablet 25 mg  25 mg Oral TID PRN    ondansetron (ZOFRAN ODT) tablet 4 mg  4 mg Oral Q8H PRN    docusate sodium (COLACE) capsule 100 mg  100 mg Oral BID    magnesium hydroxide (MILK OF MAGNESIA) 400 mg/5 mL oral suspension 30 mL  30 mL Oral DAILY PRN    bisacodyL (DULCOLAX) tablet 10 mg  10 mg Oral Q48H PRN    dorzolamide (TRUSOPT) 2 % ophthalmic solution 1 Drop  1 Drop Both Eyes TID    latanoprost (XALATAN) 0.005 % ophthalmic solution 1 Drop  1 Drop Both Eyes QHS    loperamide (IMODIUM) capsule 2 mg  2 mg Oral PRN    montelukast (SINGULAIR) tablet 10 mg  10 mg Oral DAILY    oxybutynin chloride XL (DITROPAN XL) tablet 10 mg  10 mg Oral QHS    sertraline (ZOLOFT) tablet 50 mg  50 mg Oral DAILY    acetaminophen (TYLENOL) tablet 650 mg  650 mg Oral Q4H PRN       Allergies:   Allergies   Allergen Reactions    Novocain [Procaine] Swelling       Lab/Data Review:  Recent Results (from the past 24 hour(s))   METABOLIC PANEL, BASIC    Collection Time: 01/10/22  5:58 AM   Result Value Ref Range    Sodium 137 136 - 145 mmol/L    Potassium 3.8 3.5 - 5.5 mmol/L    Chloride 106 100 - 111 mmol/L    CO2 26 21 - 32 mmol/L    Anion gap 5 3.0 - 18 mmol/L    Glucose 85 74 - 99 mg/dL    BUN 15 7.0 - 18 MG/DL    Creatinine 0.62 0.6 - 1.3 MG/DL    BUN/Creatinine ratio 24 (H) 12 - 20      GFR est AA >60 >60 ml/min/1.73m2    GFR est non-AA >60 >60 ml/min/1.73m2    Calcium 9.0 8.5 - 10.1 MG/DL   HGB & HCT    Collection Time: 01/10/22  5:58 AM   Result Value Ref Range    HGB 11.6 (L) 12.0 - 16.0 g/dL    HCT 35.4 35.0 - 45.0 %       Assessment:     Primary Rehabilitation Diagnosis  1. Impaired Mobility and ADLs  2. 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 Justification for continued stay: Good progression towards established rehabilitation goals. 2. Medical Issues being followed closely:    [x]  Fall and safety precautions     []  Wound Care     [x]  Bowel and Bladder Function     [x]  Fluid Electrolyte and Nutrition Balance     []  Pain Control      3. Issues that 24 hour rehabilitation nursing is following:    [x]  Fall and safety precautions     []  Wound Care     [x]  Bowel and Bladder Function     [x]  Fluid Electrolyte and Nutrition Balance     []  Pain Control      [x]  Assistance with and education on in-room safety with transfers to and from the bed, wheelchair, toilet and shower. 4. Acute rehabilitation plan of care:    [x]  Continue current care and rehab. [x]  Physical Therapy           [x]  Occupational Therapy           []  Speech Therapy     []  Hold Rehab until further notice     5. Medications:    [x]  MAR Reviewed     [x]  Continue Present Medications     6. Chemical DVT Prophylaxis:      []  Enoxaparin     []  Unfractionated Heparin     []  Warfarin     []  NOAC     []  Aspirin     [x]  None     7.  Mechanical DVT Prophylaxis:      [x]  RADHAMES Stockings     [x]  Sequential Compression Device     []  None     8. GI Prophylaxis:      []  PPI     []  H2 Blocker     [x]  None / Not indicated     9. Code status:    [x]  Full code     []  Partial code     []  Do not intubate     []  Do not resuscitate     10. Diet:  Specifications  3 carb choices (45 gm/meal); Low fat/Low cholesterol/High fiber/ANGELINE   Solids (consistency)  Regular   Liquids (consistency)  Thin   Fluid Restriction  None     11. 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   > Continue:    > Aspirin 325 mg PO once daily with breakfast    > Clopidogrel 75 mg PO once daily with dinner (STOP DATE: 3/27/2022)    > Meclizine 12.5 mg PO BID (for dizziness)     > Essential hypertension   > On 1/1/2022, started:    > Losartan 25 mg PO once daily (9AM)    > Hydralazine 25 mg PO TID PRN for SBP greater than 160 mmHg   > On 1/5/2022, increased Losartan from 25 mg to 50 mg PO once daily (9AM)   > Continue:    > Losartan 50 mg PO once daily (9AM)    > Hydralazine 25 mg PO TID PRN for SBP greater than 160 mmHg     > Hyperlipidemia   > Continue Atorvastatin 80 mg PO q HS     > Depression and anxiety   > Continue Sertraline 50 mg PO once daily     > Glaucoma   > Continue Latanoprost eyedrops 1 drop both eyes q HS     > Urinary incontinence   > Urinalysis (1/1/2022): WNL   > Continue Oxybutynin XL 10 mg PO once daily    > COVID-19 ruled out by laboratory testing   > Prior to admission and upon admission to the ARU, patient did not have any fever, desaturation, cough or difficulty breathing    > SARS-CoV-2 by [facility]) (1/7/2022): Not detected      12. Personal Protective Equipment (N95 face mask and eye goggles) was used while interacting with the patient. Patient was using a surgical mask. 15. Patient's progress in rehabilitation and medical issues discussed with the patient. All questions answered to the best of my ability.  Care plan discussed with patient and nurse. 14. Total clinical care time is 30 minutes, including review of chart including all labs, radiology, past medical history, and discussion with patient. Greater than 50% of my time was spent in coordination of care and counseling.       Signed:    Leonid Brunson MD    January 10, 2022

## 2022-01-10 NOTE — PROGRESS NOTES
Problem: Pressure Injury - Risk of  Goal: *Prevention of pressure injury  Description: Document Lebron Scale and appropriate interventions in the flowsheet. Outcome: Progressing Towards Goal  Note: Pressure Injury Interventions:  Sensory Interventions: Assess changes in LOC    Moisture Interventions: Absorbent underpads    Activity Interventions: Chair cushion,Increase time out of bed,Pressure redistribution bed/mattress(bed type)    Mobility Interventions: Chair cushion,HOB 30 degrees or less,Pressure redistribution bed/mattress (bed type)    Nutrition Interventions: Document food/fluid/supplement intake    Friction and Shear Interventions: HOB 30 degrees or less                Problem: Patient Education: Go to Patient Education Activity  Goal: Patient/Family Education  Outcome: Progressing Towards Goal     Problem: Falls - Risk of  Goal: *Absence of Falls  Description: Document Yadira Fall Risk and appropriate interventions in the flowsheet.   Outcome: Progressing Towards Goal  Note: Fall Risk Interventions:  Mobility Interventions: Bed/chair exit alarm,Patient to call before getting OOB    Mentation Interventions: Bed/chair exit alarm,Evaluate medications/consider consulting pharmacy    Medication Interventions: Bed/chair exit alarm,Evaluate medications/consider consulting pharmacy,Patient to call before getting OOB    Elimination Interventions: Call light in reach,Bed/chair exit alarm,Patient to call for help with toileting needs    History of Falls Interventions: Bed/chair exit alarm,Evaluate medications/consider consulting pharmacy         Problem: Patient Education: Go to Patient Education Activity  Goal: Patient/Family Education  Outcome: Progressing Towards Goal     Problem: General Medical Care Plan  Goal: *Vital signs within specified parameters  Outcome: Progressing Towards Goal  Goal: *Labs within defined limits  Outcome: Progressing Towards Goal  Goal: *Absence of infection signs and symptoms  Outcome: Progressing Towards Goal  Goal: *Optimal pain control at patient's stated goal  Outcome: Progressing Towards Goal  Goal: *Skin integrity maintained  Outcome: Progressing Towards Goal  Goal: *Fluid volume balance  Outcome: Progressing Towards Goal  Goal: *Optimize nutritional status  Outcome: Progressing Towards Goal  Goal: *Anxiety reduced or absent  Outcome: Progressing Towards Goal  Goal: *Progressive mobility and function (eg: ADL's)  Outcome: Progressing Towards Goal     Problem: Patient Education: Go to Patient Education Activity  Goal: Patient/Family Education  Outcome: Progressing Towards Goal     Problem: Patient Education: Go to Patient Education Activity  Goal: Patient/Family Education  Outcome: Progressing Towards Goal     Problem: Patient Education: Go to Patient Education Activity  Goal: Patient/Family Education  Outcome: Progressing Towards Goal     Problem: Inpatient Rehab (Adult)  Goal: *LTG: Avoids injury/falls 100% of time related to deficits  Outcome: Progressing Towards Goal  Goal: *LTG: Avoids infection 100% of time related to deficits  Outcome: Progressing Towards Goal  Goal: *LTG: Verbalize understanding of diagnosis and risk factors for recurring stroke  Outcome: Progressing Towards Goal  Goal: *LTG: Absence of DVT during hospitalization  Outcome: Progressing Towards Goal  Goal: *LTG: Maintains Skin Integrity With No Evidence of Pressure Injury 100% of Time  Outcome: Progressing Towards Goal  Goal: Interventions  Outcome: Progressing Towards Goal     Problem: Patient Education: Go to Patient Education Activity  Goal: Patient/Family Education  Outcome: Progressing Towards Goal     Problem: Risk for Spread of Infection  Goal: Prevent transmission of infectious organism to others  Description: Prevent the transmission of infectious organisms to other patients, staff members, and visitors.   Outcome: Progressing Towards Goal     Problem: Patient Education:  Go to Education Activity  Goal: Patient/Family Education  Outcome: Progressing Towards Goal

## 2022-01-10 NOTE — PROGRESS NOTES
SHIFT CHANGE NOTE FOR [facility]    Bedside and Verbal shift change report given to BERT Tate (oncoming nurse) by Ryan Smalls RN (offgoing nurse). Report included the following information SBAR, Kardex, MAR and Recent Results. Situation:   Code Status: Full Code   Hospital Day: 10   Problem List:   Hospital Problems  Date Reviewed: 1/7/2022          Codes Class Noted POA    Essential hypertension (Chronic) ICD-10-CM: I10  ICD-9-CM: 401.9  Unknown Yes        Hypercholesterolemia with hypertriglyceridemia (Chronic) ICD-10-CM: E78.2  ICD-9-CM: 272.2  12/27/2021 Yes    Overview Signed 1/4/2022 12:02 AM by David Cordoba MD     Lipid profile (12/27/2021) showed , , HDL 48,              * (Principal) Acute ischemic stroke ([facility] [address] ICD-10-CM: I63.9  ICD-9-CM: 434.91  12/26/2021 Yes    Overview Signed 1/3/2022 11:59 PM by David Cordoba MD     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 Assessment:   Changes in Assessment throughout shift: No change to previous assessment     Patient has a central line: no Reasons if yes: Insertion date: Last dressing date:   Patient has Ovalle Cath:  Reasons if yes:     Insertion date:  Shift ovalle care completed:      Last Vitals:     Vitals:    01/08/22 2046 01/09/22 0746 01/09/22 1606 01/09/22 2127   BP: (!) 148/80 (!) 167/94 128/71 107/67   Pulse: 77 75 79 78   Resp: 16 16 18 18   Temp: 97 °F (36.1 °C) 98.7 °F (37.1 °C) 98.6 °F (37 °C) 97 °F (36.1 °C)   SpO2: 93% 92% 93% 92%   Weight:       Height:            PAIN    Pain Assessment    Pain Intensity 1: 0 (01/10/22 0054) Pain Intensity 1: 2 (12/29/14 1105)    Pain Location 1: Knee Pain Location 1: Abdomen    Pain Intervention(s) 1: Medication (see MAR) Pain Intervention(s) 1: Medication (see MAR)  Patient Stated Pain Goal: 0 Patient Stated Pain Goal: 0  o Intervention effective: yes  o Other actions taken for pain:       Skin Assessment  Skin color    Condition/Temperature    Integrity    Turgor    Weekly Pressure Ulcer Documentation  Pressure  Injury Documentation: No Pressure Injury Noted-Pressure Ulcer Prevention Initiated  Wound Prevention & Protection Methods  Orientation of wound Orientation of Wound Prevention: Posterior  Location of Prevention Location of Wound Prevention: Sacrum/Coccyx  Dressing Present Dressing Present : No  Dressing Status    Wound Offloading Wound Offloading (Prevention Methods): Bed, pressure reduction mattress     INTAKE/OUPUT  Date 01/09/22 0700 - 01/10/22 0659 01/10/22 0700 - 01/11/22 0659   Shift 5941-7244 8591-1023 24 Hour Total 0385-2700 7423-6444 24 Hour Total   INTAKE   P.O.         P. O.       Shift Total(mL/kg) 840(13.4) 240(3.8) 8555(29.8)      OUTPUT   Urine(mL/kg/hr)  0 0        Urine Voided  0 0        Urine Occurrence(s) 4 x 2 x 6 x      Emesis/NG output           Emesis Occurrence(s) 0 x  0 x      Stool           Stool Occurrence(s) 2 x 0 x 2 x      Shift Total(mL/kg)  0(0) 0(0)       566 0881      Weight (kg) 62.7 62.7 62.7 62.7 62.7 62.7       Recommendations:  1. Patient needs and requests: none    2. Pending tests/procedures: none     3. Functional Level/Equipment: Partial (one person) / Silvia Cano; Wheelchair    Fall Precautions:   Fall risk precautions were reinforced with the patient; she was instructed to call for help prior to getting up. The following fall risk precautions were continued: bed/ chair alarms, door signage, yellow bracelet and socks as well as update of the [place] tool in the patient's room.    Yadira Score: 3    HEALS Safety Check    A safety check occurred in the patient's room between off going nurse and oncoming nurse listed above. The safety check included the below items  Area Items   H  High Alert Medications - Verify all high alert medication drips (heparin, PCA, etc.)   E  Equipment - Suction is set up for ALL patients (with norah)  - Red plugs utilized for all equipment (IV pumps, etc.)  - WOWs wiped down at end of shift.  - Room stocked with oxygen, suction, and other unit-specific supplies   A  Alarms - Bed alarm is set for fall risk patients  - Ensure chair alarm is in place and activated if patient is up in a chair   L  Lines - Check IV for any infiltration  - Edwards bag is empty if patient has a Edwards   - Tubing and IV bags are labeled   S  Safety   - Room is clean, patient is clean, and equipment is clean. - Hallways are clear from equipment besides carts. - Fall bracelet on for fall risk patients  - Ensure room is clear and free of clutter  - Suction is set up for ALL patients (with norah)  - Hallways are clear from equipment besides carts.    - Isolation precautions followed, supplies available outside room, sign posted     Natalio Rodriguez RN

## 2022-01-10 NOTE — PROGRESS NOTES
Problem: Mobility Impaired (Adult and Pediatric)  Goal: *Therapy Goal (Edit Goal, Insert Text)  Description: Physical Therapy Short Term Goals  Initiated 1/1/2022, updated 1/7/2022 and to be accomplished within 7 day(s) on 1/14/2022  1. Patient will move from supine to sit and sit to supine  in bed with minimal assistance/contact guard assist.  Goal met 1/7/2022  Updated goal: Patient will perform supine<->sit supervision level   2. Patient will transfer from bed to chair and chair to bed with minimal assistance using the least restrictive device. Goal met 1/7/2022  Updated goal: Patient will perform bed<->chair transfer with SBA using RW. 3.  Patient will perform sit to stand with minimal assistance. Goal met 1/7/2022  Updated goal: Patient will perform sit to stand with SBA>   4. Patient will ambulate with minimal assistance/contact guard assist for 50 feet with the least restrictive device before needing seated rest. Goal ongoing  5. Patient will participate in formal balance [facility]/Postural Assessment Scale for Stroke). Goal ongoing    Physical Therapy Long Term Goals  Initiated 1/1/2022 and to be accomplished within 21 day(s) on 1/22/2022  1. Patient will move from supine to sit and sit to supine , scoot up and down, and roll side to side in bed with modified independence. 2.  Patient will transfer from bed to chair and chair to bed with modified independence using the least restrictive device. 3.  Patient will perform sit to stand with modified independence. 4.  Patient will ambulate with modified independence for 150 feet with the least restrictive device. 5.  Patient will negotiate one curb step with CG/SBA , using appropriate AD for safe community mobility.        Outcome: Progressing Towards Goal   PHYSICAL THERAPY TREATMENT    Patient: Evangelina Pump (89 y.o. female)  Date: 1/10/2022  Diagnosis: CVA (cerebral vascular accident) [facility]) [I63.9] Acute ischemic stroke [facility])  Precautions: Fall  Chart, physical therapy assessment, plan of care and goals were reviewed. Time In:1004  Time STANTON:6068    Patient seen for: Balance activities;Gait training;Patient education; Therapeutic exercise;Transfer training; Wheelchair mobility    Pain:  Patient denied significant pain during session but reporting chronic bilat knee pain. Patient identified with name and : yes    SUBJECTIVE:      Patient agreeable to treatment. Continues to report feeling \"dizzy\"    OBJECTIVE DATA SUMMARY:    Objective:     TRANSFERS Daily Assessment     Transfer Type: Other  Other: stand step with RW  Transfer Assistance : 4 (Contact guard assistance)  Sit to Stand Assistance: Minimal assistance (CGA for sit->stand, min A stand->sit on w/c surface)    Needing significant extra time, cues for safe hand placement and for slowly sitting stand to sit. GAIT Daily Assessment    Gait Description (WDL) Exceptions to WDL    Gait Abnormalities Decreased step clearance;Shuffling gait; Step to gait (left LE externally rotating with fatigue)    Assistive device Walker, rolling;Gait belt    Ambulation assistance - level surface 4 (Minimal assistance)    Distance 18 Feet (ft) (20 ft, then 18 ft after seated rest)    Ambulation assistance- uneven surface  (NT)    Comments Patient performing gait with decreased gait speed, patient reporting feeling tired/\"worn out\" after 20 ft of gait for first gait bout. Fatiguing after 18 ft of gait second bout. Patient having difficulty advancing left LE with increased external rotation left LE observed.          BALANCE Daily Assessment     Sitting - Static: Good (unsupported)  Sitting - Dynamic: Good (unsupported)  Standing - Static: Fair  Standing - Dynamic : Impaired        WHEELCHAIR MOBILITY Daily Assessment     Able to Propel (ft): 60 feet  Functional Level:  (SBA)  Curbs/Ramps Assist Required (FIM Score): 0 (Not tested)  Wheelchair Setup Assist Required : 5 (Supervision/setup)  Wheelchair Management: Manages left brake;Manages right brake    Performing w/c mobility with supervision using bilat feet to propel. THERAPEUTIC EXERCISES Daily Assessment       Performing seated therex including hip flex marches and LAQ 1.5# cuff weights 3 x 10 reps. Neuro Re-Education:  Performing static standing upright standing balance at RW x 2 minutes. Also performing habituation exercises for horizontal and vertical head turns with eye patch donned over left eye to improve patient's report of dizziness/\"wooziness\" with head turns in seated position. Patient only able to perform 5 reps at a time before reporting 3/10 dizziness. Educated patient in vestibular rehab principles and HEP (written) provided. ASSESSMENT:  Patient would continue to benefit from skilled PT to improve ability to perform safe functional mobility. Recommend ongoing reinforcement of habituation exercises with addition of gaze stabilization as patient able to tolerate so that patient better able to perform functional mobility with decreased dizziness symptoms. Progression toward goals:  []      Improving appropriately and progressing toward goals  [x]      Improving slowly and progressing toward goals  []      Not making progress toward goals and plan of care will be adjusted      PLAN:  Patient continues to benefit from skilled intervention to address the above impairments. Continue treatment per established plan of care.   Discharge Recommendations:  Home Health  Further Equipment Recommendations for Discharge:  rolling walker and wheelchair       Estimated Discharge Date: 1/21/2022    Activity Tolerance:   Fair due to fatigue, dizziness    After treatment:   [] Patient left in no apparent distress in bed  [] Patient left in no apparent distress sitting up in chair  [x] Patient left in no apparent distress in w/c mobilizing under own power  [] Patient left in no apparent distress dining area  [] Patient left in no apparent distress mobilizing under own power  [] Call bell left within reach  [] Nursing notified  [] Caregiver present  [] Bed alarm activated   [x] Chair alarm activated      Anne Queen, PT  1/10/2022

## 2022-01-11 PROCEDURE — 97110 THERAPEUTIC EXERCISES: CPT

## 2022-01-11 PROCEDURE — 65310000000 HC RM PRIVATE REHAB

## 2022-01-11 PROCEDURE — 97116 GAIT TRAINING THERAPY: CPT

## 2022-01-11 PROCEDURE — 97530 THERAPEUTIC ACTIVITIES: CPT

## 2022-01-11 PROCEDURE — 97112 NEUROMUSCULAR REEDUCATION: CPT

## 2022-01-11 PROCEDURE — 74011250637 HC RX REV CODE- 250/637: Performed by: INTERNAL MEDICINE

## 2022-01-11 PROCEDURE — 74011250637 HC RX REV CODE- 250/637: Performed by: FAMILY MEDICINE

## 2022-01-11 PROCEDURE — 97535 SELF CARE MNGMENT TRAINING: CPT

## 2022-01-11 PROCEDURE — 99232 SBSQ HOSP IP/OBS MODERATE 35: CPT | Performed by: INTERNAL MEDICINE

## 2022-01-11 PROCEDURE — 74011250636 HC RX REV CODE- 250/636: Performed by: FAMILY MEDICINE

## 2022-01-11 RX ADMIN — MONTELUKAST 10 MG: 10 TABLET, FILM COATED ORAL at 08:56

## 2022-01-11 RX ADMIN — MECLIZINE 12.5 MG: 12.5 TABLET ORAL at 08:56

## 2022-01-11 RX ADMIN — MECLIZINE 12.5 MG: 12.5 TABLET ORAL at 17:17

## 2022-01-11 RX ADMIN — LOSARTAN POTASSIUM 50 MG: 50 TABLET, FILM COATED ORAL at 08:56

## 2022-01-11 RX ADMIN — DOCUSATE SODIUM 100 MG: 100 CAPSULE, LIQUID FILLED ORAL at 08:56

## 2022-01-11 RX ADMIN — ATORVASTATIN CALCIUM 80 MG: 40 TABLET, FILM COATED ORAL at 21:02

## 2022-01-11 RX ADMIN — SERTRALINE 50 MG: 50 TABLET, FILM COATED ORAL at 08:56

## 2022-01-11 RX ADMIN — DORZOLAMIDE HYDROCHLORIDE 1 DROP: 20 SOLUTION/ DROPS OPHTHALMIC at 17:17

## 2022-01-11 RX ADMIN — CHOLECALCIFEROL TAB 25 MCG (1000 UNIT) 2000 UNITS: 25 TAB at 08:56

## 2022-01-11 RX ADMIN — LATANOPROST 1 DROP: 50 SOLUTION OPHTHALMIC at 21:02

## 2022-01-11 RX ADMIN — OXYBUTYNIN CHLORIDE 10 MG: 5 TABLET, EXTENDED RELEASE ORAL at 21:02

## 2022-01-11 RX ADMIN — CLOPIDOGREL 75 MG: 75 TABLET, FILM COATED ORAL at 17:17

## 2022-01-11 RX ADMIN — DORZOLAMIDE HYDROCHLORIDE 1 DROP: 20 SOLUTION/ DROPS OPHTHALMIC at 08:56

## 2022-01-11 RX ADMIN — DORZOLAMIDE HYDROCHLORIDE 1 DROP: 20 SOLUTION/ DROPS OPHTHALMIC at 21:02

## 2022-01-11 RX ADMIN — ASPIRIN 325 MG ORAL TABLET 325 MG: 325 PILL ORAL at 08:56

## 2022-01-11 RX ADMIN — DOCUSATE SODIUM 100 MG: 100 CAPSULE, LIQUID FILLED ORAL at 17:17

## 2022-01-11 NOTE — PROGRESS NOTES
[facility]    Bedside and Verbal shift change report given to Keke Schwartz (oncoming nurse) by Rosalva Johnson RN (offgoing nurse). Report included the following information SBAR, Kardex, MAR and Recent Results. Situation:   Code Status: Full Code   Hospital Day: 10   Problem List:   Hospital Problems  Date Reviewed: 1/10/2022          Codes Class Noted POA    Essential hypertension (Chronic) ICD-10-CM: I10  ICD-9-CM: 401.9  Unknown Yes        Hypercholesterolemia with hypertriglyceridemia (Chronic) ICD-10-CM: E78.2  ICD-9-CM: 272.2  12/27/2021 Yes    Overview Signed 1/4/2022 12:02 AM by Jsutin De Jesus MD     Lipid profile (12/27/2021) showed , , HDL 48,              * (Principal) [facility] ICD-10-CM: I63.9  ICD-9-CM: 434.91  12/26/2021 Yes    Overview Signed 1/3/2022 11:59 PM by Justin De Jesus MD     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 Assessment:   Changes in Assessment throughout shift: No change to previous assessment     Patient has a central line: no Reasons if yes: Insertion date: Last dressing date:   Patient has Ovalle Cath:  Reasons if yes:     Insertion date:  Shift ovalle care completed:      Last Vitals:     Vitals:    01/09/22 1606 01/09/22 2127 01/10/22 0736 01/10/22 1543   BP: 128/71 107/67 138/82 (!) 117/90   Pulse: 79 78 77 96   Resp: 18 18 16 18   Temp: 98.6 °F (37 °C) 97 °F (36.1 °C) 98.5 °F (36.9 °C) 98.9 °F (37.2 °C)   SpO2: 93% 92% 92% 95%   Weight:       Height:            PAIN    Pain Assessment    Pain Intensity 1: 0 (01/10/22 4802) Pain Intensity 1: 2 (12/29/14 1105)    Pain Location 1: Knee Pain Location 1: Abdomen    Pain Intervention(s) 1: Medication (see MAR) Pain Intervention(s) 1: Medication (see MAR)  Patient Stated Pain Goal: 0 Patient Stated Pain Goal: 0  o Intervention effective: yes  o Other actions taken for pain:       Skin Assessment  Skin color    Condition/Temperature    Integrity    Turgor    Weekly Pressure Ulcer Documentation  Pressure  Injury Documentation: No Pressure Injury Noted-Pressure Ulcer Prevention Initiated  Wound Prevention & Protection Methods  Orientation of wound Orientation of Wound Prevention: Posterior  Location of Prevention Location of Wound Prevention: Sacrum/Coccyx  Dressing Present Dressing Present : No  Dressing Status    Wound Offloading Wound Offloading (Prevention Methods): Bed, pressure reduction mattress     INTAKE/OUPUT  Date 01/09/22 1900 - 01/10/22 0659 01/10/22 0700 - 01/11/22 0659   Shift 2325-0613 24 Hour Total 4885-7674 5027-6631 24 Hour Total   INTAKE   P.O. 240 1080 440  440     P. O. 240 1080 440  440   Shift Total(mL/kg) 240(3.8) 1080(17.2) 440(7)  440(7)   OUTPUT   Urine(mL/kg/hr) 0 0        Urine Voided 0 0        Urine Occurrence(s) 5 x 9 x 2 x  2 x   Emesis/NG output          Emesis Occurrence(s)  0 x      Stool          Stool Occurrence(s) 0 x 2 x 0 x  0 x   Shift Total(mL/kg) 0(0) 0(0)       1080 440  440   Weight (kg) 62.7 62.7 62.7 62.7 62.7       Recommendations:  1. Patient needs and requests: none    2. Pending tests/procedures: none     3. Functional Level/Equipment: Partial (one person) /      Fall Precautions:   Fall risk precautions were reinforced with the patient; she was instructed to call for help prior to getting up. The following fall risk precautions were continued: bed/ chair alarms, door signage, yellow bracelet and socks as well as update of the [place] tool in the patient's room.    Yadira Score: 3    HEALS Safety Check    A safety check occurred in the patient's room between off going nurse and oncoming nurse listed above. The safety check included the below items  Area Items   H  High Alert Medications - Verify all high alert medication drips (heparin, PCA, etc.)   E  Equipment - Suction is set up for ALL patients (with norah)  - Red plugs utilized for all equipment (IV pumps, etc.)  - WOWs wiped down at end of shift.  - Room stocked with oxygen, suction, and other unit-specific supplies   A  Alarms - Bed alarm is set for fall risk patients  - Ensure chair alarm is in place and activated if patient is up in a chair   L  Lines - Check IV for any infiltration  - Edwards bag is empty if patient has a Edwards   - Tubing and IV bags are labeled   S  Safety   - Room is clean, patient is clean, and equipment is clean. - Hallways are clear from equipment besides carts. - Fall bracelet on for fall risk patients  - Ensure room is clear and free of clutter  - Suction is set up for ALL patients (with norah)  - Hallways are clear from equipment besides carts.    - Isolation precautions followed, supplies available outside room, sign posted     Marbella Hope RN

## 2022-01-11 NOTE — PROGRESS NOTES
Problem: Pressure Injury - Risk of  Goal: *Prevention of pressure injury  Description: Document Lebron Scale and appropriate interventions in the flowsheet. Outcome: Progressing Towards Goal  Note: Pressure Injury Interventions:  Sensory Interventions: Assess changes in LOC    Moisture Interventions: Absorbent underpads    Activity Interventions: Chair cushion    Mobility Interventions: Chair cushion    Nutrition Interventions: Document food/fluid/supplement intake    Friction and Shear Interventions: HOB 30 degrees or less                Problem: Patient Education: Go to Patient Education Activity  Goal: Patient/Family Education  Outcome: Progressing Towards Goal     Problem: Falls - Risk of  Goal: *Absence of Falls  Description: Document Yadira Fall Risk and appropriate interventions in the flowsheet.   Outcome: Progressing Towards Goal  Note: Fall Risk Interventions:  Mobility Interventions: Bed/chair exit alarm    Mentation Interventions: Bed/chair exit alarm    Medication Interventions: Bed/chair exit alarm    Elimination Interventions: Call light in reach    History of Falls Interventions: Bed/chair exit alarm         Problem: Patient Education: Go to Patient Education Activity  Goal: Patient/Family Education  Outcome: Progressing Towards Goal     Problem: General Medical Care Plan  Goal: *Vital signs within specified parameters  Outcome: Progressing Towards Goal  Goal: *Labs within defined limits  Outcome: Progressing Towards Goal  Goal: *Absence of infection signs and symptoms  Outcome: Progressing Towards Goal  Goal: *Optimal pain control at patient's stated goal  Outcome: Progressing Towards Goal  Goal: *Skin integrity maintained  Outcome: Progressing Towards Goal  Goal: *Fluid volume balance  Outcome: Progressing Towards Goal  Goal: *Optimize nutritional status  Outcome: Progressing Towards Goal  Goal: *Anxiety reduced or absent  Outcome: Progressing Towards Goal  Goal: *Progressive mobility and function (eg: ADL's)  Outcome: Progressing Towards Goal     Problem: Patient Education: Go to Patient Education Activity  Goal: Patient/Family Education  Outcome: Progressing Towards Goal     Problem: Patient Education: Go to Patient Education Activity  Goal: Patient/Family Education  Outcome: Progressing Towards Goal     Problem: Patient Education: Go to Patient Education Activity  Goal: Patient/Family Education  Outcome: Progressing Towards Goal     Problem: Inpatient Rehab (Adult)  Goal: *LTG: Avoids injury/falls 100% of time related to deficits  Outcome: Progressing Towards Goal  Goal: *LTG: Avoids infection 100% of time related to deficits  Outcome: Progressing Towards Goal  Goal: *LTG: Verbalize understanding of diagnosis and risk factors for recurring stroke  Outcome: Progressing Towards Goal  Goal: *LTG: Absence of DVT during hospitalization  Outcome: Progressing Towards Goal  Goal: *LTG: Maintains Skin Integrity With No Evidence of Pressure Injury 100% of Time  Outcome: Progressing Towards Goal  Goal: Interventions  Outcome: Progressing Towards Goal     Problem: Patient Education: Go to Patient Education Activity  Goal: Patient/Family Education  Outcome: Progressing Towards Goal     Problem: Risk for Spread of Infection  Goal: Prevent transmission of infectious organism to others  Description: Prevent the transmission of infectious organisms to other patients, staff members, and visitors.   Outcome: Progressing Towards Goal     Problem: Patient Education:  Go to Education Activity  Goal: Patient/Family Education  Outcome: Progressing Towards Goal     Problem: Patient Education: Go to Patient Education Activity  Goal: Patient/Family Education  Outcome: Progressing Towards Goal

## 2022-01-11 NOTE — PROGRESS NOTES
Problem: Self Care Deficits Care Plan (Adult)  Goal: *Therapy Goal (Edit Goal, Insert Text)  Description: Occupational Therapy Goals   Long Term Goals  Initiated 22 and to be accomplished within 3 week(s), by 2022  1. Pt will perform self-feeding with independence. 2. Pt will perform grooming with independence. 3. Pt will perform UB bathing with supervision. 4. Pt will perform LB bathing with supervision. 5. Pt will perform tub/shower transfer with supervision. 6. Pt will perform UB dressing with Axel using compensatory strategies. 7. Pt will perform LB dressing with Axel and AE. 8. Pt will perform toileting task with Axel and AE. 9. Pt will perform toilet transfer with Axel and AE. Short Term Goals   Initiated 22 (reassessed on 2022) and to be accomplished within 7 day(s), to be reassessed by 2022  1. Pt will perform self-feeding with Axel. 2. Pt will perform grooming with Axel. 3. Pt will perform UB bathing with SBA. (Goal met 2022)      Upgraded goal: Pt will perform UB bathing with set-up/ supv. 4. Pt will perform LB bathing with Fidel and UE support. (Goal met 2022)      Upgraded goal: Pt will perform LB bathing with CGA using AE and/ or compensatory strategies as needed. 5. Pt will perform tub/shower transfer with Fidel and AE. 6. Pt will perform UB dressing with SBA using compensatory strategies. (Goal met 2022)      Upgraded goal: Pt will perform UB dressing with supv using compensatory strategies as needed. 7. Pt will perform LB dressing with Fidel and AE. 8. Pt will perform toileting task with Fidel and AE. (Goal met 2022; continue goal for consistency)  9. Pt will perform toilet transfer with Fidel and AE. Outcome: Progressing Towards Goal  Goal: Interventions  Outcome: Progressing Towards Goal   Occupational Therapy TREATMENT    Patient: Maggy Nunn   82 y.o.     Patient identified with name and : yes    Date: 2022    First Tx Session  Time In: 1100  Time Out[de-identified] 3929    Second Tx Session  Time In: 1330  Time Out[de-identified] 1400    Diagnosis: CVA (cerebral vascular accident) [facility]) [I63.9]   Precautions: Fall  Chart, occupational therapy assessment, plan of care, and goals were reviewed. Pain:  Pt reports 0/10 pain or discomfort prior to treatment. Pt reports -/10 pain or discomfort post treatment. Intervention Provided:       SUBJECTIVE:   Patient stated i'm not really hungry.     OBJECTIVE DATA SUMMARY:     THERAPEUTIC ACTIVITY Daily Assessment    Pt participated in dynamic standing activity which involved reaching across table top to sort and manipulate puzzle pieces to complete puzzle. Pt demonstrated standing tolerance for 10 minutes and 56 sec. First rep and 5 minutes and 15 sec. With CGA for safety and w/c in locked position behind pt for safety. Pt engaged in BUE FM task which involved grasping clothespin against min resistance to transfer small foam pieces to cup. Pt engaged in functional reach activity which involved grasping small manipulatives to form words with letters while reaching across table top as tolerated due to pt short stature and improve pt BUE AROM and strength with functional tasks. THERAPEUTIC EXERCISE Daily Assessment    Pt engaged in BUE strengthening with red theraband against mod resistance abduction/adduction 10 reps, 2 sets with RB's in between sets. GROOMING Daily Assessment    Grooming  Grooming Assistance : 5 (Supervision)  Comments: Pt washed face secondary to eye drops dried to face and eye lashes with supervision and setup     TOILETING Daily Assessment    Toileting  Toileting Assistance (FIM Score):  (Pt declined)     MOBILITY/TRANSFERS Daily Assessment     Pt performed w/c mobility with Axel and RB's prn. Pt performed sit to stand with CGA and VC's for w/c management to reduce risks for falls.         ASSESSMENT:  Pt is making slow progress due to prior rotator cuff injuries and arthritic changes however participates well as tolerated. Pt is improving BUE AROM and strength for self cares and functional mobility. Pt demonstrates improved safety with functional transfers for self cares. Progression toward goals:  []          Improving appropriately and progressing toward goals  [x]          Improving slowly and progressing toward goals  []          Not making progress toward goals and plan of care will be adjusted     PLAN:  Patient continues to benefit from skilled intervention to address the above impairments. Continue treatment per established plan of care. Discharge Recommendations:  Home Health with assistance  Further Equipment Recommendations for Discharge:  bedside commode     Activity Tolerance:  Fair      Estimated LOS:1/21/2022    Please refer to the flowsheet for vital signs taken during this treatment. After treatment:   [x]  Patient left in no apparent distress sitting up in chair   []  Patient left in no apparent distress in bed  [x]  Call bell left within reach  []  Nursing notified  []  Caregiver present  []  Bed alarm activated    COMMUNICATION/EDUCATION:   [] Home safety education was provided and the patient/caregiver indicated understanding. [] Patient/family have participated as able in goal setting and plan of care. [x] Patient/family agree to work toward stated goals and plan of care. [] Patient understands intent and goals of therapy, but is neutral about his/her participation. [] Patient is unable to participate in goal setting and plan of care.       Maria Piper, OT

## 2022-01-11 NOTE — PROGRESS NOTES
Problem: Pressure Injury - Risk of  Goal: *Prevention of pressure injury  Description: Document Lebron Scale and appropriate interventions in the flowsheet. Outcome: Progressing Towards Goal  Note: Pressure Injury Interventions:  Sensory Interventions: Assess changes in LOC    Moisture Interventions: Absorbent underpads    Activity Interventions: Chair cushion,Increase time out of bed,Pressure redistribution bed/mattress(bed type)    Mobility Interventions: Chair cushion,HOB 30 degrees or less,Pressure redistribution bed/mattress (bed type)    Nutrition Interventions: Document food/fluid/supplement intake    Friction and Shear Interventions: Feet elevated on foot rest,HOB 30 degrees or less                Problem: Patient Education: Go to Patient Education Activity  Goal: Patient/Family Education  Outcome: Progressing Towards Goal     Problem: Falls - Risk of  Goal: *Absence of Falls  Description: Document Yadira Fall Risk and appropriate interventions in the flowsheet.   Outcome: Progressing Towards Goal  Note: Fall Risk Interventions:  Mobility Interventions: Bed/chair exit alarm,Patient to call before getting OOB    Mentation Interventions: Bed/chair exit alarm,Evaluate medications/consider consulting pharmacy    Medication Interventions: Bed/chair exit alarm,Evaluate medications/consider consulting pharmacy,Patient to call before getting OOB    Elimination Interventions: Call light in reach,Bed/chair exit alarm,Patient to call for help with toileting needs    History of Falls Interventions: Bed/chair exit alarm         Problem: Patient Education: Go to Patient Education Activity  Goal: Patient/Family Education  Outcome: Progressing Towards Goal     Problem: General Medical Care Plan  Goal: *Vital signs within specified parameters  Outcome: Progressing Towards Goal  Goal: *Labs within defined limits  Outcome: Progressing Towards Goal  Goal: *Absence of infection signs and symptoms  Outcome: Progressing Towards Goal  Goal: *Optimal pain control at patient's stated goal  Outcome: Progressing Towards Goal  Goal: *Skin integrity maintained  Outcome: Progressing Towards Goal  Goal: *Fluid volume balance  Outcome: Progressing Towards Goal  Goal: *Optimize nutritional status  Outcome: Progressing Towards Goal  Goal: *Anxiety reduced or absent  Outcome: Progressing Towards Goal  Goal: *Progressive mobility and function (eg: ADL's)  Outcome: Progressing Towards Goal     Problem: Patient Education: Go to Patient Education Activity  Goal: Patient/Family Education  Outcome: Progressing Towards Goal     Problem: Patient Education: Go to Patient Education Activity  Goal: Patient/Family Education  Outcome: Progressing Towards Goal     Problem: Patient Education: Go to Patient Education Activity  Goal: Patient/Family Education  Outcome: Progressing Towards Goal     Problem: Inpatient Rehab (Adult)  Goal: *LTG: Avoids injury/falls 100% of time related to deficits  Outcome: Progressing Towards Goal  Goal: *LTG: Avoids infection 100% of time related to deficits  Outcome: Progressing Towards Goal  Goal: *LTG: Verbalize understanding of diagnosis and risk factors for recurring stroke  Outcome: Progressing Towards Goal  Goal: *LTG: Absence of DVT during hospitalization  Outcome: Progressing Towards Goal  Goal: *LTG: Maintains Skin Integrity With No Evidence of Pressure Injury 100% of Time  Outcome: Progressing Towards Goal  Goal: Interventions  Outcome: Progressing Towards Goal     Problem: Patient Education: Go to Patient Education Activity  Goal: Patient/Family Education  Outcome: Progressing Towards Goal     Problem: Risk for Spread of Infection  Goal: Prevent transmission of infectious organism to others  Description: Prevent the transmission of infectious organisms to other patients, staff members, and visitors.   Outcome: Progressing Towards Goal     Problem: Patient Education:  Go to Education Activity  Goal: Patient/Family Education  Outcome: Progressing Towards Goal

## 2022-01-11 NOTE — PROGRESS NOTES
Problem: Mobility Impaired (Adult and Pediatric)  Goal: *Therapy Goal (Edit Goal, Insert Text)  Description: Physical Therapy Short Term Goals  Initiated 1/1/2022, updated 1/7/2022 and to be accomplished within 7 day(s) on 1/14/2022  1. Patient will move from supine to sit and sit to supine  in bed with minimal assistance/contact guard assist.  Goal met 1/7/2022  Updated goal: Patient will perform supine<->sit supervision level   2. Patient will transfer from bed to chair and chair to bed with minimal assistance using the least restrictive device. Goal met 1/7/2022  Updated goal: Patient will perform bed<->chair transfer with SBA using RW. 3.  Patient will perform sit to stand with minimal assistance. Goal met 1/7/2022  Updated goal: Patient will perform sit to stand with SBA>   4. Patient will ambulate with minimal assistance/contact guard assist for 50 feet with the least restrictive device before needing seated rest. Goal ongoing  5. Patient will participate in formal balance assessment Lorel Coffin Balance Scale/Postural Assessment Scale for Stroke). Goal ongoing    Physical Therapy Long Term Goals  Initiated 1/1/2022 and to be accomplished within 21 day(s) on 1/22/2022  1. Patient will move from supine to sit and sit to supine , scoot up and down, and roll side to side in bed with modified independence. 2.  Patient will transfer from bed to chair and chair to bed with modified independence using the least restrictive device. 3.  Patient will perform sit to stand with modified independence. 4.  Patient will ambulate with modified independence for 150 feet with the least restrictive device. 5.  Patient will negotiate one curb step with CG/SBA , using appropriate AD for safe community mobility.        Outcome: Progressing Towards Goal   PHYSICAL THERAPY TREATMENT    Patient: Radha Hunter (28 y.o. female)  Date: 1/11/2022  Diagnosis: CVA (cerebral vascular accident) [facility]) [I63.9] Acute ischemic stroke [facility])  Precautions: Fall  Chart, physical therapy assessment, plan of care and goals were reviewed. Time In:0810  Time Out:0910  Time In: 3947  Time Out: 1555    Patient seen for: Gait training;Patient education;Transfer training; Wheelchair mobility; Therapeutic exercise    Pain:  Patient reporting ongoing bilat knee pain that is worst with sit<->stand transition. Patient identified with name and : yes    SUBJECTIVE:      Patient agreeable to treatment but did comment in AM that she needed her coffee. Patient set up for breakfast with coffee at end of session. Patient reporting fatigue quickly with all gait activity. OBJECTIVE DATA SUMMARY:    Objective:       BED/MAT MOBILITY Daily Assessment     Supine to Sit : 6 (Modified independent)  Sit to Supine : 6 (Modified independent)      TRANSFERS Daily Assessment     Transfer Type: Other  Other: stand step with RW  Transfer Assistance : 4 (Contact guard assistance)  Sit to Stand Assistance: Contact guard assistance    Transfers needing only CGA for safety, cues for hand placement intermittently and needing extra time to perform task. GAIT Daily Assessment    Gait Description (WDL) Exceptions to WDL    Gait Abnormalities Decreased step clearance;Shuffling gait; Step to gait (left LE rotating with fatigue)    Assistive device Walker, rolling;Gait belt    Ambulation assistance - level surface 4 (Minimal assistance)    Distance 14 Feet (ft) (ranging 14-25 ft distances at maximum today)    Ambulation assistance- uneven surface  (NT)    Comments Performing 3 gait bouts today with difficulty advancing left LE observed.  Patient reporting fatiguing quickly and needing seated rest.         BALANCE Daily Assessment     Sitting - Static: Good (unsupported)  Sitting - Dynamic: Good (unsupported)  Standing - Static: Fair  Standing - Dynamic : Impaired        WHEELCHAIR MOBILITY Daily Assessment     Able to Propel (ft): 60 feet  Functional Level: (SBA)  Curbs/Ramps Assist Required (FIM Score): 0 (Not tested)  Wheelchair Setup Assist Required : 5 (Supervision/setup)  Wheelchair Management: Manages left brake;Manages right brake        THERAPEUTIC EXERCISES Daily Assessment       Supine therex in hooklying including hip abd red Tband 3 x 10 reps to improve lateral stability with standing and gait. Also performing bridges 3 x 10 reps for better ability to perform sit to stand. Neuro Re-Education:   Performing habituation exercises for horizontal and vertical head turns to improve patient's report of dizziness/\"wooziness\" with head turns in seated position. Patient only able to perform 5 reps at a time before reporting 3/10 dizziness. Educated patient in vestibular rehab principles and HEP (written) provided. Performing x 3 bouts for vertical and horizontal head turns. Added gaze stabilization 2 x 5 reps focusing on target on wall (\"A\") 6 ft away. ASSESSMENT:  Patient would continue to benefit from functional strengthening, standing endurance training and balance in addition to establishment of HEP for vestibular exercises to improve ability to perform safe functional mobility. No significant dizziness reported during mobility tasks today, only reporting dizziness during habituation and gaze stabilization exercises. Progression toward goals:  []      Improving appropriately and progressing toward goals  [x]      Improving slowly and progressing toward goals  []      Not making progress toward goals and plan of care will be adjusted      PLAN:  Patient continues to benefit from skilled intervention to address the above impairments. Continue treatment per established plan of care.   Discharge Recommendations:  Home Health and caregiver support  Further Equipment Recommendations for Discharge:  rolling walker and wheelchair 18 inch      Estimated Discharge Date: 1/21/2022    Activity Tolerance:   Fair due to fatigue    After treatment:   [] Patient left in no apparent distress in bed  [x] Patient left in no apparent distress sitting up in chair  [] Patient left in no apparent distress in w/c mobilizing under own power  [] Patient left in no apparent distress dining area  [] Patient left in no apparent distress mobilizing under own power  [x] Call bell left within reach  [x] Nursing notified  [] Caregiver present  [] Bed alarm activated   [x] Chair alarm activated      Katy Linares, PT  1/11/2022

## 2022-01-11 NOTE — PROGRESS NOTES
[facility] PHYSICAL REHABILITATION  [address] [place] [address], [address]     INPATIENT REHABILITATION  DAILY PROGRESS NOTE     Date: 1/11/2022    Name: Gaby Orellana Age / Sex: 82 y.o. 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 Blood pressure controlled. Patient's Complaint:   No significant medical complaints    Pain Control: no current joint or muscle symptoms, essentially pain-free      Objective:     Vital Signs:  Patient Vitals for the past 24 hrs:   BP Temp Pulse Resp SpO2   01/11/22 0829 (!) 146/74 98 °F (36.7 °C) 89 18 96 %   01/11/22 0821 115/64 98.1 °F (36.7 °C) 71 18 99 %   01/10/22 2056 128/70 97.6 °F (36.4 °C) 95 19 96 %   01/10/22 1543 (!) 117/90 98.9 °F (37.2 °C) 96 18 95 %        Physical Examination:  GENERAL SURVEY: Patient is awake, alert, oriented x 3, laying comfortably on the bed, not in acute respiratory distress. HEENT: pink palpebral conjunctivae, anicteric sclerae, no nasoaural discharge, moist oral mucosa  NECK: supple, no jugular venous distention, no palpable lymph nodes  CHEST/LUNGS: symmetrical chest expansion, good air entry, clear breath sounds  HEART: adynamic precordium, good S1 S2, no S3, regular rhythm, no murmurs  ABDOMEN: flat, bowel sounds appreciated, soft, non-tender  EXTREMITIES: (+) limitation ROM of both shoulders due to history of rotator cuff injury, pink nailbeds, no edema, full and equal pulses, no calf tenderness   NEUROLOGICAL EXAM: The patient is awake, alert and oriented x3, able to answer questions fairly appropriately, able to follow 1 and 2 step commands. Able to tell time from the wall clock. Cranial nerves II-XII are grossly intact. No gross sensory deficit. Motor strength is 4-/5 on the RUE (except right shoulder), 3+/5 on the LUE (except left shoulder), 4+/5 on the RLE, 4/5 on the LLE. Current Medications:  Current Facility-Administered Medications   Medication Dose Route Frequency    losartan (COZAAR) tablet 50 mg  50 mg Oral DAILY    cholecalciferol (VITAMIN D3) (1000 Units /25 mcg) tablet 2,000 Units  2,000 Units Oral DAILY    aspirin tablet 325 mg  325 mg Oral DAILY WITH BREAKFAST    atorvastatin (LIPITOR) tablet 80 mg  80 mg Oral QHS    clopidogreL (PLAVIX) tablet 75 mg  75 mg Oral DAILY WITH DINNER    meclizine (ANTIVERT) tablet 12.5 mg  12.5 mg Oral BID    hydrALAZINE (APRESOLINE) tablet 25 mg  25 mg Oral TID PRN    ondansetron (ZOFRAN ODT) tablet 4 mg  4 mg Oral Q8H PRN    docusate sodium (COLACE) capsule 100 mg  100 mg Oral BID    magnesium hydroxide (MILK OF MAGNESIA) 400 mg/5 mL oral suspension 30 mL  30 mL Oral DAILY PRN    bisacodyL (DULCOLAX) tablet 10 mg  10 mg Oral Q48H PRN    dorzolamide (TRUSOPT) 2 % ophthalmic solution 1 Drop  1 Drop Both Eyes TID    latanoprost (XALATAN) 0.005 % ophthalmic solution 1 Drop  1 Drop Both Eyes QHS    loperamide (IMODIUM) capsule 2 mg  2 mg Oral PRN    montelukast (SINGULAIR) tablet 10 mg  10 mg Oral DAILY    oxybutynin chloride XL (DITROPAN XL) tablet 10 mg  10 mg Oral QHS    sertraline (ZOLOFT) tablet 50 mg  50 mg Oral DAILY    acetaminophen (TYLENOL) tablet 650 mg  650 mg Oral Q4H PRN       Allergies:   Allergies   Allergen Reactions    Novocain [Procaine] Swelling       Functional Progress:    PHYSICAL THERAPY    ON ADMISSION MOST RECENT   Wheelchair Mobility/Management  Able to Propel (ft): 55 feet  Functional Level: 2 (mod/max A using bilat feet, occasionally UE to propel)  Curbs/Ramps Assist Required (FIM Score): 0 (Not tested)  Wheelchair Setup Assist Required : 2 (Maximal assistance)  Wheelchair Management: Manages left brake,Manages right brake (needing assistance) Wheelchair Mobility/Management  Able to Propel (ft): 60 feet  Functional Level:  (SBA)  Curbs/Ramps Assist Required (FIM Score): 0 (Not tested)  Wheelchair Setup Assist Required : 5 (Supervision/setup)  Wheelchair Management: Manages left brake,Manages right brake     Gait  Amount of Assistance: 3 (Moderate assistance)  Distance (ft): 18 Feet (ft)  Assistive Device: Walker, rolling,Gait belt Gait  Amount of Assistance: 4 (Minimal assistance)  Distance (ft): 18 Feet (ft) (20 ft, then 18 ft after seated rest)  Assistive Device: Walker, rolling,Gait belt     Balance-Sitting/Standing  Sitting - Static: Good (unsupported)  Sitting - Dynamic: Good (unsupported),Occassional,Fair (occasional)  Standing - Static: Fair,Occasional,Poor  Standing - Dynamic : Impaired  Other (comment): standing balance to be further assessed Balance-Sitting/Standing  Sitting - Static: Good (unsupported)  Sitting - Dynamic: Good (unsupported)  Standing - Static: Fair  Standing - Dynamic : Impaired  Other (comment): standing balance to be further assessed     Bed/Mat Mobility  Rolling Right : 5 (Stand-by assistance)  Rolling Left : 5 (Stand-by assistance)  Supine to Sit : 3 (Moderate assistance)  Sit to Supine : 4 (Minimal assistance) Bed/Mat Mobility  Rolling Right : 5 (Supervision)  Rolling Left : 5 (Supervision)  Supine to Sit : 4 (Contact guard assistance)  Sit to Supine : 5 (Supervision)     Transfers  Transfer Type: Other  Other: stand step with RW  Transfer Assistance : 3 (Moderate assistance )  Sit to Stand Assistance: Moderate assistance  Car Transfers: Not tested (Patient feeling nauseous during second session)  Car Type: NT Transfers  Transfer Type:  Other  Other: stand step with RW  Transfer Assistance : 4 (Contact guard assistance)  Sit to Stand Assistance: Minimal assistance (CGA for sit->stand, min A stand->sit on w/c surface)  Car Transfers: Minimum assistance  Car Type: car simulator     Steps or Stairs  Steps/Stairs Ambulated (#): 0  Level of Assist : 0 (Not tested)  Rail Use:  (NT ) Steps or Stairs  Steps/Stairs Ambulated (#):  (1-4 inch step)  Level of Assist : 3 (Moderate assistance)  Rail Use: Left  (and SC right UE)         Lab/Data Review:  No results found for this or any previous visit (from the past 24 hour(s)). Assessment:     Primary Rehabilitation Diagnosis  1. Impaired Mobility and ADLs  2. 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 Justification for continued stay: Good progression towards established rehabilitation goals. 2. Medical Issues being followed closely:    [x]  Fall and safety precautions     []  Wound Care     [x]  Bowel and Bladder Function     [x]  Fluid Electrolyte and Nutrition Balance     []  Pain Control      3. Issues that 24 hour rehabilitation nursing is following:    [x]  Fall and safety precautions     []  Wound Care     [x]  Bowel and Bladder Function     [x]  Fluid Electrolyte and Nutrition Balance     []  Pain Control      [x]  Assistance with and education on in-room safety with transfers to and from the bed, wheelchair, toilet and shower. 4. Acute rehabilitation plan of care:    [x]  Continue current care and rehab. [x]  Physical Therapy           [x]  Occupational Therapy           []  Speech Therapy     []  Hold Rehab until further notice     5. Medications:    [x]  MAR Reviewed     [x]  Continue Present Medications     6.  Chemical DVT Prophylaxis:      []  Enoxaparin     []  Unfractionated Heparin     [] Warfarin     []  NOAC     []  Aspirin     [x]  None     7. Mechanical DVT Prophylaxis:      [x]  RADHAMES Stockings     [x]  Sequential Compression Device     []  None     8. GI Prophylaxis:      []  PPI     []  H2 Blocker     [x]  None / Not indicated     9. Code status:    [x]  Full code     []  Partial code     []  Do not intubate     []  Do not resuscitate     10. Diet:  Specifications  3 carb choices (45 gm/meal); Low fat/Low cholesterol/High fiber/ANGELINE   Solids (consistency)  Regular   Liquids (consistency)  Thin   Fluid Restriction  None     11. 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   > Continue:    > Aspirin 325 mg PO once daily with breakfast    > Clopidogrel 75 mg PO once daily with dinner (STOP DATE: 3/27/2022)    > Meclizine 12.5 mg PO BID (for dizziness)     > Essential hypertension   > On 1/1/2022, started:    > Losartan 25 mg PO once daily (9AM)    > Hydralazine 25 mg PO TID PRN for SBP greater than 160 mmHg   > On 1/5/2022, increased Losartan from 25 mg to 50 mg PO once daily (9AM)   > Continue:    > Losartan 50 mg PO once daily (9AM)    > Hydralazine 25 mg PO TID PRN for SBP greater than 160 mmHg     > Hyperlipidemia   > Continue Atorvastatin 80 mg PO q HS     > Depression and anxiety   > Continue Sertraline 50 mg PO once daily     > Glaucoma   > Continue Latanoprost eyedrops 1 drop both eyes q HS     > Urinary incontinence   > Urinalysis (1/1/2022): WNL   > Continue Oxybutynin XL 10 mg PO once daily    > COVID-19 ruled out by laboratory testing   > Prior to admission and upon admission to the ARU, patient did not have any fever, desaturation, cough or difficulty breathing    > SARS-CoV-2 by [facility]) (1/7/2022): Not detected      12. Personal Protective Equipment (N95 face mask and eye goggles) was used while interacting with the patient. Patient was using a surgical mask.     13. Patient's progress in rehabilitation and medical issues discussed with the patient. All questions answered to the best of my ability. Care plan discussed with patient and nurse. 14. Total clinical care time is 30 minutes, including review of chart including all labs, radiology, past medical history, and discussion with patient. Greater than 50% of my time was spent in coordination of care and counseling.       Signed:    Braxton Boyd MD    January 11, 2022

## 2022-01-11 NOTE — ROUTINE PROCESS
[facility]    Bedside and Verbal shift change report given to Lea NOEL (oncoming nurse) by Vania Sierra RN (offgoing nurse). Report included the following information SBAR, Kardex, MAR and Recent Results. Situation:   Code Status: Full Code   Hospital Day: 11   Problem List:   Hospital Problems  Date Reviewed: 1/10/2022          Codes Class Noted POA    Essential hypertension (Chronic) ICD-10-CM: I10  ICD-9-CM: 401.9  Unknown Yes        Hypercholesterolemia with hypertriglyceridemia (Chronic) ICD-10-CM: E78.2  ICD-9-CM: 272.2  12/27/2021 Yes    Overview Signed 1/4/2022 12:02 AM by Adarsh Armendariz MD     Lipid profile (12/27/2021) showed , , HDL 48,              * (Principal) [facility] ICD-10-CM: I63.9  ICD-9-CM: 434.91  12/26/2021 Yes    Overview Signed 1/3/2022 11:59 PM by Adarsh Armendariz MD     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      Assessment:   Changes in Assessment throughout shift: No change to previous assessment     Patient has a central line: no Reasons if yes:  n/a  Insertion date: n/a Last dressing date: n/a   Patient has Edwards Cath: no Reasons if yes:  n/a   Insertion date: n/a     Last Vitals:     Vitals:    01/09/22 2127 01/10/22 0736 01/10/22 1543 01/10/22 2056   BP: 107/67 138/82 (!) 117/90 128/70   Pulse: 78 77 96 95   Resp: 18 16 18 19   Temp: 97 °F (36.1 °C) 98.5 °F (36.9 °C) 98.9 °F (37.2 °C) 97.6 °F (36.4 °C)   SpO2: 92% 92% 95% 96%   Weight:       Height:            PAIN    Pain Assessment    Pain Intensity 1: 0 (01/11/22 0001) Pain Intensity 1: 2 (12/29/14 1105)    Pain Location 1: Knee Pain Location 1: Abdomen    Pain Intervention(s) 1: Medication (see MAR) Pain Intervention(s) 1: Medication (see MAR)  Patient Stated Pain Goal: 0 Patient Stated Pain Goal: 0  o Intervention effective: no  o Other actions taken for pain:       Skin Assessment  Skin color    Condition/Temperature    Integrity    Turgor    Weekly Pressure Ulcer Documentation  Pressure  Injury Documentation: No Pressure Injury Noted-Pressure Ulcer Prevention Initiated  Wound Prevention & Protection Methods  Orientation of wound Orientation of Wound Prevention: Posterior  Location of Prevention Location of Wound Prevention: Sacrum/Coccyx  Dressing Present Dressing Present : No  Dressing Status    Wound Offloading Wound Offloading (Prevention Methods): Bed, pressure reduction mattress     INTAKE/OUPUT  Date 01/10/22 0700 - 01/11/22 0659 01/11/22 0700 - 01/12/22 0659   Shift 6530-7747 6049-4599 24 Hour Total 6381-4998 0793-3194 24 Hour Total   INTAKE   P.O. 440 100 540        P. O. 440 100 540      Shift Total(mL/kg) 440(7) 100(1.6) 540(8.6)      OUTPUT   Urine(mL/kg/hr)           Urine Occurrence(s) 2 x 1 x 3 x      Emesis/NG output           Emesis Occurrence(s)  0 x 0 x      Stool           Stool Occurrence(s) 0 x 0 x 0 x      Shift Total(mL/kg)          100 540      Weight (kg) 62.7 62.7 62.7 62.7 62.7 62.7       Recommendations:  1. Patient needs and requests: toileting    2. Pending tests/procedures: routine labs     3. Functional Level/Equipment: Partial (one person) / Wheelchair;Walker    Fall Precautions:   Fall risk precautions were reinforced with the patient; she was instructed to call for help prior to getting up. The following fall risk precautions were continued: bed/ chair alarms, door signage, yellow bracelet and socks as well as update of the [employer] tool in the patient's room.    Yadira Score: 3    HEALS Safety Check    A safety check occurred in the patient's room between off going nurse and oncoming nurse listed above. The safety check included the below items  Area Items   H  High Alert Medications - Verify all high alert medication drips (heparin, PCA, etc.)   E  Equipment - Suction is set up for ALL patients (with norah)  - Red plugs utilized for all equipment (IV pumps, etc.)  - WOWs wiped down at end of shift.  - Room stocked with oxygen, suction, and other unit-specific supplies   A  Alarms - Bed alarm is set for fall risk patients  - Ensure chair alarm is in place and activated if patient is up in a chair   L  Lines - Check IV for any infiltration  - Edwards bag is empty if patient has a Edwards   - Tubing and IV bags are labeled   S  Safety   - Room is clean, patient is clean, and equipment is clean. - Hallways are clear from equipment besides carts. - Fall bracelet on for fall risk patients  - Ensure room is clear and free of clutter  - Suction is set up for ALL patients (with norah)  - Hallways are clear from equipment besides carts.    - Isolation precautions followed, supplies available outside room, sign posted     Poly Hernandez RN

## 2022-01-12 PROCEDURE — 74011250636 HC RX REV CODE- 250/636: Performed by: FAMILY MEDICINE

## 2022-01-12 PROCEDURE — 74011250637 HC RX REV CODE- 250/637: Performed by: FAMILY MEDICINE

## 2022-01-12 PROCEDURE — 97110 THERAPEUTIC EXERCISES: CPT

## 2022-01-12 PROCEDURE — 2709999900 HC NON-CHARGEABLE SUPPLY

## 2022-01-12 PROCEDURE — 97116 GAIT TRAINING THERAPY: CPT

## 2022-01-12 PROCEDURE — 65310000000 HC RM PRIVATE REHAB

## 2022-01-12 PROCEDURE — 97535 SELF CARE MNGMENT TRAINING: CPT

## 2022-01-12 PROCEDURE — 99232 SBSQ HOSP IP/OBS MODERATE 35: CPT | Performed by: INTERNAL MEDICINE

## 2022-01-12 PROCEDURE — 74011250637 HC RX REV CODE- 250/637: Performed by: INTERNAL MEDICINE

## 2022-01-12 PROCEDURE — 97150 GROUP THERAPEUTIC PROCEDURES: CPT

## 2022-01-12 PROCEDURE — 97530 THERAPEUTIC ACTIVITIES: CPT

## 2022-01-12 RX ADMIN — LATANOPROST 1 DROP: 50 SOLUTION OPHTHALMIC at 21:01

## 2022-01-12 RX ADMIN — DORZOLAMIDE HYDROCHLORIDE 1 DROP: 20 SOLUTION/ DROPS OPHTHALMIC at 16:46

## 2022-01-12 RX ADMIN — DORZOLAMIDE HYDROCHLORIDE 1 DROP: 20 SOLUTION/ DROPS OPHTHALMIC at 09:59

## 2022-01-12 RX ADMIN — CLOPIDOGREL 75 MG: 75 TABLET, FILM COATED ORAL at 18:18

## 2022-01-12 RX ADMIN — DOCUSATE SODIUM 100 MG: 100 CAPSULE, LIQUID FILLED ORAL at 09:56

## 2022-01-12 RX ADMIN — SERTRALINE 50 MG: 50 TABLET, FILM COATED ORAL at 09:56

## 2022-01-12 RX ADMIN — LOSARTAN POTASSIUM 50 MG: 50 TABLET, FILM COATED ORAL at 09:57

## 2022-01-12 RX ADMIN — DORZOLAMIDE HYDROCHLORIDE 1 DROP: 20 SOLUTION/ DROPS OPHTHALMIC at 21:01

## 2022-01-12 RX ADMIN — OXYBUTYNIN CHLORIDE 10 MG: 5 TABLET, EXTENDED RELEASE ORAL at 21:02

## 2022-01-12 RX ADMIN — MECLIZINE 12.5 MG: 12.5 TABLET ORAL at 18:18

## 2022-01-12 RX ADMIN — MECLIZINE 12.5 MG: 12.5 TABLET ORAL at 09:56

## 2022-01-12 RX ADMIN — ASPIRIN 325 MG ORAL TABLET 325 MG: 325 PILL ORAL at 09:56

## 2022-01-12 RX ADMIN — CHOLECALCIFEROL TAB 25 MCG (1000 UNIT) 2000 UNITS: 25 TAB at 09:56

## 2022-01-12 RX ADMIN — MONTELUKAST 10 MG: 10 TABLET, FILM COATED ORAL at 09:56

## 2022-01-12 RX ADMIN — ATORVASTATIN CALCIUM 80 MG: 40 TABLET, FILM COATED ORAL at 21:02

## 2022-01-12 NOTE — INTERDISCIPLINARY ROUNDS
[facility] PHYSICAL REHABILITATION  [address] [place] [address], [address]    INPATIENT REHABILITATION  PRE-TEAM CONFERENCE SUMMARY     Date of Conference: 1/13/2022    Patient Information:        Name: Gaby Orellana Age / Sex: 82 y.o. / female   CSN: [id number] MRN: [id number]   [facility] Date: 12/31/2021 Length of Stay: 12 days     Primary Rehabilitation Diagnosis  1. Impaired Mobility and ADLs  2.  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 (HCC) I67.89, G81.94    Essential hypertension I10    Hypercholesterolemia with hypertriglyceridemia E78.2    Glaucoma H40.9    Depression with anxiety F41.8    Urinary incontinence R32          Therapy:     FIM SCORES Initial Assessment Weekly Progress Assessment 1/12/2022   Eating Functional Level: 5  5   Swallowing     Grooming 5  5   Bathing 4 Bathing Assistance, Upper: 5 (Supervision) (setup)  Upper Body : Compensatory technique training  Position Performed: Seated in chair  Adaptive Equipment: Tub bench  Comments: Pt performed UB showering from tub bench with setup using compensatory strategies  Bathing Assistance, Lower : 4 (Contact guard assistance)  Adaptive Equipment: Grab bar  Position Performed: Seated in chair  Adaptive Equipment: Tub bench  Comments: Pt performed LB showering with CGA in standing to wash eric area and buttocks   Upper Body Dressing Functional Level: 4  Items Applied/Steps Completed: Bra (3 steps),Pullover (4 steps)  Comments: Pt performed UB dressing with Fidel to don bra in seated position at EOB Dressing Assistance : 5 (Supervision) (setup)  Comments: Pt performed UB dressing with supervision and setup from w/c   Lower Body Dressing Functional Level: 2  Items Applied/Steps Completed: Elastic waist pants (3 steps),Shoe, left (1 step),Shoe, right (1 step),Sock, left (1 step),Sock, right (1 step),Underpants (3 steps)  Comments: Pt completed LB dressing with maxA to don BLE socks and TA to don pull tab brief, pt thread BLE feet through pants and pulled up over BLE hips and buttocks with modA. Dressing Assistance : 4 (Contact guard assistance)  Leg Crossed Method Used: No  Position Performed: Seated in chair;Standing  Adaptive Equipment Used: Walker  Comments: Pt performed LB dressing from w/c to thread BLE feet through underwear and pants and CGA at FWW to pull up over buttocks and BLE hips. Toileting Functional Level: 2  Comments: Pt performed toileting with FWW to [facility] with maxA to perform toileting hygiene and CM due to decreased stability and strength Toileting Assistance (FIM Score): 5 (Supervision) (CGA)   Bladder 0 0   Bowel  0 0   Toilet Transfer [place] Toilet Transfer Score: 3  Comments: Pt performed toilet transfer with modA using FWW, pt required assistance due to instability and deformity of BLE legs (IR of BLE hips) and decreased strength and activity tolerance 4 (Contact guard assistance)   Tub/Shower Transfer [place] Tub or Shower Type: Shower (small step to step over)  Tub/Shower Transfer Score: 0 (NT due to safety concerns)     4 (CGA)   Comprehension Primary Mode of Comprehension: Auditory  Score: 5 Auditory  6   Expression Primary Mode of Expression: Verbal  Score: 6 Verbal  6   Social Interaction Score: 6 5   Problem Solving Score: 5 4   Memory Score: 6  Comments: appears intact 5     FIM SCORES Initial Assessment Weekly Progress Assessment 1/12/2022   Bed/Chair/Wheelchair Transfers Transfer Type: Other  Other: stand step with RW  Transfer Assistance : 3 (Moderate assistance )  Sit to Stand Assistance:  Moderate assistance  Car Transfers: Not tested (Patient feeling nauseous during second session)  Car Type: NT Transfer Type: Other  Other: stand step with RW  Transfer Assistance : 4 (Contact guard assistance)  Sit to Stand Assistance: Contact guard assistance  Car Transfers:  (CGA using RW, needing extra time)  Car Type: car transfer simulator   Bed Mobility Rolling Right 5 (Stand-by assistance)   Rolling Left 5 (Stand-by assistance)   Supine to Sit 3 (Moderate assistance)   Sit to Stand Moderate assistance   Sit to Supine 4 (Minimal assistance)    Rolling Right   5 (Supervision)   Rolling Left   5 (Supervision)   Supine to Sit   6 (Modified independent)   Sit to Stand   Contact guard assistance   Sit to Supine   6 (Modified independent)      Locomotion (W/C) Able to Propel (ft): 55 feet  Functional Level: 2 (mod/max A using bilat feet, occasionally UE to propel)  Curbs/Ramps Assist Required (FIM Score): 0 (Not tested)  Wheelchair Setup Assist Required : 2 (Maximal assistance)  Wheelchair Management: Manages left brake,Manages right brake (needing assistance) Function  (SBA)  Setup Assistance  5 (Supervision/setup)      Locomotion (W/C distance)   100 feet (fatiguing after 100 ft, using bilat LE to propel)   Locomotion (Walk) 3 (Moderate assistance) 4 (Minimal assistance)  Walker, rolling;Gait belt   Locomotion (Walk dist.) 18 Feet (ft) 25 Feet (ft)   Steps/Stairs Steps/Stairs Ambulated (#): 0  Level of Assist : 0 (Not tested)  Rail Use:  (NT )  NT         Nursing:     Neuro:   AAA&O x 4            Respiratory:   [x] WNL   [] O2 LPM:   Other:  Peripheral Vascular:   [] TEDS present   [] Edema present ____ Grade   Cardiac:   [x] WNL   [] Other  Genitourinary:   [x] continent   [x] incontinent   [] ovalle  Abdominal ___1/11____ LBM  GI: _reg___ Diet _thin_____ Liquids _____ tube feeds  Musculoskeletal: ____ ROM Transfers _____ Assistive Device Used  _mod___ Level of Assistance  Skin Integumentary:   [x] Intact   [] Not Intact   __________Preventative Measures  Details______________________________________________________________  Pain: [x] Controlled   [] Not Controlled   Pain Meds:   [] Scheduled   [] PRN        Interdisciplinary Team Goals:     1. Discipline  Physical Therapy    Goal  Patient will perform sit to stand with SBA from multiple height surfaces using RW    Barrier  Pain bilat LE, decreased strength, endurance, balance    Intervention  Therex, theract, NM re-ed    Goal written by:   Tamiko Deng, PT, DPT     2. Discipline  Occupational Therapy    Goal  Pt will perform toilet and shower transfers with CGA and AE consistently. Barrier Impaired strength, decreased activity tolerance    Intervention  ADL training, transfer training    Goal written by:  Florinda Becerril MS, OTR/L     3. Discipline  Speech Therapy    Goal      Barrier      Intervention      Goal written by:       4. Discipline  Nursing    Goal  Pt. Will be more familiar with types of strokes,  risk factors, treatment, prevention and medications by discharge from rehab. Barrier  Knowledge deficit, Acute ischemic stroke    Intervention  Pt. Education, instruction , medication teaching and review med reconciliation. Goal written by:  Shawn Nogueira, RN     5. Discipline  Clinical Psychology    Goal  Understand all stroke recovery and maintain mood stability    Barrier  Limited insight about all parameters of recovery and history of mood distress    Intervention  Patient/stroke education, support  as needed and Rx    Goal written by:  Elma Hung, PhD         Disposition / Discharge Planning:      Follow-up services:  [x] Physical Therapy             [x] Occupational Therapy       [] Speech Therapy           [] Skilled Nursing      [] Medical Social Worker   [] Aide        [] Outpatient      [] vs   [x] Home Health  [] vs       [] to progress to outpatient       [] with 24-hour supervision       [] with 24-hour assistance   [] Sarwat DAMIAN recommendations:  CAROLYN w/colleen   Estimated discharge date: 1/21/2022   Discharge Location:  [] Home  [] versus    [] Sarwat Triplett    [] [address]   [] Other:           Electronic Signatures:      Signature Date Signed   Physical Therapist    Solis Muñiz, PT, DPT  1/12/2022   Occupational Therapist   Josue Chambers MS, OTR/L  1/12/2022   Speech Therapist         Recreational Therapist    Rozina Chew, CTRS 1/13/2022   Nursing    Dolly Thomson  1/12/2022   Clinical Psychologist    Feli Hodge, PhD  1/12/2022    Physician    Wen Awad MD   1/12/2022         Lewis Fraser, MSW  1/12/2022     Opportunity to share with family/caregiver[] YES [] NO    Relationship to patient____________________________________________________      The above information has been reviewed with the patient in a language that they can understand. Opportunity for comments and questions has been provided and a signed attestation has been scanned into the \"media tab\" of the EMR.       Patient Signature: ______________________________________________________    Date Signed: __________________________________________________________

## 2022-01-12 NOTE — ROUTINE PROCESS
[facility]    Bedside and Verbal shift change report given to Tiffany NOEL (oncoming nurse) by Marlene Dow RN (offgoing nurse). Report included the following information SBAR, Kardex, MAR and Recent Results. Situation:   Code Status: Full Code   Hospital Day: 11   Problem List:   Hospital Problems  Date Reviewed: 1/11/2022          Codes Class Noted POA    Essential hypertension (Chronic) ICD-10-CM: I10  ICD-9-CM: 401.9  Unknown Yes        Hypercholesterolemia with hypertriglyceridemia (Chronic) ICD-10-CM: E78.2  ICD-9-CM: 272.2  12/27/2021 Yes    Overview Signed 1/4/2022 12:02 AM by John Le MD     Lipid profile (12/27/2021) showed , , HDL 48,              * (Principal) [facility] ICD-10-CM: I63.9  ICD-9-CM: 434.91  12/26/2021 Yes    Overview Signed 1/3/2022 11:59 PM by John Le MD     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      Assessment:   Changes in Assessment throughout shift:       Patient has a central line: no Reasons if yes:  n/a  Insertion date: n/a Last dressing date: n/a   Patient has Edwards Cath: no Reasons if yes:  n/a   Insertion date: n/a     Last Vitals:     Vitals:    01/10/22 2056 01/11/22 0821 01/11/22 0829 01/11/22 1716   BP: 128/70 115/64 (!) 146/74 132/82   Pulse: 95 71 89 77   Resp: 19 18 18 18   Temp: 97.6 °F (36.4 °C) 98.1 °F (36.7 °C) 98 °F (36.7 °C) 98.2 °F (36.8 °C)   SpO2: 96% 99% 96% 95%   Weight:       Height:            PAIN    Pain Assessment    Pain Intensity 1: 0 (01/11/22 1630) Pain Intensity 1: 2 (12/29/14 1105)    Pain Location 1: Knee Pain Location 1: Abdomen    Pain Intervention(s) 1: Medication (see MAR) Pain Intervention(s) 1: Medication (see MAR)  Patient Stated Pain Goal: 0 Patient Stated Pain Goal: 0  o Intervention effective: no  o Other actions taken for pain:       Skin Assessment  Skin color    Condition/Temperature    Integrity    Turgor    Weekly Pressure Ulcer Documentation  Pressure  Injury Documentation: No Pressure Injury Noted-Pressure Ulcer Prevention Initiated  Wound Prevention & Protection Methods  Orientation of wound Orientation of Wound Prevention: Posterior  Location of Prevention Location of Wound Prevention: Sacrum/Coccyx  Dressing Present Dressing Present : No  Dressing Status    Wound Offloading Wound Offloading (Prevention Methods): Bed, pressure reduction mattress     INTAKE/OUPUT  Date 01/10/22 1900 - 01/11/22 0659 01/11/22 0700 - 01/12/22 0659   Shift 9013-6499 24 Hour Total 2730-2789 5557-2356 24 Hour Total   INTAKE   P.O. 100 540 120  120     P. O. 100 540 120  120   Shift Total(mL/kg) 100(1.6) 540(8.6) 120(1.9)  120(1.9)   OUTPUT   Urine(mL/kg/hr)          Urine Occurrence(s) 2 x 4 x 1 x  1 x   Emesis/NG output          Emesis Occurrence(s) 0 x 0 x      Stool          Stool Occurrence(s) 0 x 0 x 1 x  1 x   Shift Total(mL/kg)         540 120  120   Weight (kg) 62.7 62.7 62.7 62.7 62.7       Recommendations:  1. Patient needs and requests: toileting    2. Pending tests/procedures: routine labs     3. Functional Level/Equipment: Partial (one person) / Wheelchair    Fall Precautions:   Fall risk precautions were reinforced with the patient; she was instructed to call for help prior to getting up. The following fall risk precautions were continued: bed/ chair alarms, door signage, yellow bracelet and socks as well as update of the [place] [place] tool in the patient's room.    Yadira Score: 3    HEALS Safety Check    A safety check occurred in the patient's room between off going nurse and oncoming nurse listed above. The safety check included the below items  Area Items   H  High Alert Medications - Verify all high alert medication drips (heparin, PCA, etc.)   E  Equipment - Suction is set up for ALL patients (with norah)  - Red plugs utilized for all equipment (IV pumps, etc.)  - WOWs wiped down at end of shift.  - Room stocked with oxygen, suction, and other unit-specific supplies   A  Alarms - Bed alarm is set for fall risk patients  - Ensure chair alarm is in place and activated if patient is up in a chair   L  Lines - Check IV for any infiltration  - Edwards bag is empty if patient has a Edwards   - Tubing and IV bags are labeled   S  Safety   - Room is clean, patient is clean, and equipment is clean. - Hallways are clear from equipment besides carts. - Fall bracelet on for fall risk patients  - Ensure room is clear and free of clutter  - Suction is set up for ALL patients (with norah)  - Hallways are clear from equipment besides carts.    - Isolation precautions followed, supplies available outside room, sign posted     Juan Berger RN

## 2022-01-12 NOTE — PROGRESS NOTES
Problem: Self Care Deficits Care Plan (Adult)  Goal: *Therapy Goal (Edit Goal, Insert Text)  Description: Occupational Therapy Goals   Long Term Goals  Initiated 22 and to be accomplished within 3 week(s), by 2022  1. Pt will perform self-feeding with independence. 2. Pt will perform grooming with independence. 3. Pt will perform UB bathing with supervision. 4. Pt will perform LB bathing with supervision. 5. Pt will perform tub/shower transfer with supervision. 6. Pt will perform UB dressing with Axel using compensatory strategies. 7. Pt will perform LB dressing with Axel and AE. 8. Pt will perform toileting task with Axel and AE. 9. Pt will perform toilet transfer with Axel and AE. Short Term Goals   Initiated 22 (reassessed on 2022) and to be accomplished within 7 day(s), to be reassessed by 2022  1. Pt will perform self-feeding with Axel. 2. Pt will perform grooming with Axel. 3. Pt will perform UB bathing with SBA. (Goal met 2022)      Upgraded goal: Pt will perform UB bathing with set-up/ supv. 4. Pt will perform LB bathing with Fidel and UE support. (Goal met 2022)      Upgraded goal: Pt will perform LB bathing with CGA using AE and/ or compensatory strategies as needed. 5. Pt will perform tub/shower transfer with Fidel and AE. 6. Pt will perform UB dressing with SBA using compensatory strategies. (Goal met 2022)      Upgraded goal: Pt will perform UB dressing with supv using compensatory strategies as needed. 7. Pt will perform LB dressing with Fidel and AE. 8. Pt will perform toileting task with Fidel and AE. (Goal met 2022; continue goal for consistency)  9. Pt will perform toilet transfer with Fidel and AE. Outcome: Progressing Towards Goal  Goal: Interventions  Outcome: Progressing Towards Goal   Occupational Therapy TREATMENT    Patient: Gaby Orellana   82 y.o.     Patient identified with name and : yes    Date: 2022    First Tx Session  Time In: 0911  Time Out[de-identified] 1000    Second Tx Session  Time In: 1401  Time Out[de-identified] 2961    Diagnosis: CVA (cerebral vascular accident) [facility]) [I63.9]   Precautions: Fall  Chart, occupational therapy assessment, plan of care, and goals were reviewed. Pain:  Pt reports 0/10 pain or discomfort prior to treatment. Pt reports 0/10 pain or discomfort post treatment. 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     OBJECTIVE DATA SUMMARY:     THERAPEUTIC ACTIVITY Daily Assessment    Pt engaged in group activity with one other pt which involved in reaching across table top in dynamic standing to play game [place] with min VC's for problem solving. Pt demonstrated standing tolerance of 5 minutes 15 sec., 5 minutes 28 sec. And 2 minutes 5 sec. With CGA/SBA and w/c in locked position behind pt for safety.       UPPER BODY BATHING Daily Assessment    Upper Body Bathing  Bathing Assistance, Upper: 5 (Supervision) (setup)  Upper Body : Compensatory technique training  Position Performed: Seated in chair  Adaptive Equipment: Tub bench  Comments: Pt performed UB showering from tub bench with setup using compensatory strategies     LOWER BODY BATHING Daily Assessment    Lower Body Bathing  Bathing Assistance, Lower : 4 (Contact guard assistance)  Adaptive Equipment: Grab bar  Position Performed: Seated in chair  Adaptive Equipment: Tub bench  Comments: Pt performed LB showering with CGA in standing to wash eric area and buttocks     TOILETING Daily Assessment    Toileting  Toileting Assistance (FIM Score): 5 (Supervision) (CGA)     UPPER BODY DRESSING Daily Assessment    Upper Body Dressing   Dressing Assistance : 5 (Supervision) (setup)  Comments: Pt performed UB dressing with supervision and setup from w/c     LOWER BODY DRESSING Daily Assessment    Lower Body Dressing   Dressing Assistance : 4 (Contact guard assistance)  Leg Crossed Method Used: No  Position Performed: Seated in chair;Standing  Adaptive Equipment Used: Walker  Comments: Pt performed LB dressing from w/c to thread BLE feet through underwear and pants and CGA at FWW to pull up over buttocks and BLE hips. MOBILITY/TRANSFERS Daily Assessment    Functional Transfers  Toilet Transfer : Elevated seat;Stand pivot transfer with walker  Amount of Assistance Required: 4 (Contact guard assistance)       ASSESSMENT:  Pt is making slow progress toward goals and demonstrating improved standing tolerance with therapeutic activity while reaching across table top. Pt is also making progress using compensatory strategies to facilitate independence with self cares due to arthritic changes. Progression toward goals:  []          Improving appropriately and progressing toward goals  [x]          Improving slowly and progressing toward goals  []          Not making progress toward goals and plan of care will be adjusted     PLAN:  Patient continues to benefit from skilled intervention to address the above impairments. Continue treatment per established plan of care. Discharge Recommendations:  Home Health with assistance  Further Equipment Recommendations for Discharge:  bedside commode     Activity Tolerance:  fair      Estimated LOS:1/21/2022    Please refer to the flowsheet for vital signs taken during this treatment. After treatment:   [x]  Patient left in no apparent distress sitting up in chair   []  Patient left in no apparent distress in bed  [x]  Call bell left within reach  []  Nursing notified  []  Caregiver present  []  Bed alarm activated    COMMUNICATION/EDUCATION:   [] Home safety education was provided and the patient/caregiver indicated understanding. [] Patient/family have participated as able in goal setting and plan of care. [x] Patient/family agree to work toward stated goals and plan of care. [] Patient understands intent and goals of therapy, but is neutral about his/her participation.   [] Patient is unable to participate in goal setting and plan of care.       Camelia Morelos, OT

## 2022-01-12 NOTE — ROUTINE PROCESS
SHIFT CHANGE NOTE FOR [facility]    Bedside and Verbal shift change report given to Oscar Gregorio RN (oncoming nurse) by Tiffanie Peterson RN (offgoing nurse). Report included the following information SBAR, Kardex, MAR and Recent Results. Situation:   Code Status: Full Code   Hospital Day: 12   Problem List:   Hospital Problems  Date Reviewed: 1/11/2022          Codes Class Noted POA    Essential hypertension (Chronic) ICD-10-CM: I10  ICD-9-CM: 401.9  Unknown Yes        Hypercholesterolemia with hypertriglyceridemia (Chronic) ICD-10-CM: E78.2  ICD-9-CM: 272.2  12/27/2021 Yes    Overview Signed 1/4/2022 12:02 AM by Sylvie Valadez MD     Lipid profile (12/27/2021) showed , , HDL 48,              * (Principal) Acute ischemic stroke ([facility] [address] ICD-10-CM: I63.9  ICD-9-CM: 434.91  12/26/2021 Yes    Overview Signed 1/3/2022 11:59 PM by Sylvie Valadez MD     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      Assessment:   Changes in Assessment throughout shift: No change to previous assessment     Patient has a central line: no Reasons if yes:  n/a  Insertion date: n/a Last dressing date: n/a   Patient has Edwards Cath: no Reasons if yes:  n/a   Insertion date: n/a     Last Vitals:     Vitals:    01/11/22 0829 01/11/22 1716 01/11/22 2100 01/12/22 0728   BP: (!) 146/74 132/82 118/68 (!) 148/79   Pulse: 89 77 76 78   Resp: 18 18 18 16   Temp: 98 °F (36.7 °C) 98.2 °F (36.8 °C) 98.5 °F (36.9 °C) 98.3 °F (36.8 °C)   SpO2: 96% 95% 94% 92%   Weight:       Height:            PAIN    Pain Assessment    Pain Intensity 1: 0 (01/12/22 0400) Pain Intensity 1: 2 (12/29/14 1105)    Pain Location 1: Knee Pain Location 1: Abdomen    Pain Intervention(s) 1: Medication (see MAR) Pain Intervention(s) 1: Medication (see MAR)  Patient Stated Pain Goal: 0 Patient Stated Pain Goal: 0  o Intervention effective: no  o Other actions taken for pain:       Skin Assessment  Skin color    Condition/Temperature    Integrity    Turgor    Weekly Pressure Ulcer Documentation  Pressure  Injury Documentation: No Pressure Injury Noted-Pressure Ulcer Prevention Initiated  Wound Prevention & Protection Methods  Orientation of wound Orientation of Wound Prevention: Posterior  Location of Prevention Location of Wound Prevention: Buttocks,Sacrum/Coccyx  Dressing Present Dressing Present : No  Dressing Status    Wound Offloading Wound Offloading (Prevention Methods): Bed, pressure redistribution/air     INTAKE/OUPUT  Date 01/11/22 0700 - 01/12/22 0659 01/12/22 0700 - 01/13/22 0659   Shift 1666-5502 8596-7127 24 Hour Total 3168-3129 6016-1641 24 Hour Total   INTAKE   P.O. 120  120        P. O. 120  120      Shift Total(mL/kg) 120(1.9)  120(1.9)      OUTPUT   Urine(mL/kg/hr)           Urine Occurrence(s) 1 x 6 x 7 x      Stool           Stool Occurrence(s) 1 x 0 x 1 x      Shift Total(mL/kg)           120      Weight (kg) 62.7 62.7 62.7 62.7 62.7 62.7       Recommendations:  1. Patient needs and requests: toileting    2. Pending tests/procedures: routine labs     3. Functional Level/Equipment: Partial (one person) /      Fall Precautions:   Fall risk precautions were reinforced with the patient; she was instructed to call for help prior to getting up. The following fall risk precautions were continued: bed/ chair alarms, door signage, yellow bracelet and socks as well as update of the Madhuri BevSpotdy tool in the patient's room. Yadira Score:      HEALS Safety Check    A safety check occurred in the patient's room between off going nurse and oncoming nurse listed above.     The safety check included the below items  Area Items   H  High Alert Medications - Verify all high alert medication drips (heparin, PCA, etc.)   E  Equipment - Suction is set up for ALL patients (with norah)  - Red plugs utilized for all equipment (IV pumps, etc.)  - WOWs wiped down at end of shift.  - Room stocked with oxygen, suction, and other unit-specific supplies   A  Alarms - Bed alarm is set for fall risk patients  - Ensure chair alarm is in place and activated if patient is up in a chair   L  Lines - Check IV for any infiltration  - Edwards bag is empty if patient has a Edwards   - Tubing and IV bags are labeled   S  Safety   - Room is clean, patient is clean, and equipment is clean. - Hallways are clear from equipment besides carts. - Fall bracelet on for fall risk patients  - Ensure room is clear and free of clutter  - Suction is set up for ALL patients (with norah)  - Hallways are clear from equipment besides carts.    - Isolation precautions followed, supplies available outside room, sign posted     Sd Boyd RN

## 2022-01-12 NOTE — PROGRESS NOTES
[facility] PHYSICAL REHABILITATION  [address] [place] [address], [address]     INPATIENT REHABILITATION  DAILY PROGRESS NOTE     Date: 1/12/2022    Name: Memo Cam Age / Sex: 82 y.o. 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 Blood pressure controlled. Patient's Complaint:   No significant medical complaints    Pain Control: no current joint or muscle symptoms, essentially pain-free      Objective:     Vital Signs:  Patient Vitals for the past 24 hrs:   BP Temp Pulse Resp SpO2   01/12/22 0728 (!) 148/79 98.3 °F (36.8 °C) 78 16 92 %   01/11/22 2100 118/68 98.5 °F (36.9 °C) 76 18 94 %   01/11/22 1716 132/82 98.2 °F (36.8 °C) 77 18 95 %        Physical Examination:  GENERAL SURVEY: Patient is awake, alert, oriented x 3, laying comfortably on the bed, not in acute respiratory distress. HEENT: pink palpebral conjunctivae, anicteric sclerae, no nasoaural discharge, moist oral mucosa  NECK: supple, no jugular venous distention, no palpable lymph nodes  CHEST/LUNGS: symmetrical chest expansion, good air entry, clear breath sounds  HEART: adynamic precordium, good S1 S2, no S3, regular rhythm, no murmurs  ABDOMEN: flat, bowel sounds appreciated, soft, non-tender  EXTREMITIES: (+) limitation ROM of both shoulders due to history of rotator cuff injury, pink nailbeds, no edema, full and equal pulses, no calf tenderness   NEUROLOGICAL EXAM: The patient is awake, alert and oriented x3, able to answer questions fairly appropriately, able to follow 1 and 2 step commands. Able to tell time from the wall clock. Cranial nerves II-XII are grossly intact. No gross sensory deficit.   Motor strength is 4-/5 on the RUE (except right shoulder), 3+/5 on the LUE (except left shoulder), 4+/5 on the RLE, 4/5 on the LLE. Current Medications:  Current Facility-Administered Medications   Medication Dose Route Frequency    losartan (COZAAR) tablet 50 mg  50 mg Oral DAILY    cholecalciferol (VITAMIN D3) (1000 Units /25 mcg) tablet 2,000 Units  2,000 Units Oral DAILY    aspirin tablet 325 mg  325 mg Oral DAILY WITH BREAKFAST    atorvastatin (LIPITOR) tablet 80 mg  80 mg Oral QHS    clopidogreL (PLAVIX) tablet 75 mg  75 mg Oral DAILY WITH DINNER    meclizine (ANTIVERT) tablet 12.5 mg  12.5 mg Oral BID    hydrALAZINE (APRESOLINE) tablet 25 mg  25 mg Oral TID PRN    ondansetron (ZOFRAN ODT) tablet 4 mg  4 mg Oral Q8H PRN    docusate sodium (COLACE) capsule 100 mg  100 mg Oral BID    magnesium hydroxide (MILK OF MAGNESIA) 400 mg/5 mL oral suspension 30 mL  30 mL Oral DAILY PRN    bisacodyL (DULCOLAX) tablet 10 mg  10 mg Oral Q48H PRN    dorzolamide (TRUSOPT) 2 % ophthalmic solution 1 Drop  1 Drop Both Eyes TID    latanoprost (XALATAN) 0.005 % ophthalmic solution 1 Drop  1 Drop Both Eyes QHS    loperamide (IMODIUM) capsule 2 mg  2 mg Oral PRN    montelukast (SINGULAIR) tablet 10 mg  10 mg Oral DAILY    oxybutynin chloride XL (DITROPAN XL) tablet 10 mg  10 mg Oral QHS    sertraline (ZOLOFT) tablet 50 mg  50 mg Oral DAILY    acetaminophen (TYLENOL) tablet 650 mg  650 mg Oral Q4H PRN       Allergies:   Allergies   Allergen Reactions    Novocain [Procaine] Swelling       Functional Progress:    OCCUPATIONAL THERAPY    ON ADMISSION MOST RECENT   Eating  Functional Level: 5   Eating  Functional Level: 5     Grooming  Functional Level: 5   Grooming  Functional Level: 5     Bathing  Functional Level: 4   Bathing  Functional Level: 4     Upper Body Dressing  Functional Level: 4   Upper Body Dressing  Functional Level: 4     Lower Body Dressing  Functional Level: 2   Lower Body Dressing  Functional Level: 2     Toileting  Functional Level: 2 Toileting  Functional Level: 2     Toilet Transfers  Toilet Transfer Score: 3   Toilet Transfers  Toilet Transfer Score: 3     Tub /Shower Transfers  Tub/Shower Transfer Score: 0 (NT due to safety concerns)   Tub/Shower Transfers  Tub/Shower Transfer Score: 0 (NT due to safety concerns)       Legend:   7 - Independent   6 - Modified Independent   5 - Standby Assistance / Supervision / Set-up   4 - Minimum Assistance / Contact Guard Assistance   3 - Moderate Assistance   2 - Maximum Assistance   1 - Total Assistance / Dependent       Lab/Data Review:  No results found for this or any previous visit (from the past 24 hour(s)). Assessment:     Primary Rehabilitation Diagnosis  1. Impaired Mobility and ADLs  2. 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 Justification for continued stay: Good progression towards established rehabilitation goals. 2. Medical Issues being followed closely:    [x]  Fall and safety precautions     []  Wound Care     [x]  Bowel and Bladder Function     [x]  Fluid Electrolyte and Nutrition Balance     []  Pain Control      3. Issues that 24 hour rehabilitation nursing is following:    [x]  Fall and safety precautions     []  Wound Care     [x]  Bowel and Bladder Function     [x]  Fluid Electrolyte and Nutrition Balance     []  Pain Control      [x]  Assistance with and education on in-room safety with transfers to and from the bed, wheelchair, toilet and shower.       4. Acute rehabilitation plan of care: [x]  Continue current care and rehab. [x]  Physical Therapy           [x]  Occupational Therapy           []  Speech Therapy     []  Hold Rehab until further notice     5. Medications:    [x]  MAR Reviewed     [x]  Continue Present Medications     6. Chemical DVT Prophylaxis:      []  Enoxaparin     []  Unfractionated Heparin     []  Warfarin     []  NOAC     []  Aspirin     [x]  None     7. Mechanical DVT Prophylaxis:      [x]  RADHAMES Stockings     [x]  Sequential Compression Device     []  None     8. GI Prophylaxis:      []  PPI     []  H2 Blocker     [x]  None / Not indicated     9. Code status:    [x]  Full code     []  Partial code     []  Do not intubate     []  Do not resuscitate     10. Diet:  Specifications  3 carb choices (45 gm/meal); Low fat/Low cholesterol/High fiber/ANGELINE   Solids (consistency)  Regular   Liquids (consistency)  Thin   Fluid Restriction  None     11.  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   > Continue:    > Aspirin 325 mg PO once daily with breakfast    > Clopidogrel 75 mg PO once daily with dinner (STOP DATE: 3/27/2022)    > Meclizine 12.5 mg PO BID (for dizziness)     > Essential hypertension   > On 1/1/2022, started:    > Losartan 25 mg PO once daily (9AM)    > Hydralazine 25 mg PO TID PRN for SBP greater than 160 mmHg   > On 1/5/2022, increased Losartan from 25 mg to 50 mg PO once daily (9AM)   > Continue:    > Losartan 50 mg PO once daily (9AM)    > Hydralazine 25 mg PO TID PRN for SBP greater than 160 mmHg     > Hyperlipidemia   > Continue Atorvastatin 80 mg PO q HS     > Depression and anxiety   > Continue Sertraline 50 mg PO once daily     > Glaucoma   > Continue Latanoprost eyedrops 1 drop both eyes q HS     > Urinary incontinence   > Urinalysis (1/1/2022): WNL   > Continue Oxybutynin XL 10 mg PO once daily    > COVID-19 ruled out by laboratory testing   > Prior to admission and upon admission to the ARU, patient did not have any fever, desaturation, cough or difficulty breathing    > SARS-CoV-2 by [facility]) (1/7/2022): Not detected      12. Personal Protective Equipment (N95 face mask and eye goggles) was used while interacting with the patient. Patient was using a surgical mask. 15. Patient's progress in rehabilitation and medical issues discussed with the patient. All questions answered to the best of my ability. Care plan discussed with patient and nurse. 14. Total clinical care time is 30 minutes, including review of chart including all labs, radiology, past medical history, and discussion with patient. Greater than 50% of my time was spent in coordination of care and counseling.       Signed:    Ginny Elkins MD    January 12, 2022

## 2022-01-12 NOTE — PROGRESS NOTES
Problem: Mobility Impaired (Adult and Pediatric)  Goal: *Therapy Goal (Edit Goal, Insert Text)  Description: Physical Therapy Short Term Goals  Initiated 1/1/2022, updated 1/7/2022 and to be accomplished within 7 day(s) on 1/14/2022  1. Patient will move from supine to sit and sit to supine  in bed with minimal assistance/contact guard assist.  Goal met 1/7/2022  Updated goal: Patient will perform supine<->sit supervision level   2. Patient will transfer from bed to chair and chair to bed with minimal assistance using the least restrictive device. Goal met 1/7/2022  Updated goal: Patient will perform bed<->chair transfer with SBA using RW. 3.  Patient will perform sit to stand with minimal assistance. Goal met 1/7/2022  Updated goal: Patient will perform sit to stand with SBA>   4. Patient will ambulate with minimal assistance/contact guard assist for 50 feet with the least restrictive device before needing seated rest. Goal ongoing  5. Patient will participate in formal balance assessment Lidya Stamp Balance Scale/Postural Assessment Scale for Stroke). Goal ongoing    Physical Therapy Long Term Goals  Initiated 1/1/2022 and to be accomplished within 21 day(s) on 1/22/2022  1. Patient will move from supine to sit and sit to supine , scoot up and down, and roll side to side in bed with modified independence. 2.  Patient will transfer from bed to chair and chair to bed with modified independence using the least restrictive device. 3.  Patient will perform sit to stand with modified independence. 4.  Patient will ambulate with modified independence for 150 feet with the least restrictive device. 5.  Patient will negotiate one curb step with CG/SBA , using appropriate AD for safe community mobility.        Outcome: Progressing Towards Goal   PHYSICAL THERAPY TREATMENT    Patient: Cosme Armendariz (66 y.o. female)  Date: 1/12/2022  Diagnosis: CVA (cerebral vascular accident) [facility]) [I63.9] Acute ischemic stroke [facility])  Precautions: Fall  Chart, physical therapy assessment, plan of care and goals were reviewed. Time In:1008  Time Out:1110    Patient seen for: Gait training;Patient education; Therapeutic exercise;Transfer training; Wheelchair mobility;Balance activities    Pain:  Patient continues to report ongoing chronic bilat knee pain that affects her ability to perform sit<->stand and weightbearing activities comfortably. Physician (Dr. Jonelle Bustamante) notified about this barrier to progress and per physician, patient will be appropriate for application of voltaren gel to see if patient better able to tolerate these activities. Physician to order as appropriate; will reassess at next session to see if patient better able to perform these tasks. Patient identified with name and : yes    SUBJECTIVE:      Patient agreeable to treatment. Continues to report feeling tired rather quickly with activity. OBJECTIVE DATA SUMMARY:    Objective:     GROSS ASSESSMENT Daily Assessment     AROM: Generally decreased, functional  PROM: Generally decreased, functional  Strength: Generally decreased, functional (poor functional endurance)  Coordination: Within functional limits  Tone: Normal  Sensation: Impaired       BED/MAT MOBILITY Daily Assessment     Rolling Right : 5 (Supervision)  Rolling Left : 5 (Supervision)  Supine to Sit : 6 (Modified independent)  Sit to Supine : 6 (Modified independent)      TRANSFERS Daily Assessment     Transfer Type: Other  Other: stand step with RW  Transfer Assistance : 4 (Contact guard assistance)  Sit to Stand Assistance: Contact guard assistance  Car Transfers:  (CGA using RW, needing extra time)  Car Type: car transfer simulator     Transfers with RW needing ongoing CGA for safety, maintaining trunk forward during sit->stand and cues for slowing down stand->sit. Due to reported knee pain, patient having difficulty with eccentric control stand to sit.  Performing sit to stand from 16-18\" height surfaces. Extra time required for all sit<->stand and transfers with RW, needing cues as well for turning RW completely around while stepping with her feet to come to chair. GAIT Daily Assessment    Gait Description (WDL) Exceptions to WDL    Gait Abnormalities Decreased step clearance;Shuffling gait; Step to gait (left LE rotating with fatigue)    Assistive device Walker, rolling;Gait belt    Ambulation assistance - level surface 4 (Minimal assistance)    Distance 25 Feet (ft) when distance challenged, also performing multiple gait bouts x 8-15 ft. Ambulation assistance- uneven surface  (NT)    Comments Gait with RW min A 25 ft with cues for more upright gaze, stepping into middle of RW appropriately as well, keeping left foot in more neutral hip/leg rotation as she has the tendency to externally rotate leg and foot as she fatigues. BALANCE Daily Assessment     Sitting - Static: Good (unsupported)  Sitting - Dynamic: Good (unsupported)  Standing - Static: Fair  Standing - Dynamic : Impaired        WHEELCHAIR MOBILITY Daily Assessment     Able to Propel (ft): 100 feet (fatiguing after 100 ft, using bilat LE to propel)  Functional Level:  (SBA)  Curbs/Ramps Assist Required (FIM Score): 0 (Not tested)  Wheelchair Setup Assist Required : 5 (Supervision/setup)  Wheelchair Management: Manages left brake;Manages right brake (does not use foot rests)     W/C mobility bouts x 55-60 ft to/from her room, also performing x 100 ft when distance challenged with intermittent cues for improved left LE hip flex and ankle DF to better clear the floor. THERAPEUTIC EXERCISES Daily Assessment       Standing alternating foot touches to 4\" step (bilat UE support of RW), emphasis on adequate hip flexion combined with ankle DF to appropriately clear step. Needing cues for more upright posture for ease of task. Only able to tolerate 6-7 reps at one time, performing x 4 sets.      Also performing seated exercise on SCI FIT 3000, level 1 for 6.5 minutes before needing rest to improve UE/LE coordination, better ability to perform hip/knee flex/ext in reciprocal pattern and improved aerobic endurance. Patient reporting that she typically does a similar exercise at home to help her with moving around better. ASSESSMENT:  Patient would continue to benefit from skilled PT to improve ability to perform safe functional mobility, particularly to improve strength, endurance, balance to be able to perform transfers with increased independence, improve gait pattern and ability to more comfortably mobilize. Patient did not indicate any increased dizziness with mobility today. Patient has been provided HEP for habituation with horizontal and vertical head turns to continue to decrease her dizziness symptoms with mobility/activity when incorporating head turns. Patient making overall slow gains toward her functional goals, but currently still requiring use of w/c due to poor gait endurance/tolerance at this time. Currently she is unable to perform gait at household distances so relies on wheelchair as primary mode of mobility. Progression toward goals:  []      Improving appropriately and progressing toward goals  [x]      Improving slowly and progressing toward goals  []      Not making progress toward goals and plan of care will be adjusted      PLAN:  Patient continues to benefit from skilled intervention to address the above impairments. Continue treatment per established plan of care. Discharge Recommendations:  Home Health with caregiver assistance  Further Equipment Recommendations for Discharge:  rolling walker and wheelchair (lightweight, donavan-height)      Estimated Discharge Date: 1/21/2022    Activity Tolerance:   Fair due to fatigue, pain.     After treatment:   [] Patient left in no apparent distress in bed  [] Patient left in no apparent distress sitting up in chair  [x] Patient left in no apparent distress in w/c mobilizing under own power  [] Patient left in no apparent distress dining area  [] Patient left in no apparent distress mobilizing under own power  [x] Call bell left within reach  [x] Nursing notified  [] Caregiver present  [] Bed alarm activated   [x] Chair alarm activated      Sherry Arreola, PT  1/12/2022

## 2022-01-13 PROCEDURE — 74011250637 HC RX REV CODE- 250/637: Performed by: FAMILY MEDICINE

## 2022-01-13 PROCEDURE — 74011250636 HC RX REV CODE- 250/636: Performed by: FAMILY MEDICINE

## 2022-01-13 PROCEDURE — 97150 GROUP THERAPEUTIC PROCEDURES: CPT

## 2022-01-13 PROCEDURE — 97110 THERAPEUTIC EXERCISES: CPT

## 2022-01-13 PROCEDURE — 97530 THERAPEUTIC ACTIVITIES: CPT

## 2022-01-13 PROCEDURE — 97116 GAIT TRAINING THERAPY: CPT

## 2022-01-13 PROCEDURE — 74011250637 HC RX REV CODE- 250/637: Performed by: INTERNAL MEDICINE

## 2022-01-13 PROCEDURE — 65310000000 HC RM PRIVATE REHAB

## 2022-01-13 PROCEDURE — 97535 SELF CARE MNGMENT TRAINING: CPT

## 2022-01-13 PROCEDURE — 99232 SBSQ HOSP IP/OBS MODERATE 35: CPT | Performed by: INTERNAL MEDICINE

## 2022-01-13 RX ORDER — DICLOFENAC SODIUM 10 MG/G
2 GEL TOPICAL 3 TIMES DAILY
Status: DISCONTINUED | OUTPATIENT
Start: 2022-01-13 | End: 2022-01-20 | Stop reason: HOSPADM

## 2022-01-13 RX ORDER — ACETAMINOPHEN 325 MG/1
650 TABLET ORAL 2 TIMES DAILY
Status: DISCONTINUED | OUTPATIENT
Start: 2022-01-14 | End: 2022-01-20 | Stop reason: HOSPADM

## 2022-01-13 RX ORDER — ACETAMINOPHEN 325 MG/1
650 TABLET ORAL
Status: DISCONTINUED | OUTPATIENT
Start: 2022-01-13 | End: 2022-01-20 | Stop reason: HOSPADM

## 2022-01-13 RX ADMIN — MONTELUKAST 10 MG: 10 TABLET, FILM COATED ORAL at 09:15

## 2022-01-13 RX ADMIN — OXYBUTYNIN CHLORIDE 10 MG: 5 TABLET, EXTENDED RELEASE ORAL at 21:06

## 2022-01-13 RX ADMIN — CHOLECALCIFEROL TAB 25 MCG (1000 UNIT) 2000 UNITS: 25 TAB at 09:15

## 2022-01-13 RX ADMIN — DOCUSATE SODIUM 100 MG: 100 CAPSULE, LIQUID FILLED ORAL at 18:23

## 2022-01-13 RX ADMIN — CLOPIDOGREL 75 MG: 75 TABLET, FILM COATED ORAL at 18:23

## 2022-01-13 RX ADMIN — ATORVASTATIN CALCIUM 80 MG: 40 TABLET, FILM COATED ORAL at 20:05

## 2022-01-13 RX ADMIN — DOCUSATE SODIUM 100 MG: 100 CAPSULE, LIQUID FILLED ORAL at 09:15

## 2022-01-13 RX ADMIN — ASPIRIN 325 MG ORAL TABLET 325 MG: 325 PILL ORAL at 09:15

## 2022-01-13 RX ADMIN — MECLIZINE 12.5 MG: 12.5 TABLET ORAL at 09:15

## 2022-01-13 RX ADMIN — LATANOPROST 1 DROP: 50 SOLUTION OPHTHALMIC at 21:00

## 2022-01-13 RX ADMIN — DORZOLAMIDE HYDROCHLORIDE 1 DROP: 20 SOLUTION/ DROPS OPHTHALMIC at 21:08

## 2022-01-13 RX ADMIN — DORZOLAMIDE HYDROCHLORIDE 1 DROP: 20 SOLUTION/ DROPS OPHTHALMIC at 09:15

## 2022-01-13 RX ADMIN — MECLIZINE 12.5 MG: 12.5 TABLET ORAL at 18:26

## 2022-01-13 RX ADMIN — DORZOLAMIDE HYDROCHLORIDE 1 DROP: 20 SOLUTION/ DROPS OPHTHALMIC at 15:50

## 2022-01-13 RX ADMIN — LOSARTAN POTASSIUM 50 MG: 50 TABLET, FILM COATED ORAL at 09:15

## 2022-01-13 RX ADMIN — SERTRALINE 50 MG: 50 TABLET, FILM COATED ORAL at 09:15

## 2022-01-13 RX ADMIN — DICLOFENAC SODIUM 2 G: 10 GEL TOPICAL at 18:26

## 2022-01-13 NOTE — INTERDISCIPLINARY ROUNDS
[facility] PHYSICAL REHABILITATION  [address] [place] [address], [address]    INPATIENT REHABILITATION  TEAM CONFERENCE SUMMARY     Date of Conference: 1/13/2022    Patient Information:        Name: Mady Richard Age / Sex: 82 y.o. / female   CSN: [id number] MRN: [id number]   [facility] Date: 12/31/2021 Length of Stay: 13 days     Primary Rehabilitation Diagnosis  1. Impaired Mobility and ADLs  2. 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 (HCC) I67.89, G81.94    Essential hypertension I10    Hypercholesterolemia with hypertriglyceridemia E78.2    Glaucoma H40.9    Depression with anxiety F41.8    Urinary incontinence R32    Primary osteoarthritis of both knees M17.0          Therapy:     FIM SCORES Initial Assessment Weekly Progress Assessment 1/13/2022   Eating Functional Level: 5  5   Swallowing     Grooming 5  5   Bathing 4        Upper Body Dressing Functional Level: 4  Items Applied/Steps Completed: Bra (3 steps),Pullover (4 steps)  Comments: Pt performed UB dressing with Fidel to don bra in seated position at EOB     Lower Body Dressing Functional Level: 2  Items Applied/Steps Completed: Elastic waist pants (3 steps),Shoe, left (1 step),Shoe, right (1 step),Sock, left (1 step),Sock, right (1 step),Underpants (3 steps)  Comments: Pt completed LB dressing with maxA to don BLE socks and TA to don pull tab brief, pt thread BLE feet through pants and pulled up over BLE hips and buttocks with modA.      Toileting Functional Level: 2  Comments: Pt performed toileting with FWW to [facility] with maxA to perform toileting hygiene and CM due to decreased stability and strength     Bladder 0 0   Bowel  0 0   Toilet Transfer Dubois Toilet Transfer Score: 3  Comments: Pt performed toilet transfer with modA using FWW, pt required assistance due to instability and deformity of BLE legs (IR of BLE hips) and decreased strength and activity tolerance     Tub/Shower Transfer [place]ois Tub or Shower Type: Shower (small step to step over)  Tub/Shower Transfer Score: 0 (NT due to safety concerns)     4 (CGA)   Comprehension Primary Mode of Comprehension: Auditory  Score: 5 Auditory  6   Expression Primary Mode of Expression: Verbal  Score: 6 Verbal  6   Social Interaction Score: 6 5   Problem Solving Score: 5 4   Memory Score: 6  Comments: appears intact 5     FIM SCORES Initial Assessment Weekly Progress Assessment 1/13/2022   Bed/Chair/Wheelchair Transfers Transfer Type: Other  Other: stand step with RW  Transfer Assistance : 3 (Moderate assistance )  Sit to Stand Assistance:  Moderate assistance  Car Transfers: Not tested (Patient feeling nauseous during second session)  Car Type: NT     Bed Mobility Rolling Right 5 (Stand-by assistance)   Rolling Left 5 (Stand-by assistance)   Supine to Sit 3 (Moderate assistance)   Sit to Stand Moderate assistance   Sit to Supine 4 (Minimal assistance)    Rolling Right       Rolling Left       Supine to Sit       Sit to Stand       Sit to Supine          Locomotion (W/C) Able to Propel (ft): 55 feet  Functional Level: 2 (mod/max A using bilat feet, occasionally UE to propel)  Curbs/Ramps Assist Required (FIM Score): 0 (Not tested)  Wheelchair Setup Assist Required : 2 (Maximal assistance)  Wheelchair Management: Manages left brake,Manages right brake (needing assistance) Function    Setup Assistance         Locomotion (W/C distance)       Locomotion (Walk) 3 (Moderate assistance)        Locomotion (Walk dist.) 18 Feet (ft)     Steps/Stairs Steps/Stairs Ambulated (#): 0  Level of Assist : 0 (Not tested)  Rail Use:  (NT )  NT         Nursing:     Neuro:   AAA&O x 4            Respiratory:   [x] WNL [] O2 LPM:   Other:  Peripheral Vascular:   [] TEDS present   [] Edema present ____ Grade   Cardiac:   [x] WNL   [] Other  Genitourinary:   [x] continent   [x] incontinent   [] ovalle  Abdominal ___1/11____ LBM  GI: _reg___ Diet _thin_____ Liquids _____ tube feeds  Musculoskeletal: ____ ROM Transfers _____ Assistive Device Used  _mod___ Level of Assistance  Skin Integumentary:   [x] Intact   [] Not Intact   __________Preventative Measures  Details______________________________________________________________  Pain: [x] Controlled   [] Not Controlled   Pain Meds:   [] Scheduled   [] PRN        Interdisciplinary Team Goals:     1. Discipline  Physical Therapy    Goal  Patient will perform sit to stand with SBA from multiple height surfaces using RW    Barrier  Pain bilat LE, decreased strength, endurance, balance    Intervention  Therex, theract, NM re-ed    Goal written by:   Johnathan Khan, PT, DPT     2. Discipline  Occupational Therapy    Goal  Pt will perform toilet and shower transfers with CGA and AE consistently. Barrier Impaired strength, decreased activity tolerance    Intervention  ADL training, transfer training    Goal written by:  Tiffanie Jett MS, OTR/L     3. Discipline  Speech Therapy    Goal      Barrier      Intervention      Goal written by:       4. Discipline  Nursing    Goal  Pt. Will be more familiar with types of strokes,  risk factors, treatment, prevention and medications by discharge from rehab. Barrier  Knowledge deficit, Acute ischemic stroke    Intervention  Pt. Education, instruction , medication teaching and review med reconciliation. Goal written by:  Shana Khan, BERT     5.   Discipline  Clinical Psychology    Goal  Understand all stroke recovery and maintain mood stability    Barrier  Limited insight about all parameters of recovery and history of mood distress    Intervention  Patient/stroke education, support  as needed and Rx    Goal written by:  Oscar Andersen, PhD Disposition / Discharge Planning:      Follow-up services:  [x] Physical Therapy             [x] Occupational Therapy       [] Speech Therapy           [] Skilled Nursing      [] Medical Social Worker   [] Aide        [] Outpatient      [] vs   [x] Home Health  [] vs       [] to progress to outpatient       [] with 24-hour supervision       [] with 24-hour assistance   [] Sarwat DAMIAN recommendations:  CAROLYN w/c   Estimated discharge date: 1/21/2022   Discharge Location:  [x] Home  [] versus    [] [facility]    [] [address]   [] Other:           Interdisciplinary team rounds were held this PM with the following team members:       Name   Physical Therapist    Neeru Moran PT, DPT     Occupational Therapist    Sumi Love MS, OTR/L   Recreational Therapist    Ramírez Blake, Tom Booker, BERT      Physician    Linette Gonzalez MD        Avani Gabriel, MSW          Signed:  Linette Gonzalez MD    January 13, 2022

## 2022-01-13 NOTE — ROUTINE PROCESS
0800 Pt. Awake sitting up in bed no change in assessment pt. Reported to be feeling fine. 0930 Pt. Sitting up in chair eating breakfast.  1200 with therapy. 1330 able to transfer from bed to chair with assist.  1500 no change in assessment. 1800 Pt. Sitting up in bed chair eating dinner.

## 2022-01-13 NOTE — ROUTINE PROCESS
[facility]    Bedside and Verbal shift change report given to Estrella Troy RN (oncoming nurse) by Lisha Buckley RN (offgoing nurse). Report included the following information SBAR, Kardex, MAR and Recent Results. Situation:   Code Status: Full Code   Hospital Day: 12   Problem List:   Hospital Problems  Date Reviewed: 1/12/2022          Codes Class Noted POA    Essential hypertension (Chronic) ICD-10-CM: I10  ICD-9-CM: 401.9  Unknown Yes        Hypercholesterolemia with hypertriglyceridemia (Chronic) ICD-10-CM: E78.2  ICD-9-CM: 272.2  12/27/2021 Yes    Overview Signed 1/4/2022 12:02 AM by Eden Lutz MD     Lipid profile (12/27/2021) showed , , HDL 48,              * (Principal) Acute ischemic stroke ([facility] [address] ICD-10-CM: I63.9  ICD-9-CM: 434.91  12/26/2021 Yes    Overview Signed 1/3/2022 11:59 PM by Eden Lutz MD     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      Assessment:   Changes in Assessment throughout shift: No change to previous assessment     Patient has a central line: no Reasons if yes: na  Insertion date:na Last dressing date:na   Patient has Ovalle Cath: no Reasons if yes: na   Insertion date:na  Shift ovalle care completed: NO     Last Vitals:     Vitals:    01/11/22 1716 01/11/22 2100 01/12/22 0728 01/12/22 1632   BP: 132/82 118/68 (!) 148/79 126/80   Pulse: 77 76 78 78   Resp: 18 18 16 18   Temp: 98.2 °F (36.8 °C) 98.5 °F (36.9 °C) 98.3 °F (36.8 °C) 98.2 °F (36.8 °C)   SpO2: 95% 94% 92% 96%   Weight:       Height:            PAIN    Pain Assessment    Pain Intensity 1: 0 (01/12/22 1632) Pain Intensity 1: 2 (12/29/14 1105)    Pain Location 1: Knee Pain Location 1: Abdomen    Pain Intervention(s) 1: Medication (see MAR) Pain Intervention(s) 1: Medication (see MAR)  Patient Stated Pain Goal: 0 Patient Stated Pain Goal: 0  o Intervention effective: yes  o Other actions taken for pain:       Skin Assessment  Skin color    Condition/Temperature    Integrity    Turgor    Weekly Pressure Ulcer Documentation  Pressure  Injury Documentation: No Pressure Injury Noted-Pressure Ulcer Prevention Initiated  Wound Prevention & Protection Methods  Orientation of wound Orientation of Wound Prevention: Posterior  Location of Prevention Location of Wound Prevention: Buttocks,Sacrum/Coccyx  Dressing Present Dressing Present : No  Dressing Status    Wound Offloading Wound Offloading (Prevention Methods): Bed, pressure redistribution/air     INTAKE/OUPUT  Date 01/11/22 1900 - 01/12/22 0659 01/12/22 0700 - 01/13/22 0659   Shift 1847-1398 24 Hour Total 2596-1763 8717-9102 24 Hour Total   INTAKE   P.O.  120 1080  1080     P. O.  120 1080  1080   Shift Total(mL/kg)  120(1.9) 0800(89.9)  8464(39.3)   OUTPUT   Urine(mL/kg/hr)          Urine Occurrence(s) 6 x 7 x 3 x  3 x   Stool          Stool Occurrence(s) 0 x 1 x 1 x  1 x   Shift Total(mL/kg)        NET  120 1080  1080   Weight (kg) 62.7 62.7 62.7 62.7 62.7       Recommendations:  1. Patient needs and requests: na    2. Pending tests/procedures: na     3. Functional Level/Equipment:   /      Fall Precautions:   Fall risk precautions were reinforced with the patient; she was instructed to call for help prior to getting up. The following fall risk precautions were continued: bed/ chair alarms, door signage, yellow bracelet and socks as well as update of the Geovanna Bluelock tool in the patient's room. Yadira Score: 3    HEALS Safety Check    A safety check occurred in the patient's room between off going nurse and oncoming nurse listed above.     The safety check included the below items  Area Items   H  High Alert Medications - Verify all high alert medication drips (heparin, PCA, etc.)   E  Equipment - Suction is set up for ALL patients (with norah)  - Red plugs utilized for all equipment (IV pumps, etc.)  - WOWs wiped down at end of shift.  - Room stocked with oxygen, suction, and other unit-specific supplies   A  Alarms - Bed alarm is set for fall risk patients  - Ensure chair alarm is in place and activated if patient is up in a chair   L  Lines - Check IV for any infiltration  - Edwards bag is empty if patient has a Edwards   - Tubing and IV bags are labeled   S  Safety   - Room is clean, patient is clean, and equipment is clean. - Hallways are clear from equipment besides carts. - Fall bracelet on for fall risk patients  - Ensure room is clear and free of clutter  - Suction is set up for ALL patients (with norah)  - Hallways are clear from equipment besides carts.    - Isolation precautions followed, supplies available outside room, sign posted     Danica Cox RN

## 2022-01-13 NOTE — PROGRESS NOTES
Problem: Self Care Deficits Care Plan (Adult)  Goal: *Therapy Goal (Edit Goal, Insert Text)  Description: Occupational Therapy Goals   Long Term Goals  Initiated 22 and to be accomplished within 3 week(s), by 2022  1. Pt will perform self-feeding with independence. 2. Pt will perform grooming with independence. 3. Pt will perform UB bathing with supervision. 4. Pt will perform LB bathing with supervision. 5. Pt will perform tub/shower transfer with supervision. 6. Pt will perform UB dressing with Axel using compensatory strategies. 7. Pt will perform LB dressing with Axel and AE. 8. Pt will perform toileting task with Axel and AE. 9. Pt will perform toilet transfer with Axel and AE. Short Term Goals   Initiated 22 (reassessed on 2022) and to be accomplished within 7 day(s), to be reassessed by 2022  1. Pt will perform self-feeding with Axel. 2. Pt will perform grooming with Axel. 3. Pt will perform UB bathing with SBA. (Goal met 2022)      Upgraded goal: Pt will perform UB bathing with set-up/ supv. 4. Pt will perform LB bathing with Fidel and UE support. (Goal met 2022)      Upgraded goal: Pt will perform LB bathing with CGA using AE and/ or compensatory strategies as needed. 5. Pt will perform tub/shower transfer with Fidel and AE. 6. Pt will perform UB dressing with SBA using compensatory strategies. (Goal met 2022)      Upgraded goal: Pt will perform UB dressing with supv using compensatory strategies as needed. 7. Pt will perform LB dressing with Fidel and AE. 8. Pt will perform toileting task with Fidel and AE. (Goal met 2022; continue goal for consistency)  9. Pt will perform toilet transfer with Fidel and AE. Outcome: Progressing Towards Goal  Goal: Interventions  Outcome: Progressing Towards Goal   Occupational Therapy TREATMENT    Patient: Jenyhel Pair   82 y.o.     Patient identified with name and : yes    Date: 2022    First Tx Session  Time In: 0930  Time Out[de-identified] 7039    Second Tx Session  Time In: 1401  Time Out[de-identified] 6155    Diagnosis: CVA (cerebral vascular accident) [facility]) [I63.9]   Precautions: Fall  Chart, occupational therapy assessment, plan of care, and goals were reviewed. Pain:  Pt reports 0/10 pain or discomfort prior to treatment. Pt reports 0/10 pain or discomfort post treatment. Intervention Provided: Pt reported no pain in BLE knees but reported they pop and make her feel unstable and \"insecure\" when standing. SUBJECTIVE:   Patient stated my knees don't hurt they make me feel unstable and insecure.  (referring to BLE knees popping)    OBJECTIVE DATA SUMMARY:     THERAPEUTIC ACTIVITY Daily Assessment    Pt engaged in functional reach activity grasping medium pegs and securing to vertical surface in seated and standing position. Pt demonstrated ability to grasp pegs with LUE and RUE however increased difficulty with RUE due to digit 1 & 2 being numb. Pt demonstrated standing tolerance of 4 minutes 30 sec. And 5 minutes second rep with SBA/CGA. Pt engaged in group activity with one other pt which involved playing game of checkers in standing and reaching across tabletop with SBA. THERAPEUTIC EXERCISE Daily Assessment    Pt performed BUE strengthening with 3# dowel (bicep curls) 15 reps, 3 sets with RB's in between sets in seated position. IADL Daily Assessment    Pt engaged in simulated med management task with dycem sorting pills in pill box according to instruction on bottle with supervision and improved safety using dycem due to impaired BUE FM. Pt engaged in laundry task which involved loading clothes in washer using AE to facilitate increased independence (ie:long handled reacher, cane) with SBA. MOBILITY/TRANSFERS Daily Assessment     Pt performed sit to stand with CGA/SBA this date and demonstrated improved performance.         ASSESSMENT:  Pt is making good progress toward goals however demonstrates stiffness in the mornings and occasionally requires increased assistance due to arthritic changes. Pt demonstrated improved performance with sit to stand this date increasing to SBA. Pt and son provided education for safety with self cares and use of AE to reduce risk for falls as well as environmental modifications. Progression toward goals:  []          Improving appropriately and progressing toward goals  [x]          Improving slowly and progressing toward goals  []          Not making progress toward goals and plan of care will be adjusted     PLAN:  Patient continues to benefit from skilled intervention to address the above impairments. Continue treatment per established plan of care. Discharge Recommendations:  Home Health with assistance  Further Equipment Recommendations for Discharge:  bedside commode     Activity Tolerance:  fair      Estimated LOS:1/21/2022    Please refer to the flowsheet for vital signs taken during this treatment. After treatment:   [x]  Patient left in no apparent distress sitting up in chair   []  Patient left in no apparent distress in bed  [x]  Call bell left within reach  []  Nursing notified  []  Caregiver present  []  Bed alarm activated    COMMUNICATION/EDUCATION:   [x] Home safety education was provided and the patient/caregiver indicated understanding. [] Patient/family have participated as able in goal setting and plan of care. [x] Patient/family agree to work toward stated goals and plan of care. [] Patient understands intent and goals of therapy, but is neutral about his/her participation. [] Patient is unable to participate in goal setting and plan of care.       Caesar Sebastian, OT

## 2022-01-13 NOTE — PROGRESS NOTES
[facility] PHYSICAL REHABILITATION  [address] [place] [address], [address]     INPATIENT REHABILITATION  DAILY PROGRESS NOTE     Date: 1/13/2022    Name: Dinorah Carreon Age / Sex: 82 y.o. 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 Blood pressure controlled. Team conference was held at bedside this PM.     Patient's Complaint:   No significant medical complaints    Pain Control: no current joint or muscle symptoms, essentially pain-free      Objective:     Vital Signs:  Patient Vitals for the past 24 hrs:   BP Temp Pulse Resp SpO2   01/13/22 0759 (!) 145/72 98.2 °F (36.8 °C) 80 16 94 %   01/12/22 2100 115/60 98.4 °F (36.9 °C) 74 18 94 %   01/12/22 1632 126/80 98.2 °F (36.8 °C) 78 18 96 %        Physical Examination:  GENERAL SURVEY: Patient is awake, alert, oriented x 3, laying comfortably on the bed, not in acute respiratory distress. HEENT: pink palpebral conjunctivae, anicteric sclerae, no nasoaural discharge, moist oral mucosa  NECK: supple, no jugular venous distention, no palpable lymph nodes  CHEST/LUNGS: symmetrical chest expansion, good air entry, clear breath sounds  HEART: adynamic precordium, good S1 S2, no S3, regular rhythm, no murmurs  ABDOMEN: flat, bowel sounds appreciated, soft, non-tender  EXTREMITIES: (+) limitation ROM of both shoulders due to history of rotator cuff injury, pink nailbeds, no edema, full and equal pulses, no calf tenderness   NEUROLOGICAL EXAM: The patient is awake, alert and oriented x3, able to answer questions fairly appropriately, able to follow 1 and 2 step commands. Able to tell time from the wall clock. Cranial nerves II-XII are grossly intact. No gross sensory deficit.   Motor strength is 4-/5 on the RUE (except right shoulder), 3+/5 on the LUE (except left shoulder), 4+/5 on the RLE, 4/5 on the LLE. Current Medications:  Current Facility-Administered Medications   Medication Dose Route Frequency    [START ON 1/14/2022] acetaminophen (TYLENOL) tablet 650 mg  650 mg Oral BID    acetaminophen (TYLENOL) tablet 650 mg  650 mg Oral Q4H PRN    diclofenac (VOLTAREN) 1 % topical gel 2 g  2 g Topical TID    losartan (COZAAR) tablet 50 mg  50 mg Oral DAILY    cholecalciferol (VITAMIN D3) (1000 Units /25 mcg) tablet 2,000 Units  2,000 Units Oral DAILY    aspirin tablet 325 mg  325 mg Oral DAILY WITH BREAKFAST    atorvastatin (LIPITOR) tablet 80 mg  80 mg Oral QHS    clopidogreL (PLAVIX) tablet 75 mg  75 mg Oral DAILY WITH DINNER    meclizine (ANTIVERT) tablet 12.5 mg  12.5 mg Oral BID    hydrALAZINE (APRESOLINE) tablet 25 mg  25 mg Oral TID PRN    ondansetron (ZOFRAN ODT) tablet 4 mg  4 mg Oral Q8H PRN    docusate sodium (COLACE) capsule 100 mg  100 mg Oral BID    magnesium hydroxide (MILK OF MAGNESIA) 400 mg/5 mL oral suspension 30 mL  30 mL Oral DAILY PRN    bisacodyL (DULCOLAX) tablet 10 mg  10 mg Oral Q48H PRN    dorzolamide (TRUSOPT) 2 % ophthalmic solution 1 Drop  1 Drop Both Eyes TID    latanoprost (XALATAN) 0.005 % ophthalmic solution 1 Drop  1 Drop Both Eyes QHS    loperamide (IMODIUM) capsule 2 mg  2 mg Oral PRN    montelukast (SINGULAIR) tablet 10 mg  10 mg Oral DAILY    oxybutynin chloride XL (DITROPAN XL) tablet 10 mg  10 mg Oral QHS    sertraline (ZOLOFT) tablet 50 mg  50 mg Oral DAILY       Allergies:   Allergies   Allergen Reactions    Novocain [Procaine] Swelling       Functional Progress:    PHYSICAL THERAPY    ON ADMISSION MOST RECENT   Wheelchair Mobility/Management  Able to Propel (ft): 55 feet  Functional Level: 2 (mod/max A using bilat feet, occasionally UE to propel)  Curbs/Ramps Assist Required (FIM Score): 0 (Not tested)  Wheelchair Setup Assist Required : 2 (Maximal assistance)  Wheelchair Management: Manages left brake,Manages right brake (needing assistance) Wheelchair Mobility/Management  Able to Propel (ft): 100 feet (fatiguing after 100 ft, using bilat LE to propel)  Functional Level:  (SBA)  Curbs/Ramps Assist Required (FIM Score): 0 (Not tested)  Wheelchair Setup Assist Required : 5 (Supervision/setup)  Wheelchair Management: Manages left brake,Manages right brake (does not use foot rests)     Gait  Amount of Assistance: 3 (Moderate assistance)  Distance (ft): 18 Feet (ft)  Assistive Device: Walker, rolling,Gait belt Gait  Amount of Assistance: 4 (Minimal assistance)  Distance (ft): 25 Feet (ft)  Assistive Device: Walker, rolling,Gait belt     Balance-Sitting/Standing  Sitting - Static: Good (unsupported)  Sitting - Dynamic: Good (unsupported),Occassional,Fair (occasional)  Standing - Static: Fair,Occasional,Poor  Standing - Dynamic : Impaired  Other (comment): standing balance to be further assessed Balance-Sitting/Standing  Sitting - Static: Good (unsupported)  Sitting - Dynamic: Good (unsupported)  Standing - Static: Fair  Standing - Dynamic : Impaired  Other (comment): standing balance to be further assessed     Bed/Mat Mobility  Rolling Right : 5 (Stand-by assistance)  Rolling Left : 5 (Stand-by assistance)  Supine to Sit : 3 (Moderate assistance)  Sit to Supine : 4 (Minimal assistance) Bed/Mat Mobility  Rolling Right : 5 (Supervision)  Rolling Left : 5 (Supervision)  Supine to Sit : 6 (Modified independent)  Sit to Supine : 6 (Modified independent)     Transfers  Transfer Type: Other  Other: stand step with RW  Transfer Assistance : 3 (Moderate assistance )  Sit to Stand Assistance: Moderate assistance  Car Transfers: Not tested (Patient feeling nauseous during second session)  Car Type: NT Transfers  Transfer Type:  Other  Other: stand step with RW  Transfer Assistance : 4 (Contact guard assistance)  Sit to Stand Assistance: Contact guard assistance  Car Transfers:  (CGA using RW, needing extra time)  Car Type: car transfer simulator     Steps or Stairs  Steps/Stairs Ambulated (#): 0  Level of Assist : 0 (Not tested)  Rail Use:  (NT ) Steps or Stairs  Steps/Stairs Ambulated (#):  (1-4 inch step)  Level of Assist : 3 (Moderate assistance)  Rail Use: Left  (and SC right UE)         Lab/Data Review:  No results found for this or any previous visit (from the past 24 hour(s)). Assessment:     Primary Rehabilitation Diagnosis  1. Impaired Mobility and ADLs  2. 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 Justification for continued stay: Good progression towards established rehabilitation goals. 2. Medical Issues being followed closely:    [x]  Fall and safety precautions     []  Wound Care     [x]  Bowel and Bladder Function     [x]  Fluid Electrolyte and Nutrition Balance     []  Pain Control      3. Issues that 24 hour rehabilitation nursing is following:    [x]  Fall and safety precautions     []  Wound Care     [x]  Bowel and Bladder Function     [x]  Fluid Electrolyte and Nutrition Balance     []  Pain Control      [x]  Assistance with and education on in-room safety with transfers to and from the bed, wheelchair, toilet and shower. 4. Acute rehabilitation plan of care:    [x]  Continue current care and rehab.            [x]  Physical Therapy           [x]  Occupational Therapy           []  Speech Therapy     [] Hold Rehab until further notice     5. Medications:    [x]  MAR Reviewed     [x]  Continue Present Medications     6. Chemical DVT Prophylaxis:      []  Enoxaparin     []  Unfractionated Heparin     []  Warfarin     []  NOAC     []  Aspirin     [x]  None     7. Mechanical DVT Prophylaxis:      [x]  RADHAMES Stockings     [x]  Sequential Compression Device     []  None     8. GI Prophylaxis:      []  PPI     []  H2 Blocker     [x]  None / Not indicated     9. Code status:    [x]  Full code     []  Partial code     []  Do not intubate     []  Do not resuscitate     10. Diet:  Specifications  3 carb choices (45 gm/meal); Low fat/Low cholesterol/High fiber/ANGELINE   Solids (consistency)  Regular   Liquids (consistency)  Thin   Fluid Restriction  None     11.  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   > Continue:    > Aspirin 325 mg PO once daily with breakfast    > Clopidogrel 75 mg PO once daily with dinner (STOP DATE: 3/27/2022)    > Meclizine 12.5 mg PO BID (for dizziness)     > Essential hypertension   > On 1/1/2022, started:    > Losartan 25 mg PO once daily (9AM)    > Hydralazine 25 mg PO TID PRN for SBP greater than 160 mmHg   > On 1/5/2022, increased Losartan from 25 mg to 50 mg PO once daily (9AM)   > Continue:    > Losartan 50 mg PO once daily (9AM)    > Hydralazine 25 mg PO TID PRN for SBP greater than 160 mmHg     > Hyperlipidemia   > Continue Atorvastatin 80 mg PO q HS     > Depression and anxiety   > Continue Sertraline 50 mg PO once daily     > Glaucoma   > Continue Latanoprost eyedrops 1 drop both eyes q HS     > Urinary incontinence   > Urinalysis (1/1/2022): WNL   > Continue Oxybutynin XL 10 mg PO once daily    > Primary osteoarthritis of both knees    > Start:    > Acetaminophen 650 mg PO BID (8AM, 12PM)    > Diclofenac 1% gel, 2 grams applied to both knees TID (8AM, 12PM, 4PM)   > Continue  Acetaminophen 650 mg PO q 4 hr PRN for pain     > COVID-19 ruled out by laboratory testing   > Prior to admission and upon admission to the ARU, patient did not have any fever, desaturation, cough or difficulty breathing    > SARS-CoV-2 by [facility]) (1/7/2022): Not detected      12. Personal Protective Equipment (N95 face mask and eye goggles) was used while interacting with the patient. Patient was using a surgical mask. 15. Patient's progress in rehabilitation and medical issues discussed with the patient. All questions answered to the best of my ability. Care plan discussed with patient and nurse. 14. Total clinical care time is 30 minutes, including review of chart including all labs, radiology, past medical history, and discussion with patient. Greater than 50% of my time was spent in coordination of care and counseling.       Signed:    Wen Hickman MD    January 13, 2022

## 2022-01-13 NOTE — PROGRESS NOTES
Nutrition Assessment (Disaster Mode)    Type and Reason for Visit: Reassess,Positive nutrition screen,Consult    Nutrition Recommendations/Plan:   - Add food preferences in diet order  - Place nursing order for meal assistance  - Continue all other nutrition interventions. Encourage/ monitor po intake of meals and supplements.   - Nutrition recommendations for post discharge discussed with pt and her son     Malnutrition Assessment:  Malnutrition Status: At risk for malnutrition (specify) (poor po intake PTA)    Nutrition Assessment:   Nutrition Assessment: Pt continues to have poor/fair meal intake. tolerating diet. Denied having any food preferences, but reported that she liked the [employer] toast today and the turkey at lunch. Pt stated that she has difficulty eating salad due to needing assistance with cutting up some of the lettuce; has arthritis. Fair intake of Ensure supplements; has 6-7 unopened bottles in her room. Discussed decreasing frequency to once daily; pt declined. Likes the magic cup supplements. Encouraged po intake of meals and supplements. Patient's son was in the room; discussed nutrition recommendations for post discharge; per son report, pt to go to an assisted living facility and he and his wife have been preparting meals (placing in the freezer) for the pt for when she is discharged. Recommended for pt to continue with nutrition supplements; explained magic cup can be ordered but not found in stores; coupons for ensure supplements provided to son. Nutrition Related Findings: BM 1/12.   no edema. Total Energy Requirements (kcals/day): [id number] Energy Requirements Based On:  Otilia Arm (x1.2-1.3) Weight Used for Energy Requirements: Admission (62.7 kg)  Total Protein Requirements (g/day): 50-63 Weight Used for Protein Requirements: Admission (x0.8-1)  Total Fluid Requirements (ml/day): 9957-8979 Method Used for Fluid Requirements: 1 ml/kcal    Current Nutrition Therapies:  ADULT DIET Regular; 3 carb choices (45 gm/meal); Low Fat/Low Chol/High Fiber/ANGELINE  ADULT ORAL NUTRITION SUPPLEMENT Breakfast, Dinner; Standard High Calorie/High Protein  ADULT ORAL NUTRITION SUPPLEMENT Lunch, Dinner; Frozen Supplement     Anthropometric Measures:  Height: 4' 11\" (149.9 cm) Weight: 62.7 kg (138 lb 3.2 oz) Body mass index is 27.91 kg/m². Admission Body Weight: 138 lb 3.7 oz  Ideal Body Weight (lbs) (Calculated): 95 lbs Ideal Body Weight (Kg) (Calculated): 43 kg % IBW (Calculated): 145.5 %  Usual Body Weight:  (135-136 lb)              Nutrition Diagnosis:   · Inadequate oral intake related to early satiety,altered GI function (nausea, little vomiting) as evidenced by intake 0-25%,poor intake prior to admission     Nutrition Interventions:   Food and/or Nutrient Delivery: Continue current diet,Continue oral nutrition supplement  Nutrition Education/Counseling: Education not indicated,No recommendations at this time  Coordination of Nutrition Care: Continue to monitor while inpatient,Feeding assistance/environmental change    Previous Goal Met: Progressing toward goal(s)  Goals: PO nutrition intake will meet >75% of patient's estimated nutrition needs within the next 10 days    Nutrition Monitoring and Evaluation: Patient will be monitored per nutrition standards of care. Consult Dietitian if nutrition intervention essential to patient care is needed.    Behavioral-Environmental Outcomes: None identified  Food/Nutrient Intake Outcomes: Diet advancement/tolerance,Food and nutrient intake,Vitamin/mineral intake,Supplement intake  Physical Signs/Symptoms Outcomes: Biochemical data,Meal time behavior,GI status,Nausea/vomiting,Nutrition focused physical findings    Discharge Planning: Continue current diet,Continue oral nutrition supplement    Umm Escamilla RD on 1/13/2022 at 3:15 PM  Contact: [phone]    Disaster Mode Active

## 2022-01-13 NOTE — PROGRESS NOTES
Problem: Mobility Impaired (Adult and Pediatric)  Goal: *Therapy Goal (Edit Goal, Insert Text)  Description: Physical Therapy Short Term Goals  Initiated 1/1/2022, updated 1/7/2022 and to be accomplished within 7 day(s) on 1/14/2022  1. Patient will move from supine to sit and sit to supine  in bed with minimal assistance/contact guard assist.  Goal met 1/7/2022  Updated goal: Patient will perform supine<->sit supervision level   2. Patient will transfer from bed to chair and chair to bed with minimal assistance using the least restrictive device. Goal met 1/7/2022  Updated goal: Patient will perform bed<->chair transfer with SBA using RW. 3.  Patient will perform sit to stand with minimal assistance. Goal met 1/7/2022  Updated goal: Patient will perform sit to stand with SBA>   4. Patient will ambulate with minimal assistance/contact guard assist for 50 feet with the least restrictive device before needing seated rest. Goal ongoing  5. Patient will participate in formal balance assessment Royann Mariner Balance Scale/Postural Assessment Scale for Stroke). Goal ongoing    Physical Therapy Long Term Goals  Initiated 1/1/2022 and to be accomplished within 21 day(s) on 1/22/2022  1. Patient will move from supine to sit and sit to supine , scoot up and down, and roll side to side in bed with modified independence. 2.  Patient will transfer from bed to chair and chair to bed with modified independence using the least restrictive device. 3.  Patient will perform sit to stand with modified independence. 4.  Patient will ambulate with modified independence for 150 feet with the least restrictive device. 5.  Patient will negotiate one curb step with CG/SBA , using appropriate AD for safe community mobility.        Outcome: Progressing Towards Goal   PHYSICAL THERAPY TREATMENT    Patient: Yonathan Banegas (53 y.o. female)  Date: 1/13/2022  Diagnosis: CVA (cerebral vascular accident) [facility]) [I63.9] Acute ischemic stroke [facility])  Precautions: Fall  Chart, physical therapy assessment, plan of care and goals were reviewed. Time GW:5055  Time CIB:2324  Time In: 1330  Time Out: 0887    Patient seen for: Gait training;Patient education; Therapeutic exercise;Transfer training; Wheelchair mobility    Pain:  Patient reporting ongoing chronic knee pain bilat, discussed with physician ongoing difficulty with getting into standing and sitting (transitional movement) due to pain. Patient identified with name and : yes    SUBJECTIVE:      Patient agreeable to treatment. OBJECTIVE DATA SUMMARY:    Objective:     TRANSFERS Daily Assessment     Transfer Type: Other  Other: stand step with RW  Transfer Assistance : 4 (Contact guard assistance)  Sit to Stand Assistance: Contact guard assistance    CGA with transfers with RW, cues for more upright trunk posture and also for improved eccentric control with stand to sit. GAIT Daily Assessment    Gait Description (WDL) Exceptions to WDL    Gait Abnormalities Decreased step clearance;Shuffling gait; Step to gait (left LE rotating with fatigue)    Assistive device Walker, rolling;Gait belt    Ambulation assistance - level surface 4 (Minimal assistance)    Distance 24 Feet (ft) (24 ft bouts today)    Ambulation assistance- uneven surface  NT    Comments Performing gait with cues for more upright posture and for stepping into middle of RW appropriately. BALANCE Daily Assessment     Sitting - Static: Good (unsupported)  Sitting - Dynamic: Good (unsupported)  Standing - Static: Fair  Standing - Dynamic : Impaired        WHEELCHAIR MOBILITY Daily Assessment     Able to Propel (ft): 60 feet  Functional Level:  (SBA)  Curbs/Ramps Assist Required (FIM Score): 0 (Not tested)  Wheelchair Setup Assist Required : 5 (Supervision/setup)  Wheelchair Management: Manages left brake;Manages right brake (not using footrests)    60 ft SBA with patient using hemiheight w/c.  Observed to primarily use bilat LE with occasional use of bilat UE to assist.        THERAPEUTIC EXERCISES Daily Assessment       Hip abd red Tband 3 x 20 reps, also performing hip flex marches with ankle DF, heel taps forward/back 3 x 10 reps. ASSESSMENT:  Patient continues to require CGA for safety with transfers and gait, limited in gait endurance due to fatigue. Patient's son present for education in PM and reporting that plan for discharge is for additional assistance/support to be available for patient for safety. Patient demonstrating better ability to perform functional transfers from hemiheight w/c vs short w/c. Progression toward goals:  [x]      Improving appropriately and progressing toward goals  []      Improving slowly and progressing toward goals  []      Not making progress toward goals and plan of care will be adjusted      PLAN:  Patient continues to benefit from skilled intervention to address the above impairments. Continue treatment per established plan of care.   Discharge Recommendations:  Home Health and caregiver assistance  Further Equipment Recommendations for Discharge:  rolling walker and wheelchair 18 inch at donavan-height      Estimated Discharge Date: 1/21/2022    Activity Tolerance:   Fair due to fatigue, pain in bilat knees  After treatment:   [] Patient left in no apparent distress in bed  [x] Patient left in no apparent distress sitting up in chair  [] Patient left in no apparent distress in w/c mobilizing under own power  [] Patient left in no apparent distress dining area  [] Patient left in no apparent distress mobilizing under own power  [x] Call bell left within reach  [x] Nursing notified  [] Caregiver present  [] Bed alarm activated   [x] Chair alarm activated      Charlene Price, PT  1/13/2022

## 2022-01-14 PROCEDURE — 97116 GAIT TRAINING THERAPY: CPT

## 2022-01-14 PROCEDURE — 74011250637 HC RX REV CODE- 250/637: Performed by: FAMILY MEDICINE

## 2022-01-14 PROCEDURE — 97110 THERAPEUTIC EXERCISES: CPT

## 2022-01-14 PROCEDURE — 99232 SBSQ HOSP IP/OBS MODERATE 35: CPT | Performed by: INTERNAL MEDICINE

## 2022-01-14 PROCEDURE — 74011250637 HC RX REV CODE- 250/637: Performed by: INTERNAL MEDICINE

## 2022-01-14 PROCEDURE — 97112 NEUROMUSCULAR REEDUCATION: CPT

## 2022-01-14 PROCEDURE — 74011250636 HC RX REV CODE- 250/636: Performed by: FAMILY MEDICINE

## 2022-01-14 PROCEDURE — 97535 SELF CARE MNGMENT TRAINING: CPT

## 2022-01-14 PROCEDURE — 97530 THERAPEUTIC ACTIVITIES: CPT

## 2022-01-14 PROCEDURE — 65310000000 HC RM PRIVATE REHAB

## 2022-01-14 PROCEDURE — 74011250636 HC RX REV CODE- 250/636: Performed by: INTERNAL MEDICINE

## 2022-01-14 RX ORDER — MECLIZINE HCL 12.5 MG 12.5 MG/1
12.5 TABLET ORAL ONCE
Status: COMPLETED | OUTPATIENT
Start: 2022-01-14 | End: 2022-01-14

## 2022-01-14 RX ORDER — MECLIZINE HCL 12.5 MG 12.5 MG/1
12.5 TABLET ORAL 3 TIMES DAILY
Status: DISCONTINUED | OUTPATIENT
Start: 2022-01-15 | End: 2022-01-20 | Stop reason: HOSPADM

## 2022-01-14 RX ADMIN — MONTELUKAST 10 MG: 10 TABLET, FILM COATED ORAL at 08:23

## 2022-01-14 RX ADMIN — CHOLECALCIFEROL TAB 25 MCG (1000 UNIT) 2000 UNITS: 25 TAB at 08:23

## 2022-01-14 RX ADMIN — ACETAMINOPHEN 650 MG: 325 TABLET ORAL at 11:37

## 2022-01-14 RX ADMIN — OXYBUTYNIN CHLORIDE 10 MG: 5 TABLET, EXTENDED RELEASE ORAL at 21:29

## 2022-01-14 RX ADMIN — ACETAMINOPHEN 650 MG: 325 TABLET ORAL at 08:23

## 2022-01-14 RX ADMIN — ATORVASTATIN CALCIUM 80 MG: 40 TABLET, FILM COATED ORAL at 21:29

## 2022-01-14 RX ADMIN — CLOPIDOGREL 75 MG: 75 TABLET, FILM COATED ORAL at 17:09

## 2022-01-14 RX ADMIN — DORZOLAMIDE HYDROCHLORIDE 1 DROP: 20 SOLUTION/ DROPS OPHTHALMIC at 08:24

## 2022-01-14 RX ADMIN — DOCUSATE SODIUM 100 MG: 100 CAPSULE, LIQUID FILLED ORAL at 08:23

## 2022-01-14 RX ADMIN — LATANOPROST 1 DROP: 50 SOLUTION OPHTHALMIC at 21:37

## 2022-01-14 RX ADMIN — ASPIRIN 325 MG ORAL TABLET 325 MG: 325 PILL ORAL at 08:23

## 2022-01-14 RX ADMIN — MECLIZINE 12.5 MG: 12.5 TABLET ORAL at 17:09

## 2022-01-14 RX ADMIN — DICLOFENAC SODIUM 2 G: 10 GEL TOPICAL at 11:37

## 2022-01-14 RX ADMIN — DORZOLAMIDE HYDROCHLORIDE 1 DROP: 20 SOLUTION/ DROPS OPHTHALMIC at 21:30

## 2022-01-14 RX ADMIN — DORZOLAMIDE HYDROCHLORIDE 1 DROP: 20 SOLUTION/ DROPS OPHTHALMIC at 17:09

## 2022-01-14 RX ADMIN — MECLIZINE 12.5 MG: 12.5 TABLET ORAL at 08:23

## 2022-01-14 RX ADMIN — SERTRALINE 50 MG: 50 TABLET, FILM COATED ORAL at 08:23

## 2022-01-14 RX ADMIN — DICLOFENAC SODIUM 2 G: 10 GEL TOPICAL at 17:09

## 2022-01-14 RX ADMIN — LOSARTAN POTASSIUM 50 MG: 50 TABLET, FILM COATED ORAL at 08:23

## 2022-01-14 RX ADMIN — DICLOFENAC SODIUM 2 G: 10 GEL TOPICAL at 08:24

## 2022-01-14 RX ADMIN — DOCUSATE SODIUM 100 MG: 100 CAPSULE, LIQUID FILLED ORAL at 17:09

## 2022-01-14 NOTE — PROGRESS NOTES
SHIFT CHANGE NOTE FOR [facility]    Bedside and Verbal shift change report given to Avery De La Garza RN (oncoming nurse) by Manuel Burris RN (offgoing nurse). Report included the following information SBAR, Kardex, MAR and Recent Results. Situation:   Code Status: Full Code   Hospital Day: 14   Problem List:   Hospital Problems  Date Reviewed: 1/13/2022          Codes Class Noted POA    Primary osteoarthritis of both knees (Chronic) ICD-10-CM: M17.0  ICD-9-CM: 715.16  Unknown Yes        Essential hypertension (Chronic) ICD-10-CM: I10  ICD-9-CM: 401.9  Unknown Yes        Hypercholesterolemia with hypertriglyceridemia (Chronic) ICD-10-CM: E78.2  ICD-9-CM: 272.2  12/27/2021 Yes    Overview Signed 1/4/2022 12:02 AM by Reginald Vee MD     Lipid profile (12/27/2021) showed , , HDL 48,              * (Principal) Acute ischemic stroke ([facility] [address] ICD-10-CM: I63.9  ICD-9-CM: 434.91  12/26/2021 Yes    Overview Signed 1/3/2022 11:59 PM by Reginald Vee MD     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             Impaired mobility and ADLs ICD-10-CM: Z74.09, Z78.9  ICD-9-CM: V49.89  12/26/2021 Yes        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 ([facility] [address] ICD-10-CM: I67.89, G81.94  ICD-9-CM: 436, 342.92  12/26/2021 Yes              Background:   Past Medical History:   Past Medical History:   Diagnosis Date    Primary osteoarthritis of both knees         Assessment:   Changes in Assessment throughout shift: No change to previous assessment     Patient has a central line: no Reasons if yes: Insertion date: Last dressing date:   Patient has Ovlale Cath:  Reasons if yes:     Insertion date:  Shift ovalle care completed:      Last Vitals:     Vitals:    01/12/22 2100 01/13/22 0759 01/13/22 1553 01/13/22 2005   BP: 115/60 (!) 145/72 125/73 136/71   Pulse: 74 80 85 79   Resp: 18 16 18 18   Temp: 98.4 °F (36.9 °C) 98.2 °F (36.8 °C) 98.2 °F (36.8 °C) 98.1 °F (36.7 °C)   SpO2: 94% 94% 95% 94%   Weight:       Height:            PAIN    Pain Assessment    Pain Intensity 1: 0 (01/14/22 0606) Pain Intensity 1: 2 (12/29/14 1105)    Pain Location 1: Knee Pain Location 1: Abdomen    Pain Intervention(s) 1: Medication (see MAR) Pain Intervention(s) 1: Medication (see MAR)  Patient Stated Pain Goal: 0 Patient Stated Pain Goal: 0  o Intervention effective: no pain  o Other actions taken for pain:       Skin Assessment  Skin color    Condition/Temperature    Integrity    Turgor    Weekly Pressure Ulcer Documentation  Pressure  Injury Documentation: No Pressure Injury Noted-Pressure Ulcer Prevention Initiated  Wound Prevention & Protection Methods  Orientation of wound Orientation of Wound Prevention: Posterior  Location of Prevention Location of Wound Prevention: Sacrum/Coccyx  Dressing Present Dressing Present : No  Dressing Status    Wound Offloading Wound Offloading (Prevention Methods): Bed, pressure reduction mattress     INTAKE/OUPUT  Date 01/13/22 0700 - 01/14/22 0659 01/14/22 0700 - 01/15/22 0659   Shift 5896-1880 0995-3473 24 Hour Total 6447-5141 2589-6559 24 Hour Total   INTAKE   P.O. 6752 159 3656        P. O. 5464 815 7734      Shift Total(mL/kg) 1200(19.1) 120(1.9) 1320(21.1)      OUTPUT   Urine(mL/kg/hr)  0 0        Urine Voided  0 0        Urine Occurrence(s) 4 x 4 x 8 x      Stool           Stool Occurrence(s) 0 x 0 x 0 x      Shift Total(mL/kg)  0(0) 0(0)      NET [phone]      Weight (kg) 62.7 62.7 62.7 62.7 62.7 62.7       Recommendations:  1. Patient needs and requests: none at this time    2. Pending tests/procedures: none     3. Functional Level/Equipment: Partial (one person) / Theador Corpus; Wheelchair    Fall Precautions:   Fall risk precautions were reinforced with the patient; she was instructed to call for help prior to getting up.  The following fall risk precautions were continued: bed/ chair alarms, door signage, yellow bracelet and socks as well as update of the Geovanna Almanzaria tool in the patient's room. Yadira Score: 3    HEALS Safety Check    A safety check occurred in the patient's room between off going nurse and oncoming nurse listed above. The safety check included the below items  Area Items   H  High Alert Medications - Verify all high alert medication drips (heparin, PCA, etc.)   E  Equipment - Suction is set up for ALL patients (with norah)  - Red plugs utilized for all equipment (IV pumps, etc.)  - WOWs wiped down at end of shift.  - Room stocked with oxygen, suction, and other unit-specific supplies   A  Alarms - Bed alarm is set for fall risk patients  - Ensure chair alarm is in place and activated if patient is up in a chair   L  Lines - Check IV for any infiltration  - Edwards bag is empty if patient has a Edwards   - Tubing and IV bags are labeled   S  Safety   - Room is clean, patient is clean, and equipment is clean. - Hallways are clear from equipment besides carts. - Fall bracelet on for fall risk patients  - Ensure room is clear and free of clutter  - Suction is set up for ALL patients (with norah)  - Hallways are clear from equipment besides carts.    - Isolation precautions followed, supplies available outside room, sign posted     Chloé Huddleston RN

## 2022-01-14 NOTE — PROGRESS NOTES
Problem: Mobility Impaired (Adult and Pediatric)  Goal: *Therapy Goal (Edit Goal, Insert Text)  Description: Physical Therapy Short Term Goals  Initiated 1/1/2022, updated 1/14/2022 and to be accomplished within 7 day(s) on 1/21/2022  1. Patient will move from supine to sit and sit to supine  in bed with minimal assistance/contact guard assist.  Goal met 1/7/2022  Updated goal: Patient will perform supine<->sit supervision level. Goal met 1/14/2022   2. Patient will transfer from bed to chair and chair to bed with minimal assistance using the least restrictive device. Goal met 1/7/2022  Updated goal: Patient will perform bed<->chair transfer with SBA using RW. Goal ongoing 1/14/2022  3. Patient will perform sit to stand with minimal assistance. Goal met 1/7/2022  Updated goal: Patient will perform sit to stand with SBA. Goal ongoing 1/14/2022  4. Patient will ambulate with minimal assistance/contact guard assist for 50 feet with the least restrictive device before needing seated rest. Goal ongoing 1/14/2022  5. Patient will participate in formal balance assessment Noreene Shorts Balance Scale/Postural Assessment Scale for Stroke). Goal ongoing as patient not able to consistently tolerance standing activity    Physical Therapy Long Term Goals  Initiated 1/1/2022 and to be accomplished within 21 day(s) on 1/22/2022  1. Patient will move from supine to sit and sit to supine , scoot up and down, and roll side to side in bed with modified independence. 2.  Patient will transfer from bed to chair and chair to bed with modified independence using the least restrictive device. 3.  Patient will perform sit to stand with modified independence. 4.  Patient will ambulate with modified independence for 150 feet with the least restrictive device. 5.  Patient will negotiate one curb step with CG/SBA , using appropriate AD for safe community mobility.    6.  Patient will perform w/c mobility with distant supervision for at least 150 ft over even surfaces for ease of mobility upon discharge home with support. Outcome: Progressing Towards Goal   PHYSICAL THERAPY WEEKLY PROGRESS NOTE    Patient: Rodney Teixeira (28 y.o. female)  Date: 2022  Diagnosis: CVA (cerebral vascular accident) [facility]) [I63.9] Acute ischemic stroke [facility])  Precautions: Fall  Chart, physical therapy assessment, plan of care and goals were reviewed. Time in:0930  Time out:1100    Patient seen for: Gait training;Patient education;Transfer training; Therapeutic exercise; Wheelchair mobility      Pain:  Patient reporting that the voltaren gel applied to bilat knees prior to therapy session helping with pain sit<->stand. Continues to require extra time to perform this task, treatment still needing modification. Patient identified with name and : yes    SUBJECTIVE:     Patient agreeable to treatment. Continues to report intermittent dizziness with head/eye movements. OBJECTIVE DATA SUMMARY:       GROSS ASSESSMENT Weekly Progress Assessment 2022   AROM Generally decreased, functional   Strength Generally decreased, functional (impaired functional endurance)   Coordination Within functional limits   Tone Normal   Sensation Impaired   PROM Generally decreased, functional       POSTURE Weekly Progress Assessment 2022   Posture (WDL) Exceptions to WDL   Posture Assessment Forward head;Rounded shoulders; Increased;Trunk flexion       BALANCE Weekly Progress Assessment 2022    Sitting - Static: Good (unsupported)  Sitting - Dynamic: Good (unsupported)  Standing - Static: Fair (needing UE support of RW)  Standing - Dynamic : Impaired  Other (comment): standing balance      BED/CHAIR/WHEELCHAIR TRANSFERS Initial Assessment Weekly Progress Assessment 2022   Rolling Right 5 (Stand-by assistance) 6 (Modified independent)   Rolling Left 5 (Stand-by assistance) 6 (Modified independent)   Supine to Sit 3 (Moderate assistance) 6 (Modified independent)   Sit to Stand Moderate assistance Contact guard assistance (extra time, intermittent CGA stand to sit for slow descent)   Sit to Supine 4 (Minimal assistance) 6 (Modified independent)   Transfer Type Other Other: stand step with RW   Transfer Assistance Needed 3 (Moderate assistance ) 4 (Contact guard assistance) (CG/SBA, cues for keeping RW completely around her)   Comments    Cues for safe RW management and for improved eccentric control with sit to stand and transfers   Car Transfer Not tested (Patient feeling nauseous during second session)  (CGA with RW when assessed 1/12/2022)   Car Type NT car transfer simulator       WHEELCHAIR MOBILITY/MANAGEMENT Initial Assessment Weekly Progress Assessment 1/14/2022   Able to Propel (dist) 55 feet 90 feet   Assistance Required 2 (mod/max A using bilat feet, occasionally UE to propel) Supervision   Curbs/ramps assistance required 0 (Not tested) 0 (Not tested)   Wheelchair set up assistance required 2 (Maximal assistance)  Supervision   Wheelchair management Manages left brake,Manages right brake (needing assistance) Manages left brake;Manages right brake (using brake extenders, needing brakes double checked)   Comments  Using bilat LE, occasionally bilat UE to assist with propulsion. GAIT Weekly Progress Assessment 1/14/2022   Gait Description (WDL) Exceptions to WDL   Gait Abnormalities Decreased step clearance;Shuffling gait; Step to gait (left LE externally rotating with fatigue)       WALKING INDEPENDENCE Initial Assessment Weekly Progress Assessment 1/14/2022   Assistive device Walker, rolling,Gait belt Walker, rolling;Gait belt   Ambulation assistance - level surface 3 (Moderate assistance) 4 (Contact guard assistance)   Distance 18 Feet (ft) 18 Feet (ft)   Comments   Performing 2 gait bouts: 18 ft, then 24 ft before reporting fatigued and needing seated rest.    Ambulation assistance - unlevel surfaces  (NT)  (NT)       STEPS/STAIRS Initial Assessment Weekly Progress Assessment 1/14/2022   Steps/Stairs ambulated 0 0   Assistance Required   0 (Not tested)   Rail Use  (NT )  (NT)   Comments    Patient not assessed due to ongoing fatigue and difficulty performing task. Curbs/Ramps  (NT)  (NT due to fatigue, safety)       Neuro Re-Education:  Performing gaze stabilization exercises with visual target 6 ft away on wall. Performing 3 bouts with patient cued for proper technique of keeping eyes steady on target while moving head in horizontal direction. Needing a couple minutes between each bout to recover, allowing resolution of symptoms before completing another set. Written HEP provided. Therapeutic Exercise:  Patient performing seated hip abd 3 x 20 reps with red Tband, standing hip flex marches to 4\" height step stool for target 2 x 10 reps. Needing seated rest in between bouts    ASSESSMENT:  Patient continues to have decreased functional endurance affecting ability to participate in gait. Requiring w/c for primary mode of mobility due to fatigue observed left LE and reported with all standing activity. Patient continues to slowly progress toward functional goals, needing CGA with coming into standing and with transfers using RW. Patient will likely require caregiver assist/supervision upon discharge. Progression toward goals:  []      Improving appropriately and progressing toward goals  [x]      Improving slowly and progressing toward goals  []      Not making progress toward goals and plan of care will be adjusted     PLAN:  Patient continues to benefit from skilled intervention to address the above impairments. Continue treatment per established plan of care. Discharge Recommendations:  Home Health and caregiver assistance/supervision  Further Equipment Recommendations for Discharge:  rolling walker and wheelchair 18 inch     Estimated Discharge Date: 1/21/2022    Activity Tolerance:   Fair due to fatigue.    After treatment:   [] Patient left in no apparent distress in bed  [x] Patient left in no apparent distress sitting up in chair  [] Patient left in no apparent distress in w/c mobilizing under own power  [] Patient left in no apparent distress dining area  [] Patient left in no apparent distress mobilizing under own power  [x] Call bell left within reach  [x] Nursing notified  [] Caregiver present  [] Bed alarm activated   [x] Chair alarm activated      Charlene Price, PT  1/14/2022

## 2022-01-14 NOTE — ROUTINE PROCESS
SHIFT CHANGE NOTE FOR [facility]    Bedside and Verbal shift change report given to William Irby RN (oncoming nurse) by Roselyn Tyson RN (offgoing nurse). Report included the following information SBAR, Kardex, MAR and Recent Results.     Situation:   Code Status: Full Code   Hospital Day: 13   Problem List:   Hospital Problems  Date Reviewed: 1/13/2022          Codes Class Noted POA    Primary osteoarthritis of both knees (Chronic) ICD-10-CM: M17.0  ICD-9-CM: 715.16  Unknown Yes        Essential hypertension (Chronic) ICD-10-CM: I10  ICD-9-CM: 401.9  Unknown Yes        Hypercholesterolemia with hypertriglyceridemia (Chronic) ICD-10-CM: E78.2  ICD-9-CM: 272.2  12/27/2021 Yes    Overview Signed 1/4/2022 12:02 AM by Edel Andersen MD     Lipid profile (12/27/2021) showed , , HDL 48,              * (Principal) Acute ischemic stroke ([facility] [address] ICD-10-CM: I63.9  ICD-9-CM: 434.91  12/26/2021 Yes    Overview Signed 1/3/2022 11:59 PM by Edel Andersen MD     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             Impaired mobility and ADLs ICD-10-CM: Z74.09, Z78.9  ICD-9-CM: V49.89  12/26/2021 Yes        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 ([facility] [address] ICD-10-CM: I67.89, G81.94  ICD-9-CM: 436, 342.92  12/26/2021 Yes              Background:   Past Medical History:   Past Medical History:   Diagnosis Date    Primary osteoarthritis of both knees         Assessment:   Changes in Assessment throughout shift: No change to previous assessment     Patient has a central line: no Reasons if yes: na  Insertion date:na Last dressing date:na   Patient has Ovalle Cath: no Reasons if yes: na   Insertion date:na  Shift ovalle care completed: NO     Last Vitals:     Vitals:    01/12/22 1632 01/12/22 2100 01/13/22 0759 01/13/22 1553   BP: 126/80 115/60 (!) 145/72 125/73   Pulse: 78 74 80 85   Resp: 18 18 16 18   Temp: 98.2 °F (36.8 °C) 98.4 °F (36.9 °C) 98.2 °F (36.8 °C) 98.2 °F (36.8 °C)   SpO2: 96% 94% 94% 95%   Weight:       Height:            PAIN    Pain Assessment    Pain Intensity 1: 0 (01/13/22 1553) Pain Intensity 1: 2 (12/29/14 1105)    Pain Location 1: Knee Pain Location 1: Abdomen    Pain Intervention(s) 1: Medication (see MAR) Pain Intervention(s) 1: Medication (see MAR)  Patient Stated Pain Goal: 0 Patient Stated Pain Goal: 0  o Intervention effective: yes  o Other actions taken for pain:       Skin Assessment  Skin color    Condition/Temperature    Integrity    Turgor    Weekly Pressure Ulcer Documentation  Pressure  Injury Documentation: No Pressure Injury Noted-Pressure Ulcer Prevention Initiated  Wound Prevention & Protection Methods  Orientation of wound Orientation of Wound Prevention: Posterior  Location of Prevention Location of Wound Prevention: Buttocks,Sacrum/Coccyx  Dressing Present Dressing Present : No  Dressing Status    Wound Offloading Wound Offloading (Prevention Methods): Bed, pressure redistribution/air     INTAKE/OUPUT  Date 01/12/22 1900 - 01/13/22 0659 01/13/22 0700 - 01/14/22 0659   Shift 4418-8778 24 Hour Total 1988-5395 2691-2911 24 Hour Total   INTAKE   P.O.  1080 1200  1200     P. O.  1080 1200  1200   Shift Total(mL/kg)  8122(33.1) 1200(19.1)  1200(19.1)   OUTPUT   Urine(mL/kg/hr)          Urine Occurrence(s) 2 x 5 x 4 x  4 x   Stool          Stool Occurrence(s) 0 x 1 x 0 x  0 x   Shift Total(mL/kg)        NET  1080 1200  1200   Weight (kg) 62.7 62.7 62.7 62.7 62.7       Recommendations:  1. Patient needs and requests: na    2. Pending tests/procedures: na     3. Functional Level/Equipment:   /      Fall Precautions:   Fall risk precautions were reinforced with the patient; she was instructed to call for help prior to getting up. The following fall risk precautions were continued: bed/ chair alarms, door signage, yellow bracelet and socks as well as update of the Sofia Fothergill tool in the patient's room.    Sofia Fothergill Score: 3    HEALS Safety Check    A safety check occurred in the patient's room between off going nurse and oncoming nurse listed above. The safety check included the below items  Area Items   H  High Alert Medications - Verify all high alert medication drips (heparin, PCA, etc.)   E  Equipment - Suction is set up for ALL patients (with norah)  - Red plugs utilized for all equipment (IV pumps, etc.)  - WOWs wiped down at end of shift.  - Room stocked with oxygen, suction, and other unit-specific supplies   A  Alarms - Bed alarm is set for fall risk patients  - Ensure chair alarm is in place and activated if patient is up in a chair   L  Lines - Check IV for any infiltration  - Edwards bag is empty if patient has a Edwards   - Tubing and IV bags are labeled   S  Safety   - Room is clean, patient is clean, and equipment is clean. - Hallways are clear from equipment besides carts. - Fall bracelet on for fall risk patients  - Ensure room is clear and free of clutter  - Suction is set up for ALL patients (with norah)  - Hallways are clear from equipment besides carts.    - Isolation precautions followed, supplies available outside room, sign posted     Danica Cox RN

## 2022-01-14 NOTE — ROUTINE PROCESS
[facility]    Bedside and Verbal shift change report given to Jhonny Talbert RN (oncoming nurse) by Jf Brumfield (offgoing nurse). Report included the following information SBAR, Kardex, MAR and Recent Results.     Situation:   Code Status: Full Code   Hospital Day: 14   Problem List:   Hospital Problems  Date Reviewed: 1/13/2022          Codes Class Noted POA    Primary osteoarthritis of both knees (Chronic) ICD-10-CM: M17.0  ICD-9-CM: 715.16  Unknown Yes        Essential hypertension (Chronic) ICD-10-CM: I10  ICD-9-CM: 401.9  Unknown Yes        Hypercholesterolemia with hypertriglyceridemia (Chronic) ICD-10-CM: E78.2  ICD-9-CM: 272.2  12/27/2021 Yes    Overview Signed 1/4/2022 12:02 AM by Carmen Cedeño MD     Lipid profile (12/27/2021) showed , , HDL 48,              * (Principal) Acute ischemic stroke ([facility] ICD-10-CM: I63.9  ICD-9-CM: 434.91  12/26/2021 Yes    Overview Signed 1/3/2022 11:59 PM by Carmen Cedeño MD     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             Impaired mobility and ADLs ICD-10-CM: Z74.09, Z78.9  ICD-9-CM: V49.89  12/26/2021 Yes        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 [facility]) ICD-10-CM: I67.89, G81.94  ICD-9-CM: 436, 342.92  12/26/2021 Yes              Background:   Past Medical History:   Past Medical History:   Diagnosis Date    Primary osteoarthritis of both knees         Assessment:   Changes in Assessment throughout shift: No change to previous assessment    Patient has a central line: no   Patient has Edwards Cath: no      Last Vitals:     Vitals:    01/13/22 0759 01/13/22 1553 01/13/22 2005 01/14/22 0821   BP: (!) 145/72 125/73 136/71 138/70   Pulse: 80 85 79 77   Resp: 16 18 18 18   Temp: 98.2 °F (36.8 °C) 98.2 °F (36.8 °C) 98.1 °F (36.7 °C) 98.9 °F (37.2 °C)   SpO2: 94% 95% 94% 98%   Weight:       Height:            PAIN    Pain Assessment    Pain Intensity 1: 0 (01/14/22 0821) Pain Intensity 1: 2 (12/29/14 1105)    Pain Location 1: Knee Pain Location 1: Abdomen    Pain Intervention(s) 1: Medication (see MAR) Pain Intervention(s) 1: Medication (see MAR)  Patient Stated Pain Goal: 0 Patient Stated Pain Goal: 0  o Intervention effective: Yes  o Other actions taken for pain:       Skin Assessment  Skin color    Condition/Temperature    Integrity    Turgor    Weekly Pressure Ulcer Documentation  Pressure  Injury Documentation: No Pressure Injury Noted-Pressure Ulcer Prevention Initiated  Wound Prevention & Protection Methods  Orientation of wound Orientation of Wound Prevention: Posterior  Location of Prevention Location of Wound Prevention: Buttocks,Sacrum/Coccyx  Dressing Present Dressing Present : No  Dressing Status    Wound Offloading Wound Offloading (Prevention Methods): Bed, pressure redistribution/air,Pillows,Repositioning     INTAKE/OUPUT  Date 01/13/22 0700 - 01/14/22 0659 01/14/22 0700 - 01/15/22 0659   Shift 1536-2790 8843-7483 24 Hour Total 2189-2629 5462-1769 24 Hour Total   INTAKE   P.O. 8939 962 4562        P. O. 6090 923 2879      Shift Total(mL/kg) 1200(19.1) 120(1.9) 1320(21.1)      OUTPUT   Urine(mL/kg/hr)  0(0) 0(0)        Urine Voided  0 0        Urine Occurrence(s) 4 x 4 x 8 x 0 x  0 x   Stool           Stool Occurrence(s) 0 x 0 x 0 x 0 x  0 x   Shift Total(mL/kg)  0(0) 0(0)      NET 9096 924 5337      Weight (kg) 62.7 62.7 62.7 62.7 62.7 62.7       Recommendations:  1. Patient needs and requests: None at this time    2. Pending tests/procedures: Routine labs     3. Functional Level/Equipment: Partial (one person) / Bed (comment); Darrell Au; Wheelchair;Bedside Commode    Fall Precautions:   Fall risk precautions were reinforced with the patient; she was instructed to call for help prior to getting up.  The following fall risk precautions were continued: bed/ chair alarms, door signage, yellow bracelet and socks as well as update of the [facility] tool in the patient's room. Yadira Score: 3    HEALS Safety Check    A safety check occurred in the patient's room between off going nurse and oncoming nurse listed above. The safety check included the below items  Area Items   H  High Alert Medications - Verify all high alert medication drips (heparin, PCA, etc.)   E  Equipment - Suction is set up for ALL patients (with norah)  - Red plugs utilized for all equipment (IV pumps, etc.)  - WOWs wiped down at end of shift.  - Room stocked with oxygen, suction, and other unit-specific supplies   A  Alarms - Bed alarm is set for fall risk patients  - Ensure chair alarm is in place and activated if patient is up in a chair   L  Lines - Check IV for any infiltration  - Edwards bag is empty if patient has a Edwards   - Tubing and IV bags are labeled   S  Safety   - Room is clean, patient is clean, and equipment is clean. - Hallways are clear from equipment besides carts. - Fall bracelet on for fall risk patients  - Ensure room is clear and free of clutter  - Suction is set up for ALL patients (with norah)  - Hallways are clear from equipment besides carts.    - Isolation precautions followed, supplies available outside room, sign posted     Dede Fitzgerald

## 2022-01-14 NOTE — PROGRESS NOTES
Problem: Self Care Deficits Care Plan (Adult)  Goal: *Therapy Goal (Edit Goal, Insert Text)  Description: Occupational Therapy Goals   Long Term Goals  Initiated 1/1/22 and to be accomplished within 3 week(s), by 1/22/2022  1. Pt will perform self-feeding with independence. 2. Pt will perform grooming with independence. 3. Pt will perform UB bathing with supervision. 4. Pt will perform LB bathing with supervision. 5. Pt will perform tub/shower transfer with supervision. 6. Pt will perform UB dressing with Axel using compensatory strategies. 7. Pt will perform LB dressing with Axel and AE. 8. Pt will perform toileting task with Axel and AE. 9. Pt will perform toilet transfer with Axel and AE. Short Term Goals   Initiated 1/1/22 (reassessed on 1/7/2022) and to be accomplished within 7 day(s), to be reassessed by 1/14/2022  1. Pt will perform self-feeding with Axel. Goal met 1/14/2022  2. Pt will perform grooming with Axel. Goal met 1/14/2022  3. Pt will perform UB bathing with SBA. (Goal met 1/7/2022)      Upgraded goal: Pt will perform UB bathing with set-up/ supv. Goal met 1/14/2022  4. Pt will perform LB bathing with Fidel and UE support. (Goal met 1/7/2022)      Upgraded goal: Pt will perform LB bathing with CGA using AE and/ or compensatory strategies as needed. Goal met 1/14/2022 upgrade to supervision  5. Pt will perform tub/shower transfer with Fidel and AE. Goal met 1/14/2022 upgrade to supervision  6. Pt will perform UB dressing with SBA using compensatory strategies. (Goal met 1/7/2022)      Upgraded goal: Pt will perform UB dressing with supv using compensatory strategies as needed. Goal met 1/14/2022  7. Pt will perform LB dressing with Fidel and AE. Goal met 1/14/2022 upgrade to supervision  8. Pt will perform toileting task with Fidel and AE. (Goal met 1/7/2022; continue goal for consistency), upgrade to supervision  9. Pt will perform toilet transfer with Fidel and AE.  Goal met 2022 upgrade to supervision  Outcome: Progressing Towards Goal  Goal: Interventions  Outcome: Progressing Towards Goal   OT WEEKLY PROGRESS NOTE  Patient Asher Villegas      Time In: 0700  Time Out: 0800    Medical Diagnosis:  CVA (cerebral vascular accident) ([facility] [address] [I63.9] Acute ischemic stroke ([facility]     Pain at start of tx:0/10 pain or discomfort. Pain at stop of tx:-/10 pain or discomfort.     Patient identified with name and :yes  Subjective: Pt reported BLE knees being stiff this morning but not painful         Objective:       Outcome Measures:      [facility]: [facility]      COGNITION/PERCEPTION Initial Assessment Weekly Progress Assessment 2022   Premorbid Reading Status Literate  Literate   Premorbid Writing Status Impaired  Impaired   Arousal/Alertness    Alert   Orientation Level Oriented X4 Oriented X4   Visual Fields       Praxis       Body Scheme       COMPREHENSION MODE Initial Assessment Weekly Progress Assessment 2022   Primary Mode of Comprehension Auditory Auditory   Hearing Aide None None   Corrective Lenses Reading glasses     Score 5 6     EXPRESSION Initial Assessment Weekly Progress Assessment 2022   Primary Mode of Expression Verbal Verbal   Score 6 6   Comments         SOCIAL INTERACTION/ PRAGMATICS Initial Assessment Weekly Progress Assessment 2022   Score 6 6   Comments         PROBLEM SOLVING Initial Assessment Weekly Progress Assessment 2022   Score 5 5   Comments         MEMORY Initial Assessment Weekly Progress Assessment 2022   Score 6 6   Comments appears intact appears intact     EATING Initial Assessment Weekly Progress Assessment 2022   Functional Level 5 5   Comments         GROOMING Initial Assessment Weekly Progress Assessment 2022   Functional Level 5 Grooming  Grooming Assistance : 5 (Supervision) (setup)  Comments: Pt brushed hair with setup at EOB   Tasks completed by patient       Comments         BATHING Initial Assessment Weekly Progress Assessment 1/14/2022   Functional Level 4    Upper Body Bathing  Bathing Assistance, Upper: 5 (Supervision) (setup)  Upper Body : Compensatory technique training  Position Performed: Seated edge of bed  Comments: Pt performed UB bathing via sponge bath with setup  Lower Body Bathing  Bathing Assistance, Lower : 4 (Contact guard assistance)  Adaptive Equipment: Walker  Position Performed: Seated edge of bed  Comments: Pt performed LB bathing via sponge bath with CGA/SBA at [facility] for stability   Body parts patient bathed       Comments         TUB/SHOWER TRANSFER INDEPENDENCE Initial Assessment Weekly Progress Assessment 1/14/2022   Score 0 (NT due to safety concerns) Functional Transfers  Toilet Transfer : Elevated seat;Stand pivot transfer with walker  Amount of Assistance Required: 4 (Contact guard assistance)   Comments         UPPER BODY DRESSING/UNDRESSING Initial Assessment Weekly Progress Assessment 1/14/2022   Functional Level 4 Upper Body Dressing   Dressing Assistance : 5 (Supervision) (setup)  Comments: Pt performed UB dressing with setup at EOB   Items applied/Steps completed Bra (3 steps),Pullover (4 steps)     Comments Pt performed UB dressing with Fidel to don bra in seated position at EOB       LOWER BODY DRESSING/UNDRESSING Initial Assessment Weekly Progress Assessment 1/14/2022   Functional Level 2 Lower Body Dressing   Dressing Assistance : 4 (Contact guard assistance) (CGA/SBA)  Leg Crossed Method Used: No  Position Performed: Seated edge of bed;Standing  Adaptive Equipment Used: Walker  Comments: Pt performed LB dressing with CGA/SBA at [facility], doffing and donning socks in seated position with supervision and threading BLE feet through brief and underwear with supervision.  Pt pulled brief and pants up over BLE hips at [facility] using bilateral hands with SBA and bed behind BLE knees for stabilty   Items applied/Steps completed Elastic waist pants (3 steps),Shoe, left (1 step),Shoe, right (1 step),Sock, left (1 step),Sock, right (1 step),Underpants (3 steps)     Comments Pt completed LB dressing with maxA to don BLE socks and TA to don pull tab brief, pt thread BLE feet through pants and pulled up over BLE hips and buttocks with modA. TOILETING Initial Assessment Weekly Progress Assessment 1/14/2022   Functional Level 2 Toileting  Toileting Assistance (FIM Score): 5 (Supervision) (SBA/CGA)  Cues: Verbal cues provided  Adaptive Equipment: Elevated seat;Walker   Comments Pt performed toileting with FWW to [facility] with maxA to perform toileting hygiene and CM due to decreased stability and strength       TOILET TRANSFER INDEPENDENCE Initial Assessment Weekly Progress Assessment 1/14/2022   Transfer score 3 Functional Transfers  Toilet Transfer : Elevated seat;Stand pivot transfer with walker  Amount of Assistance Required: 4 (Contact guard assistance)   Comments Pt performed toilet transfer with modA using FWW, pt required assistance due to instability and deformity of BLE legs (IR of BLE hips) and decreased strength and activity tolerance                ASSESSMENT:  Pt is making slow progress toward goals due to arthritic changes and stiffness in BLE knees however demonstrated improved stability this date and use of compensatory strategies for self cares and functional transfers and mobility. Pt demonstrates good safety with FWW and self cares and is increasing BUE strength and FM control as well as using AE to facilitate increased independence with self cares. Pt has met all STG's at this time and upgraded goals to reflect performance of care (ie: performing LB dressing/bathing and toileting with supervision).     Progression toward goals:  []          Improving appropriately and progressing toward goals  [x]          Improving slowly and progressing toward goals  []          Not making progress toward goals and plan of care will be adjusted     PLAN:  Patient continues to benefit from skilled intervention to address the above impairments. Continue treatment per established plan of care. Discharge Recommendations:  Home Health with assistance  Further Equipment Recommendations for Discharge:  bedside commode     Please refer to the flow sheet for vital signs taken during this treatment. After treatment:   [x]  Patient left in no apparent distress sitting up in chair  []  Patient left in no apparent distress in bed  [x]  Call bell left within reach  []  Nursing notified  []  Caregiver present  []  Bed alarm activated    COMMUNICATION/EDUCATION:   [] Home safety education was provided and the patient/caregiver indicated understanding. [] Patient/family have participated as able in goal setting and plan of care. [x] Patient/family agree to work toward stated goals and plan of care. [] Patient understands intent and goals of therapy, but is neutral about his/her participation. [] Patient is unable to participate in goal setting and plan of care. Plan of Care: Please see Care Plan for updated STG/LTGs.    Family Training:    Estimated LOS: 1/21/2022  Kavon Orellana OT  1/14/2022

## 2022-01-14 NOTE — PROGRESS NOTES
Problem: Pressure Injury - Risk of  Goal: *Prevention of pressure injury  Description: Document Lebron Scale and appropriate interventions in the flowsheet. Outcome: Progressing Towards Goal  Note: Pressure Injury Interventions:  Sensory Interventions: Assess changes in LOC,Keep linens dry and wrinkle-free,Minimize linen layers,Pressure redistribution bed/mattress (bed type)    Moisture Interventions: Absorbent underpads,Minimize layers,Moisture barrier,Maintain skin hydration (lotion/cream)    Activity Interventions: Increase time out of bed,Pressure redistribution bed/mattress(bed type)    Mobility Interventions: HOB 30 degrees or less,Pressure redistribution bed/mattress (bed type)    Nutrition Interventions: Document food/fluid/supplement intake    Friction and Shear Interventions: HOB 30 degrees or less,Foam dressings/transparent film/skin sealants,Minimize layers,Lift sheet                Problem: Patient Education: Go to Patient Education Activity  Goal: Patient/Family Education  Outcome: Progressing Towards Goal     Problem: Falls - Risk of  Goal: *Absence of Falls  Description: Document Yadira Fall Risk and appropriate interventions in the flowsheet.   Outcome: Progressing Towards Goal  Note: Fall Risk Interventions:  Mobility Interventions: Bed/chair exit alarm,Patient to call before getting OOB,Utilize walker, cane, or other assistive device    Mentation Interventions: Bed/chair exit alarm,Door open when patient unattended,Room close to nurse's station,More frequent rounding,Adequate sleep, hydration, pain control    Medication Interventions: Bed/chair exit alarm,Patient to call before getting OOB,Teach patient to arise slowly    Elimination Interventions: Call light in reach,Bed/chair exit alarm,Patient to call for help with toileting needs,Stay With Me (per policy),Toileting schedule/hourly rounds    History of Falls Interventions: Bed/chair exit alarm,Door open when patient unattended,Room close to nurse's station         Problem: Patient Education: Go to Patient Education Activity  Goal: Patient/Family Education  Outcome: Progressing Towards Goal

## 2022-01-14 NOTE — PROGRESS NOTES
Problem: Pressure Injury - Risk of  Goal: *Prevention of pressure injury  Description: Document Lebron Scale and appropriate interventions in the flowsheet. Outcome: Progressing Towards Goal  Note: Pressure Injury Interventions:  Sensory Interventions: Assess changes in LOC,Keep linens dry and wrinkle-free,Minimize linen layers,Pressure redistribution bed/mattress (bed type)    Moisture Interventions: Absorbent underpads,Minimize layers,Moisture barrier,Maintain skin hydration (lotion/cream)    Activity Interventions: Increase time out of bed,Pressure redistribution bed/mattress(bed type)    Mobility Interventions: HOB 30 degrees or less,Pressure redistribution bed/mattress (bed type)    Nutrition Interventions: Document food/fluid/supplement intake    Friction and Shear Interventions: HOB 30 degrees or less,Foam dressings/transparent film/skin sealants,Minimize layers,Lift sheet                Problem: Patient Education: Go to Patient Education Activity  Goal: Patient/Family Education  Outcome: Progressing Towards Goal     Problem: Falls - Risk of  Goal: *Absence of Falls  Description: Document Yadira Fall Risk and appropriate interventions in the flowsheet.   Outcome: Progressing Towards Goal  Note: Fall Risk Interventions:  Mobility Interventions: Bed/chair exit alarm,Patient to call before getting OOB,Utilize walker, cane, or other assistive device    Mentation Interventions: Bed/chair exit alarm,Door open when patient unattended,Room close to nurse's station,More frequent rounding,Adequate sleep, hydration, pain control    Medication Interventions: Bed/chair exit alarm,Patient to call before getting OOB,Teach patient to arise slowly    Elimination Interventions: Call light in reach,Bed/chair exit alarm,Patient to call for help with toileting needs,Stay With Me (per policy),Toileting schedule/hourly rounds    History of Falls Interventions: Bed/chair exit alarm,Door open when patient unattended,Room close to nurse's station         Problem: Patient Education: Go to Patient Education Activity  Goal: Patient/Family Education  Outcome: Progressing Towards Goal     Problem: General Medical Care Plan  Goal: *Vital signs within specified parameters  Outcome: Progressing Towards Goal  Goal: *Labs within defined limits  Outcome: Progressing Towards Goal  Goal: *Absence of infection signs and symptoms  Outcome: Progressing Towards Goal  Goal: *Optimal pain control at patient's stated goal  Outcome: Progressing Towards Goal  Goal: *Skin integrity maintained  Outcome: Progressing Towards Goal  Goal: *Fluid volume balance  Outcome: Progressing Towards Goal  Goal: *Optimize nutritional status  Outcome: Progressing Towards Goal  Goal: *Anxiety reduced or absent  Outcome: Progressing Towards Goal  Goal: *Progressive mobility and function (eg: ADL's)  Outcome: Progressing Towards Goal     Problem: Patient Education: Go to Patient Education Activity  Goal: Patient/Family Education  Outcome: Progressing Towards Goal     Problem: Patient Education: Go to Patient Education Activity  Goal: Patient/Family Education  Outcome: Progressing Towards Goal     Problem: Patient Education: Go to Patient Education Activity  Goal: Patient/Family Education  Outcome: Progressing Towards Goal     Problem: Inpatient Rehab (Adult)  Goal: *LTG: Avoids injury/falls 100% of time related to deficits  Outcome: Progressing Towards Goal  Goal: *LTG: Avoids infection 100% of time related to deficits  Outcome: Progressing Towards Goal  Goal: *LTG: Verbalize understanding of diagnosis and risk factors for recurring stroke  Outcome: Progressing Towards Goal  Goal: *LTG: Absence of DVT during hospitalization  Outcome: Progressing Towards Goal  Goal: *LTG: Maintains Skin Integrity With No Evidence of Pressure Injury 100% of Time  Outcome: Progressing Towards Goal  Goal: Interventions  Outcome: Progressing Towards Goal     Problem: Patient Education: Go to Patient Education Activity  Goal: Patient/Family Education  Outcome: Progressing Towards Goal     Problem: Risk for Spread of Infection  Goal: Prevent transmission of infectious organism to others  Description: Prevent the transmission of infectious organisms to other patients, staff members, and visitors.   Outcome: Progressing Towards Goal     Problem: Patient Education:  Go to Education Activity  Goal: Patient/Family Education  Outcome: Progressing Towards Goal

## 2022-01-14 NOTE — PROGRESS NOTES
[facility] PHYSICAL REHABILITATION  [address] [place] [address], [address]     INPATIENT REHABILITATION  DAILY PROGRESS NOTE     Date: 1/14/2022    Name: Julián Lacey Age / Sex: 82 y.o. 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 Blood pressure controlled. Patient's Complaint:   Still with intermittent dizziness with head/eye movements    Pain Control: no current joint or muscle symptoms, essentially pain-free      Objective:     Vital Signs:  Patient Vitals for the past 24 hrs:   BP Temp Pulse Resp SpO2   01/14/22 0821 138/70 98.9 °F (37.2 °C) 77 18 98 %   01/13/22 2005 136/71 98.1 °F (36.7 °C) 79 18 94 %        Physical Examination:  GENERAL SURVEY: Patient is awake, alert, oriented x 3, laying comfortably on the bed, not in acute respiratory distress. HEENT: pink palpebral conjunctivae, anicteric sclerae, no nasoaural discharge, moist oral mucosa  NECK: supple, no jugular venous distention, no palpable lymph nodes  CHEST/LUNGS: symmetrical chest expansion, good air entry, clear breath sounds  HEART: adynamic precordium, good S1 S2, no S3, regular rhythm, no murmurs  ABDOMEN: flat, bowel sounds appreciated, soft, non-tender  EXTREMITIES: (+) limitation ROM of both shoulders due to history of rotator cuff injury, pink nailbeds, no edema, full and equal pulses, no calf tenderness   NEUROLOGICAL EXAM: The patient is awake, alert and oriented x3, able to answer questions fairly appropriately, able to follow 1 and 2 step commands. Able to tell time from the wall clock. Cranial nerves II-XII are grossly intact. No gross sensory deficit.   Motor strength is 4-/5 on the RUE (except right shoulder), 3+/5 on the LUE (except left shoulder), 4+/5 on the RLE, 4/5 on the LLE. Current Medications:  Current Facility-Administered Medications   Medication Dose Route Frequency    acetaminophen (TYLENOL) tablet 650 mg  650 mg Oral BID    acetaminophen (TYLENOL) tablet 650 mg  650 mg Oral Q4H PRN    diclofenac (VOLTAREN) 1 % topical gel 2 g  2 g Topical TID    losartan (COZAAR) tablet 50 mg  50 mg Oral DAILY    cholecalciferol (VITAMIN D3) (1000 Units /25 mcg) tablet 2,000 Units  2,000 Units Oral DAILY    aspirin tablet 325 mg  325 mg Oral DAILY WITH BREAKFAST    atorvastatin (LIPITOR) tablet 80 mg  80 mg Oral QHS    clopidogreL (PLAVIX) tablet 75 mg  75 mg Oral DAILY WITH DINNER    meclizine (ANTIVERT) tablet 12.5 mg  12.5 mg Oral BID    hydrALAZINE (APRESOLINE) tablet 25 mg  25 mg Oral TID PRN    ondansetron (ZOFRAN ODT) tablet 4 mg  4 mg Oral Q8H PRN    docusate sodium (COLACE) capsule 100 mg  100 mg Oral BID    magnesium hydroxide (MILK OF MAGNESIA) 400 mg/5 mL oral suspension 30 mL  30 mL Oral DAILY PRN    bisacodyL (DULCOLAX) tablet 10 mg  10 mg Oral Q48H PRN    dorzolamide (TRUSOPT) 2 % ophthalmic solution 1 Drop  1 Drop Both Eyes TID    latanoprost (XALATAN) 0.005 % ophthalmic solution 1 Drop  1 Drop Both Eyes QHS    loperamide (IMODIUM) capsule 2 mg  2 mg Oral PRN    montelukast (SINGULAIR) tablet 10 mg  10 mg Oral DAILY    oxybutynin chloride XL (DITROPAN XL) tablet 10 mg  10 mg Oral QHS    sertraline (ZOLOFT) tablet 50 mg  50 mg Oral DAILY       Allergies: Allergies   Allergen Reactions    Novocain [Procaine] Swelling       Lab/Data Review:  No results found for this or any previous visit (from the past 24 hour(s)). Assessment:     Primary Rehabilitation Diagnosis  1. Impaired Mobility and ADLs  2.  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 Justification for continued stay: Good progression towards established rehabilitation goals. 2. Medical Issues being followed closely:    [x]  Fall and safety precautions     []  Wound Care     [x]  Bowel and Bladder Function     [x]  Fluid Electrolyte and Nutrition Balance     []  Pain Control      3. Issues that 24 hour rehabilitation nursing is following:    [x]  Fall and safety precautions     []  Wound Care     [x]  Bowel and Bladder Function     [x]  Fluid Electrolyte and Nutrition Balance     []  Pain Control      [x]  Assistance with and education on in-room safety with transfers to and from the bed, wheelchair, toilet and shower. 4. Acute rehabilitation plan of care:    [x]  Continue current care and rehab. [x]  Physical Therapy           [x]  Occupational Therapy           []  Speech Therapy     []  Hold Rehab until further notice     5. Medications:    [x]  MAR Reviewed     [x]  Continue Present Medications     6. Chemical DVT Prophylaxis:      []  Enoxaparin     []  Unfractionated Heparin     []  Warfarin     []  NOAC     []  Aspirin     [x]  None     7. Mechanical DVT Prophylaxis:      [x]  RADHAMES Stockings     [x]  Sequential Compression Device     []  None     8. GI Prophylaxis:      []  PPI     []  H2 Blocker     [x]  None / Not indicated     9. Code status:    [x]  Full code     []  Partial code     []  Do not intubate     []  Do not resuscitate     10. Diet:  Specifications  3 carb choices (45 gm/meal);  Low fat/Low cholesterol/High fiber/ANGELINE   Solids (consistency)  Regular   Liquids (consistency)  Thin   Fluid Restriction None     11. 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   > Continue:    > Aspirin 325 mg PO once daily with breakfast    > Clopidogrel 75 mg PO once daily with dinner (STOP DATE: 3/27/2022)    > Increase Meclizine from 12.5 mg PO BID to 12.5 mg PO TID (for dizziness)     > Essential hypertension   > On 1/1/2022, started:    > Losartan 25 mg PO once daily (9AM)    > Hydralazine 25 mg PO TID PRN for SBP greater than 160 mmHg   > On 1/5/2022, increased Losartan from 25 mg to 50 mg PO once daily (9AM)   > Continue:    > Losartan 50 mg PO once daily (9AM)    > Hydralazine 25 mg PO TID PRN for SBP greater than 160 mmHg     > Hyperlipidemia   > Continue Atorvastatin 80 mg PO q HS     > Depression and anxiety   > Continue Sertraline 50 mg PO once daily     > Glaucoma   > Continue Latanoprost eyedrops 1 drop both eyes q HS     > Urinary incontinence   > Urinalysis (1/1/2022): WNL   > Continue Oxybutynin XL 10 mg PO once daily    > Primary osteoarthritis of both knees    > On 1/13/2022, started:    > Acetaminophen 650 mg PO BID (8AM, 12PM)    > Diclofenac 1% gel, 2 grams applied to both knees TID (8AM, 12PM, 4PM)   > Continue:    > Acetaminophen 650 mg PO BID (8AM, 12PM)    > Diclofenac 1% gel, 2 grams applied to both knees TID (8AM, 12PM, 4PM)    > Acetaminophen 650 mg PO q 4 hr PRN for pain     > COVID-19 ruled out by laboratory testing   > Prior to admission and upon admission to the ARU, patient did not have any fever, desaturation, cough or difficulty breathing    > SARS-CoV-2 by [facility]) (1/7/2022): Not detected      12. Personal Protective Equipment (N95 face mask and eye goggles) was used while interacting with the patient. Patient was using a surgical mask. 15. Patient's progress in rehabilitation and medical issues discussed with the patient. All questions answered to the best of my ability. Care plan discussed with patient and nurse.     14. Total clinical care time is 30 minutes, including review of chart including all labs, radiology, past medical history, and discussion with patient. Greater than 50% of my time was spent in coordination of care and counseling.       Signed:    Blank Oconnell MD    January 14, 2022

## 2022-01-15 LAB
APPEARANCE UR: CLEAR
BACTERIA URNS QL MICRO: ABNORMAL /HPF
BILIRUB UR QL: NEGATIVE
COLOR UR: YELLOW
EPITH CASTS URNS QL MICRO: ABNORMAL /LPF (ref 0–5)
GLUCOSE UR STRIP.AUTO-MCNC: NEGATIVE MG/DL
HGB UR QL STRIP: NEGATIVE
KETONES UR QL STRIP.AUTO: NEGATIVE MG/DL
LEUKOCYTE ESTERASE UR QL STRIP.AUTO: ABNORMAL
NITRITE UR QL STRIP.AUTO: NEGATIVE
PH UR STRIP: 7 [PH] (ref 5–8)
PROT UR STRIP-MCNC: NEGATIVE MG/DL
RBC #/AREA URNS HPF: ABNORMAL /HPF (ref 0–5)
SP GR UR REFRACTOMETRY: 1.01 (ref 1–1.03)
UROBILINOGEN UR QL STRIP.AUTO: 0.2 EU/DL (ref 0.2–1)
WBC URNS QL MICRO: ABNORMAL /HPF (ref 0–5)

## 2022-01-15 PROCEDURE — 81001 URINALYSIS AUTO W/SCOPE: CPT

## 2022-01-15 PROCEDURE — 74011250637 HC RX REV CODE- 250/637: Performed by: FAMILY MEDICINE

## 2022-01-15 PROCEDURE — 65310000000 HC RM PRIVATE REHAB

## 2022-01-15 PROCEDURE — 87186 SC STD MICRODIL/AGAR DIL: CPT

## 2022-01-15 PROCEDURE — 87086 URINE CULTURE/COLONY COUNT: CPT

## 2022-01-15 PROCEDURE — 74011250637 HC RX REV CODE- 250/637: Performed by: INTERNAL MEDICINE

## 2022-01-15 PROCEDURE — 51798 US URINE CAPACITY MEASURE: CPT

## 2022-01-15 PROCEDURE — 74011250636 HC RX REV CODE- 250/636: Performed by: INTERNAL MEDICINE

## 2022-01-15 PROCEDURE — 2709999900 HC NON-CHARGEABLE SUPPLY

## 2022-01-15 PROCEDURE — 87077 CULTURE AEROBIC IDENTIFY: CPT

## 2022-01-15 RX ADMIN — DICLOFENAC SODIUM 2 G: 10 GEL TOPICAL at 16:08

## 2022-01-15 RX ADMIN — DICLOFENAC SODIUM 2 G: 10 GEL TOPICAL at 08:13

## 2022-01-15 RX ADMIN — MECLIZINE 12.5 MG: 12.5 TABLET ORAL at 16:08

## 2022-01-15 RX ADMIN — DICLOFENAC SODIUM 2 G: 10 GEL TOPICAL at 13:01

## 2022-01-15 RX ADMIN — DORZOLAMIDE HYDROCHLORIDE 1 DROP: 20 SOLUTION/ DROPS OPHTHALMIC at 21:00

## 2022-01-15 RX ADMIN — ASPIRIN 325 MG ORAL TABLET 325 MG: 325 PILL ORAL at 08:12

## 2022-01-15 RX ADMIN — SERTRALINE 50 MG: 50 TABLET, FILM COATED ORAL at 08:13

## 2022-01-15 RX ADMIN — DORZOLAMIDE HYDROCHLORIDE 1 DROP: 20 SOLUTION/ DROPS OPHTHALMIC at 08:13

## 2022-01-15 RX ADMIN — MECLIZINE 12.5 MG: 12.5 TABLET ORAL at 08:12

## 2022-01-15 RX ADMIN — ACETAMINOPHEN 650 MG: 325 TABLET ORAL at 08:12

## 2022-01-15 RX ADMIN — MONTELUKAST 10 MG: 10 TABLET, FILM COATED ORAL at 08:12

## 2022-01-15 RX ADMIN — DOCUSATE SODIUM 100 MG: 100 CAPSULE, LIQUID FILLED ORAL at 08:12

## 2022-01-15 RX ADMIN — MECLIZINE 12.5 MG: 12.5 TABLET ORAL at 12:59

## 2022-01-15 RX ADMIN — LATANOPROST 1 DROP: 50 SOLUTION OPHTHALMIC at 21:00

## 2022-01-15 RX ADMIN — CHOLECALCIFEROL TAB 25 MCG (1000 UNIT) 2000 UNITS: 25 TAB at 08:12

## 2022-01-15 RX ADMIN — CLOPIDOGREL 75 MG: 75 TABLET, FILM COATED ORAL at 16:08

## 2022-01-15 RX ADMIN — DORZOLAMIDE HYDROCHLORIDE 1 DROP: 20 SOLUTION/ DROPS OPHTHALMIC at 16:09

## 2022-01-15 RX ADMIN — ATORVASTATIN CALCIUM 80 MG: 40 TABLET, FILM COATED ORAL at 20:59

## 2022-01-15 RX ADMIN — ACETAMINOPHEN 650 MG: 325 TABLET ORAL at 12:59

## 2022-01-15 RX ADMIN — LOSARTAN POTASSIUM 50 MG: 50 TABLET, FILM COATED ORAL at 08:13

## 2022-01-15 NOTE — PROGRESS NOTES
0630  Patient voided almost every two hours over night. PVR: 429 ml at 06:15AM. Denies pain on bladder area, non distended, soft on gentle palpation. MD paged. New order received: D/C Oxybutynin and bladder scan  patient again on her next void. If PVR> 300 place ovalle in per Dr. Edward Gu. Airway patent, Nasal mucosa clear. Mouth with normal mucosa. Throat has no vesicles, no oropharyngeal exudates and uvula is midline.

## 2022-01-15 NOTE — PROGRESS NOTES
SHIFT CHANGE NOTE FOR [facility]    Bedside and Verbal shift change report given to Danis Choe RN (oncoming nurse) by Ryan Smalls RN (offgoing nurse). Report included the following information SBAR, Kardex, MAR and Recent Results. Situation:   Code Status: Full Code   Hospital Day: 15   Problem List:   Hospital Problems  Date Reviewed: 1/14/2022          Codes Class Noted POA    Primary osteoarthritis of both knees (Chronic) ICD-10-CM: M17.0  ICD-9-CM: 715.16  Unknown Yes        Essential hypertension (Chronic) ICD-10-CM: I10  ICD-9-CM: 401.9  Unknown Yes        Hypercholesterolemia with hypertriglyceridemia (Chronic) ICD-10-CM: E78.2  ICD-9-CM: 272.2  12/27/2021 Yes    Overview Signed 1/4/2022 12:02 AM by David Cordoba MD     Lipid profile (12/27/2021) showed , , HDL 48,              * (Principal) Acute ischemic stroke ([facility] [address] ICD-10-CM: I63.9  ICD-9-CM: 434.91  12/26/2021 Yes    Overview Signed 1/3/2022 11:59 PM by David Cordoba MD     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             Impaired mobility and ADLs ICD-10-CM: Z74.09, Z78.9  ICD-9-CM: V49.89  12/26/2021 Yes        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 ([facility] [address] ICD-10-CM: I67.89, G81.94  ICD-9-CM: 436, 342.92  12/26/2021 Yes              Background:   Past Medical History:   Past Medical History:   Diagnosis Date    Primary osteoarthritis of both knees         Assessment:   Changes in Assessment throughout shift: No change to previous assessment     Patient has a central line: no Reasons if yes: Insertion date: Last dressing date:   Patient has Ovalle Cath:  Reasons if yes:     Insertion date:  Shift ovalle care completed:      Last Vitals:     Vitals:    01/13/22 2005 01/14/22 0821 01/14/22 1617 01/14/22 2054   BP: 136/71 138/70 96/61 (!) 140/80   Pulse: 79 77 81 83   Resp: 18 18 16 16   Temp: 98.1 °F (36.7 °C) 98.9 °F (37.2 °C) 98.3 °F (36.8 °C) 97 °F (36.1 °C)   SpO2: 94% 98% 96% 97%   Weight:       Height:            PAIN    Pain Assessment    Pain Intensity 1: 0 (01/15/22 0303) Pain Intensity 1: 2 (12/29/14 1105)    Pain Location 1: Knee Pain Location 1: Abdomen    Pain Intervention(s) 1: Medication (see MAR) Pain Intervention(s) 1: Medication (see MAR)  Patient Stated Pain Goal: 0 Patient Stated Pain Goal: 0  o Intervention effective: no pain  o Other actions taken for pain:       Skin Assessment  Skin color    Condition/Temperature    Integrity    Turgor    Weekly Pressure Ulcer Documentation  Pressure  Injury Documentation: No Pressure Injury Noted-Pressure Ulcer Prevention Initiated  Wound Prevention & Protection Methods  Orientation of wound Orientation of Wound Prevention: Posterior  Location of Prevention Location of Wound Prevention: Sacrum/Coccyx  Dressing Present Dressing Present : No  Dressing Status    Wound Offloading Wound Offloading (Prevention Methods): Bed, pressure reduction mattress     INTAKE/OUPUT  Date 01/14/22 0700 - 01/15/22 0659 01/15/22 0700 - 01/16/22 0659   Shift 6383-5083 6043-6456 24 Hour Total 0058-0437 7397-8093 24 Hour Total   INTAKE   P.O. 480 360 840        P. O. 480 360 840      Shift Total(mL/kg) 480(7.7) 360(5.7) 840(13.4)      OUTPUT   Urine(mL/kg/hr)  0 0        Urine Voided  0 0        Urine Occurrence(s) 3 x 3 x 6 x      Stool           Stool Occurrence(s) 2 x 0 x 2 x      Shift Total(mL/kg)  0(0) 0(0)       360 840      Weight (kg) 62.7 62.7 62.7 62.7 62.7 62.7       Recommendations:  1. Patient needs and requests: none at this time    2. Pending tests/procedures: none     3. Functional Level/Equipment: Partial (one person) / Dulcie Sicks; Wheelchair    Fall Precautions:   Fall risk precautions were reinforced with the patient; she was instructed to call for help prior to getting up.  The following fall risk precautions were continued: bed/ chair alarms, door signage, yellow bracelet and socks as well as update of the Prakash Chang tool in the patient's room. Yadira Score: 3    HEALS Safety Check    A safety check occurred in the patient's room between off going nurse and oncoming nurse listed above. The safety check included the below items  Area Items   H  High Alert Medications - Verify all high alert medication drips (heparin, PCA, etc.)   E  Equipment - Suction is set up for ALL patients (with norah)  - Red plugs utilized for all equipment (IV pumps, etc.)  - WOWs wiped down at end of shift.  - Room stocked with oxygen, suction, and other unit-specific supplies   A  Alarms - Bed alarm is set for fall risk patients  - Ensure chair alarm is in place and activated if patient is up in a chair   L  Lines - Check IV for any infiltration  - Edwards bag is empty if patient has a Edwards   - Tubing and IV bags are labeled   S  Safety   - Room is clean, patient is clean, and equipment is clean. - Hallways are clear from equipment besides carts. - Fall bracelet on for fall risk patients  - Ensure room is clear and free of clutter  - Suction is set up for ALL patients (with norah)  - Hallways are clear from equipment besides carts.    - Isolation precautions followed, supplies available outside room, sign posted     Jac Farah RN

## 2022-01-15 NOTE — PROGRESS NOTES
Problem: Pressure Injury - Risk of  Goal: *Prevention of pressure injury  Description: Document Lebron Scale and appropriate interventions in the flowsheet. Outcome: Progressing Towards Goal  Note: Pressure Injury Interventions:  Sensory Interventions: Assess changes in LOC,Assess need for specialty bed,Chair cushion,Check visual cues for pain,Float heels,Keep linens dry and wrinkle-free,Maintain/enhance activity level,Minimize linen layers,Pressure redistribution bed/mattress (bed type)    Moisture Interventions: Absorbent underpads,Assess need for specialty bed,Limit adult briefs,Maintain skin hydration (lotion/cream),Minimize layers    Activity Interventions: Assess need for specialty bed,Chair cushion,Increase time out of bed,Pressure redistribution bed/mattress(bed type)    Mobility Interventions: Assess need for specialty bed,Chair cushion,Float heels,HOB 30 degrees or less,Pressure redistribution bed/mattress (bed type)    Nutrition Interventions: Document food/fluid/supplement intake    Friction and Shear Interventions: Apply protective barrier, creams and emollients,Feet elevated on foot rest,HOB 30 degrees or less,Minimize layers                Problem: Patient Education: Go to Patient Education Activity  Goal: Patient/Family Education  Outcome: Progressing Towards Goal     Problem: Falls - Risk of  Goal: *Absence of Falls  Description: Document Yadira Fall Risk and appropriate interventions in the flowsheet.   Outcome: Progressing Towards Goal  Note: Fall Risk Interventions:  Mobility Interventions: Assess mobility with egress test,Bed/chair exit alarm,Patient to call before getting OOB,Utilize walker, cane, or other assistive device,Utilize gait belt for transfers/ambulation    Mentation Interventions: Adequate sleep, hydration, pain control,Bed/chair exit alarm,Door open when patient unattended,Gait belt with transfers/ambulation,Increase mobility,More frequent rounding,Room close to nurse's station,Self-releasing belt,Toileting rounds,Update white board    Medication Interventions: Assess postural VS orthostatic hypotension,Bed/chair exit alarm,Patient to call before getting OOB,Teach patient to arise slowly,Utilize gait belt for transfers/ambulation    Elimination Interventions: Bed/chair exit alarm,Call light in reach,Elevated toilet seat,Patient to call for help with toileting needs,Stay With Me (per policy),Toilet paper/wipes in reach,Toileting schedule/hourly rounds    History of Falls Interventions: Bed/chair exit alarm,Door open when patient unattended,Investigate reason for fall,Room close to nurse's station,Utilize gait belt for transfer/ambulation         Problem: Patient Education: Go to Patient Education Activity  Goal: Patient/Family Education  Outcome: Progressing Towards Goal

## 2022-01-15 NOTE — PROGRESS NOTES
Problem: Pressure Injury - Risk of  Goal: *Prevention of pressure injury  Description: Document Lebron Scale and appropriate interventions in the flowsheet. Outcome: Progressing Towards Goal  Note: Pressure Injury Interventions:  Sensory Interventions: Assess changes in LOC,Assess need for specialty bed,Chair cushion,Check visual cues for pain,Float heels,Keep linens dry and wrinkle-free,Maintain/enhance activity level,Minimize linen layers,Pressure redistribution bed/mattress (bed type)    Moisture Interventions: Absorbent underpads,Assess need for specialty bed,Limit adult briefs,Maintain skin hydration (lotion/cream),Minimize layers    Activity Interventions: Assess need for specialty bed,Chair cushion,Increase time out of bed,Pressure redistribution bed/mattress(bed type)    Mobility Interventions: Assess need for specialty bed,Chair cushion,Float heels,HOB 30 degrees or less,Pressure redistribution bed/mattress (bed type)    Nutrition Interventions: Document food/fluid/supplement intake    Friction and Shear Interventions: Apply protective barrier, creams and emollients,Feet elevated on foot rest,HOB 30 degrees or less,Minimize layers                Problem: Patient Education: Go to Patient Education Activity  Goal: Patient/Family Education  Outcome: Progressing Towards Goal     Problem: Falls - Risk of  Goal: *Absence of Falls  Description: Document Yadira Fall Risk and appropriate interventions in the flowsheet.   Outcome: Progressing Towards Goal  Note: Fall Risk Interventions:  Mobility Interventions: Assess mobility with egress test,Bed/chair exit alarm,Patient to call before getting OOB,Utilize walker, cane, or other assistive device,Utilize gait belt for transfers/ambulation    Mentation Interventions: Adequate sleep, hydration, pain control,Bed/chair exit alarm,Door open when patient unattended,Gait belt with transfers/ambulation,Increase mobility,More frequent rounding,Room close to nurse's station,Self-releasing belt,Toileting rounds,Update white board    Medication Interventions: Assess postural VS orthostatic hypotension,Bed/chair exit alarm,Patient to call before getting OOB,Teach patient to arise slowly,Utilize gait belt for transfers/ambulation    Elimination Interventions: Bed/chair exit alarm,Call light in reach,Elevated toilet seat,Patient to call for help with toileting needs,Stay With Me (per policy),Toilet paper/wipes in reach,Toileting schedule/hourly rounds    History of Falls Interventions: Bed/chair exit alarm,Door open when patient unattended,Investigate reason for fall,Room close to nurse's station,Utilize gait belt for transfer/ambulation         Problem: Patient Education: Go to Patient Education Activity  Goal: Patient/Family Education  Outcome: Progressing Towards Goal     Problem: General Medical Care Plan  Goal: *Vital signs within specified parameters  Outcome: Progressing Towards Goal  Goal: *Labs within defined limits  Outcome: Progressing Towards Goal  Goal: *Absence of infection signs and symptoms  Outcome: Progressing Towards Goal  Goal: *Optimal pain control at patient's stated goal  Outcome: Progressing Towards Goal  Goal: *Skin integrity maintained  Outcome: Progressing Towards Goal  Goal: *Fluid volume balance  Outcome: Progressing Towards Goal  Goal: *Optimize nutritional status  Outcome: Progressing Towards Goal  Goal: *Anxiety reduced or absent  Outcome: Progressing Towards Goal  Goal: *Progressive mobility and function (eg: ADL's)  Outcome: Progressing Towards Goal     Problem: Patient Education: Go to Patient Education Activity  Goal: Patient/Family Education  Outcome: Progressing Towards Goal     Problem: Patient Education: Go to Patient Education Activity  Goal: Patient/Family Education  Outcome: Progressing Towards Goal     Problem: Patient Education: Go to Patient Education Activity  Goal: Patient/Family Education  Outcome: Progressing Towards Goal Problem: Inpatient Rehab (Adult)  Goal: *LTG: Avoids injury/falls 100% of time related to deficits  Outcome: Progressing Towards Goal  Goal: *LTG: Avoids infection 100% of time related to deficits  Outcome: Progressing Towards Goal  Goal: *LTG: Verbalize understanding of diagnosis and risk factors for recurring stroke  Outcome: Progressing Towards Goal  Goal: *LTG: Absence of DVT during hospitalization  Outcome: Progressing Towards Goal  Goal: *LTG: Maintains Skin Integrity With No Evidence of Pressure Injury 100% of Time  Outcome: Progressing Towards Goal  Goal: Interventions  Outcome: Progressing Towards Goal     Problem: Patient Education: Go to Patient Education Activity  Goal: Patient/Family Education  Outcome: Progressing Towards Goal     Problem: Risk for Spread of Infection  Goal: Prevent transmission of infectious organism to others  Description: Prevent the transmission of infectious organisms to other patients, staff members, and visitors.   Outcome: Progressing Towards Goal     Problem: Patient Education:  Go to Education Activity  Goal: Patient/Family Education  Outcome: Progressing Towards Goal

## 2022-01-15 NOTE — PROGRESS NOTES
Progress Note    Patient: Dmitriy Zarate MRN: [id number]  CSN: [id number]    YOB: 1935  Age: 82 y.o. 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 Nursing noting retention last night after polyuria. oxybutinin discontinued, pt able to void fully this am, nurse reports PVR 0ml. Pt denies dysuria, hesitency. Reports frequency is baseline for her. Review of systems  General: No fevers or chills. Cardiovascular: No chest pain or pressure. No palpitations. Pulmonary: No shortness of breath, cough or wheeze. Gastrointestinal: No abdominal pain, nausea, vomiting or diarrhea. Genitourinary: +urinary frequency, no hesitancy or dysuria. Musculoskeletal: No joint or muscle pain  Neurologic:  left hemiparesis    Objective:     Physical Exam:  Visit Vitals  BP (!) 141/77 (BP 1 Location: Right upper arm, BP Patient Position: At rest)   Pulse 72   Temp 97.7 °F (36.5 °C)   Resp 16   Ht 4' 11\" (1.499 m)   Wt 62.7 kg (138 lb 3.2 oz)   SpO2 95%   BMI 27.91 kg/m²        General:         Alert, cooperative, no acute distress    HEENT: NC, Atraumatic. PERRLA, anicteric sclerae. Lungs: CTA Bilaterally. No Wheezing/Rhonchi/Rales. Heart:  Regular  rhythm,  +II/VI holosysytolic RUSB murmur  Abdomen: Soft, Non distended, Non tender.  +Bowel sounds, no HSM  Extremities: No edema  Psych:   Not anxious or agitated. Neurologic:  CN 2-12 grossly intact, Alert and oriented X 3. No gross sensory deficit. Motor strength is 4-/5 on the RUE, 3+/5 on the LUE, 4+/5 on the RLE, 4/5 on the LLE. Labs: Results:       Chemistry No results for input(s): GLU, NA, K, CL, CO2, BUN, CREA, CA, AGAP, BUCR, TBIL, AP, TP, ALB, GLOB, AGRAT in the last 72 hours.     No lab exists for component: GPT   CBC w/Diff No results for input(s): WBC, RBC, HGB, HCT, PLT, GRANS, LYMPH, EOS, HGBEXT, HCTEXT, PLTEXT in the last 72 hours. Cardiac Enzymes No results for input(s): CPK, CKND1, GUSTABO in the last 72 hours. No lab exists for component: CKRMB, TROIP   Coagulation No results for input(s): PTP, INR, APTT, INREXT in the last 72 hours. Lipid Panel Lab Results   Component Value Date/Time    Cholesterol, total 220 (H) 12/27/2021 01:25 AM    HDL Cholesterol 48 12/27/2021 01:25 AM    LDL, calculated 136 (H) 12/27/2021 01:25 AM    Triglyceride 182 (H) 12/27/2021 01:25 AM    CHOL/HDL Ratio 4.6 (H) 12/27/2021 01:25 AM      BNP No results for input(s): BNPP in the last 72 hours. Liver Enzymes No results for input(s): TP, ALB, TBIL, AP in the last 72 hours.     No lab exists for component: SGOT, GPT, DBIL   Thyroid Studies No results found for: T4, T3U, TSH, TSHEXT       Procedures/imaging: see electronic medical records for all procedures/Xrays and details which were not copied into this note but were reviewed prior to creation of Plan    Medications:   Current Facility-Administered Medications   Medication Dose Route Frequency    meclizine (ANTIVERT) tablet 12.5 mg  12.5 mg Oral TID    acetaminophen (TYLENOL) tablet 650 mg  650 mg Oral BID    acetaminophen (TYLENOL) tablet 650 mg  650 mg Oral Q4H PRN    diclofenac (VOLTAREN) 1 % topical gel 2 g  2 g Topical TID    losartan (COZAAR) tablet 50 mg  50 mg Oral DAILY    cholecalciferol (VITAMIN D3) (1000 Units /25 mcg) tablet 2,000 Units  2,000 Units Oral DAILY    aspirin tablet 325 mg  325 mg Oral DAILY WITH BREAKFAST    atorvastatin (LIPITOR) tablet 80 mg  80 mg Oral QHS    clopidogreL (PLAVIX) tablet 75 mg  75 mg Oral DAILY WITH DINNER    hydrALAZINE (APRESOLINE) tablet 25 mg  25 mg Oral TID PRN    ondansetron (ZOFRAN ODT) tablet 4 mg  4 mg Oral Q8H PRN    docusate sodium (COLACE) capsule 100 mg  100 mg Oral BID    magnesium hydroxide (MILK OF MAGNESIA) 400 mg/5 mL oral suspension 30 mL  30 mL Oral DAILY PRN    bisacodyL (DULCOLAX) tablet 10 mg  10 mg Oral Q48H PRN    dorzolamide (TRUSOPT) 2 % ophthalmic solution 1 Drop  1 Drop Both Eyes TID    latanoprost (XALATAN) 0.005 % ophthalmic solution 1 Drop  1 Drop Both Eyes QHS    loperamide (IMODIUM) capsule 2 mg  2 mg Oral PRN    montelukast (SINGULAIR) tablet 10 mg  10 mg Oral DAILY    sertraline (ZOLOFT) tablet 50 mg  50 mg Oral DAILY       Assessment/Plan     Principal Problem:    Acute ischemic stroke ([facility] [address] (12/26/2021)      Overview: Acute Ischemic Stroke (acute infarct in the right corona radiata) with       residual left hemiparesis    Active Problems:    Impaired mobility and ADLs (12/26/2021)      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 ([facility] [address] (12/26/2021)      Essential hypertension ()      Hypercholesterolemia with hypertriglyceridemia (12/27/2021)      Overview: Lipid profile (12/27/2021) showed , , HDL 48,       Primary osteoarthritis of both knees ()      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  Continue Aspirin 325 mg PO once daily with breakfast & Clopidogrel 75 mg PO once daily with dinner (STOP DATE: 3/27/2022)  Continue Meclizine from 12.5 mg PO TID (for dizziness)     Essential hypertension   BP fair, had 1/14/22 low bp 96/61, remaining since systolic 404O  continue Losartan 50 mg PO once daily (9AM) with holding parameters and Hydralazine 25 mg PO TID PRN for SBP greater than 160 mmHg     Hyperlipidemia  Continue Atorvastatin 80 mg PO q HS     Depression and anxiety  Continue Sertraline 50 mg PO once daily     Glaucoma  Continue Latanoprost eyedrops 1 drop both eyes q HS     Urinary incontinence  Urinalysis (1/1/2022): WNL  ON 1/15/21 RN noting pt voiding q2hrs overnight with PVR 429ml. Oxybutynin Discontinued due to retention.   Pt able to void completely with PVR 0ml on next void.   Will continue to check bladder scan post void, notify MD if >300ml  Reheck UA        Primary osteoarthritis of both knees   continu eAcetaminophen 650 mg PO BID (8AM, 12PM), Diclofenac 1% gel, 2 grams applied to both knees TID (8AM, 12PM, 4PM) and Acetaminophen 650 mg PO q 4 hr PRN for pain     Warren Mcdaniel MD  1/15/2022 09:58

## 2022-01-16 ENCOUNTER — APPOINTMENT (OUTPATIENT)
Dept: CT IMAGING | Age: 87
DRG: 057 | End: 2022-01-16
Attending: FAMILY MEDICINE
Payer: MEDICARE

## 2022-01-16 PROCEDURE — 51798 US URINE CAPACITY MEASURE: CPT

## 2022-01-16 PROCEDURE — 97535 SELF CARE MNGMENT TRAINING: CPT

## 2022-01-16 PROCEDURE — 65310000000 HC RM PRIVATE REHAB

## 2022-01-16 PROCEDURE — BW28YZZ COMPUTERIZED TOMOGRAPHY (CT SCAN) OF HEAD USING OTHER CONTRAST: ICD-10-PCS | Performed by: INTERNAL MEDICINE

## 2022-01-16 PROCEDURE — 74011250637 HC RX REV CODE- 250/637: Performed by: INTERNAL MEDICINE

## 2022-01-16 PROCEDURE — 97530 THERAPEUTIC ACTIVITIES: CPT

## 2022-01-16 PROCEDURE — 97110 THERAPEUTIC EXERCISES: CPT

## 2022-01-16 PROCEDURE — 74011250636 HC RX REV CODE- 250/636: Performed by: INTERNAL MEDICINE

## 2022-01-16 PROCEDURE — 70450 CT HEAD/BRAIN W/O DYE: CPT

## 2022-01-16 PROCEDURE — 74011250637 HC RX REV CODE- 250/637: Performed by: FAMILY MEDICINE

## 2022-01-16 PROCEDURE — 2709999900 HC NON-CHARGEABLE SUPPLY

## 2022-01-16 RX ADMIN — LOSARTAN POTASSIUM 50 MG: 50 TABLET, FILM COATED ORAL at 08:23

## 2022-01-16 RX ADMIN — MONTELUKAST 10 MG: 10 TABLET, FILM COATED ORAL at 08:24

## 2022-01-16 RX ADMIN — MECLIZINE 12.5 MG: 12.5 TABLET ORAL at 15:43

## 2022-01-16 RX ADMIN — DOCUSATE SODIUM 100 MG: 100 CAPSULE, LIQUID FILLED ORAL at 17:41

## 2022-01-16 RX ADMIN — DOCUSATE SODIUM 100 MG: 100 CAPSULE, LIQUID FILLED ORAL at 08:23

## 2022-01-16 RX ADMIN — DORZOLAMIDE HYDROCHLORIDE 1 DROP: 20 SOLUTION/ DROPS OPHTHALMIC at 21:10

## 2022-01-16 RX ADMIN — ACETAMINOPHEN 650 MG: 325 TABLET ORAL at 11:09

## 2022-01-16 RX ADMIN — ATORVASTATIN CALCIUM 80 MG: 40 TABLET, FILM COATED ORAL at 20:48

## 2022-01-16 RX ADMIN — DICLOFENAC SODIUM 2 G: 10 GEL TOPICAL at 15:43

## 2022-01-16 RX ADMIN — ACETAMINOPHEN 650 MG: 325 TABLET ORAL at 08:24

## 2022-01-16 RX ADMIN — ASPIRIN 325 MG ORAL TABLET 325 MG: 325 PILL ORAL at 08:23

## 2022-01-16 RX ADMIN — MECLIZINE 12.5 MG: 12.5 TABLET ORAL at 08:24

## 2022-01-16 RX ADMIN — DORZOLAMIDE HYDROCHLORIDE 1 DROP: 20 SOLUTION/ DROPS OPHTHALMIC at 15:43

## 2022-01-16 RX ADMIN — DICLOFENAC SODIUM 2 G: 10 GEL TOPICAL at 12:10

## 2022-01-16 RX ADMIN — CHOLECALCIFEROL TAB 25 MCG (1000 UNIT) 2000 UNITS: 25 TAB at 08:23

## 2022-01-16 RX ADMIN — CLOPIDOGREL 75 MG: 75 TABLET, FILM COATED ORAL at 17:41

## 2022-01-16 RX ADMIN — LATANOPROST 1 DROP: 50 SOLUTION OPHTHALMIC at 21:10

## 2022-01-16 RX ADMIN — MECLIZINE 12.5 MG: 12.5 TABLET ORAL at 11:10

## 2022-01-16 RX ADMIN — DICLOFENAC SODIUM 2 G: 10 GEL TOPICAL at 08:24

## 2022-01-16 RX ADMIN — SERTRALINE 50 MG: 50 TABLET, FILM COATED ORAL at 08:23

## 2022-01-16 RX ADMIN — DORZOLAMIDE HYDROCHLORIDE 1 DROP: 20 SOLUTION/ DROPS OPHTHALMIC at 08:24

## 2022-01-16 NOTE — PROGRESS NOTES
Progress Note    Patient: Cosme Armendariz MRN: [id number]  CSN: [id number]    YOB: 1935  Age: 82 y.o. 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 Improved to 150s after received am medications. Pt reporting this am woke up with vision changes, notes blurred not double vision, unable to read text that she was able to read previously. No headache, no change in speech or strength or sensation. No further frequency of urination. Nurse reports bladder scans have been normal, last PVR was 19ml. Review of systems  General: No fevers or chills. Cardiovascular: No chest pain or pressure. No palpitations. Pulmonary: No shortness of breath, cough or wheeze. Gastrointestinal: No abdominal pain, nausea, vomiting or diarrhea. Genitourinary: no rinary frequency, no hesitancy or dysuria. Musculoskeletal: No joint or muscle pain  Neurologic:  left hemiparesis, blurred vision    Objective:     Physical Exam:  Visit Vitals  BP (!) 155/78 (BP 1 Location: Left upper arm, BP Patient Position: Supine)   Pulse 73   Temp 98.1 °F (36.7 °C)   Resp 16   Ht 4' 11\" (1.499 m)   Wt 62.7 kg (138 lb 3.2 oz)   SpO2 94%   BMI 27.91 kg/m²        General:         Alert, cooperative, no acute distress    HEENT: NC, Atraumatic. PERRLA, anicteric sclerae. Denies double vision, peripheral vision grossly intact, able to dtermine number of fingers being held up. Lungs: CTA Bilaterally. No Wheezing/Rhonchi/Rales. Heart:  Regular  rhythm,  +II/VI holosysytolic RUSB murmur  Abdomen: Soft, Non distended, Non tender.  +Bowel sounds, no HSM  Extremities: No edema  Psych:   Not anxious or agitated.   Neurologic:  CN 2-12 grossly intact, Alert and oriented X 3. No gross sensory deficit. Motor strength is 4+/5 on the RUE, 4/5 on the LUE, 4+/5 on the RLE, 4/5 on the LLE. Labs: Results:       Chemistry No results for input(s): GLU, NA, K, CL, CO2, BUN, CREA, CA, AGAP, BUCR, TBIL, AP, TP, ALB, GLOB, AGRAT in the last 72 hours. No lab exists for component: GPT   CBC w/Diff No results for input(s): WBC, RBC, HGB, HCT, PLT, GRANS, LYMPH, EOS, HGBEXT, HCTEXT, PLTEXT, HGBEXT, HCTEXT, PLTEXT in the last 72 hours. Cardiac Enzymes No results for input(s): CPK, CKND1, GUSTABO in the last 72 hours. No lab exists for component: CKRMB, TROIP   Coagulation No results for input(s): PTP, INR, APTT, INREXT, INREXT in the last 72 hours. Lipid Panel Lab Results   Component Value Date/Time    Cholesterol, total 220 (H) 12/27/2021 01:25 AM    HDL Cholesterol 48 12/27/2021 01:25 AM    LDL, calculated 136 (H) 12/27/2021 01:25 AM    Triglyceride 182 (H) 12/27/2021 01:25 AM    CHOL/HDL Ratio 4.6 (H) 12/27/2021 01:25 AM      BNP No results for input(s): BNPP in the last 72 hours. Liver Enzymes No results for input(s): TP, ALB, TBIL, AP in the last 72 hours.     No lab exists for component: SGOT, GPT, DBIL   Thyroid Studies No results found for: T4, T3U, TSH, TSHEXT, TSHEXT       Procedures/imaging: see electronic medical records for all procedures/Xrays and details which were not copied into this note but were reviewed prior to creation of Plan    Medications:   Current Facility-Administered Medications   Medication Dose Route Frequency    meclizine (ANTIVERT) tablet 12.5 mg  12.5 mg Oral TID    acetaminophen (TYLENOL) tablet 650 mg  650 mg Oral BID    acetaminophen (TYLENOL) tablet 650 mg  650 mg Oral Q4H PRN    diclofenac (VOLTAREN) 1 % topical gel 2 g  2 g Topical TID    losartan (COZAAR) tablet 50 mg  50 mg Oral DAILY    cholecalciferol (VITAMIN D3) (1000 Units /25 mcg) tablet 2,000 Units  2,000 Units Oral DAILY    aspirin tablet 325 mg  325 mg Oral DAILY WITH BREAKFAST    atorvastatin (LIPITOR) tablet 80 mg  80 mg Oral QHS    clopidogreL (PLAVIX) tablet 75 mg  75 mg Oral DAILY WITH DINNER    hydrALAZINE (APRESOLINE) tablet 25 mg  25 mg Oral TID PRN    ondansetron (ZOFRAN ODT) tablet 4 mg  4 mg Oral Q8H PRN    docusate sodium (COLACE) capsule 100 mg  100 mg Oral BID    magnesium hydroxide (MILK OF MAGNESIA) 400 mg/5 mL oral suspension 30 mL  30 mL Oral DAILY PRN    bisacodyL (DULCOLAX) tablet 10 mg  10 mg Oral Q48H PRN    dorzolamide (TRUSOPT) 2 % ophthalmic solution 1 Drop  1 Drop Both Eyes TID    latanoprost (XALATAN) 0.005 % ophthalmic solution 1 Drop  1 Drop Both Eyes QHS    loperamide (IMODIUM) capsule 2 mg  2 mg Oral PRN    montelukast (SINGULAIR) tablet 10 mg  10 mg Oral DAILY    sertraline (ZOLOFT) tablet 50 mg  50 mg Oral DAILY       Assessment/Plan     Principal Problem:    Acute ischemic stroke (HCC) (12/26/2021)      Overview: Acute Ischemic Stroke (acute infarct in the right corona radiata) with       residual left hemiparesis    Active Problems:    Impaired mobility and ADLs (12/26/2021)      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 (HCC) (12/26/2021)      Essential hypertension ()      Hypercholesterolemia with hypertriglyceridemia (12/27/2021)      Overview: Lipid profile (12/27/2021) showed , , HDL 48,       Primary osteoarthritis of both knees ()      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   Pt reports onset of symptoms early this am.     Essential hypertension   BP elevated  continue Losartan 50 mg PO once daily (9AM) with holding parameters and Hydralazine 25 mg PO TID PRN for SBP greater than 160 mmHg     Hyperlipidemia  Continue Atorvastatin 80 mg PO q HS     Depression and anxiety  Continue Sertraline 50 mg PO once daily     Glaucoma  Continue Latanoprost eyedrops 1 drop both eyes q HS     Urinary incontinence  Urinalysis (1/1/2022): WNL  ON 1/15/21 RN noting pt voiding q2hrs overnight with PVR 429ml. Oxybutynin Discontinued due to retention. Pt able to void completely with PVR 0ml on next void.   Will continue to check bladder scan post void, notify MD if >300ml  UA with 4-6 WBC and few bacteria, trace LE, neg Nit, 1+ epth cells likely contaminated will f/u urine culture.       Primary osteoarthritis of both knees   Continue Acetaminophen 650 mg PO BID (8AM, 12PM), Diclofenac 1% gel, 2 grams applied to both knees TID (8AM, 12PM, 4PM) and Acetaminophen 650 mg PO q 4 hr PRN for pain     Yadira Norton MD  1/16/2022

## 2022-01-16 NOTE — PROGRESS NOTES
Problem: Self Care Deficits Care Plan (Adult)  Goal: *Therapy Goal (Edit Goal, Insert Text)  Description: Occupational Therapy Goals   Long Term Goals  Initiated 22 and to be accomplished within 3 week(s), by 2022  1. Pt will perform self-feeding with independence. 2. Pt will perform grooming with independence. 3. Pt will perform UB bathing with supervision. 4. Pt will perform LB bathing with supervision. 5. Pt will perform tub/shower transfer with supervision. 6. Pt will perform UB dressing with Axel using compensatory strategies. 7. Pt will perform LB dressing with Axel and AE. 8. Pt will perform toileting task with Axel and AE. 9. Pt will perform toilet transfer with Axel and AE. Short Term Goals   Initiated 22 (reassessed on 2022) and to be accomplished within 7 day(s), to be reassessed by 2022  1. Pt will perform self-feeding with Axel. 2. Pt will perform grooming with Axel. 3. Pt will perform UB bathing with SBA. (Goal met 2022)      Upgraded goal: Pt will perform UB bathing with set-up/ supv. 4. Pt will perform LB bathing with Fidel and UE support. (Goal met 2022)      Upgraded goal: Pt will perform LB bathing with CGA using AE and/ or compensatory strategies as needed. 5. Pt will perform tub/shower transfer with Fidel and AE. 6. Pt will perform UB dressing with SBA using compensatory strategies. (Goal met 2022)      Upgraded goal: Pt will perform UB dressing with supv using compensatory strategies as needed. 7. Pt will perform LB dressing with Fidel and AE. 8. Pt will perform toileting task with Fidel and AE. (Goal met 2022; continue goal for consistency)  9. Pt will perform toilet transfer with Fidel and AE. Outcome: Progressing Towards Goal   Occupational Therapy TREATMENT    Patient: Ingrid Galvan   82 y.o.     Patient identified with name and : yes    Date: 2022    First Tx Session  Time In: 1100  Time Out[de-identified] 1200    Diagnosis: CVA (cerebral vascular accident) [facility]) [I63.9]   Precautions: Fall  Chart, occupational therapy assessment, plan of care, and goals were reviewed. Pain:  Pt reports 0/10 pain or discomfort prior to treatment. Pt reports 0/10 pain or discomfort post treatment. Intervention Provided: NA       SUBJECTIVE:   Patient stated I live in a senior living community.     OBJECTIVE DATA SUMMARY:     THERAPEUTIC ACTIVITY Daily Assessment    Pt participated in group activity with one other pt. Pt played the game Connect 4 while seated w/c level, having to reach forward (outside JIL) to place game pieces into board. Pt used BUE to perform activity, incorporating left UE to assist right UE 2/2 decreased ROM B shoulders. Pt incorporating good strategy to gain advantage throughout game play. THERAPEUTIC EXERCISE Daily Assessment    Pt participated in BUE strengthening exercises for carryover to self-care tasks. Pt used FlexiBar to perform forearm supination/pronation and wrist flexion/extension (2 sets x 10 reps each direction). UPPER BODY DRESSING Daily Assessment    Upper Body Dressing   Dressing Assistance : 5 (Supervision) (setup)  Comments: Pt donned open-front sweater seated EOB. LOWER BODY DRESSING Daily Assessment    Lower Body Dressing   Dressing Assistance : 5 (Stand-by assistance)  Leg Crossed Method Used: Yes  Position Performed: Seated edge of bed  Comments: Pt doffed non-skid socks seated EOB. Pt donned socks and shoes seated EOB. MOBILITY/TRANSFERS Daily Assessment     Pt performed stand-step transfer EOB to w/c using RW CGA. Pt self-propelled w/c room <> gym Supervision. ASSESSMENT:  Pt making good progress with increasing BUE strength and increasing activity tolerance. Pt limited with certain activities due to limited ROM in B shoulders.   Progression toward goals:  []          Improving appropriately and progressing toward goals  [x]          Improving slowly and progressing toward goals  []          Not making progress toward goals and plan of care will be adjusted     PLAN:  Patient continues to benefit from skilled intervention to address the above impairments. Continue treatment per established plan of care. Discharge Recommendations:  Home Health with assistance  Further Equipment Recommendations for Discharge:  bedside commode     Activity Tolerance:  Fair      Estimated LOS:1/21/2022    Please refer to the flowsheet for vital signs taken during this treatment. After treatment:   [x]  Patient left in no apparent distress sitting up in chair   []  Patient left in no apparent distress in bed  [x]  Call bell left within reach  []  Nursing notified  []  Caregiver present  []  Bed alarm activated    COMMUNICATION/EDUCATION:   [] Home safety education was provided and the patient/caregiver indicated understanding. [] Patient/family have participated as able in goal setting and plan of care. [x] Patient/family agree to work toward stated goals and plan of care. [] Patient understands intent and goals of therapy, but is neutral about his/her participation. [] Patient is unable to participate in goal setting and plan of care.       Nichol Prom, ELLIOTT/L

## 2022-01-16 NOTE — ROUTINE PROCESS
SHIFT CHANGE NOTE FOR [facility]    Bedside and Verbal shift change report given to Kacey Clark RN (oncoming nurse) by Aneesh Hung RN (offgoing nurse). Report included the following information SBAR, Kardex, MAR and Recent Results.     Situation:   Code Status: Full Code   Hospital Day: 16   Problem List:   Hospital Problems  Date Reviewed: 1/14/2022          Codes Class Noted POA    Primary osteoarthritis of both knees (Chronic) ICD-10-CM: M17.0  ICD-9-CM: 715.16  Unknown Yes        Essential hypertension (Chronic) ICD-10-CM: I10  ICD-9-CM: 401.9  Unknown Yes        Hypercholesterolemia with hypertriglyceridemia (Chronic) ICD-10-CM: E78.2  ICD-9-CM: 272.2  12/27/2021 Yes    Overview Signed 1/4/2022 12:02 AM by Adama Bond MD     Lipid profile (12/27/2021) showed , , HDL 48,              * (Principal) [facility] ICD-10-CM: I63.9  ICD-9-CM: 434.91  12/26/2021 Yes    Overview Signed 1/3/2022 11:59 PM by Adama Bond MD     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             Impaired mobility and ADLs ICD-10-CM: Z74.09, Z78.9  ICD-9-CM: V49.89  12/26/2021 Yes        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 [facility]) ICD-10-CM: I67.89, G81.94  ICD-9-CM: 436, 342.92  12/26/2021 Yes              Background:   Past Medical History:   Past Medical History:   Diagnosis Date    Primary osteoarthritis of both knees         Assessment:   Changes in Assessment throughout shift: No change to previous assessment    Patient has a central line: no   Patient has Edwards Cath: no      Last Vitals:     Vitals:    01/14/22 2054 01/15/22 0803 01/15/22 1607 01/15/22 2107   BP: (!) 140/80 (!) 141/77 (!) 136/90 131/64   Pulse: 83 72 72 69   Resp: 16 16 18 18   Temp: 97 °F (36.1 °C) 97.7 °F (36.5 °C) 98.5 °F (36.9 °C) 98 °F (36.7 °C)   SpO2: 97% 95% 97% 93%   Weight:       Height:            PAIN    Pain Assessment    Pain Intensity 1: 0 (01/16/22 0410) Pain Intensity 1: 2 (12/29/14 1105)    Pain Location 1: Knee Pain Location 1: Abdomen    Pain Intervention(s) 1: Medication (see MAR) Pain Intervention(s) 1: Medication (see MAR)  Patient Stated Pain Goal: 0 Patient Stated Pain Goal: 0  o Intervention effective: Yes  o Other actions taken for pain:       Skin Assessment  Skin color    Condition/Temperature    Integrity    Turgor    Weekly Pressure Ulcer Documentation  Pressure  Injury Documentation: No Pressure Injury Noted-Pressure Ulcer Prevention Initiated  Wound Prevention & Protection Methods  Orientation of wound Orientation of Wound Prevention: Posterior  Location of Prevention Location of Wound Prevention: Buttocks,Sacrum/Coccyx  Dressing Present Dressing Present : No  Dressing Status    Wound Offloading Wound Offloading (Prevention Methods): Bed, pressure redistribution/air     INTAKE/OUPUT  Date 01/15/22 0700 - 01/16/22 0659 01/16/22 0700 - 01/17/22 0659   Shift 7278-1368 0921-2258 24 Hour Total 8093-6467 5932-9535 24 Hour Total   INTAKE   Shift Total(mL/kg)         OUTPUT   Urine(mL/kg/hr)  800(1.1) 800(0.5)        Urine Voided  800 800        Urine Occurrence(s) 3 x 7 x 10 x      Stool           Stool Occurrence(s) 2 x 1 x 3 x      Shift Total(mL/kg)  800(12.8) 800(12.8)      NET  -800 -800      Weight (kg) 62.7 62.7 62.7 62.7 62.7 62.7       Recommendations:  1. Patient needs and requests: None at this time    2. Pending tests/procedures: Routine labs, PVR q shift     3. Functional Level/Equipment: Partial (one person) /      Fall Precautions:   Fall risk precautions were reinforced with the patient; she was instructed to call for help prior to getting up. The following fall risk precautions were continued: bed/ chair alarms, door signage, yellow bracelet and socks as well as update of the Yonas Blight tool in the patient's room.    Yadira Score:      HEALS Safety Check    A safety check occurred in the patient's room between off going nurse and oncoming nurse listed above. The safety check included the below items  Area Items   H  High Alert Medications - Verify all high alert medication drips (heparin, PCA, etc.)   E  Equipment - Suction is set up for ALL patients (with norah)  - Red plugs utilized for all equipment (IV pumps, etc.)  - WOWs wiped down at end of shift.  - Room stocked with oxygen, suction, and other unit-specific supplies   A  Alarms - Bed alarm is set for fall risk patients  - Ensure chair alarm is in place and activated if patient is up in a chair   L  Lines - Check IV for any infiltration  - Edwards bag is empty if patient has a Edwards   - Tubing and IV bags are labeled   S  Safety   - Room is clean, patient is clean, and equipment is clean. - Hallways are clear from equipment besides carts. - Fall bracelet on for fall risk patients  - Ensure room is clear and free of clutter  - Suction is set up for ALL patients (with norah)  - Hallways are clear from equipment besides carts.    - Isolation precautions followed, supplies available outside room, sign posted     Josie Rodriguez RN

## 2022-01-16 NOTE — PROGRESS NOTES
Problem: Pressure Injury - Risk of  Goal: *Prevention of pressure injury  Description: Document Leborn Scale and appropriate interventions in the flowsheet. Outcome: Progressing Towards Goal  Note: Pressure Injury Interventions:  Sensory Interventions: Assess changes in LOC,Pressure redistribution bed/mattress (bed type)    Moisture Interventions: Maintain skin hydration (lotion/cream),Absorbent underpads    Activity Interventions: Pressure redistribution bed/mattress(bed type)    Mobility Interventions: Pressure redistribution bed/mattress (bed type),HOB 30 degrees or less,Float heels    Nutrition Interventions: Document food/fluid/supplement intake    Friction and Shear Interventions: HOB 30 degrees or less                Problem: Patient Education: Go to Patient Education Activity  Goal: Patient/Family Education  Outcome: Progressing Towards Goal     Problem: Falls - Risk of  Goal: *Absence of Falls  Description: Document Yadira Fall Risk and appropriate interventions in the flowsheet.   Outcome: Progressing Towards Goal  Note: Fall Risk Interventions:  Mobility Interventions: Bed/chair exit alarm,Patient to call before getting OOB    Mentation Interventions: Adequate sleep, hydration, pain control,Bed/chair exit alarm    Medication Interventions: Bed/chair exit alarm,Patient to call before getting OOB    Elimination Interventions: Bed/chair exit alarm,Call light in reach,Patient to call for help with toileting needs    History of Falls Interventions: Bed/chair exit alarm         Problem: Patient Education: Go to Patient Education Activity  Goal: Patient/Family Education  Outcome: Progressing Towards Goal     Problem: General Medical Care Plan  Goal: *Vital signs within specified parameters  Outcome: Progressing Towards Goal  Goal: *Labs within defined limits  Outcome: Progressing Towards Goal  Goal: *Absence of infection signs and symptoms  Outcome: Progressing Towards Goal  Goal: *Optimal pain control at patient's stated goal  Outcome: Progressing Towards Goal  Goal: *Skin integrity maintained  Outcome: Progressing Towards Goal  Goal: *Fluid volume balance  Outcome: Progressing Towards Goal  Goal: *Optimize nutritional status  Outcome: Progressing Towards Goal  Goal: *Anxiety reduced or absent  Outcome: Progressing Towards Goal  Goal: *Progressive mobility and function (eg: ADL's)  Outcome: Progressing Towards Goal     Problem: Patient Education: Go to Patient Education Activity  Goal: Patient/Family Education  Outcome: Progressing Towards Goal     Problem: Patient Education: Go to Patient Education Activity  Goal: Patient/Family Education  Outcome: Progressing Towards Goal     Problem: Patient Education: Go to Patient Education Activity  Goal: Patient/Family Education  Outcome: Progressing Towards Goal     Problem: Inpatient Rehab (Adult)  Goal: *LTG: Avoids injury/falls 100% of time related to deficits  Outcome: Progressing Towards Goal  Goal: *LTG: Avoids infection 100% of time related to deficits  Outcome: Progressing Towards Goal  Goal: *LTG: Verbalize understanding of diagnosis and risk factors for recurring stroke  Outcome: Progressing Towards Goal  Goal: *LTG: Absence of DVT during hospitalization  Outcome: Progressing Towards Goal  Goal: *LTG: Maintains Skin Integrity With No Evidence of Pressure Injury 100% of Time  Outcome: Progressing Towards Goal  Goal: Interventions  Outcome: Progressing Towards Goal     Problem: Patient Education: Go to Patient Education Activity  Goal: Patient/Family Education  Outcome: Progressing Towards Goal     Problem: Risk for Spread of Infection  Goal: Prevent transmission of infectious organism to others  Description: Prevent the transmission of infectious organisms to other patients, staff members, and visitors.   Outcome: Progressing Towards Goal     Problem: Patient Education:  Go to Education Activity  Goal: Patient/Family Education  Outcome: Progressing Towards Goal

## 2022-01-16 NOTE — PROGRESS NOTES
BP taken by student nurses this morning, 188/87. I rechecked for accuracy, /87. Given AM dose of losartan. BP rechecked, 159/84.  Will continue to monitor patient's BP.

## 2022-01-16 NOTE — ROUTINE PROCESS
SHIFT CHANGE NOTE FOR [facility]    Bedside and Verbal shift change report given to Gregg Cooper RN (oncoming nurse) by Noemi Ray (offgoing nurse). Report included the following information SBAR, Kardex, MAR and Recent Results.     Situation:   Code Status: Full Code   Hospital Day: 16   Problem List:   Hospital Problems  Date Reviewed: 1/14/2022          Codes Class Noted POA    Primary osteoarthritis of both knees (Chronic) ICD-10-CM: M17.0  ICD-9-CM: 715.16  Unknown Yes        Essential hypertension (Chronic) ICD-10-CM: I10  ICD-9-CM: 401.9  Unknown Yes        Hypercholesterolemia with hypertriglyceridemia (Chronic) ICD-10-CM: E78.2  ICD-9-CM: 272.2  12/27/2021 Yes    Overview Signed 1/4/2022 12:02 AM by Eduarda Law MD     Lipid profile (12/27/2021) showed , , HDL 48,              * (Principal) [facility] ICD-10-CM: I63.9  ICD-9-CM: 434.91  12/26/2021 Yes    Overview Signed 1/3/2022 11:59 PM by Eduarda Law MD     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             Impaired mobility and ADLs ICD-10-CM: Z74.09, Z78.9  ICD-9-CM: V49.89  12/26/2021 Yes        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 ([facility] ICD-10-CM: I67.89, G81.94  ICD-9-CM: 436, 342.92  12/26/2021 Yes              Background:   Past Medical History:   Past Medical History:   Diagnosis Date    Primary osteoarthritis of both knees         Assessment:   Changes in Assessment throughout shift: No change to previous assessment    Patient has a central line: no   Patient has Edwards Cath: no      Last Vitals:     Vitals:    01/16/22 0741 01/16/22 0811 01/16/22 0943 01/16/22 1106   BP: (!) 188/87 (!) 179/87 (!) 159/84 (!) 155/82   Pulse: 70 71 71    Resp: 14      Temp: 96.8 °F (36 °C)      SpO2: 95%      Weight:       Height:            PAIN    Pain Assessment    Pain Intensity 1: 0 (01/16/22 0811) Pain Intensity 1: 2 (12/29/14 9995)    Pain Location 1: Knee Pain Location 1: Abdomen    Pain Intervention(s) 1: Medication (see MAR) Pain Intervention(s) 1: Medication (see MAR)  Patient Stated Pain Goal: 0 Patient Stated Pain Goal: 0  o Intervention effective: Yes  o Other actions taken for pain:       Skin Assessment  Skin color    Condition/Temperature    Integrity    Turgor    Weekly Pressure Ulcer Documentation  Pressure  Injury Documentation: No Pressure Injury Noted-Pressure Ulcer Prevention Initiated  Wound Prevention & Protection Methods  Orientation of wound Orientation of Wound Prevention: Posterior  Location of Prevention Location of Wound Prevention: Buttocks,Sacrum/Coccyx  Dressing Present Dressing Present : No  Dressing Status    Wound Offloading Wound Offloading (Prevention Methods): Bed, pressure redistribution/air,Pillows,Repositioning,Wheelchair,Walker     INTAKE/OUPUT  Date 01/15/22 0700 - 01/16/22 0659 01/16/22 0700 - 01/17/22 0659   Shift 1119-1024 1087-5659 24 Hour Total 6858-9298 8377-4574 24 Hour Total   INTAKE   P.O.    240  240     P. O.    240  240   Shift Total(mL/kg)    240(3.8)  240(3.8)   OUTPUT   Urine(mL/kg/hr)  800(1.1) 800(0.5)        Urine Voided  800 800        Urine Occurrence(s) 3 x 7 x 10 x 1 x  1 x   Stool           Stool Occurrence(s) 2 x 1 x 3 x 1 x  1 x   Shift Total(mL/kg)  800(12.8) 800(12.8)      NET  -800 -800 240  240   Weight (kg) 62.7 62.7 62.7 62.7 62.7 62.7       Recommendations:  1. Patient needs and requests: None at this time    2. Pending tests/procedures: Routine labs, PVR q shift     3. Functional Level/Equipment: Partial (one person) / Bed (comment); Bedside Commode;Walker; Wheelchair    Fall Precautions:   Fall risk precautions were reinforced with the patient; she was instructed to call for help prior to getting up.  The following fall risk precautions were continued: bed/ chair alarms, door signage, yellow bracelet and socks as well as update of the Wilnerluiza Jaquezozer tool in the patient's room. Yadira Score: 3    HEALS Safety Check    A safety check occurred in the patient's room between off going nurse and oncoming nurse listed above. The safety check included the below items  Area Items   H  High Alert Medications - Verify all high alert medication drips (heparin, PCA, etc.)   E  Equipment - Suction is set up for ALL patients (with norah)  - Red plugs utilized for all equipment (IV pumps, etc.)  - WOWs wiped down at end of shift.  - Room stocked with oxygen, suction, and other unit-specific supplies   A  Alarms - Bed alarm is set for fall risk patients  - Ensure chair alarm is in place and activated if patient is up in a chair   L  Lines - Check IV for any infiltration  - Edwards bag is empty if patient has a Edwards   - Tubing and IV bags are labeled   S  Safety   - Room is clean, patient is clean, and equipment is clean. - Hallways are clear from equipment besides carts. - Fall bracelet on for fall risk patients  - Ensure room is clear and free of clutter  - Suction is set up for ALL patients (with norah)  - Hallways are clear from equipment besides carts.    - Isolation precautions followed, supplies available outside room, sign posted     Vy Gomez

## 2022-01-17 LAB
ANION GAP SERPL CALC-SCNC: 7 MMOL/L (ref 3–18)
BUN SERPL-MCNC: 17 MG/DL (ref 7–18)
BUN/CREAT SERPL: 24 (ref 12–20)
CALCIUM SERPL-MCNC: 9.1 MG/DL (ref 8.5–10.1)
CHLORIDE SERPL-SCNC: 105 MMOL/L (ref 100–111)
CO2 SERPL-SCNC: 27 MMOL/L (ref 21–32)
CREAT SERPL-MCNC: 0.7 MG/DL (ref 0.6–1.3)
GLUCOSE SERPL-MCNC: 85 MG/DL (ref 74–99)
HCT VFR BLD AUTO: 36 % (ref 35–45)
HGB BLD-MCNC: 12.1 G/DL (ref 12–16)
POTASSIUM SERPL-SCNC: 4.3 MMOL/L (ref 3.5–5.5)
SODIUM SERPL-SCNC: 139 MMOL/L (ref 136–145)

## 2022-01-17 PROCEDURE — 99232 SBSQ HOSP IP/OBS MODERATE 35: CPT | Performed by: INTERNAL MEDICINE

## 2022-01-17 PROCEDURE — 65310000000 HC RM PRIVATE REHAB

## 2022-01-17 PROCEDURE — 80048 BASIC METABOLIC PNL TOTAL CA: CPT

## 2022-01-17 PROCEDURE — 51798 US URINE CAPACITY MEASURE: CPT

## 2022-01-17 PROCEDURE — 97530 THERAPEUTIC ACTIVITIES: CPT

## 2022-01-17 PROCEDURE — 97116 GAIT TRAINING THERAPY: CPT

## 2022-01-17 PROCEDURE — 97150 GROUP THERAPEUTIC PROCEDURES: CPT

## 2022-01-17 PROCEDURE — 36415 COLL VENOUS BLD VENIPUNCTURE: CPT

## 2022-01-17 PROCEDURE — 85018 HEMOGLOBIN: CPT

## 2022-01-17 PROCEDURE — 74011250636 HC RX REV CODE- 250/636: Performed by: INTERNAL MEDICINE

## 2022-01-17 PROCEDURE — 74011250637 HC RX REV CODE- 250/637: Performed by: INTERNAL MEDICINE

## 2022-01-17 PROCEDURE — 97110 THERAPEUTIC EXERCISES: CPT

## 2022-01-17 PROCEDURE — 74011250637 HC RX REV CODE- 250/637: Performed by: FAMILY MEDICINE

## 2022-01-17 RX ADMIN — LOSARTAN POTASSIUM 50 MG: 50 TABLET, FILM COATED ORAL at 09:57

## 2022-01-17 RX ADMIN — DOCUSATE SODIUM 100 MG: 100 CAPSULE, LIQUID FILLED ORAL at 18:25

## 2022-01-17 RX ADMIN — DICLOFENAC SODIUM 2 G: 10 GEL TOPICAL at 12:00

## 2022-01-17 RX ADMIN — MECLIZINE 12.5 MG: 12.5 TABLET ORAL at 09:57

## 2022-01-17 RX ADMIN — DORZOLAMIDE HYDROCHLORIDE 1 DROP: 20 SOLUTION/ DROPS OPHTHALMIC at 17:21

## 2022-01-17 RX ADMIN — DICLOFENAC SODIUM 2 G: 10 GEL TOPICAL at 17:21

## 2022-01-17 RX ADMIN — MECLIZINE 12.5 MG: 12.5 TABLET ORAL at 17:20

## 2022-01-17 RX ADMIN — MECLIZINE 12.5 MG: 12.5 TABLET ORAL at 12:43

## 2022-01-17 RX ADMIN — ATORVASTATIN CALCIUM 80 MG: 40 TABLET, FILM COATED ORAL at 21:08

## 2022-01-17 RX ADMIN — LATANOPROST 1 DROP: 50 SOLUTION OPHTHALMIC at 21:09

## 2022-01-17 RX ADMIN — ASPIRIN 325 MG ORAL TABLET 325 MG: 325 PILL ORAL at 09:56

## 2022-01-17 RX ADMIN — ACETAMINOPHEN 650 MG: 325 TABLET ORAL at 09:57

## 2022-01-17 RX ADMIN — DORZOLAMIDE HYDROCHLORIDE 1 DROP: 20 SOLUTION/ DROPS OPHTHALMIC at 10:01

## 2022-01-17 RX ADMIN — SERTRALINE 50 MG: 50 TABLET, FILM COATED ORAL at 09:56

## 2022-01-17 RX ADMIN — CLOPIDOGREL 75 MG: 75 TABLET, FILM COATED ORAL at 18:25

## 2022-01-17 RX ADMIN — DOCUSATE SODIUM 100 MG: 100 CAPSULE, LIQUID FILLED ORAL at 09:56

## 2022-01-17 RX ADMIN — ACETAMINOPHEN 650 MG: 325 TABLET ORAL at 12:43

## 2022-01-17 RX ADMIN — DORZOLAMIDE HYDROCHLORIDE 1 DROP: 20 SOLUTION/ DROPS OPHTHALMIC at 21:09

## 2022-01-17 RX ADMIN — DICLOFENAC SODIUM 2 G: 10 GEL TOPICAL at 09:57

## 2022-01-17 RX ADMIN — MONTELUKAST 10 MG: 10 TABLET, FILM COATED ORAL at 09:56

## 2022-01-17 RX ADMIN — CHOLECALCIFEROL TAB 25 MCG (1000 UNIT) 2000 UNITS: 25 TAB at 09:57

## 2022-01-17 NOTE — ROUTINE PROCESS
SHIFT CHANGE NOTE FOR [facility]    Bedside and Verbal shift change report given to Elton Herrera RN (oncoming nurse) by Tylor Riggs RN (offgoing nurse). Report included the following information SBAR, Kardex, MAR and Recent Results.     Situation:   Code Status: Full Code   Hospital Day: 17   Problem List:   Hospital Problems  Date Reviewed: 1/14/2022          Codes Class Noted POA    Primary osteoarthritis of both knees (Chronic) ICD-10-CM: M17.0  ICD-9-CM: 715.16  Unknown Yes        Essential hypertension (Chronic) ICD-10-CM: I10  ICD-9-CM: 401.9  Unknown Yes        Hypercholesterolemia with hypertriglyceridemia (Chronic) ICD-10-CM: E78.2  ICD-9-CM: 272.2  12/27/2021 Yes    Overview Signed 1/4/2022 12:02 AM by Woodrow Downey MD     Lipid profile (12/27/2021) showed , , HDL 48,              * (Principal) [facility] ICD-10-CM: I63.9  ICD-9-CM: 434.91  12/26/2021 Yes    Overview Signed 1/3/2022 11:59 PM by Woodrow Downey MD     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             Impaired mobility and ADLs ICD-10-CM: Z74.09, Z78.9  ICD-9-CM: V49.89  12/26/2021 Yes        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7 °C) 98.2 °F (36.8 °C)   SpO2:   94% 95%   Weight:       Height:            PAIN    Pain Assessment    Pain Intensity 1: 0 (01/17/22 0400) Pain Intensity 1: 2 (12/29/14 1105)    Pain Location 1: Knee Pain Location 1: Abdomen    Pain Intervention(s) 1: Medication (see MAR) Pain Intervention(s) 1: Medication (see MAR)  Patient Stated Pain Goal: 0 Patient Stated Pain Goal: 0  o Intervention effective: Yes  o Other actions taken for pain:       Skin Assessment  Skin color    Condition/Temperature    Integrity    Turgor    Weekly Pressure Ulcer Documentation  Pressure  Injury Documentation: No Pressure Injury Noted-Pressure Ulcer Prevention Initiated  Wound Prevention & Protection Methods  Orientation of wound Orientation of Wound Prevention: Posterior  Location of Prevention Location of Wound Prevention: Buttocks,Sacrum/Coccyx  Dressing Present Dressing Present : No  Dressing Status    Wound Offloading Wound Offloading (Prevention Methods): Bed, pressure redistribution/air     INTAKE/OUPUT  Date 01/16/22 0700 - 01/17/22 0659 01/17/22 0700 - 01/18/22 0659   Shift 5942-4474 9098-6566 24 Hour Total 4020-9126 2971-4464 24 Hour Total   INTAKE   P.O. 240  240        P. O. 240  240      Shift Total(mL/kg) 240(3.8)  240(3.8)      OUTPUT   Urine(mL/kg/hr)  1300(1.7) 1300(0.9)        Urine Voided  1300 1300        Urine Occurrence(s) 3 x 6 x 9 x      Stool           Stool Occurrence(s) 2 x 0 x 2 x      Shift Total(mL/kg)  1300(20.7) 1300(20.7)       -1300 -1060      Weight (kg) 62.7 62.7 62.7 62.7 62.7 62.7       Recommendations:  1. Patient needs and requests: toileting,assist with ADL's    2. Pending tests/procedures: Routine labs, PVR q shift     3. Functional Level/Equipment: Partial (one person) /      Fall Precautions:   Fall risk precautions were reinforced with the patient; she was instructed to call for help prior to getting up. The following fall risk precautions were continued: bed/ chair alarms, door signage, yellow bracelet and socks as well as update of the Meridee Hernandez tool in the patient's room.    Yadira Score:      HEALS Safety Check    A safety check occurred in the patient's room between off going nurse and oncoming nurse listed above. The safety check included the below items  Area Items   H  High Alert Medications - Verify all high alert medication drips (heparin, PCA, etc.)   E  Equipment - Suction is set up for ALL patients (with norah)  - Red plugs utilized for all equipment (IV pumps, etc.)  - WOWs wiped down at end of shift.  - Room stocked with oxygen, suction, and other unit-specific supplies   A  Alarms - Bed alarm is set for fall risk patients  - Ensure chair alarm is in place and activated if patient is up in a chair   L  Lines - Check IV for any infiltration  - Edwards bag is empty if patient has a Edwards   - Tubing and IV bags are labeled   S  Safety   - Room is clean, patient is clean, and equipment is clean. - Hallways are clear from equipment besides carts. - Fall bracelet on for fall risk patients  - Ensure room is clear and free of clutter  - Suction is set up for ALL patients (with norah)  - Hallways are clear from equipment besides carts.    - Isolation precautions followed, supplies available outside room, sign posted     Skylar De Santiago RN

## 2022-01-17 NOTE — PROGRESS NOTES
[facility] PHYSICAL REHABILITATION  [address] [place] [address], [address]     INPATIENT REHABILITATION  DAILY PROGRESS NOTE     Date: 1/17/2022    Name: Tito Benitez Age / Sex: 82 y.o. 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 Blood pressure controlled. Patient's Complaint:   No significant medical complaints    Pain Control: no current joint or muscle symptoms, essentially pain-free      Objective:     Vital Signs:  Patient Vitals for the past 24 hrs:   BP Temp Pulse Resp SpO2   01/17/22 1020 133/76 -- 95 -- --   01/17/22 0807 (!) 165/75 98 °F (36.7 °C) 76 18 95 %   01/16/22 2100 119/64 98.2 °F (36.8 °C) 82 18 95 %   01/16/22 1539 (!) 155/78 98.1 °F (36.7 °C) 73 16 94 %        Physical Examination:  GENERAL SURVEY: Patient is awake, alert, oriented x 3, laying comfortably on the bed, not in acute respiratory distress. HEENT: pink palpebral conjunctivae, anicteric sclerae, no nasoaural discharge, moist oral mucosa  NECK: supple, no jugular venous distention, no palpable lymph nodes  CHEST/LUNGS: symmetrical chest expansion, good air entry, clear breath sounds  HEART: adynamic precordium, good S1 S2, no S3, regular rhythm, no murmurs  ABDOMEN: flat, bowel sounds appreciated, soft, non-tender  EXTREMITIES: (+) limitation ROM of both shoulders due to history of rotator cuff injury, pink nailbeds, no edema, full and equal pulses, no calf tenderness   NEUROLOGICAL EXAM: The patient is awake, alert and oriented x3, able to answer questions fairly appropriately, able to follow 1 and 2 step commands. Able to tell time from the wall clock. Cranial nerves II-XII are grossly intact. No gross sensory deficit.   Motor strength is 4-/5 on the RUE (except right shoulder), 3+/5 on the LUE (except left shoulder), 4+/5 on the RLE, 4/5 on the LLE. Current Medications:  Current Facility-Administered Medications   Medication Dose Route Frequency    meclizine (ANTIVERT) tablet 12.5 mg  12.5 mg Oral TID    acetaminophen (TYLENOL) tablet 650 mg  650 mg Oral BID    acetaminophen (TYLENOL) tablet 650 mg  650 mg Oral Q4H PRN    diclofenac (VOLTAREN) 1 % topical gel 2 g  2 g Topical TID    losartan (COZAAR) tablet 50 mg  50 mg Oral DAILY    cholecalciferol (VITAMIN D3) (1000 Units /25 mcg) tablet 2,000 Units  2,000 Units Oral DAILY    aspirin tablet 325 mg  325 mg Oral DAILY WITH BREAKFAST    atorvastatin (LIPITOR) tablet 80 mg  80 mg Oral QHS    clopidogreL (PLAVIX) tablet 75 mg  75 mg Oral DAILY WITH DINNER    hydrALAZINE (APRESOLINE) tablet 25 mg  25 mg Oral TID PRN    ondansetron (ZOFRAN ODT) tablet 4 mg  4 mg Oral Q8H PRN    docusate sodium (COLACE) capsule 100 mg  100 mg Oral BID    magnesium hydroxide (MILK OF MAGNESIA) 400 mg/5 mL oral suspension 30 mL  30 mL Oral DAILY PRN    bisacodyL (DULCOLAX) tablet 10 mg  10 mg Oral Q48H PRN    dorzolamide (TRUSOPT) 2 % ophthalmic solution 1 Drop  1 Drop Both Eyes TID    latanoprost (XALATAN) 0.005 % ophthalmic solution 1 Drop  1 Drop Both Eyes QHS    loperamide (IMODIUM) capsule 2 mg  2 mg Oral PRN    montelukast (SINGULAIR) tablet 10 mg  10 mg Oral DAILY    sertraline (ZOLOFT) tablet 50 mg  50 mg Oral DAILY       Allergies:   Allergies   Allergen Reactions    Novocain [Procaine] Swelling       Lab/Data Review:  Recent Results (from the past 24 hour(s))   METABOLIC PANEL, BASIC    Collection Time: 01/17/22  6:43 AM   Result Value Ref Range    Sodium 139 136 - 145 mmol/L    Potassium 4.3 3.5 - 5.5 mmol/L    Chloride 105 100 - 111 mmol/L    CO2 27 21 - 32 mmol/L    Anion gap 7 3.0 - 18 mmol/L    Glucose 85 74 - 99 mg/dL    BUN 17 7.0 - 18 MG/DL    Creatinine 0.70 0.6 - 1.3 MG/DL BUN/Creatinine ratio 24 (H) 12 - 20      GFR est AA >60 >60 ml/min/1.73m2    GFR est non-AA >60 >60 ml/min/1.73m2    Calcium 9.1 8.5 - 10.1 MG/DL   HGB & HCT    Collection Time: 01/17/22  6:43 AM   Result Value Ref Range    HGB 12.1 12.0 - 16.0 g/dL    HCT 36.0 35.0 - 45.0 %       Assessment:     Primary Rehabilitation Diagnosis  1. Impaired Mobility and ADLs  2. 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 Justification for continued stay: Good progression towards established rehabilitation goals. 2. Medical Issues being followed closely:    [x]  Fall and safety precautions     []  Wound Care     [x]  Bowel and Bladder Function     [x]  Fluid Electrolyte and Nutrition Balance     []  Pain Control      3. Issues that 24 hour rehabilitation nursing is following:    [x]  Fall and safety precautions     []  Wound Care     [x]  Bowel and Bladder Function     [x]  Fluid Electrolyte and Nutrition Balance     []  Pain Control      [x]  Assistance with and education on in-room safety with transfers to and from the bed, wheelchair, toilet and shower. 4. Acute rehabilitation plan of care:    [x]  Continue current care and rehab. [x]  Physical Therapy           [x]  Occupational Therapy           []  Speech Therapy     []  Hold Rehab until further notice     5. Medications:    [x]  MAR Reviewed     [x]  Continue Present Medications     6.  Chemical DVT Prophylaxis:      []  Enoxaparin []  Unfractionated Heparin     []  Warfarin     []  NOAC     []  Aspirin     [x]  None     7. Mechanical DVT Prophylaxis:      [x]  RADHAMES Stockings     [x]  Sequential Compression Device     []  None     8. GI Prophylaxis:      []  PPI     []  H2 Blocker     [x]  None / Not indicated     9. Code status:    [x]  Full code     []  Partial code     []  Do not intubate     []  Do not resuscitate     10. Diet:  Specifications  3 carb choices (45 gm/meal); Low fat/Low cholesterol/High fiber/ANGELINE   Solids (consistency)  Regular   Liquids (consistency)  Thin   Fluid Restriction  None     11. 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 No acute intracranial process identified. 2.  Moderate parenchymal volume loss and chronic small vessel ischemic changes.    > Continue:    > Aspirin 325 mg PO once daily with breakfast    > Clopidogrel 75 mg PO once daily with dinner (STOP DATE: 3/27/2022)    > Meclizine 12.5 mg PO TID (for dizziness)     > Essential hypertension   > On 1/1/2022, started:    > Losartan 25 mg PO once daily (9AM)    > Hydralazine 25 mg PO TID PRN for SBP greater than 160 mmHg   > On 1/5/2022, increased Losartan from 25 mg to 50 mg PO once daily (9AM)   > Continue:    > Losartan 50 mg PO once daily (9AM)    > Hydralazine 25 mg PO TID PRN for SBP greater than 160 mmHg     > Hyperlipidemia   > Continue Atorvastatin 80 mg PO q HS     > Depression and anxiety   > Continue Sertraline 50 mg PO once daily     > Glaucoma   > Continue Latanoprost eyedrops 1 drop both eyes q HS     > Urinary incontinence   > Urinalysis (1/1/2022): WNL   > Urinalysis (1/15/2022) : WNL   > Urine culture (collected 1/15/2022, preliminary result as of 1/17/2022): PENDING   > Continue Oxybutynin XL 10 mg PO once daily    > Primary osteoarthritis of both knees    > On 1/13/2022, started:    > Acetaminophen 650 mg PO BID (8AM, 12PM)    > Diclofenac 1% gel, 2 grams applied to both knees TID (8AM, 12PM, 4PM)   > Continue:    > Acetaminophen 650 mg PO BID (8AM, 12PM)    > Diclofenac 1% gel, 2 grams applied to both knees TID (8AM, 12PM, 4PM)    > Acetaminophen 650 mg PO q 4 hr PRN for pain     > COVID-19 ruled out by laboratory testing   > Prior to admission and upon admission to the ARU, patient did not have any fever, desaturation, cough or difficulty breathing    > SARS-CoV-2 by [facility]) (1/7/2022): Not detected      12. Personal Protective Equipment (N95 face mask and eye goggles) was used while interacting with the patient. Patient was using a surgical mask. 15. Patient's progress in rehabilitation and medical issues discussed with the patient. All questions answered to the best of my ability. Care plan discussed with patient and nurse. 14. Total clinical care time is 30 minutes, including review of chart including all labs, radiology, past medical history, and discussion with patient. Greater than 50% of my time was spent in coordination of care and counseling.       Signed:    Ginny Elkins MD    January 17, 2022

## 2022-01-17 NOTE — PROGRESS NOTES
Problem: Mobility Impaired (Adult and Pediatric)  Goal: *Therapy Goal (Edit Goal, Insert Text)  Description: Physical Therapy Short Term Goals  Initiated 1/1/2022, updated 1/14/2022 and to be accomplished within 7 day(s) on 1/21/2022  1. Patient will move from supine to sit and sit to supine  in bed with minimal assistance/contact guard assist.  Goal met 1/7/2022  Updated goal: Patient will perform supine<->sit supervision level. Goal met 1/14/2022   2. Patient will transfer from bed to chair and chair to bed with minimal assistance using the least restrictive device. Goal met 1/7/2022  Updated goal: Patient will perform bed<->chair transfer with SBA using RW. Goal ongoing 1/14/2022  3. Patient will perform sit to stand with minimal assistance. Goal met 1/7/2022  Updated goal: Patient will perform sit to stand with SBA. Goal ongoing 1/14/2022  4. Patient will ambulate with minimal assistance/contact guard assist for 50 feet with the least restrictive device before needing seated rest. Goal ongoing 1/14/2022  5. Patient will participate in formal balance assessment Lida Phi Balance Scale/Postural Assessment Scale for Stroke). Goal ongoing as patient not able to consistently tolerance standing activity    Physical Therapy Long Term Goals  Initiated 1/1/2022 and to be accomplished within 21 day(s) on 1/22/2022  1. Patient will move from supine to sit and sit to supine , scoot up and down, and roll side to side in bed with modified independence. 2.  Patient will transfer from bed to chair and chair to bed with modified independence using the least restrictive device. 3.  Patient will perform sit to stand with modified independence. 4.  Patient will ambulate with modified independence for 150 feet with the least restrictive device. 5.  Patient will negotiate one curb step with CG/SBA , using appropriate AD for safe community mobility.    6.  Patient will perform w/c mobility with distant supervision for at least 150 ft over even surfaces for ease of mobility upon discharge home with support. Outcome: Progressing Towards Goal   PHYSICAL THERAPY TREATMENT    Patient: Juan Ramon Mann (11 y.o. female)  Date: 2022  Diagnosis: CVA (cerebral vascular accident) [facility]) [I63.9] Acute ischemic stroke [facility])  Precautions: Fall  Chart, physical therapy assessment, plan of care and goals were reviewed. Time In:1425  Time [id number]    Patient seen for: Gait training;Patient education; Wheelchair mobility;Transfer training; Therapeutic exercise    Pain:  Patient reporting chronic bilat knee pain with performing all sit to stand transitions. At time of therapy session start, had not yet had voltaren gel applied to knees. Nurse applied gel toward end of therapy session. Treatment modified due to knee pain report and difficulty tolerating increased weightbearing bilat LE. Patient identified with name and : yes    SUBJECTIVE:      Patient agreeable to treatment. Reporting feeling vision a little more \"blurred\" today bilat eyes, reporting more dizzy today in all positions but felt best when lying down. Patient reporting vision, dizziness better when seen in PM.     OBJECTIVE DATA SUMMARY:    Objective:     TRANSFERS Daily Assessment     Transfer Type: Other  Other: stand step with CGA  Transfer Assistance : 4 (Contact guard assistance)  Sit to Stand Assistance: Contact guard assistance     CGA with transfers, cues for more upright posture and needing extra time to perform task. CG/SBA for sit to stand with RW, cues for bringing center of gravity forward over base of support as well as assisting.          BALANCE Daily Assessment     Sitting - Static: Good (unsupported)  Sitting - Dynamic: Good (unsupported)  Standing - Static: Fair  Standing - Dynamic : Impaired        WHEELCHAIR MOBILITY Daily Assessment     Able to Propel (ft): 90 feet  Functional Level:  (distant supervision)  Curbs/Ramps Assist Required (FIM Score): 0 (Not tested)  Wheelchair Setup Assist Required : 5 (Supervision/setup)  Wheelchair Management: Manages left brake;Manages right brake (using brake extenders)     Patient needing distant supervision over even surfaces from room into and out of gym in hallways. Able to lock/unlock brakes with use of brake extenders. THERAPEUTIC EXERCISES Daily Assessment       Patient performing SCI FIT 10 min level 1 for improved AROM bilat LE, increased aerobic endurance. Patient performing hip abd green Tband 3 x 15 reps, hip add isometrics 3 x 10 reps for improved hip/knee stability. Attempted standing exercises with hip flex marches but patient unable to tolerate during AM session. Gait training in PM session:  Gait with RW for 22 ft, emphasis on stepping closer to middle of RW and increased foot clearance for safety. Needing CGA for safety. Performing additional gait bout back into room for 40 ft to get to [facility] to toilet. Needing CGA for transfer and for toileting, using RW.      ASSESSMENT:  Patient more limited with participation in standing activity and gait in AM, better able to participate in PM with gait. Patient would continue to benefit from balance, strengthening as able to tolerate prior to discharge home with support. Progression toward goals:  []      Improving appropriately and progressing toward goals  [x]      Improving slowly and progressing toward goals  []      Not making progress toward goals and plan of care will be adjusted      PLAN:  Patient continues to benefit from skilled intervention to address the above impairments. Continue treatment per established plan of care.   Discharge Recommendations:  Home Health/outpatient with caregiver assistance  Further Equipment Recommendations for Discharge:  rolling walker and wheelchair 18 inch      Estimated Discharge Date: 1/21/2022    Activity Tolerance:   Fair due to pain, fatigue, dizziness in AM    After treatment:   [x] Patient left in no apparent distress on mat table after first session with hand off to OT  [x] Patient left in no apparent distress sitting up in chair after second session  [] Patient left in no apparent distress in w/c mobilizing under own power  [] Patient left in no apparent distress dining area  [] Patient left in no apparent distress mobilizing under own power  [x] Call bell left within reach  [] Nursing notified  [] Caregiver present  [] Bed alarm activated   [x] Chair alarm activated      Shauna Paula, PT  1/17/2022

## 2022-01-17 NOTE — PROGRESS NOTES
Problem: Self Care Deficits Care Plan (Adult)  Goal: *Therapy Goal (Edit Goal, Insert Text)  Description: Occupational Therapy Goals   Long Term Goals  Initiated 22 and to be accomplished within 3 week(s), by 2022  1. Pt will perform self-feeding with independence. 2. Pt will perform grooming with independence. 3. Pt will perform UB bathing with supervision. 4. Pt will perform LB bathing with supervision. 5. Pt will perform tub/shower transfer with supervision. 6. Pt will perform UB dressing with Axel using compensatory strategies. 7. Pt will perform LB dressing with Axel and AE. 8. Pt will perform toileting task with Axel and AE. 9. Pt will perform toilet transfer with Axel and AE. Short Term Goals   Initiated 22 (reassessed on 2022) and to be accomplished within 7 day(s), to be reassessed by 2022  1. Pt will perform self-feeding with Axel. 2. Pt will perform grooming with Axel. 3. Pt will perform UB bathing with SBA. (Goal met 2022)      Upgraded goal: Pt will perform UB bathing with set-up/ supv. 4. Pt will perform LB bathing with Fidel and UE support. (Goal met 2022)      Upgraded goal: Pt will perform LB bathing with CGA using AE and/ or compensatory strategies as needed. 5. Pt will perform tub/shower transfer with Fidel and AE. 6. Pt will perform UB dressing with SBA using compensatory strategies. (Goal met 2022)      Upgraded goal: Pt will perform UB dressing with supv using compensatory strategies as needed. 7. Pt will perform LB dressing with Fidel and AE. 8. Pt will perform toileting task with Fidel and AE. (Goal met 2022; continue goal for consistency)  9. Pt will perform toilet transfer with Fidel and AE. Outcome: Progressing Towards Goal  Goal: Interventions  Outcome: Progressing Towards Goal   Occupational Therapy TREATMENT    Patient: Reynold Schroeder   82 y.o.     Patient identified with name and : yes    Date: 2022    First Tx Session  Time In: 1033  Time Out[de-identified] 1325        Diagnosis: CVA (cerebral vascular accident) [facility]) [I63.9]   Precautions: Fall  Chart, occupational therapy assessment, plan of care, and goals were reviewed. Pain:  Pt reports 0/10 pain or discomfort prior to treatment. Pt reports -/10 pain or discomfort post treatment. Intervention Provided:       SUBJECTIVE:   Patient stated I feel okay.  (following RB)    OBJECTIVE DATA SUMMARY:   PT reported feeling dizzy upon handoff and vision worse this morning    THERAPEUTIC ACTIVITY Daily Assessment    Pt engaged in group activity with one other pt which involved reaching across table top to move various game pieces and play game of Checkers (large mat). Pt demonstrated ability to use good problem solving and required Fidel to reach pieces on far side of board. Pt engaged in BUE FM activity which involved grasping small manipulatives and retrieving from therapy putty against min resistance. THERAPEUTIC EXERCISE Daily Assessment    Pt performed BUE hand strengthening with therapy putty against min resistance 4/4 exercises with increased time to improve BUE FM and dexterity. Pt completed BUE strengthening with 3# dowel in all planes (shoulder flexion/extension to BLE knees, abduction/adduction, bicep curls) 3x15 reps, 2 sets. Pt completed LUE strengthening with 2# free weight (wrist flexion/extension, bicep curls, IR/ER) 4x15 reps, 2 sets with RB's in between sets. Pt performed bilateral AROM strengthening holding ball with bilateral hands and performing forward flexion 15 reps to increase core strength. TOILETING Daily Assessment     Pt declined. MOBILITY/TRANSFERS Daily Assessment     Pt performed functional transfer with FWW and SBA/CGA from EOM to w/c.        ASSESSMENT:  Pt performing BUE strengthening with UB and hands to increase pt strength, activity tolerance and dexterity.  Upon start of OT treatment session PT reported pt feeling dizzy and vision blurry however following RB and upon receiving pt for OT treatment pt reported feeling okay and agreeable to engage in OT. Pt is improving standing balance and stability as well as safety with functional transfers and FWW. Progression toward goals:  [x]          Improving appropriately and progressing toward goals  []          Improving slowly and progressing toward goals  []          Not making progress toward goals and plan of care will be adjusted     PLAN:  Patient continues to benefit from skilled intervention to address the above impairments. Continue treatment per established plan of care. Discharge Recommendations:  Home Health with assistance  Further Equipment Recommendations for Discharge:  bedside commode     Activity Tolerance:  Fair-good      Estimated LOS:1/21/2022    Please refer to the flowsheet for vital signs taken during this treatment. After treatment:   [x]  Patient left in no apparent distress sitting up in chair   []  Patient left in no apparent distress in bed  [x]  Call bell left within reach  [x]  Nursing notified  []  Caregiver present  []  Bed alarm activated    COMMUNICATION/EDUCATION:   [] Home safety education was provided and the patient/caregiver indicated understanding. [] Patient/family have participated as able in goal setting and plan of care. [x] Patient/family agree to work toward stated goals and plan of care. [] Patient understands intent and goals of therapy, but is neutral about his/her participation. [] Patient is unable to participate in goal setting and plan of care.       Nayeli Park, OT

## 2022-01-17 NOTE — PROGRESS NOTES
Milana returned call to Manuel Cobb at [facility] ([phone]). Ms Rossy Perea requests that orders be changed to outpatient as [facility] operates within the facility as an outpatient provider.  Milana will pass request to the MD.

## 2022-01-18 PROCEDURE — 74011250636 HC RX REV CODE- 250/636: Performed by: INTERNAL MEDICINE

## 2022-01-18 PROCEDURE — 74011250637 HC RX REV CODE- 250/637: Performed by: FAMILY MEDICINE

## 2022-01-18 PROCEDURE — 97530 THERAPEUTIC ACTIVITIES: CPT

## 2022-01-18 PROCEDURE — 74011250637 HC RX REV CODE- 250/637: Performed by: INTERNAL MEDICINE

## 2022-01-18 PROCEDURE — 97110 THERAPEUTIC EXERCISES: CPT

## 2022-01-18 PROCEDURE — 99232 SBSQ HOSP IP/OBS MODERATE 35: CPT | Performed by: INTERNAL MEDICINE

## 2022-01-18 PROCEDURE — 65310000000 HC RM PRIVATE REHAB

## 2022-01-18 PROCEDURE — 51798 US URINE CAPACITY MEASURE: CPT

## 2022-01-18 PROCEDURE — 97116 GAIT TRAINING THERAPY: CPT

## 2022-01-18 RX ADMIN — SERTRALINE 50 MG: 50 TABLET, FILM COATED ORAL at 09:36

## 2022-01-18 RX ADMIN — MECLIZINE 12.5 MG: 12.5 TABLET ORAL at 17:05

## 2022-01-18 RX ADMIN — DORZOLAMIDE HYDROCHLORIDE 1 DROP: 20 SOLUTION/ DROPS OPHTHALMIC at 22:23

## 2022-01-18 RX ADMIN — MECLIZINE 12.5 MG: 12.5 TABLET ORAL at 13:25

## 2022-01-18 RX ADMIN — CLOPIDOGREL 75 MG: 75 TABLET, FILM COATED ORAL at 18:22

## 2022-01-18 RX ADMIN — DORZOLAMIDE HYDROCHLORIDE 1 DROP: 20 SOLUTION/ DROPS OPHTHALMIC at 09:37

## 2022-01-18 RX ADMIN — DICLOFENAC SODIUM 2 G: 10 GEL TOPICAL at 17:05

## 2022-01-18 RX ADMIN — MECLIZINE 12.5 MG: 12.5 TABLET ORAL at 09:36

## 2022-01-18 RX ADMIN — DOCUSATE SODIUM 100 MG: 100 CAPSULE, LIQUID FILLED ORAL at 09:37

## 2022-01-18 RX ADMIN — DICLOFENAC SODIUM 2 G: 10 GEL TOPICAL at 09:37

## 2022-01-18 RX ADMIN — DICLOFENAC SODIUM 2 G: 10 GEL TOPICAL at 12:00

## 2022-01-18 RX ADMIN — ACETAMINOPHEN 650 MG: 325 TABLET ORAL at 13:25

## 2022-01-18 RX ADMIN — ATORVASTATIN CALCIUM 80 MG: 40 TABLET, FILM COATED ORAL at 22:19

## 2022-01-18 RX ADMIN — MONTELUKAST 10 MG: 10 TABLET, FILM COATED ORAL at 09:37

## 2022-01-18 RX ADMIN — ASPIRIN 325 MG ORAL TABLET 325 MG: 325 PILL ORAL at 09:36

## 2022-01-18 RX ADMIN — LOSARTAN POTASSIUM 50 MG: 50 TABLET, FILM COATED ORAL at 09:36

## 2022-01-18 RX ADMIN — DOCUSATE SODIUM 100 MG: 100 CAPSULE, LIQUID FILLED ORAL at 18:22

## 2022-01-18 RX ADMIN — DORZOLAMIDE HYDROCHLORIDE 1 DROP: 20 SOLUTION/ DROPS OPHTHALMIC at 17:05

## 2022-01-18 RX ADMIN — ACETAMINOPHEN 650 MG: 325 TABLET ORAL at 09:36

## 2022-01-18 RX ADMIN — CHOLECALCIFEROL TAB 25 MCG (1000 UNIT) 2000 UNITS: 25 TAB at 09:36

## 2022-01-18 RX ADMIN — LATANOPROST 1 DROP: 50 SOLUTION OPHTHALMIC at 22:23

## 2022-01-18 NOTE — PROGRESS NOTES
[facility] PHYSICAL REHABILITATION  [address] [place] [address], [address]     INPATIENT REHABILITATION  DAILY PROGRESS NOTE     Date: 1/18/2022    Name: Mil Jon Age / Sex: 82 y.o. 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 Blood pressure controlled. Patient's Complaint:   No significant medical complaints    Pain Control: no current joint or muscle symptoms, essentially pain-free      Objective:     Vital Signs:  Patient Vitals for the past 24 hrs:   BP Temp Pulse Resp SpO2   01/18/22 1600 (!) 147/69 98.5 °F (36.9 °C) 73 18 95 %   01/18/22 0800 (!) 140/82 98.6 °F (37 °C) 79 16 95 %   01/17/22 2004 115/61 97 °F (36.1 °C) 76 16 94 %        Physical Examination:  GENERAL SURVEY: Patient is awake, alert, oriented x 3, laying comfortably on the bed, not in acute respiratory distress. HEENT: pink palpebral conjunctivae, anicteric sclerae, no nasoaural discharge, moist oral mucosa  NECK: supple, no jugular venous distention, no palpable lymph nodes  CHEST/LUNGS: symmetrical chest expansion, good air entry, clear breath sounds  HEART: adynamic precordium, good S1 S2, no S3, regular rhythm, no murmurs  ABDOMEN: flat, bowel sounds appreciated, soft, non-tender  EXTREMITIES: (+) limitation ROM of both shoulders due to history of rotator cuff injury, pink nailbeds, no edema, full and equal pulses, no calf tenderness   NEUROLOGICAL EXAM: The patient is awake, alert and oriented x3, able to answer questions fairly appropriately, able to follow 1 and 2 step commands. Able to tell time from the wall clock. Cranial nerves II-XII are grossly intact. No gross sensory deficit.   Motor strength is 4-/5 on the RUE (except right shoulder), 3+/5 on the LUE (except left shoulder), 4+/5 on the RLE, 4/5 on the LLE. Current Medications:  Current Facility-Administered Medications   Medication Dose Route Frequency    meclizine (ANTIVERT) tablet 12.5 mg  12.5 mg Oral TID    acetaminophen (TYLENOL) tablet 650 mg  650 mg Oral BID    acetaminophen (TYLENOL) tablet 650 mg  650 mg Oral Q4H PRN    diclofenac (VOLTAREN) 1 % topical gel 2 g  2 g Topical TID    losartan (COZAAR) tablet 50 mg  50 mg Oral DAILY    cholecalciferol (VITAMIN D3) (1000 Units /25 mcg) tablet 2,000 Units  2,000 Units Oral DAILY    aspirin tablet 325 mg  325 mg Oral DAILY WITH BREAKFAST    atorvastatin (LIPITOR) tablet 80 mg  80 mg Oral QHS    clopidogreL (PLAVIX) tablet 75 mg  75 mg Oral DAILY WITH DINNER    hydrALAZINE (APRESOLINE) tablet 25 mg  25 mg Oral TID PRN    ondansetron (ZOFRAN ODT) tablet 4 mg  4 mg Oral Q8H PRN    docusate sodium (COLACE) capsule 100 mg  100 mg Oral BID    magnesium hydroxide (MILK OF MAGNESIA) 400 mg/5 mL oral suspension 30 mL  30 mL Oral DAILY PRN    bisacodyL (DULCOLAX) tablet 10 mg  10 mg Oral Q48H PRN    dorzolamide (TRUSOPT) 2 % ophthalmic solution 1 Drop  1 Drop Both Eyes TID    latanoprost (XALATAN) 0.005 % ophthalmic solution 1 Drop  1 Drop Both Eyes QHS    loperamide (IMODIUM) capsule 2 mg  2 mg Oral PRN    montelukast (SINGULAIR) tablet 10 mg  10 mg Oral DAILY    sertraline (ZOLOFT) tablet 50 mg  50 mg Oral DAILY       Allergies:   Allergies   Allergen Reactions    Novocain [Procaine] Swelling       Functional Progress:    PHYSICAL THERAPY    ON ADMISSION MOST RECENT   Wheelchair Mobility/Management  Able to Propel (ft): 55 feet  Functional Level: 2 (mod/max A using bilat feet, occasionally UE to propel)  Curbs/Ramps Assist Required (FIM Score): 0 (Not tested)  Wheelchair Setup Assist Required : 2 (Maximal assistance)  Wheelchair Management: Manages left brake,Manages right brake (needing assistance) Wheelchair Mobility/Management  Able to Propel (ft): 60 feet  Functional Level:  (mod I)  Curbs/Ramps Assist Required (FIM Score): 0 (Not tested)  Wheelchair Setup Assist Required : 5 (Supervision/setup)  Wheelchair Management: Manages left brake,Manages right brake (ensuring fully locked, using brake extenders)     Gait  Amount of Assistance: 3 (Moderate assistance)  Distance (ft): 18 Feet (ft)  Assistive Device: Walker, rolling,Gait belt Gait  Amount of Assistance: 4 (Contact guard assistance)  Distance (ft): 25 Feet (ft) (25 ft x 2 bouts)  Assistive Device: Walker, rolling,Gait belt     Balance-Sitting/Standing  Sitting - Static: Good (unsupported)  Sitting - Dynamic: Good (unsupported),Occassional,Fair (occasional)  Standing - Static: Fair,Occasional,Poor  Standing - Dynamic : Impaired  Other (comment): standing balance to be further assessed Balance-Sitting/Standing  Sitting - Static: Good (unsupported)  Sitting - Dynamic: Good (unsupported)  Standing - Static: Fair  Standing - Dynamic : Impaired  Other (comment): static standing 1-2 min durations emphasis on upright posture x 2 bouts, UE support of RW progressing to one UE support     Bed/Mat Mobility  Rolling Right : 5 (Stand-by assistance)  Rolling Left : 5 (Stand-by assistance)  Supine to Sit : 3 (Moderate assistance)  Sit to Supine : 4 (Minimal assistance) Bed/Mat Mobility  Rolling Right : 6 (Modified independent)  Rolling Left : 6 (Modified independent)  Supine to Sit : 6 (Modified independent)  Sit to Supine : 6 (Modified independent)     Transfers  Transfer Type: Other  Other: stand step with RW  Transfer Assistance : 3 (Moderate assistance )  Sit to Stand Assistance: Moderate assistance  Car Transfers: Not tested (Patient feeling nauseous during second session)  Car Type: NT Transfers  Transfer Type:  Other  Other: stand step with RW  Transfer Assistance : 4 (Contact guard assistance)  Sit to Stand Assistance: Contact guard assistance  Car Transfers:  (CGA)  Car Type: car transfer simulator     Steps or Stairs  Steps/Stairs Ambulated (#): 0  Level of Assist : 0 (Not tested)  Rail Use:  (NT ) Steps or Stairs  Steps/Stairs Ambulated (#): 0  Level of Assist : 0 (Not tested)  Rail Use:  (NT)         Lab/Data Review:  No results found for this or any previous visit (from the past 24 hour(s)). Assessment:     Primary Rehabilitation Diagnosis  1. Impaired Mobility and ADLs  2. 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 Justification for continued stay: Good progression towards established rehabilitation goals. 2. Medical Issues being followed closely:    [x]  Fall and safety precautions     []  Wound Care     [x]  Bowel and Bladder Function     [x]  Fluid Electrolyte and Nutrition Balance     []  Pain Control      3. Issues that 24 hour rehabilitation nursing is following:    [x]  Fall and safety precautions     []  Wound Care     [x]  Bowel and Bladder Function     [x]  Fluid Electrolyte and Nutrition Balance     []  Pain Control      [x]  Assistance with and education on in-room safety with transfers to and from the bed, wheelchair, toilet and shower. 4. Acute rehabilitation plan of care:    [x]  Continue current care and rehab. [x]  Physical Therapy           [x]  Occupational Therapy           []  Speech Therapy     []  Hold Rehab until further notice     5.  Medications:    [x]  MAR Reviewed     [x]  Continue Present Medications 6. Chemical DVT Prophylaxis:      []  Enoxaparin     []  Unfractionated Heparin     []  Warfarin     []  NOAC     []  Aspirin     [x]  None     7. Mechanical DVT Prophylaxis:      [x]  RADHAMES Stockings     [x]  Sequential Compression Device     []  None     8. GI Prophylaxis:      []  PPI     []  H2 Blocker     [x]  None / Not indicated     9. Code status:    [x]  Full code     []  Partial code     []  Do not intubate     []  Do not resuscitate     10. Diet:  Specifications  3 carb choices (45 gm/meal); Low fat/Low cholesterol/High fiber/ANGELINE   Solids (consistency)  Regular   Liquids (consistency)  Thin   Fluid Restriction  None     11. 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 No acute intracranial process identified. 2.  Moderate parenchymal volume loss and chronic small vessel ischemic changes.    > Continue:    > Aspirin 325 mg PO once daily with breakfast    > Clopidogrel 75 mg PO once daily with dinner (STOP DATE: 3/27/2022)    > Meclizine 12.5 mg PO TID (for dizziness)     > Essential hypertension   > On 1/1/2022, started:    > Losartan 25 mg PO once daily (9AM)    > Hydralazine 25 mg PO TID PRN for SBP greater than 160 mmHg   > On 1/5/2022, increased Losartan from 25 mg to 50 mg PO once daily (9AM)   > Continue:    > Losartan 50 mg PO once daily (9AM)    > Hydralazine 25 mg PO TID PRN for SBP greater than 160 mmHg     > Hyperlipidemia   > Continue Atorvastatin 80 mg PO q HS     > Depression and anxiety   > Continue Sertraline 50 mg PO once daily     > Glaucoma   > Continue Latanoprost eyedrops 1 drop both eyes q HS     > Urinary incontinence   > Urinalysis (1/1/2022): WNL   > Urinalysis (1/15/2022) : WNL   > Urine culture (collected 1/15/2022, preliminary result as of 1/18/2022) yielded growth of >100,000 colonies/ml of Gram negative rods   > Continue Oxybutynin XL 10 mg PO once daily    > Primary osteoarthritis of both knees    > On 1/13/2022, started:    > Acetaminophen 650 mg PO BID (8AM, 12PM)    > Diclofenac 1% gel, 2 grams applied to both knees TID (8AM, 12PM, 4PM)   > Continue:    > Acetaminophen 650 mg PO BID (8AM, 12PM)    > Diclofenac 1% gel, 2 grams applied to both knees TID (8AM, 12PM, 4PM)    > Acetaminophen 650 mg PO q 4 hr PRN for pain     > COVID-19 ruled out by laboratory testing   > Prior to admission and upon admission to the ARU, patient did not have any fever, desaturation, cough or difficulty breathing    > SARS-CoV-2 by [facility]) (1/7/2022): Not detected      12. Personal Protective Equipment (N95 face mask and eye goggles) was used while interacting with the patient. Patient was using a surgical mask. 15. Patient's progress in rehabilitation and medical issues discussed with the patient. All questions answered to the best of my ability. Care plan discussed with patient and nurse. 14. Total clinical care time is 30 minutes, including review of chart including all labs, radiology, past medical history, and discussion with patient. Greater than 50% of my time was spent in coordination of care and counseling.       Signed:    Steffi Cheung MD    January 18, 2022

## 2022-01-18 NOTE — PROGRESS NOTES
Problem: Self Care Deficits Care Plan (Adult)  Goal: *Therapy Goal (Edit Goal, Insert Text)  Description: Occupational Therapy Goals   Long Term Goals  Initiated 22 and to be accomplished within 3 week(s), by 2022  1. Pt will perform self-feeding with independence. 2. Pt will perform grooming with independence. 3. Pt will perform UB bathing with supervision. 4. Pt will perform LB bathing with supervision. 5. Pt will perform tub/shower transfer with supervision. 6. Pt will perform UB dressing with Axel using compensatory strategies. 7. Pt will perform LB dressing with Axel and AE. 8. Pt will perform toileting task with Axel and AE. 9. Pt will perform toilet transfer with Axel and AE. Short Term Goals   Initiated 22 (reassessed on 2022) and to be accomplished within 7 day(s), to be reassessed by 2022  1. Pt will perform self-feeding with Axel. 2. Pt will perform grooming with Axel. 3. Pt will perform UB bathing with SBA. (Goal met 2022)      Upgraded goal: Pt will perform UB bathing with set-up/ supv. 4. Pt will perform LB bathing with Fidel and UE support. (Goal met 2022)      Upgraded goal: Pt will perform LB bathing with CGA using AE and/ or compensatory strategies as needed. 5. Pt will perform tub/shower transfer with Fidel and AE. 6. Pt will perform UB dressing with SBA using compensatory strategies. (Goal met 2022)      Upgraded goal: Pt will perform UB dressing with supv using compensatory strategies as needed. 7. Pt will perform LB dressing with Fidel and AE. 8. Pt will perform toileting task with Fidel and AE. (Goal met 2022; continue goal for consistency)  9. Pt will perform toilet transfer with Fidel and AE. Outcome: Progressing Towards Goal  Goal: Interventions  Outcome: Progressing Towards Goal   Occupational Therapy TREATMENT    Patient: Rosendo Lang   82 y.o.     Patient identified with name and : yes    Date: 2022    First Tx Session  Time In: 56  Time Out[de-identified] 1200        Diagnosis: CVA (cerebral vascular accident) [facility]) [I63.9]   Precautions: Fall  Chart, occupational therapy assessment, plan of care, and goals were reviewed. Pain:  Pt reports 0/10 pain or discomfort prior to treatment. Pt reports -/10 pain or discomfort post treatment. Intervention Provided:       SUBJECTIVE:   Patient stated My knees are stiff today.     OBJECTIVE DATA SUMMARY:     THERAPEUTIC ACTIVITY Daily Assessment    Pt performed functional reach activity in dynamic standing at table top which involved grasping cards and reaching across table top to find match. Pt demonstrated standing tolerance of 4 minutes 28 sec., 6 minutes 54 sec. Pt engaged in game of Ft. j[employer] at table top which involved using pincer grasp to grasp and retrieve small blocks from tower and re-stack at top of tower in dynamic standing. Pt demonstrated difficulty with FM coordination however demonstrated good standing balance at table top and tolerance of 4 minutes 34 sec. And 3 minutes 30 sec. With CGA for increased safety. Pt required RB's in between standing. THERAPEUTIC EXERCISE Daily Assessment    Pt performed BUE strengthening with 3# dowel ( bicep curls, chest press, abduction/adduction) 3x15 reps, 3 sets with RB's in between sets. Pt performed LUE strengthening with 1# free weight (bicep curls, chest press, wrist flexion/extension) 5x15 reps with RB's in between sets. TOILETING Daily Assessment     Pt declined. MOBILITY/TRANSFERS Daily Assessment     Pt performed sit to stand with repetition and consistency from w/c with SBA to tabletop. ASSESSMENT:  Pt is making progress toward goals with sit to stand transfers from w/c and increasing LUE strength and improving performance with functional mobility using compensatory strategies.   Progression toward goals:  []          Improving appropriately and progressing toward goals  [x]          Improving slowly and progressing toward goals  []          Not making progress toward goals and plan of care will be adjusted     PLAN:  Patient continues to benefit from skilled intervention to address the above impairments. Continue treatment per established plan of care. Discharge Recommendations: Home Health  Further Equipment Recommendations for Discharge: bedside commode and transfer bench     Activity Tolerance:  fair      Estimated LOS:1/21/2022    Please refer to the flowsheet for vital signs taken during this treatment. After treatment:   [x]  Patient left in no apparent distress sitting up in chair   []  Patient left in no apparent distress in bed  [x]  Call bell left within reach  []  Nursing notified  []  Caregiver present  []  Bed alarm activated    COMMUNICATION/EDUCATION:   [] Home safety education was provided and the patient/caregiver indicated understanding. [] Patient/family have participated as able in goal setting and plan of care. [x] Patient/family agree to work toward stated goals and plan of care. [] Patient understands intent and goals of therapy, but is neutral about his/her participation. [] Patient is unable to participate in goal setting and plan of care.       Mahin Meier, OT

## 2022-01-18 NOTE — PROGRESS NOTES
Problem: Mobility Impaired (Adult and Pediatric)  Goal: *Therapy Goal (Edit Goal, Insert Text)  Description: Physical Therapy Short Term Goals  Initiated 1/1/2022, updated 1/14/2022 and to be accomplished within 7 day(s) on 1/21/2022  1. Patient will move from supine to sit and sit to supine  in bed with minimal assistance/contact guard assist.  Goal met 1/7/2022  Updated goal: Patient will perform supine<->sit supervision level. Goal met 1/14/2022   2. Patient will transfer from bed to chair and chair to bed with minimal assistance using the least restrictive device. Goal met 1/7/2022  Updated goal: Patient will perform bed<->chair transfer with SBA using RW. Goal ongoing 1/14/2022  3. Patient will perform sit to stand with minimal assistance. Goal met 1/7/2022  Updated goal: Patient will perform sit to stand with SBA. Goal ongoing 1/14/2022  4. Patient will ambulate with minimal assistance/contact guard assist for 50 feet with the least restrictive device before needing seated rest. Goal ongoing 1/14/2022  5. Patient will participate in formal balance assessment Matthews Beatrice Balance Scale/Postural Assessment Scale for Stroke). Goal ongoing as patient not able to consistently tolerance standing activity    Physical Therapy Long Term Goals  Initiated 1/1/2022 and to be accomplished within 21 day(s) on 1/22/2022  1. Patient will move from supine to sit and sit to supine , scoot up and down, and roll side to side in bed with modified independence. 2.  Patient will transfer from bed to chair and chair to bed with modified independence using the least restrictive device. 3.  Patient will perform sit to stand with modified independence. 4.  Patient will ambulate with modified independence for 150 feet with the least restrictive device. 5.  Patient will negotiate one curb step with CG/SBA , using appropriate AD for safe community mobility.    6.  Patient will perform w/c mobility with distant supervision for at least 150 ft over even surfaces for ease of mobility upon discharge home with support. Outcome: Progressing Towards Goal   PHYSICAL THERAPY TREATMENT    Patient: Elena Iniguez (2 y.o. female)  Date: 2022  Diagnosis: CVA (cerebral vascular accident) [facility]) [I63.9] Acute ischemic stroke [facility])  Precautions: Fall  Chart, physical therapy assessment, plan of care and goals were reviewed. Time In:0930  Time Out:1000  Time in: 1200  Time out: 1230  Time In: 1415  Time Out: 6614    Patient seen for: Gait training;Patient education;Balance activities; Therapeutic exercise;Transfer training; Wheelchair mobility    Pain:  Patient reporting bilat knee pain somewhat better with use of voltaren gel but still with increased pain    Patient identified with name and : yes    SUBJECTIVE:      Patient agreeable to session, needing multiple reattempts for treatment as patient initially not finished with breakfast when attempted. OBJECTIVE DATA SUMMARY:    Objective:     TRANSFERS Daily Assessment     Transfer Type: Other  Other: stand step with RW  Transfer Assistance : 4 (Contact guard assistance)  Sit to Stand Assistance: Contact guard assistance  Car Transfers:  (CGA)  Car Type: car transfer simulator        GAIT Daily Assessment    Gait Description (WDL) Exceptions to WDL    Gait Abnormalities Decreased step clearance;Shuffling gait; Step to gait    Assistive device Walker, rolling;Gait belt    Ambulation assistance - level surface 4 (Contact guard assistance)    Distance 25 Feet (ft) (25 ft x 2 bouts)    Ambulation assistance- uneven surface  (NT)    Comments Patient performing gait with RW, continues to be limited by fatigue and pain.           BALANCE Daily Assessment     Sitting - Static: Good (unsupported)  Sitting - Dynamic: Good (unsupported)  Standing - Static: Fair  Standing - Dynamic : Impaired  Other (comment): static standing 1-2 min durations emphasis on upright posture x 2 bouts, UE support of RW progressing to one UE support         WHEELCHAIR MOBILITY Daily Assessment     Able to Propel (ft): 60 feet  Functional Level:  (mod I)  Curbs/Ramps Assist Required (FIM Score): 0 (Not tested)  Wheelchair Setup Assist Required : 5 (Supervision/setup)  Wheelchair Management: Manages left brake;Manages right brake (ensuring fully locked, using brake extenders)         THERAPEUTIC EXERCISES Daily Assessment       Seated hip abd green Tband 3 x 15 reps, hip add isometrics. Standing hip flex marches 3 x 10 reps to 4\" step as target to improve ability to perform gait. ASSESSMENT:  Patient would continue to benefit from skilled PT to improve ability to perform safe functional mobility. Recommend ongoing functional strengthening and balance to improve safety and increase independence. Progression toward goals:  []      Improving appropriately and progressing toward goals  [x]      Improving slowly and progressing toward goals  []      Not making progress toward goals and plan of care will be adjusted      PLAN:  Patient continues to benefit from skilled intervention to address the above impairments. Continue treatment per established plan of care.   Discharge Recommendations:  Home Health  Further Equipment Recommendations for Discharge:  rolling walker and wheelchair 18 inch      Estimated Discharge Date: 1/20/2022    Activity Tolerance:   Fair due to fatigue and bilat knee pain    After treatment:   [] Patient left in no apparent distress in bed  [x] Patient left in no apparent distress sitting up in chair  [] Patient left in no apparent distress in w/c mobilizing under own power  [] Patient left in no apparent distress dining area  [] Patient left in no apparent distress mobilizing under own power  [x] Call bell left within reach  [] Nursing notified  [] Caregiver present  [] Bed alarm activated   [x] Chair alarm activated      Gwyndolyn Collet, PT  1/18/2022

## 2022-01-18 NOTE — ROUTINE PROCESS
SHIFT CHANGE NOTE FOR [facility]    Bedside and Verbal shift change report given to Nadiya Cortez RN (oncoming nurse) by Violette Michel RN (offgoing nurse). Report included the following information SBAR, Kardex, MAR and Recent Results.     Situation:   Code Status: Full Code   Hospital Day: 18   Problem List:   Hospital Problems  Date Reviewed: 1/17/2022          Codes Class Noted POA    Primary osteoarthritis of both knees (Chronic) ICD-10-CM: M17.0  ICD-9-CM: 715.16  Unknown Yes        Essential hypertension (Chronic) ICD-10-CM: I10  ICD-9-CM: 401.9  Unknown Yes        Hypercholesterolemia with hypertriglyceridemia (Chronic) ICD-10-CM: E78.2  ICD-9-CM: 272.2  12/27/2021 Yes    Overview Signed 1/4/2022 12:02 AM by Kesha Andrew MD     Lipid profile (12/27/2021) showed , , HDL 48,              * (Principal) Acute ischemic stroke ([facility] [address] ICD-10-CM: I63.9  ICD-9-CM: 434.91  12/26/2021 Yes    Overview Signed 1/3/2022 11:59 PM by Kesha Andrew MD     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             Impaired mobility and ADLs ICD-10-CM: Z74.09, Z78.9  ICD-9-CM: V49.89  12/26/2021 Yes        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 ([facility] [address] ICD-10-CM: I67.89, G81.94  ICD-9-CM: 436, 342.92  12/26/2021 Yes              Background:   Past Medical History:   Past Medical History:   Diagnosis Date    Primary osteoarthritis of both knees         Assessment:   Changes in Assessment throughout shift: No change to previous assessment     Patient has a central line: no Reasons if yes: na  Insertion date:na Last dressing date:na   Patient has Ovalle Cath: no Reasons if yes: na   Insertion date:na  Shift ovalle care completed: NO     Last Vitals:     Vitals:    01/17/22 0807 01/17/22 1020 01/17/22 1637 01/17/22 2004   BP: (!) 165/75 133/76 123/73 115/61   Pulse: 76 95 79 76   Resp: 18  19 16   Temp: 98 °F (36.7 °C)  97.9 °F (36.6 °C) 97 °F (36.1 °C)   SpO2: 95%  93% 94%   Weight:       Height:            PAIN    Pain Assessment    Pain Intensity 1: 0 (01/18/22 0400) Pain Intensity 1: 2 (12/29/14 1105)    Pain Location 1: Knee Pain Location 1: Abdomen    Pain Intervention(s) 1: Medication (see MAR) Pain Intervention(s) 1: Medication (see MAR)  Patient Stated Pain Goal: 0 Patient Stated Pain Goal: 0  o Intervention effective: yes  o Other actions taken for pain:       Skin Assessment  Skin color    Condition/Temperature    Integrity    Turgor    Weekly Pressure Ulcer Documentation  Pressure  Injury Documentation: No Pressure Injury Noted-Pressure Ulcer Prevention Initiated  Wound Prevention & Protection Methods  Orientation of wound Orientation of Wound Prevention: Posterior  Location of Prevention Location of Wound Prevention: Buttocks,Sacrum/Coccyx  Dressing Present Dressing Present : No  Dressing Status    Wound Offloading Wound Offloading (Prevention Methods): Bed, pressure redistribution/air     INTAKE/OUPUT  Date 01/17/22 0700 - 01/18/22 0659 01/18/22 0700 - 01/19/22 0659   Shift 2296-9023 5896-7691 24 Hour Total 7689-6963 9817-7264 24 Hour Total   INTAKE   P.O. 1080  1080        P. O. 1080  1080      Shift Total(mL/kg) 3526(76.3)  2451(77.5)      OUTPUT   Urine(mL/kg/hr)           Urine Occurrence(s) 4 x 3 x 7 x      Stool           Stool Occurrence(s)  0 x 0 x      Shift Total(mL/kg)         NET 1080  1080      Weight (kg) 62.7 62.7 62.7 62.7 62.7 62.7       Recommendations:  1. Patient needs and requests: toileting    2. Pending tests/procedures: na     3. Functional Level/Equipment: Partial (one person) /      Fall Precautions:   Fall risk precautions were reinforced with the patient; she was instructed to call for help prior to getting up. The following fall risk precautions were continued: bed/ chair alarms, door signage, yellow bracelet and socks as well as update of the [employer] tool in the patient's room.    Yadira Score: [facility] Safety Check    A safety check occurred in the patient's room between off going nurse and oncoming nurse listed above. The safety check included the below items  Area Items   H  High Alert Medications - Verify all high alert medication drips (heparin, PCA, etc.)   E  Equipment - Suction is set up for ALL patients (with norah)  - Red plugs utilized for all equipment (IV pumps, etc.)  - WOWs wiped down at end of shift.  - Room stocked with oxygen, suction, and other unit-specific supplies   A  Alarms - Bed alarm is set for fall risk patients  - Ensure chair alarm is in place and activated if patient is up in a chair   L  Lines - Check IV for any infiltration  - Edwards bag is empty if patient has a Edwards   - Tubing and IV bags are labeled   S  Safety   - Room is clean, patient is clean, and equipment is clean. - Hallways are clear from equipment besides carts. - Fall bracelet on for fall risk patients  - Ensure room is clear and free of clutter  - Suction is set up for ALL patients (with norah)  - Hallways are clear from equipment besides carts.    - Isolation precautions followed, supplies available outside room, sign posted     Fawn Cope RN

## 2022-01-19 PROBLEM — Z16.12 URINARY TRACT INFECTION DUE TO EXTENDED-SPECTRUM BETA LACTAMASE (ESBL) PRODUCING ESCHERICHIA COLI: Status: ACTIVE | Noted: 2022-01-15

## 2022-01-19 PROBLEM — Z22.322 COLONIZATION WITH MULTIDRUG-RESISTANT BACTERIA: Status: ACTIVE | Noted: 2022-01-15

## 2022-01-19 PROBLEM — B96.29 URINARY TRACT INFECTION DUE TO EXTENDED-SPECTRUM BETA LACTAMASE (ESBL) PRODUCING ESCHERICHIA COLI: Status: ACTIVE | Noted: 2022-01-15

## 2022-01-19 PROBLEM — Z22.39 EXTENDED SPECTRUM BETA-LACTAMASE (ESBL) ESCHERICHIA COLI CARRIER: Status: ACTIVE | Noted: 2022-01-15

## 2022-01-19 PROBLEM — N39.0 URINARY TRACT INFECTION DUE TO EXTENDED-SPECTRUM BETA LACTAMASE (ESBL) PRODUCING ESCHERICHIA COLI: Status: ACTIVE | Noted: 2022-01-15

## 2022-01-19 LAB
ANION GAP SERPL CALC-SCNC: 5 MMOL/L (ref 3–18)
BACTERIA SPEC CULT: ABNORMAL
BASOPHILS # BLD: 0 K/UL (ref 0–0.1)
BASOPHILS NFR BLD: 0 % (ref 0–2)
BUN SERPL-MCNC: 21 MG/DL (ref 7–18)
BUN/CREAT SERPL: 24 (ref 12–20)
CALCIUM SERPL-MCNC: 9.4 MG/DL (ref 8.5–10.1)
CC UR VC: ABNORMAL
CHLORIDE SERPL-SCNC: 106 MMOL/L (ref 100–111)
CO2 SERPL-SCNC: 26 MMOL/L (ref 21–32)
CREAT SERPL-MCNC: 0.86 MG/DL (ref 0.6–1.3)
DIFFERENTIAL METHOD BLD: ABNORMAL
EOSINOPHIL # BLD: 0 K/UL (ref 0–0.4)
EOSINOPHIL NFR BLD: 1 % (ref 0–5)
ERYTHROCYTE [DISTWIDTH] IN BLOOD BY AUTOMATED COUNT: 12.9 % (ref 11.6–14.5)
GLUCOSE SERPL-MCNC: 93 MG/DL (ref 74–99)
HCT VFR BLD AUTO: 38.9 % (ref 35–45)
HGB BLD-MCNC: 12.9 G/DL (ref 12–16)
IMM GRANULOCYTES # BLD AUTO: 0 K/UL (ref 0–0.04)
IMM GRANULOCYTES NFR BLD AUTO: 0 % (ref 0–0.5)
LYMPHOCYTES # BLD: 1 K/UL (ref 0.9–3.6)
LYMPHOCYTES NFR BLD: 13 % (ref 21–52)
MCH RBC QN AUTO: 28.7 PG (ref 24–34)
MCHC RBC AUTO-ENTMCNC: 33.2 G/DL (ref 31–37)
MCV RBC AUTO: 86.6 FL (ref 78–100)
MONOCYTES # BLD: 0.7 K/UL (ref 0.05–1.2)
MONOCYTES NFR BLD: 9 % (ref 3–10)
NEUTS SEG # BLD: 5.9 K/UL (ref 1.8–8)
NEUTS SEG NFR BLD: 77 % (ref 40–73)
NRBC # BLD: 0 K/UL (ref 0–0.01)
NRBC BLD-RTO: 0 PER 100 WBC
PLATELET # BLD AUTO: 263 K/UL (ref 135–420)
PMV BLD AUTO: 11.1 FL (ref 9.2–11.8)
POTASSIUM SERPL-SCNC: 4.2 MMOL/L (ref 3.5–5.5)
RBC # BLD AUTO: 4.49 M/UL (ref 4.2–5.3)
SERVICE CMNT-IMP: ABNORMAL
SODIUM SERPL-SCNC: 137 MMOL/L (ref 136–145)
WBC # BLD AUTO: 7.7 K/UL (ref 4.6–13.2)

## 2022-01-19 PROCEDURE — 97535 SELF CARE MNGMENT TRAINING: CPT

## 2022-01-19 PROCEDURE — 80048 BASIC METABOLIC PNL TOTAL CA: CPT

## 2022-01-19 PROCEDURE — 74011250636 HC RX REV CODE- 250/636: Performed by: INTERNAL MEDICINE

## 2022-01-19 PROCEDURE — 99232 SBSQ HOSP IP/OBS MODERATE 35: CPT | Performed by: INTERNAL MEDICINE

## 2022-01-19 PROCEDURE — 65310000000 HC RM PRIVATE REHAB

## 2022-01-19 PROCEDURE — 36415 COLL VENOUS BLD VENIPUNCTURE: CPT

## 2022-01-19 PROCEDURE — 97116 GAIT TRAINING THERAPY: CPT

## 2022-01-19 PROCEDURE — 74011250637 HC RX REV CODE- 250/637: Performed by: FAMILY MEDICINE

## 2022-01-19 PROCEDURE — 97530 THERAPEUTIC ACTIVITIES: CPT

## 2022-01-19 PROCEDURE — 97110 THERAPEUTIC EXERCISES: CPT

## 2022-01-19 PROCEDURE — 74011250637 HC RX REV CODE- 250/637: Performed by: INTERNAL MEDICINE

## 2022-01-19 PROCEDURE — 97150 GROUP THERAPEUTIC PROCEDURES: CPT

## 2022-01-19 PROCEDURE — 85025 COMPLETE CBC W/AUTO DIFF WBC: CPT

## 2022-01-19 RX ORDER — SIMVASTATIN 10 MG/1
10 TABLET, FILM COATED ORAL
COMMUNITY
End: 2022-01-20

## 2022-01-19 RX ORDER — LOSARTAN POTASSIUM 50 MG/1
50 TABLET ORAL DAILY
Qty: 30 TABLET | Refills: 0 | Status: SHIPPED | OUTPATIENT
Start: 2022-01-20

## 2022-01-19 RX ORDER — ACETAMINOPHEN 325 MG/1
650 TABLET ORAL
Qty: 60 TABLET | Refills: 0 | Status: SHIPPED | OUTPATIENT
Start: 2022-01-19

## 2022-01-19 RX ORDER — ATORVASTATIN CALCIUM 80 MG/1
80 TABLET, FILM COATED ORAL
Qty: 30 TABLET | Refills: 0 | Status: SHIPPED | OUTPATIENT
Start: 2022-01-20

## 2022-01-19 RX ORDER — MELATONIN
2000 DAILY
Qty: 60 TABLET | Refills: 0 | Status: SHIPPED | OUTPATIENT
Start: 2022-01-20

## 2022-01-19 RX ORDER — MECLIZINE HCL 12.5 MG 12.5 MG/1
12.5 TABLET ORAL 3 TIMES DAILY
Qty: 30 TABLET | Refills: 0 | Status: SHIPPED | OUTPATIENT
Start: 2022-01-19 | End: 2022-01-29

## 2022-01-19 RX ORDER — CLOPIDOGREL BISULFATE 75 MG/1
75 TABLET ORAL
Qty: 30 TABLET | Refills: 0 | Status: SHIPPED | OUTPATIENT
Start: 2022-01-20 | End: 2022-04-21

## 2022-01-19 RX ORDER — CELECOXIB 100 MG/1
100 CAPSULE ORAL 2 TIMES DAILY
COMMUNITY
End: 2022-01-20

## 2022-01-19 RX ORDER — DICLOFENAC SODIUM 10 MG/G
2 GEL TOPICAL 4 TIMES DAILY
Qty: 1 EACH | Refills: 0 | Status: SHIPPED | OUTPATIENT
Start: 2022-01-19

## 2022-01-19 RX ORDER — ASPIRIN 325 MG
325 TABLET ORAL
Qty: 30 TABLET | Refills: 0 | Status: SHIPPED | OUTPATIENT
Start: 2022-01-20

## 2022-01-19 RX ADMIN — DORZOLAMIDE HYDROCHLORIDE 1 DROP: 20 SOLUTION/ DROPS OPHTHALMIC at 09:39

## 2022-01-19 RX ADMIN — MECLIZINE 12.5 MG: 12.5 TABLET ORAL at 12:56

## 2022-01-19 RX ADMIN — ACETAMINOPHEN 650 MG: 325 TABLET ORAL at 12:56

## 2022-01-19 RX ADMIN — ATORVASTATIN CALCIUM 80 MG: 40 TABLET, FILM COATED ORAL at 21:08

## 2022-01-19 RX ADMIN — SERTRALINE 50 MG: 50 TABLET, FILM COATED ORAL at 09:39

## 2022-01-19 RX ADMIN — ASPIRIN 325 MG ORAL TABLET 325 MG: 325 PILL ORAL at 09:39

## 2022-01-19 RX ADMIN — DORZOLAMIDE HYDROCHLORIDE 1 DROP: 20 SOLUTION/ DROPS OPHTHALMIC at 21:08

## 2022-01-19 RX ADMIN — DICLOFENAC SODIUM 2 G: 10 GEL TOPICAL at 09:39

## 2022-01-19 RX ADMIN — DICLOFENAC SODIUM 2 G: 10 GEL TOPICAL at 13:00

## 2022-01-19 RX ADMIN — LOSARTAN POTASSIUM 50 MG: 50 TABLET, FILM COATED ORAL at 09:40

## 2022-01-19 RX ADMIN — MONTELUKAST 10 MG: 10 TABLET, FILM COATED ORAL at 09:39

## 2022-01-19 RX ADMIN — DORZOLAMIDE HYDROCHLORIDE 1 DROP: 20 SOLUTION/ DROPS OPHTHALMIC at 18:16

## 2022-01-19 RX ADMIN — MECLIZINE 12.5 MG: 12.5 TABLET ORAL at 09:39

## 2022-01-19 RX ADMIN — MECLIZINE 12.5 MG: 12.5 TABLET ORAL at 18:16

## 2022-01-19 RX ADMIN — CHOLECALCIFEROL TAB 25 MCG (1000 UNIT) 2000 UNITS: 25 TAB at 09:39

## 2022-01-19 RX ADMIN — CLOPIDOGREL 75 MG: 75 TABLET, FILM COATED ORAL at 18:16

## 2022-01-19 RX ADMIN — DICLOFENAC SODIUM 2 G: 10 GEL TOPICAL at 18:16

## 2022-01-19 RX ADMIN — LATANOPROST 1 DROP: 50 SOLUTION OPHTHALMIC at 21:08

## 2022-01-19 RX ADMIN — DOCUSATE SODIUM 100 MG: 100 CAPSULE, LIQUID FILLED ORAL at 18:16

## 2022-01-19 RX ADMIN — ACETAMINOPHEN 650 MG: 325 TABLET ORAL at 09:39

## 2022-01-19 RX ADMIN — DOCUSATE SODIUM 100 MG: 100 CAPSULE, LIQUID FILLED ORAL at 09:39

## 2022-01-19 NOTE — ROUTINE PROCESS
[facility]    Bedside and Verbal shift change report given to Ita Orozco RN (oncoming nurse) by Dexter Ortiz RN (offgoing nurse). Report included the following information SBAR, Kardex, MAR and Recent Results.     Situation:   Code Status: Full Code   Hospital Day: 18   Problem List:   Hospital Problems  Date Reviewed: 1/18/2022          Codes Class Noted POA    Primary osteoarthritis of both knees (Chronic) ICD-10-CM: M17.0  ICD-9-CM: 715.16  Unknown Yes        Essential hypertension (Chronic) ICD-10-CM: I10  ICD-9-CM: 401.9  Unknown Yes        Hypercholesterolemia with hypertriglyceridemia (Chronic) ICD-10-CM: E78.2  ICD-9-CM: 272.2  12/27/2021 Yes    Overview Signed 1/4/2022 12:02 AM by Familia Gottlieb MD     Lipid profile (12/27/2021) showed , , HDL 48,              * (Principal) Acute ischemic stroke ([facility] [address] ICD-10-CM: I63.9  ICD-9-CM: 434.91  12/26/2021 Yes    Overview Signed 1/3/2022 11:59 PM by Familia Gottlieb MD     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             Impaired mobility and ADLs ICD-10-CM: Z74.09, Z78.9  ICD-9-CM: V49.89  12/26/2021 Yes        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 ([facility] [address] ICD-10-CM: I67.89, G81.94  ICD-9-CM: 436, 342.92  12/26/2021 Yes              Background:   Past Medical History:   Past Medical History:   Diagnosis Date    Primary osteoarthritis of both knees         Assessment:   Changes in Assessment throughout shift: No change to previous assessment     Patient has a central line: no Reasons if yes: na  Insertion date:na Last dressing date:na   Patient has Ovalle Cath: no Reasons if yes: na   Insertion date:na  Shift ovalle care completed: NO     Last Vitals:     Vitals:    01/17/22 1637 01/17/22 2004 01/18/22 0800 01/18/22 1600   BP: 123/73 115/61 (!) 140/82 (!) 147/69   Pulse: 79 76 79 73   Resp: 19 16 16 18   Temp: 97.9 °F (36.6 °C) 97 °F (36.1 °C) 98.6 °F (37 °C) 98.5 °F (36.9 °C)   SpO2: 93% 94% 95% 95%   Weight:       Height:            PAIN    Pain Assessment    Pain Intensity 1: 0 (01/18/22 1600) Pain Intensity 1: 2 (12/29/14 1105)    Pain Location 1: Knee Pain Location 1: Abdomen    Pain Intervention(s) 1: Medication (see MAR) Pain Intervention(s) 1: Medication (see MAR)  Patient Stated Pain Goal: 0 Patient Stated Pain Goal: 0  o Intervention effective: yes  o Other actions taken for pain:       Skin Assessment  Skin color    Condition/Temperature    Integrity    Turgor    Weekly Pressure Ulcer Documentation  Pressure  Injury Documentation: No Pressure Injury Noted-Pressure Ulcer Prevention Initiated  Wound Prevention & Protection Methods  Orientation of wound Orientation of Wound Prevention: Posterior  Location of Prevention Location of Wound Prevention: Buttocks,Sacrum/Coccyx  Dressing Present Dressing Present : No  Dressing Status    Wound Offloading Wound Offloading (Prevention Methods): Bed, pressure redistribution/air     INTAKE/OUPUT  Date 01/17/22 1900 - 01/18/22 0659 01/18/22 0700 - 01/19/22 0659   Shift 6196-3461 24 Hour Total 3418-2502 2211-7511 24 Hour Total   INTAKE   P.O.  1080 1200  1200     P. O.  1080 1200  1200   Shift Total(mL/kg)  7199(70.6) 1200(19.1)  1200(19.1)   OUTPUT   Urine(mL/kg/hr)          Urine Occurrence(s) 3 x 7 x 3 x  3 x   Stool          Stool Occurrence(s) 0 x 0 x 1 x  1 x   Shift Total(mL/kg)        NET  1080 1200  1200   Weight (kg) 62.7 62.7 62.7 62.7 62.7       Recommendations:  1. Patient needs and requests: na    2. Pending tests/procedures: na     3. Functional Level/Equipment:   /      Fall Precautions:   Fall risk precautions were reinforced with the patient; she was instructed to call for help prior to getting up. The following fall risk precautions were continued: bed/ chair alarms, door signage, yellow bracelet and socks as well as update of the [employer] tool in the patient's room.    [facility] Score: 3    HEALS Safety Check    A safety check occurred in the patient's room between off going nurse and oncoming nurse listed above. The safety check included the below items  Area Items   H  High Alert Medications - Verify all high alert medication drips (heparin, PCA, etc.)   E  Equipment - Suction is set up for ALL patients (with norah)  - Red plugs utilized for all equipment (IV pumps, etc.)  - WOWs wiped down at end of shift.  - Room stocked with oxygen, suction, and other unit-specific supplies   A  Alarms - Bed alarm is set for fall risk patients  - Ensure chair alarm is in place and activated if patient is up in a chair   L  Lines - Check IV for any infiltration  - Edwards bag is empty if patient has a Edwards   - Tubing and IV bags are labeled   S  Safety   - Room is clean, patient is clean, and equipment is clean. - Hallways are clear from equipment besides carts. - Fall bracelet on for fall risk patients  - Ensure room is clear and free of clutter  - Suction is set up for ALL patients (with norah)  - Hallways are clear from equipment besides carts.    - Isolation precautions followed, supplies available outside room, sign posted     Selina Mckeon RN

## 2022-01-19 NOTE — PROGRESS NOTES
Problem: Pressure Injury - Risk of  Goal: *Prevention of pressure injury  Description: Document Lebron Scale and appropriate interventions in the flowsheet. Outcome: Progressing Towards Goal  Note: Pressure Injury Interventions:  Sensory Interventions: Assess changes in LOC,Minimize linen layers    Moisture Interventions: Absorbent underpads,Maintain skin hydration (lotion/cream)    Activity Interventions: Pressure redistribution bed/mattress(bed type)    Mobility Interventions: Pressure redistribution bed/mattress (bed type)    Nutrition Interventions: Document food/fluid/supplement intake    Friction and Shear Interventions: HOB 30 degrees or less,Minimize layers                Problem: Patient Education: Go to Patient Education Activity  Goal: Patient/Family Education  Outcome: Progressing Towards Goal     Problem: Falls - Risk of  Goal: *Absence of Falls  Description: Document Yadira Fall Risk and appropriate interventions in the flowsheet.   Outcome: Progressing Towards Goal  Note: Fall Risk Interventions:  Mobility Interventions: Assess mobility with egress test,Bed/chair exit alarm    Mentation Interventions: Adequate sleep, hydration, pain control    Medication Interventions: Bed/chair exit alarm    Elimination Interventions: Call light in reach,Patient to call for help with toileting needs    History of Falls Interventions: Bed/chair exit alarm,Utilize gait belt for transfer/ambulation         Problem: Patient Education: Go to Patient Education Activity  Goal: Patient/Family Education  Outcome: Progressing Towards Goal     Problem: General Medical Care Plan  Goal: *Vital signs within specified parameters  Outcome: Progressing Towards Goal  Goal: *Labs within defined limits  Outcome: Progressing Towards Goal  Goal: *Absence of infection signs and symptoms  Outcome: Progressing Towards Goal  Goal: *Optimal pain control at patient's stated goal  Outcome: Progressing Towards Goal  Goal: *Skin integrity maintained  Outcome: Progressing Towards Goal  Goal: *Fluid volume balance  Outcome: Progressing Towards Goal  Goal: *Optimize nutritional status  Outcome: Progressing Towards Goal  Goal: *Anxiety reduced or absent  Outcome: Progressing Towards Goal  Goal: *Progressive mobility and function (eg: ADL's)  Outcome: Progressing Towards Goal     Problem: Patient Education: Go to Patient Education Activity  Goal: Patient/Family Education  Outcome: Progressing Towards Goal     Problem: Inpatient Rehab (Adult)  Goal: *LTG: Avoids injury/falls 100% of time related to deficits  Outcome: Progressing Towards Goal  Goal: *LTG: Avoids infection 100% of time related to deficits  Outcome: Progressing Towards Goal  Goal: *LTG: Verbalize understanding of diagnosis and risk factors for recurring stroke  Outcome: Progressing Towards Goal  Goal: *LTG: Absence of DVT during hospitalization  Outcome: Progressing Towards Goal  Goal: *LTG: Maintains Skin Integrity With No Evidence of Pressure Injury 100% of Time  Outcome: Progressing Towards Goal  Goal: Interventions  Outcome: Progressing Towards Goal     Problem: Patient Education: Go to Patient Education Activity  Goal: Patient/Family Education  Outcome: Progressing Towards Goal     Problem: Risk for Spread of Infection  Goal: Prevent transmission of infectious organism to others  Description: Prevent the transmission of infectious organisms to other patients, staff members, and visitors.   Outcome: Progressing Towards Goal     Problem: Patient Education:  Go to Education Activity  Goal: Patient/Family Education  Outcome: Progressing Towards Goal

## 2022-01-19 NOTE — ROUTINE PROCESS
0800 Pt. Awake sitting up in bed no change in assessment pt. Reported to be feeling fine. 0930 PT. Sitting up in chair eating breakfast.  1130 Dr. Benito Walls notified of urine culture + E. Coli and ordered contact isolation. 1200 with therapy. 1330 able to transfer  From bed to chair with assist.  1500 no change in assessment. 1800 Pt. Sitting up in bed eating dinner.

## 2022-01-19 NOTE — ROUTINE PROCESS
SHIFT CHANGE NOTE FOR [facility]    Bedside and Verbal shift change report given to Hui Jo RN (oncoming nurse) by Malia Amador RN (offgoing nurse). Report included the following information SBAR, Kardex, MAR and Recent Results.     Situation:   Code Status: Full Code   Hospital Day: 19   Problem List:   Hospital Problems  Date Reviewed: 1/18/2022          Codes Class Noted POA    Primary osteoarthritis of both knees (Chronic) ICD-10-CM: M17.0  ICD-9-CM: 715.16  Unknown Yes        Essential hypertension (Chronic) ICD-10-CM: I10  ICD-9-CM: 401.9  Unknown Yes        Hypercholesterolemia with hypertriglyceridemia (Chronic) ICD-10-CM: E78.2  ICD-9-CM: 272.2  12/27/2021 Yes    Overview Signed 1/4/2022 12:02 AM by Eden Lutz MD     Lipid profile (12/27/2021) showed , , HDL 48,              * (Principal) [facility] ICD-10-CM: I63.9  ICD-9-CM: 434.91  12/26/2021 Yes    Overview Signed 1/3/2022 11:59 PM by Eden Lutz MD     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             Impaired mobility and ADLs ICD-10-CM: Z74.09, Z78.9  ICD-9-CM: V49.89  12/26/2021 Yes        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 [facility]) ICD-10-CM: I67.89, G81.94  ICD-9-CM: 436, 342.92  12/26/2021 Yes              Background:   Past Medical History:   Past Medical History:   Diagnosis Date    Primary osteoarthritis of both knees         Assessment:   Changes in Assessment throughout shift: No change to previous assessment    Patient has a central line: no Patient has Edwards Cath: no    Last Vitals:     Vitals:    01/17/22 2004 01/18/22 0800 01/18/22 1600 01/18/22 2127   BP: 115/61 (!) 140/82 (!) 147/69 128/77   Pulse: 76 79 73 100   Resp: 16 16 18 18   Temp: 97 °F (36.1 °C) 98.6 °F (37 °C) 98.5 °F (36.9 °C) 97 °F (36.1 °C)   SpO2: 94% 95% 95% 100%   Weight:       Height:            PAIN    Pain Assessment    Pain Intensity 1: 0 (01/19/22 0434) Pain Intensity 1: 2 (12/29/14 2555)    Pain Location 1: Knee Pain Location 1: Abdomen    Pain Intervention(s) 1: Medication (see MAR) Pain Intervention(s) 1: Medication (see MAR)  Patient Stated Pain Goal: 0 Patient Stated Pain Goal: 0  o Intervention effective: yes  o Other actions taken for pain:       Skin Assessment  Skin color    Condition/Temperature    Integrity    Turgor    Weekly Pressure Ulcer Documentation  Pressure  Injury Documentation: No Pressure Injury Noted-Pressure Ulcer Prevention Initiated  Wound Prevention & Protection Methods  Orientation of wound Orientation of Wound Prevention: Posterior  Location of Prevention Location of Wound Prevention: Buttocks,Sacrum/Coccyx  Dressing Present Dressing Present : No  Dressing Status    Wound Offloading Wound Offloading (Prevention Methods): Bed, pressure redistribution/air,Repositioning     INTAKE/OUPUT  Date 01/18/22 0700 - 01/19/22 0659 01/19/22 0700 - 01/20/22 0659   Shift 2472-5338 7192-2683 24 Hour Total 4411-6587 8232-7167 24 Hour Total   INTAKE   P.O. 1200  1200        P. O. 1200  1200      Shift Total(mL/kg) 1200(19.1)  1200(19.1)      OUTPUT   Urine(mL/kg/hr)           Urine Occurrence(s) 3 x 3 x 6 x 0 x  0 x   Stool           Stool Occurrence(s) 1 x 0 x 1 x 0 x  0 x   Shift Total(mL/kg)         NET 1200  1200      Weight (kg) 62.7 62.7 62.7 62.7 62.7 62.7       Recommendations:  1. Patient needs and requests: none    2. Pending tests/procedures: none     3. Functional Level/Equipment: Partial (one person) / Bed (comment); Bedside Commode; Wheelchair    Fall Precautions:   Fall risk precautions were reinforced with the patient; she was instructed to call for help prior to getting up. The following fall risk precautions were continued: bed/ chair alarms, door signage, yellow bracelet and socks as well as update of the [employer] tool in the patient's room.    Yadira Score: 3    HEALS Safety Check    A safety check occurred in the patient's room between off going nurse and oncoming nurse listed above. The safety check included the below items  Area Items   H  High Alert Medications - Verify all high alert medication drips (heparin, PCA, etc.)   E  Equipment - Suction is set up for ALL patients (with norah)  - Red plugs utilized for all equipment (IV pumps, etc.)  - WOWs wiped down at end of shift.  - Room stocked with oxygen, suction, and other unit-specific supplies   A  Alarms - Bed alarm is set for fall risk patients  - Ensure chair alarm is in place and activated if patient is up in a chair   L  Lines - Check IV for any infiltration  - Edwards bag is empty if patient has a Edwards   - Tubing and IV bags are labeled   S  Safety   - Room is clean, patient is clean, and equipment is clean. - Hallways are clear from equipment besides carts. - Fall bracelet on for fall risk patients  - Ensure room is clear and free of clutter  - Suction is set up for ALL patients (with norah)  - Hallways are clear from equipment besides carts.    - Isolation precautions followed, supplies available outside room, sign posted     Yaneth Aragon RN

## 2022-01-19 NOTE — PROGRESS NOTES
Milana spoke with pt's son who requests a dc prior to incoming weather if all arrangements can be made safely. Milana sent DME orders to [facility] for delivery tomorrow morning. Milana sent orders for follow up therapy to [facility] with [facility]. Milana verified that son wants to fill dc meds with [facility]. Milana notified pharmacy. Son states that he will come for caregiver education in the morning and plan to dc with pt to her home and provide supportive care.

## 2022-01-19 NOTE — ROUTINE PROCESS
0800 Pt. Awake sitting up in bed no change in assessment   0930 Pt. Sitting up in chair eating breakfast.  1200 with therapy. 1330 able to transfer from bed to chair with assist.t  1500 no change in assessment. 1800 Pt. Sitting up in chair eating dinner.

## 2022-01-19 NOTE — PROGRESS NOTES
Problem: Self Care Deficits Care Plan (Adult)  Goal: *Therapy Goal (Edit Goal, Insert Text)  Description: Occupational Therapy Goals   Long Term Goals  Initiated 22 and to be accomplished within 3 week(s), by 2022  1. Pt will perform self-feeding with independence. 2. Pt will perform grooming with independence. 3. Pt will perform UB bathing with supervision. 4. Pt will perform LB bathing with supervision. 5. Pt will perform tub/shower transfer with supervision. 6. Pt will perform UB dressing with Axel using compensatory strategies. 7. Pt will perform LB dressing with Axel and AE. 8. Pt will perform toileting task with Axel and AE. 9. Pt will perform toilet transfer with Axel and AE. Short Term Goals   Initiated 22 (reassessed on 2022) and to be accomplished within 7 day(s), to be reassessed by 2022  1. Pt will perform self-feeding with Axel. 2. Pt will perform grooming with Axel. 3. Pt will perform UB bathing with SBA. (Goal met 2022)      Upgraded goal: Pt will perform UB bathing with set-up/ supv. 4. Pt will perform LB bathing with Fidel and UE support. (Goal met 2022)      Upgraded goal: Pt will perform LB bathing with CGA using AE and/ or compensatory strategies as needed. 5. Pt will perform tub/shower transfer with Fidel and AE. 6. Pt will perform UB dressing with SBA using compensatory strategies. (Goal met 2022)      Upgraded goal: Pt will perform UB dressing with supv using compensatory strategies as needed. 7. Pt will perform LB dressing with Fidel and AE. 8. Pt will perform toileting task with Fidel and AE. (Goal met 2022; continue goal for consistency)  9. Pt will perform toilet transfer with Fidel and AE. Outcome: Progressing Towards Goal  Goal: Interventions  Outcome: Progressing Towards Goal   Occupational Therapy TREATMENT    Patient: Prisca Kwok   82 y.o.     Patient identified with name and : yes    Date: 2022    First Tx Session  Time In: 3658  Time Out[de-identified] 0930    Second Tx Session  Time In: 1130  Time Out[de-identified] 1200    Diagnosis: CVA (cerebral vascular accident) [facility]) [I63.9]   Precautions: Fall  Chart, occupational therapy assessment, plan of care, and goals were reviewed. Pain:  Pt reports 0/10 pain or discomfort prior to treatment. Pt reports -/10 pain or discomfort post treatment. Intervention Provided:       SUBJECTIVE:   Patient stated I'm not sure if I want to leave.  having fun during group session)    OBJECTIVE DATA SUMMARY:     THERAPEUTIC ACTIVITY Daily Assessment    Pt engaged in group session with one other pt which involved reaching across table top in standing to move small tokens across table to play game of Checkers. Pt required Fidel to reach far side of table due to decreased BUE shoulder ROM secondary to prior rotator injuries. Pt demonstrated standing tolerance of 13 minutes and 8 sec. With SBA at table top. GROOMING Daily Assessment    Grooming  Grooming Assistance : 5 (Supervision) (setup)  Comments: Pt required setup to comb hair, wash face     UPPER BODY BATHING Daily Assessment    Upper Body Bathing  Bathing Assistance, Upper: 5 (Supervision) (setup)  Upper Body : Compensatory technique training  Position Performed: Seated in chair  Adaptive Equipment: Tub bench  Comments: Pt performed UB showering from tub transfer bench.  Following transfer pt performed UB showering in seated position with setup     LOWER BODY BATHING Daily Assessment    Lower Body Bathing  Bathing Assistance, Lower : 5 (Stand-by assistance)  Adaptive Equipment: Grab bar  Position Performed: Seated in chair;Standing  Comments: Pt performed LB showering from tub bench to wash lower BLE feet using crossed leg method and supervision and in standing using SBA  and grab bar for safety and stability to wash buttocks and eric area     TOILETING Daily Assessment    Toileting  Toileting Assistance (FIM Score): 5 (Supervision) (SBA)  Adaptive Equipment: Elevated seat;Walker     UPPER BODY DRESSING Daily Assessment    Upper Body Dressing   Dressing Assistance : 5 (Supervision) (setup)  Comments: Pt performed UB dressing using compensatory strategies and setup  at EOB     LOWER BODY DRESSING Daily Assessment    Lower Body Dressing   Dressing Assistance : 5 (Stand-by assistance)  Leg Crossed Method Used: Yes  Position Performed: Seated edge of bed;Standing  Comments: Pt performed LB dressing doffing BLE socks from EOB and thread BLE feet through brief and pants with setup and increaswed time. Pt required VC's to push up from EOB and reach to [address] with sit to stand due to decreased safety and attempting to pull on FWW to stand. Pt completed LB dressing and pulled brief and pants up over  buttocks and BLE hips. MOBILITY/TRANSFERS Daily Assessment    Functional Transfers  Toilet Transfer : Elevated seat;Stand pivot transfer with walker  Amount of Assistance Required: 5 (stand-by assistance)       ASSESSMENT:  Pt is making good progress toward goals using good safety techniques and compensatory strategies. Pt is making good progress toward goals and increasing BUE strength for self cares. Progression toward goals:  []          Improving appropriately and progressing toward goals  []          Improving slowly and progressing toward goals  []          Not making progress toward goals and plan of care will be adjusted     PLAN:  Patient continues to benefit from skilled intervention to address the above impairments. Continue treatment per established plan of care. Discharge Recommendations:  Home Health  Further Equipment Recommendations for Discharge: bedside commode and transfer bench     Activity Tolerance:  good      Estimated LOS:1/20/2022    Please refer to the flowsheet for vital signs taken during this treatment.   After treatment:   [x]  Patient left in no apparent distress sitting up in chair   []  Patient left in no apparent distress in bed  [x]  Call bell left within reach  []  Nursing notified  []  Caregiver present  []  Bed alarm activated    COMMUNICATION/EDUCATION:   [] Home safety education was provided and the patient/caregiver indicated understanding. [] Patient/family have participated as able in goal setting and plan of care. [x] Patient/family agree to work toward stated goals and plan of care. [] Patient understands intent and goals of therapy, but is neutral about his/her participation. [] Patient is unable to participate in goal setting and plan of care.       Columba Reeder, OT

## 2022-01-19 NOTE — PROGRESS NOTES
Problem: Mobility Impaired (Adult and Pediatric)  Goal: *Therapy Goal (Edit Goal, Insert Text)  Description: Physical Therapy Short Term Goals  Initiated 1/1/2022, updated 1/14/2022 and to be accomplished within 7 day(s) on 1/21/2022  1. Patient will move from supine to sit and sit to supine  in bed with minimal assistance/contact guard assist.  Goal met 1/7/2022  Updated goal: Patient will perform supine<->sit supervision level. Goal met 1/14/2022   2. Patient will transfer from bed to chair and chair to bed with minimal assistance using the least restrictive device. Goal met 1/7/2022  Updated goal: Patient will perform bed<->chair transfer with SBA using RW. Goal ongoing 1/14/2022  3. Patient will perform sit to stand with minimal assistance. Goal met 1/7/2022  Updated goal: Patient will perform sit to stand with SBA. Goal ongoing 1/14/2022  4. Patient will ambulate with minimal assistance/contact guard assist for 50 feet with the least restrictive device before needing seated rest. Goal ongoing 1/14/2022  5. Patient will participate in formal balance assessment Marlet Ripa Balance Scale/Postural Assessment Scale for Stroke). Goal ongoing as patient not able to consistently tolerance standing activity    Physical Therapy Long Term Goals  Initiated 1/1/2022 and to be accomplished within 21 day(s) on 1/22/2022  1. Patient will move from supine to sit and sit to supine , scoot up and down, and roll side to side in bed with modified independence. 2.  Patient will transfer from bed to chair and chair to bed with modified independence using the least restrictive device. 3.  Patient will perform sit to stand with modified independence. 4.  Patient will ambulate with modified independence for 150 feet with the least restrictive device. 5.  Patient will negotiate one curb step with CG/SBA , using appropriate AD for safe community mobility.    6.  Patient will perform w/c mobility with distant supervision for at least 150 ft over even surfaces for ease of mobility upon discharge home with support. Outcome: Progressing Towards Goal   PHYSICAL THERAPY TREATMENT    Patient: Radha Doyle (61 y.o. female)  Date: 2022  Diagnosis: CVA (cerebral vascular accident) [facility]) [I63.9] Acute ischemic stroke [facility])  Precautions: Fall  Chart, physical therapy assessment, plan of care and goals were reviewed. Time In:1006  Time Out:1107  Time In: 1330  Time Out: 8605    Patient seen for: Balance activities;Gait training;Patient education; Therapeutic exercise;Transfer training; Wheelchair mobility    Pain:  Patient with ongoing bilat knee pain    Patient identified with name and : yes    SUBJECTIVE:      Patient agreeable to treatment. OBJECTIVE DATA SUMMARY:    Objective:     TRANSFERS Daily Assessment     Transfer Type: Other  Other: stand step with RW  Transfer Assistance : 5 (Stand-by assistance)  Sit to Stand Assistance: Stand-by assistance    SBA with transfers using RW, cues for more upright posture and occasionally safe turning technique with RW. GAIT Daily Assessment    Gait Description (WDL) Exceptions to WDL    Gait Abnormalities Decreased step clearance;Shuffling gait; Step to gait    Assistive device Walker, rolling;Gait belt    Ambulation assistance - level surface 5 (Stand-by assistance) (cues for increased step clearance left LE)    Distance 30 Feet (ft)    Ambulation assistance- uneven surface  (NT)    Comments Needing cues for safe gait including stepping closer to middle of RW and increased foot clearance on left. Improved gait pattern during second bout. BALANCE Daily Assessment     Sitting - Static: Good (unsupported)  Sitting - Dynamic: Good (unsupported)  Standing - Static: Fair  Standing - Dynamic : Impaired        THERAPEUTIC EXERCISES Daily Assessment       Patient performing SCI FIT x 15 min duration level 1 for improved bilat UE/LE functional endurance and strength.  Also performing hip abd green Tband 3 x 15 reps in sitting, standing hip flex marches to 4\" curb step 3 x 10 reps, heel raises 3 x 10 reps with bilat UE support of RW required. ASSESSMENT:  Patient will continue to benefit from skilled PT to improve ability to perform standing balance and standing activities. Progression toward goals:  []      Improving appropriately and progressing toward goals  [x]      Improving slowly and progressing toward goals  []      Not making progress toward goals and plan of care will be adjusted      PLAN:  Patient continues to benefit from skilled intervention to address the above impairments. Continue treatment per established plan of care.   Discharge Recommendations:  Home Health and Outpatient  Further Equipment Recommendations for Discharge:  rolling walker and wheelchair 18 inch      Estimated Discharge Date: 1/20/2022    Activity Tolerance:   Fair due to fatigue    After treatment:   [] Patient left in no apparent distress in bed  [x] Patient left in no apparent distress sitting up in chair  [] Patient left in no apparent distress in w/c mobilizing under own power  [] Patient left in no apparent distress dining area  [] Patient left in no apparent distress mobilizing under own power  [x] Call bell left within reach  [x] Nursing notified  [] Caregiver present  [] Bed alarm activated   [x] Chair alarm activated      Louann Lawrence PT  1/19/2022

## 2022-01-19 NOTE — PROGRESS NOTES
[facility] PHYSICAL REHABILITATION  [address] [place] [address], [address]     INPATIENT REHABILITATION  DAILY PROGRESS NOTE     Date: 1/19/2022    Name: Gamal Miller Age / Sex: 82 y.o. 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 Blood pressure controlled. No documented fever over the past 24 hours. Patient denied any fever, chills, burning sensation or pain on urination      Patient's Complaint:   No significant medical complaints    Pain Control: no current joint or muscle symptoms, essentially pain-free      Objective:     Vital Signs:  Patient Vitals for the past 24 hrs:   BP Temp Pulse Resp SpO2   01/19/22 0828 135/77 98.4 °F (36.9 °C) 78 16 95 %   01/18/22 2127 128/77 97 °F (36.1 °C) 100 18 100 %   01/18/22 1600 (!) 147/69 98.5 °F (36.9 °C) 73 18 95 %        Physical Examination:  GENERAL SURVEY: Patient is awake, alert, oriented x 3, laying comfortably on the bed, not in acute respiratory distress. HEENT: pink palpebral conjunctivae, anicteric sclerae, no nasoaural discharge, moist oral mucosa  NECK: supple, no jugular venous distention, no palpable lymph nodes  CHEST/LUNGS: symmetrical chest expansion, good air entry, clear breath sounds  HEART: adynamic precordium, good S1 S2, no S3, regular rhythm, no murmurs  ABDOMEN: flat, bowel sounds appreciated, soft, non-tender  EXTREMITIES: (+) limitation ROM of both shoulders due to history of rotator cuff injury, pink nailbeds, no edema, full and equal pulses, no calf tenderness   NEUROLOGICAL EXAM: The patient is awake, alert and oriented x3, able to answer questions fairly appropriately, able to follow 1 and 2 step commands. Able to tell time from the wall clock. Cranial nerves II-XII are grossly intact. No gross sensory deficit. Motor strength is 4-/5 on the RUE (except right shoulder), 3+/5 on the LUE (except left shoulder), 4+/5 on the RLE, 4/5 on the LLE. Current Medications:  Current Facility-Administered Medications   Medication Dose Route Frequency    meclizine (ANTIVERT) tablet 12.5 mg  12.5 mg Oral TID    acetaminophen (TYLENOL) tablet 650 mg  650 mg Oral BID    acetaminophen (TYLENOL) tablet 650 mg  650 mg Oral Q4H PRN    diclofenac (VOLTAREN) 1 % topical gel 2 g  2 g Topical TID    losartan (COZAAR) tablet 50 mg  50 mg Oral DAILY    cholecalciferol (VITAMIN D3) (1000 Units /25 mcg) tablet 2,000 Units  2,000 Units Oral DAILY    aspirin tablet 325 mg  325 mg Oral DAILY WITH BREAKFAST    atorvastatin (LIPITOR) tablet 80 mg  80 mg Oral QHS    clopidogreL (PLAVIX) tablet 75 mg  75 mg Oral DAILY WITH DINNER    hydrALAZINE (APRESOLINE) tablet 25 mg  25 mg Oral TID PRN    ondansetron (ZOFRAN ODT) tablet 4 mg  4 mg Oral Q8H PRN    docusate sodium (COLACE) capsule 100 mg  100 mg Oral BID    magnesium hydroxide (MILK OF MAGNESIA) 400 mg/5 mL oral suspension 30 mL  30 mL Oral DAILY PRN    bisacodyL (DULCOLAX) tablet 10 mg  10 mg Oral Q48H PRN    dorzolamide (TRUSOPT) 2 % ophthalmic solution 1 Drop  1 Drop Both Eyes TID    latanoprost (XALATAN) 0.005 % ophthalmic solution 1 Drop  1 Drop Both Eyes QHS    loperamide (IMODIUM) capsule 2 mg  2 mg Oral PRN    montelukast (SINGULAIR) tablet 10 mg  10 mg Oral DAILY    sertraline (ZOLOFT) tablet 50 mg  50 mg Oral DAILY       Allergies:   Allergies   Allergen Reactions    Novocain [Procaine] Swelling       Functional Progress:    OCCUPATIONAL THERAPY    ON ADMISSION MOST RECENT   Eating  Functional Level: 5   Eating  Functional Level: 5     Grooming  Functional Level: 5   Grooming  Functional Level: 5     Bathing  Functional Level: 4   Bathing  Functional Level: 4     Upper Body Dressing  Functional Level: 4   Upper Body Dressing  Functional Level: 4     Lower Body Dressing  Functional Level: 2   Lower Body Dressing  Functional Level: 2     Toileting  Functional Level: 2   Toileting  Functional Level: 1     Toilet Transfers  Toilet Transfer Score: 3   Toilet Transfers  Toilet Transfer Score: 3     Tub /Shower Transfers  Tub/Shower Transfer Score: 0 (NT due to safety concerns)   Tub/Shower Transfers  Tub/Shower Transfer Score: 0 (NT due to safety concerns)       Legend:   7 - Independent   6 - Modified Independent   5 - Standby Assistance / Supervision / Set-up   4 - Minimum Assistance / Contact Guard Assistance   3 - Moderate Assistance   2 - Maximum Assistance   1 - Total Assistance / Dependent       Lab/Data Review:  Recent Results (from the past 24 hour(s))   CBC WITH AUTOMATED DIFF    Collection Time: 01/19/22 12:22 PM   Result Value Ref Range    WBC 7.7 4.6 - 13.2 K/uL    RBC 4.49 4.20 - 5.30 M/uL    HGB 12.9 12.0 - 16.0 g/dL    HCT 38.9 35.0 - 45.0 %    MCV 86.6 78.0 - 100.0 FL    MCH 28.7 24.0 - 34.0 PG    MCHC 33.2 31.0 - 37.0 g/dL    RDW 12.9 11.6 - 14.5 %    PLATELET 991 170 - 841 K/uL    MPV 11.1 9.2 - 11.8 FL    NRBC 0.0 0  WBC    ABSOLUTE NRBC 0.00 0.00 - 0.01 K/uL    NEUTROPHILS 77 (H) 40 - 73 %    LYMPHOCYTES 13 (L) 21 - 52 %    MONOCYTES 9 3 - 10 %    EOSINOPHILS 1 0 - 5 %    BASOPHILS 0 0 - 2 %    IMMATURE GRANULOCYTES 0 0.0 - 0.5 %    ABS. NEUTROPHILS 5.9 1.8 - 8.0 K/UL    ABS. LYMPHOCYTES 1.0 0.9 - 3.6 K/UL    ABS. MONOCYTES 0.7 0.05 - 1.2 K/UL    ABS. EOSINOPHILS 0.0 0.0 - 0.4 K/UL    ABS. BASOPHILS 0.0 0.0 - 0.1 K/UL    ABS. IMM.  GRANS. 0.0 0.00 - 0.04 K/UL    DF AUTOMATED     METABOLIC PANEL, BASIC    Collection Time: 01/19/22 12:22 PM   Result Value Ref Range    Sodium 137 136 - 145 mmol/L    Potassium 4.2 3.5 - 5.5 mmol/L    Chloride 106 100 - 111 mmol/L    CO2 26 21 - 32 mmol/L    Anion gap 5 3.0 - 18 mmol/L    Glucose 93 74 - 99 mg/dL    BUN 21 (H) 7.0 - 18 MG/DL    Creatinine 0.86 0.6 - 1.3 MG/DL    BUN/Creatinine ratio 24 (H) 12 - 20      GFR est AA >60 >60 ml/min/1.73m2    GFR est non-AA >60 >60 ml/min/1.73m2    Calcium 9.4 8.5 - 10.1 MG/DL       Assessment:     Primary Rehabilitation Diagnosis  1. Impaired Mobility and ADLs  2. 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 Justification for continued stay: Good progression towards established rehabilitation goals. 2. Medical Issues being followed closely:    [x]  Fall and safety precautions     []  Wound Care     [x]  Bowel and Bladder Function     [x]  Fluid Electrolyte and Nutrition Balance     []  Pain Control      3. Issues that 24 hour rehabilitation nursing is following:    [x]  Fall and safety precautions     []  Wound Care     [x]  Bowel and Bladder Function     [x]  Fluid Electrolyte and Nutrition Balance     []  Pain Control      [x]  Assistance with and education on in-room safety with transfers to and from the bed, wheelchair, toilet and shower. 4. Acute rehabilitation plan of care:    [x]  Continue current care and rehab. [x]  Physical Therapy           [x]  Occupational Therapy           []  Speech Therapy     []  Hold Rehab until further notice     5. Medications:    [x]  MAR Reviewed     [x]  Continue Present Medications     6.  Chemical DVT Prophylaxis: []  Enoxaparin     []  Unfractionated Heparin     []  Warfarin     []  NOAC     []  Aspirin     [x]  None     7. Mechanical DVT Prophylaxis:      [x]  RADHAMES Stockings     [x]  Sequential Compression Device     []  None     8. GI Prophylaxis:      []  PPI     []  H2 Blocker     [x]  None / Not indicated     9. Code status:    [x]  Full code     []  Partial code     []  Do not intubate     []  Do not resuscitate     10. Diet:  Specifications  3 carb choices (45 gm/meal); Low fat/Low cholesterol/High fiber/ANGELINE   Solids (consistency)  Regular   Liquids (consistency)  Thin   Fluid Restriction  None     11. 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 No acute intracranial process identified. 2.  Moderate parenchymal volume loss and chronic small vessel ischemic changes.    > Continue:    > Aspirin 325 mg PO once daily with breakfast    > Clopidogrel 75 mg PO once daily with dinner (STOP DATE: 3/27/2022)    > Meclizine 12.5 mg PO TID (for dizziness)     > Essential hypertension   > On 1/1/2022, started:    > Losartan 25 mg PO once daily (9AM)    > Hydralazine 25 mg PO TID PRN for SBP greater than 160 mmHg   > On 1/5/2022, increased Losartan from 25 mg to 50 mg PO once daily (9AM)   > Continue:    > Losartan 50 mg PO once daily (9AM)    > Hydralazine 25 mg PO TID PRN for SBP greater than 160 mmHg     > Hyperlipidemia   > Continue Atorvastatin 80 mg PO q HS     > Depression and anxiety   > Continue Sertraline 50 mg PO once daily     > Glaucoma   > Continue Latanoprost eyedrops 1 drop both eyes q HS     > Urinary incontinence; 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 Urinary tract colonization   > Will hold off on antibiotics for now    > Primary osteoarthritis of both knees    > On 1/13/2022, started:    > Acetaminophen 650 mg PO BID (8AM, 12PM)    > Diclofenac 1% gel, 2 grams applied to both knees TID (8AM, 12PM, 4PM)   > Continue:    > Acetaminophen 650 mg PO BID (8AM, 12PM)    > Diclofenac 1% gel, 2 grams applied to both knees TID (8AM, 12PM, 4PM)    > Acetaminophen 650 mg PO q 4 hr PRN for pain     > COVID-19 ruled out by laboratory testing   > Prior to admission and upon admission to the ARU, patient did not have any fever, desaturation, cough or difficulty breathing    > SARS-CoV-2 by [facility]) (1/7/2022): Not detected      12. Personal Protective Equipment (N95 face mask and eye goggles) was used while interacting with the patient. Patient was using a surgical mask. 15. Patient's progress in rehabilitation and medical issues discussed with the patient. All questions answered to the best of my ability. Care plan discussed with patient and nurse. 14. Total clinical care time is 30 minutes, including review of chart including all labs, radiology, past medical history, and discussion with patient. Greater than 50% of my time was spent in coordination of care and counseling.     15. Discharge Planning:  Discharge date  1/20/2022 (Thursday)   Discharge location  [x] Home     [] [address]    [] Other:    Follow-up services  [x] Outpatient      [] Home Health       [x] Physical Therapy              [x] Occupational Therapy       [] Speech Therapy                [] Medical Social Worker    [] Aide   [] Skilled Nursing [] Medication reconciliation        [] Disease education        [] PT/INR monitoring        [] Routine PICC line care        [] IV antibiotic administration            Antibiotic:             Stop date:        [] Tube feeding        [] Indwelling ovalle catheter care        [] Wound Care/Dressing             Instructions:       [] Other:      Follow-up appointments  1. PCP (Dr. Fahad Mcguire)   2.  Neurology (Dr. Lisa Hubbard)        Signed:    Daniel Chavez MD    January 19, 2022

## 2022-01-20 VITALS
HEART RATE: 93 BPM | HEIGHT: 59 IN | RESPIRATION RATE: 17 BRPM | WEIGHT: 138.2 LBS | SYSTOLIC BLOOD PRESSURE: 159 MMHG | BODY MASS INDEX: 27.86 KG/M2 | TEMPERATURE: 98.7 F | OXYGEN SATURATION: 96 % | DIASTOLIC BLOOD PRESSURE: 90 MMHG

## 2022-01-20 PROCEDURE — 97530 THERAPEUTIC ACTIVITIES: CPT

## 2022-01-20 PROCEDURE — 97535 SELF CARE MNGMENT TRAINING: CPT

## 2022-01-20 PROCEDURE — 74011250637 HC RX REV CODE- 250/637: Performed by: INTERNAL MEDICINE

## 2022-01-20 PROCEDURE — 74011250637 HC RX REV CODE- 250/637: Performed by: FAMILY MEDICINE

## 2022-01-20 PROCEDURE — 99239 HOSP IP/OBS DSCHRG MGMT >30: CPT | Performed by: INTERNAL MEDICINE

## 2022-01-20 PROCEDURE — 97110 THERAPEUTIC EXERCISES: CPT

## 2022-01-20 PROCEDURE — 74011250636 HC RX REV CODE- 250/636: Performed by: INTERNAL MEDICINE

## 2022-01-20 RX ADMIN — ASPIRIN 325 MG ORAL TABLET 325 MG: 325 PILL ORAL at 08:13

## 2022-01-20 RX ADMIN — DOCUSATE SODIUM 100 MG: 100 CAPSULE, LIQUID FILLED ORAL at 08:13

## 2022-01-20 RX ADMIN — DICLOFENAC SODIUM 2 G: 10 GEL TOPICAL at 08:21

## 2022-01-20 RX ADMIN — ACETAMINOPHEN 650 MG: 325 TABLET ORAL at 08:14

## 2022-01-20 RX ADMIN — MONTELUKAST 10 MG: 10 TABLET, FILM COATED ORAL at 08:13

## 2022-01-20 RX ADMIN — SERTRALINE 50 MG: 50 TABLET, FILM COATED ORAL at 08:14

## 2022-01-20 RX ADMIN — CHOLECALCIFEROL TAB 25 MCG (1000 UNIT) 2000 UNITS: 25 TAB at 08:14

## 2022-01-20 RX ADMIN — DORZOLAMIDE HYDROCHLORIDE 1 DROP: 20 SOLUTION/ DROPS OPHTHALMIC at 08:21

## 2022-01-20 RX ADMIN — MECLIZINE 12.5 MG: 12.5 TABLET ORAL at 08:13

## 2022-01-20 RX ADMIN — LOSARTAN POTASSIUM 50 MG: 50 TABLET, FILM COATED ORAL at 08:13

## 2022-01-20 NOTE — ROUTINE PROCESS
SHIFT CHANGE NOTE FOR [facility]    Bedside and Verbal shift change report given to Sarita Magallanes RN (oncoming nurse) by Lila Rueda RN (offgoing nurse). Report included the following information SBAR, Kardex, MAR and Recent Results.     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 ICD-10-CM: Z22.39  ICD-9-CM: V02.59  1/15/2022 Clinically Undetermined    Overview Signed 1/19/2022 11:31 AM by Farooq Nieto MD     Urine culture (collected 1/15/2022, resulted 1/19/2022) yielded growth of >100,000 colonies/ml of Escherichia coli (ESBL)             Primary osteoarthritis of both knees (Chronic) ICD-10-CM: M17.0  ICD-9-CM: 715.16  Unknown Yes        Essential hypertension (Chronic) ICD-10-CM: I10  ICD-9-CM: 401.9  Unknown Yes        Hypercholesterolemia with hypertriglyceridemia (Chronic) ICD-10-CM: E78.2  ICD-9-CM: 272.2  12/27/2021 Yes    Overview Signed 1/4/2022 12:02 AM by Farooq Nieto MD     Lipid profile (12/27/2021) showed , , HDL 48,              * (Principal) Acute ischemic stroke ([facility] [address] ICD-10-CM: I63.9  ICD-9-CM: 434.91  12/26/2021 Yes    Overview Signed 1/3/2022 11:59 PM by Farooq Nieto MD     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             Impaired mobility and ADLs ICD-10-CM: Z74.09, Z78.9  ICD-9-CM: V49.89  12/26/2021 Yes        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 ([address] ICD-10-CM: I67.89, G81.94  ICD-9-CM: 436, 342.92 12/26/2021 Yes              Background:   Past Medical History:   Past Medical History:   Diagnosis Date    Colonization with multidrug-resistant bacteria 1/15/2022    Urine culture (collected 1/15/2022, resulted 1/19/2022) yielded growth of >100,000 colonies/ml of Escherichia coli (ESBL)    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 1/15/2022    Urine culture (collected 1/15/2022, resulted 1/19/2022) yielded growth of >100,000 colonies/ml of Escherichia coli (ESBL)    Primary osteoarthritis of both knees         Assessment:   Changes in Assessment throughout shift: No change to previous assessment     Patient has a central line: no Reasons if yes: na  Insertion date:na Last dressing date:na   Patient has Ovalle Cath: no Reasons if yes: na   Insertion date:na  Shift ovalle care completed: NO     Last Vitals:     Vitals:    01/18/22 2127 01/19/22 0828 01/19/22 1623 01/19/22 2100   BP: 128/77 135/77 132/77 123/66   Pulse: 100 78 79 73   Resp: 18 16 18 18   Temp: 97 °F (36.1 °C) 98.4 °F (36.9 °C) 98.5 °F (36.9 °C) 98.4 °F (36.9 °C)   SpO2: 100% 95% 94% 93%   Weight:       Height:            PAIN    Pain Assessment    Pain Intensity 1: 0 (01/20/22 0400) Pain Intensity 1: 2 (12/29/14 1105)    Pain Location 1: Knee Pain Location 1: Abdomen    Pain Intervention(s) 1: Medication (see MAR) Pain Intervention(s) 1: Medication (see MAR)  Patient Stated Pain Goal: 0 Patient Stated Pain Goal: 0  o Intervention effective: yes  o Other actions taken for pain:       Skin Assessment  Skin color    Condition/Temperature    Integrity    Turgor    Weekly Pressure Ulcer Documentation  Pressure  Injury Documentation: No Pressure Injury Noted-Pressure Ulcer Prevention Initiated  Wound Prevention & Protection Methods  Orientation of wound Orientation of Wound Prevention: Posterior  Location of Prevention Location of Wound Prevention: Buttocks,Sacrum/Coccyx  Dressing Present Dressing Present : No  Dressing Status Wound Offloading Wound Offloading (Prevention Methods): Bed, pressure redistribution/air     INTAKE/OUPUT  Date 01/19/22 0700 - 01/20/22 0659 01/20/22 0700 - 01/21/22 0659   Shift [id number] 1900-0659 24 Hour Total 2215-6197 2662-9904 24 Hour Total   INTAKE   P.O. 1080  1080        P. O. 1080  1080      Shift Total(mL/kg) 2411(07.8)  3535(09.2)      OUTPUT   Urine(mL/kg/hr)           Urine Occurrence(s) 4 x 3 x 7 x      Stool           Stool Occurrence(s) 0 x 0 x 0 x      Shift Total(mL/kg)         NET 1080  1080      Weight (kg) 62.7 62.7 62.7 62.7 62.7 62.7       Recommendations:  1. Patient needs and requests: toileting    2. Pending tests/procedures: na     3. Functional Level/Equipment: Partial (one person) /      Fall Precautions:   Fall risk precautions were reinforced with the patient; she was instructed to call for help prior to getting up. The following fall risk precautions were continued: bed/ chair alarms, door signage, yellow bracelet and socks as well as update of the [employer] tool in the patient's room. Yadira Score: 3    HEALS Safety Check    A safety check occurred in the patient's room between off going nurse and oncoming nurse listed above. The safety check included the below items  Area Items   H  High Alert Medications - Verify all high alert medication drips (heparin, PCA, etc.)   E  Equipment - Suction is set up for ALL patients (with yanker)  - Red plugs utilized for all equipment (IV pumps, etc.)  - WOWs wiped down at end of shift.  - Room stocked with oxygen, suction, and other unit-specific supplies   A  Alarms - Bed alarm is set for fall risk patients  - Ensure chair alarm is in place and activated if patient is up in a chair   L  Lines - Check IV for any infiltration  - Edwards bag is empty if patient has a Edwards   - Tubing and IV bags are labeled   S  Safety   - Room is clean, patient is clean, and equipment is clean. - Hallways are clear from equipment besides carts.    - Fall bracelet on for fall risk patients  - Ensure room is clear and free of clutter  - Suction is set up for ALL patients (with norah)  - Hallways are clear from equipment besides carts.    - Isolation precautions followed, supplies available outside room, sign posted     Violette Michel RN

## 2022-01-20 NOTE — DISCHARGE SUMMARY
[facility] PHYSICAL REHABILITATION  [address] [place] [address], [address]     INPATIENT REHABILITATION  DISCHARGE SUMMARY    Name: Vicky Danielle MRN: [id number]   Age / Sex: 82 y.o. / female CSN: [id number]   YOB: 1935 Length of Stay: 20 days   Admit Date: 12/31/2021 Discharge Date: 1/20/2022       PRIMARY CARE PHYSICIAN: Erika Mueller MD      DISCHARGE DIAGNOSES:    Primary Rehabilitation Diagnosis  1. Impaired Mobility and ADLs  2. 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 No acute intracranial process identified. 2. Moderate parenchymal volume loss and chronic small vessel ischemic changes. BRIEF HISTORY (from the history and physical prepared by Dr. Hermelinda Cochran):   Vicky Danielle is a 82 y.o.   female who has history of hypertension hyperlipidemia major depression.     Patient was admitted to [facility] 12/26/2021 after she wake up from sleep and felt unsteady nausea and vertigo with left hearing loss     Patient in the ER had sign of cerebellar dysfunction ataxia of the left upper extremity left lower extremity and dysmetria     CT scan in the ER was negative no acute stroke blood pressure was elevated to 211/99.     Patient had follow-up MRI confirming acute stroke acute infarct in the right corona radiata. Patient loaded with Plavix and started on aspirin 81 mg daily atorvastatin dose was increased     Patient had dizziness was treated with eye patch.     Echo showed left ventricular hypertrophy mild to moderate aortic stenosis     Patient had PT and OT evaluation was recommendation for inpatient rehab patient was admitted to inpatient rehab at [facility] 12/31/2021      Karen Stewart 67: Upon admission to the [facility], the patient underwent physical therapy, occupational therapy and speech therapy. The patient was able to actively participate in the rehabilitation activities and progressed well. On discharge, the patient was able to perform the following activities:    1. Occupational Therapy    ON ADMISSION ON DISCHARGE   Eating  Functional Level: 5   Eating  Functional Level: 5     Grooming  Functional Level: 5   Grooming  Functional Level: 5     Bathing  Functional Level: 4   Bathing  Functional Level: 4     Upper Body Dressing  Functional Level: 4   Upper Body Dressing  Functional Level: 4     Lower Body Dressing  Functional Level: 2   Lower Body Dressing  Functional Level: 2     Toileting  Functional Level: 2   Toileting  Functional Level: 2     Toilet Transfers  Toilet Transfer Score: 3   Toilet Transfers  Toilet Transfer Score: 3     Tub /Shower Transfers  Tub/Shower Transfer Score: 0 (NT due to safety concerns)   Tub/Shower Transfers  Tub/Shower Transfer Score: 0 (NT due to safety concerns)       2.  Physical Therapy    ON ADMISSION ON DISCHARGE   Wheelchair Mobility/Management  Able to Propel (ft): 55 feet  Functional Level: 2 (mod/max A using bilat feet, occasionally UE to propel)  Curbs/Ramps Assist Required (FIM Score): 0 (Not tested)  Wheelchair Setup Assist Required : 2 (Maximal assistance)  Wheelchair Management: Manages left brake,Manages right brake (needing assistance) Wheelchair Mobility/Management  Able to Propel (ft): 60 feet  Functional Level:  (mod I)  Curbs/Ramps Assist Required (FIM Score): 0 (Not tested)  Wheelchair Setup Assist Required : 5 (Supervision/setup)  Wheelchair Management: Manages left brake,Manages right brake (ensuring fully locked, using brake extenders)     Gait  Amount of Assistance: 3 (Moderate assistance)  Distance (ft): 18 Feet (ft)  Assistive Device: Walker, rolling,Gait belt Gait  Amount of Assistance: 5 (Stand-by assistance) (cues for increased step clearance left LE)  Distance (ft): 30 Feet (ft)  Assistive Device: Walker, rolling,Gait belt     Balance-Sitting/Standing  Sitting - Static: Good (unsupported)  Sitting - Dynamic: Good (unsupported),Occassional,Fair (occasional)  Standing - Static: Fair,Occasional,Poor  Standing - Dynamic : Impaired  Other (comment): standing balance to be further assessed Balance-Sitting/Standing  Sitting - Static: Good (unsupported)  Sitting - Dynamic: Good (unsupported)  Standing - Static: Fair  Standing - Dynamic : Impaired  Other (comment): static standing 1-2 min durations emphasis on upright posture x 2 bouts, UE support of RW progressing to one UE support     Bed/Mat Mobility  Rolling Right : 5 (Stand-by assistance)  Rolling Left : 5 (Stand-by assistance)  Supine to Sit : 3 (Moderate assistance)  Sit to Supine : 4 (Minimal assistance) Bed/Mat Mobility  Rolling Right : 6 (Modified independent)  Rolling Left : 6 (Modified independent)  Supine to Sit : 6 (Modified independent)  Sit to Supine : 6 (Modified independent)     Transfers  Transfer Type: Other  Other: stand step with RW  Transfer Assistance : 3 (Moderate assistance )  Sit to Stand Assistance: Moderate assistance  Car Transfers: Not tested (Patient feeling nauseous during second session)  Car Type: NT Transfers  Transfer Type:  Other  Other: stand step with RW  Transfer Assistance : 5 (Stand-by assistance)  Sit to Stand Assistance: Stand-by assistance  Car Transfers:  (CGA)  Car Type: car transfer simulator     Steps or Stairs  Steps/Stairs Ambulated (#): 0  Level of Assist : 0 (Not tested)  Rail Use:  (NT ) Steps or Stairs  Steps/Stairs Ambulated (#): 0  Level of Assist : 0 (Not tested)  Rail Use:  (NT)       3. Speech and Language Pathology    ON ADMISSION ON DISCHARGE   Comprehension (Native Language)  Primary Mode of Comprehension: Auditory  Score: 5 Comprehension (Native Language)  Primary Mode of Comprehension: Auditory  Score: 6     Expression (Native Language)  Primary Mode of Expression: Verbal  Score: 6   Expression (Native Language)  Primary Mode of Expression: Verbal  Score: 6     Social Interaction/Pragmatics  Score: 6 Social Interaction/Pragmatics  Score: 5     Problem Solving  Score: 5   Problem Solving  Score: 4     Memory  Score: 6  Comments: appears intact Memory  Score: 5  Comments: appears intact       Legend:   7 - Independent   6 - Modified Independent   5 - Standby Assistance / Supervision / Set-up   4 - Minimum Assistance / Contact Guard Assistance   3 - Moderate Assistance   2 - Maximum Assistance   1 - Total Assistance / Dependent       ACUTE MEDICAL ISSUES ADDRESSED IN INPATIENT REHABILITATION FACILITY:     > 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 No acute intracranial process identified. 2.  Moderate parenchymal volume loss and chronic small vessel ischemic changes.    > Continue:    > Aspirin 325 mg PO once daily with breakfast    > Clopidogrel 75 mg PO once daily with dinner (STOP DATE: 3/27/2022)    > Meclizine 12.5 mg PO TID (for dizziness)     > Essential hypertension   > On 1/1/2022, started:    > Losartan 25 mg PO once daily (9AM)    > Hydralazine 25 mg PO TID PRN for SBP greater than 160 mmHg   > On 1/5/2022, increased Losartan from 25 mg to 50 mg PO once daily (9AM)              > During the patient's stay at the ARU, the patient was given Hydralazine 25 mg PO TID PRN for SBP greater than 160 mmHg   > Continue Losartan 50 mg PO once daily (9AM)      > Hyperlipidemia   > Continue Atorvastatin 80 mg PO q HS     > Depression and anxiety   > Continue Sertraline 50 mg PO once daily     > Glaucoma   > Continue:    > Dorzolamide 2% ophthalmic solution 1 drop both eyes TID    > Latanoprost eyedrops 1 drop both eyes q HS     > Urinary incontinence; 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 Urinary tract colonization   > Will hold off on antibiotics for now    > Primary osteoarthritis of both knees    > On 1/13/2022, started:    > Acetaminophen 650 mg PO BID (8AM, 12PM)    > Diclofenac 1% gel, 2 grams applied to both knees TID (8AM, 12PM, 4PM)              > During the patient's stay at the ARU, the patient was given:     > Acetaminophen 650 mg PO BID (8AM, 12PM)    > Diclofenac 1% gel, 2 grams applied to both knees TID (8AM, 12PM, 4PM)    > Acetaminophen 650 mg PO q 4 hr PRN for pain     > COVID-19 ruled out by laboratory testing   > Prior to admission and upon admission to the ARU, patient did not have any fever, desaturation, cough or difficulty breathing    > SARS-CoV-2 by [facility]) (1/7/2022):  Not detected      MEDICATIONS ON DISCHARGE:    Current Discharge Medication List      START taking these medications    Details meclizine (ANTIVERT) 12.5 mg tablet Take 1 Tablet by mouth three (3) times daily for 10 days. Indications: sensation of spinning or whirling  Qty: 30 Tablet, Refills: 0  Start date: 1/19/2022, End date: 1/29/2022    Associated Diagnoses: Acute ischemic stroke (HCC)      losartan (COZAAR) 50 mg tablet Take 1 Tablet by mouth daily. Indications: high blood pressure  Qty: 30 Tablet, Refills: 0  Start date: 1/20/2022    Associated Diagnoses: Essential hypertension      cholecalciferol (VITAMIN D3) (1000 Units /25 mcg) tablet Take 2 Tablets by mouth daily. Indications: prevention of vitamin D deficiency  Qty: 60 Tablet, Refills: 0  Start date: 1/20/2022      acetaminophen (TYLENOL) 325 mg tablet Take 2 Tablets by mouth every four (4) hours as needed (for fever or pain). Indications: fever, pain  Qty: 60 Tablet, Refills: 0  Start date: 1/19/2022    Associated Diagnoses: Primary osteoarthritis of both knees      diclofenac (VOLTAREN) 1 % gel Apply 2 g to affected area four (4) times daily. [both knees]  Indications: osteoarthritis of the knee  Qty: 1 Each, Refills: 0  Start date: 1/19/2022    Associated Diagnoses: Primary osteoarthritis of both knees         CONTINUE these medications which have CHANGED    Details   aspirin (ASPIRIN) 325 mg tablet Take 1 Tablet by mouth daily (with breakfast). Indications: acute thromboembolic stroke, prevention for a blood clot going to the brain  Qty: 30 Tablet, Refills: 0  Start date: 1/20/2022    Associated Diagnoses: Acute ischemic stroke (HCC)      atorvastatin (LIPITOR) 80 mg tablet Take 1 Tablet by mouth nightly. Indications: excessive fat in the blood, stroke prevention  Qty: 30 Tablet, Refills: 0  Start date: 1/20/2022    Associated Diagnoses: Acute ischemic stroke ([address] Hypercholesterolemia with hypertriglyceridemia      clopidogreL (PLAVIX) 75 mg tab Take 1 Tablet by mouth daily (with dinner).  Indications: prevention for a blood clot going to the brain  Qty: 30 Tablet, Refills: 0  Start date: 1/20/2022    Associated Diagnoses: Acute ischemic stroke ([facility] [address]         CONTINUE these medications which have NOT CHANGED    Details   dorzolamide (TRUSOPT) 2 % ophthalmic solution Administer 1 Drop to both eyes three (3) times daily. Qty: 10 mL, Refills: 0  Start date: 12/31/2021      latanoprost (XALATAN) 0.005 % ophthalmic solution Administer 1 Drop to both eyes nightly. montelukast (SINGULAIR) 10 mg tablet Take 10 mg by mouth daily. sertraline (ZOLOFT) 50 mg tablet Take 50 mg by mouth daily. loperamide (IMODIUM) 2 mg capsule Take 2 mg by mouth as needed for Diarrhea. oxybutynin chloride XL (DITROPAN XL) 10 mg CR tablet Take 10 mg by mouth nightly. cetirizine (ZYRTEC) 10 mg tablet Take 10 mg by mouth daily. STOP taking these medications       simvastatin (Zocor) 10 mg tablet Comments:   Reason for Stopping:         celecoxib (CeleBREX) 100 mg capsule Comments:   Reason for Stopping:         mirabegron ER (Myrbetriq) 25 mg ER tablet Comments:   Reason for Stopping:         simvastatin (ZOCOR) 10 mg tablet Comments:   Reason for Stopping:         celecoxib (CELEBREX) 100 mg capsule Comments:   Reason for Stopping:                 DISCHARGE VITAL SIGNS:  Visit Vitals  BP (!) 159/90 (BP 1 Location: Right arm, BP Patient Position: At rest)   Pulse 93   Temp 98.7 °F (37.1 °C)   Resp 17   Ht 4' 11\" (1.499 m)   Wt 62.7 kg (138 lb 3.2 oz)   SpO2 96%   BMI 27.91 kg/m²       DISCHARGE PHYSICAL EXAMINATION:  GENERAL SURVEY: Patient is awake, alert, oriented x 3, laying comfortably on the bed, not in acute respiratory distress.   HEENT: pink palpebral conjunctivae, anicteric sclerae, no nasoaural discharge, moist oral mucosa  NECK: supple, no jugular venous distention, no palpable lymph nodes  CHEST/LUNGS: symmetrical chest expansion, good air entry, clear breath sounds  HEART: adynamic precordium, good S1 S2, no S3, regular rhythm, no murmurs  ABDOMEN: flat, bowel sounds appreciated, soft, non-tender  EXTREMITIES: (+) limitation ROM of both shoulders due to history of rotator cuff injury, pink nailbeds, no edema, full and equal pulses, no calf tenderness   NEUROLOGICAL EXAM: The patient is awake, alert and oriented x3, able to answer questions fairly appropriately, able to follow 1 and 2 step commands. Able to tell time from the wall clock. Cranial nerves II-XII are grossly intact. No gross sensory deficit. Motor strength is 4-/5 on the RUE (except right shoulder), 3+/5 on the LUE (except left shoulder), 4+/5 on the RLE, 4/5 on the LLE. CONDITION ON DISCHARGE: Stable. DISPOSITION: Patient clinically improved and was discharged home with outpatient physical therapy and occupational therapy. FOLLOW-UP RECOMMENDATIONS:   Follow-up Information     Follow up With Specialties Details Why Contact Info    Amy Stevenson MD Family Medicine On 2/8/2022 Patient has an appointment scheduled with PCP Dr. Shanthi Deutsch on February 8, 2022 @ 1:00pm. [address] [facility] [address]      Amy Correa MD Neurology On 2/2/2022 Patient has an appointment scheduled with Neurology Dr. Kary Fitzgerald group on February 2, 2022 @ 11:00am.  [facility]  [phone]      Follow up Therapy   Orders were sent to [facility] with [facility] for PHYSICAL and OCCUPATIONAL therapies     [facility]. DME Services  Orders were sent for a wheelchair, cushions, rolling walker and a bedside commode. [address] [place]  [phone]          OTHER INSTRUCTIONS:  1. Diet. Specifications  3 carb choices (45 gm/meal); Low fat/Low cholesterol/High fiber/ANGELINE   Solids (consistency)  Regular   Liquids (consistency)  Thin   Fluid Restriction  None      2. Activity. As tolerated. 3. Safety / fall precautions.       TIME SPENT ON DISCHARGE ACTIVITIES: 32 minutes.       Signed:  Dotty Bañuelos MD    1/20/2022

## 2022-01-20 NOTE — DISCHARGE INSTRUCTIONS
DISCHARGE SUMMARY from Nurse    PATIENT INSTRUCTIONS:    After general anesthesia or intravenous sedation, for 24 hours or while taking prescription Narcotics:  · Limit your activities  · Do not drive and operate hazardous machinery  · Do not make important personal or business decisions  · Do  not drink alcoholic beverages  · If you have not urinated within 8 hours after discharge, please contact your surgeon on call. Report the following to your surgeon:  · Excessive pain, swelling, redness or odor of or around the surgical area  · Temperature over 100.5  · Nausea and vomiting lasting longer than 4 hours or if unable to take medications  · Any signs of decreased circulation or nerve impairment to extremity: change in color, persistent  numbness, tingling, coldness or increase pain  · Any questions    What to do at Home:  Recommended activity: Activity as tolerated, per therapy instructions. *  Please give a list of your current medications to your Primary Care Provider. *  Please update this list whenever your medications are discontinued, doses are      changed, or new medications (including over-the-counter products) are added. *  Please carry medication information at all times in case of emergency situations. These are general instructions for a healthy lifestyle:    No smoking/ No tobacco products/ Avoid exposure to second hand smoke  Surgeon General's Warning:  Quitting smoking now greatly reduces serious risk to your health.     Obesity, smoking, and sedentary lifestyle greatly increases your risk for illness    A healthy diet, regular physical exercise & weight monitoring are important for maintaining a healthy lifestyle    You may be retaining fluid if you have a history of heart failure or if you experience any of the following symptoms:  Weight gain of 3 pounds or more overnight or 5 pounds in a week, increased swelling in our hands or feet or shortness of breath while lying flat in bed.  Please call your doctor as soon as you notice any of these symptoms; do not wait until your next office visit. The discharge information has been reviewed with the patient and caregiver. The patient and caregiver verbalized understanding. Discharge medications reviewed with the patient and caregiver and appropriate educational materials and side effects teaching were provided. Patient armband removed and shredded. MyChart Activation    Thank you for requesting access to [facility]. Please follow the instructions below to securely access and download your online medical record. [facility] allows you to send messages to your doctor, view your test results, renew your prescriptions, schedule appointments, and more. How Do I Sign Up? 1. In your internet browser, go to [email or web address]  2. Click on the First Time User? Click Here link in the Sign In box. You will be redirect to the New Member Sign Up page. 3. Enter your Central Desktop Access Code exactly as it appears below. You will not need to use this code after youve completed the sign-up process. If you do not sign up before the expiration date, you must request a new code. Central Desktop Access Code: [email or web address]  Expires: 2022 12:27 PM (This is the date your Central Desktop access code will )    4. Enter the last four digits of your Social Security Number (xxxx) and Date of Birth (mm/dd/yyyy) as indicated and click Submit. You will be taken to the next sign-up page. 5. Create a Central Desktop ID. This will be your Central Desktop login ID and cannot be changed, so think of one that is secure and easy to remember. 6. Create a Central Desktop password. You can change your password at any time. 7. Enter your Password Reset Question and Answer. This can be used at a later time if you forget your password. 8. Enter your e-mail address. You will receive e-mail notification when new information is available in [address] 9. Click Sign Up.  You can now view and download portions of your medical record. 10. Click the Download Summary menu link to download a portable copy of your medical information. Additional Information    If you have questions, please visit the Frequently Asked Questions section of the [employer] website at [email or web address] Remember, [employer] is NOT to be used for urgent needs. For medical emergencies, dial 911.        ___________________________________________________________________________________________________________________________________    ------------------------------------------------------------------------------------------------------------    DISCHARGE INSTRUCTIONS    1. Make sure that when you request refills at the pharmacy that the refill requests are sent to your PCP (NOT to the prescriber at the [facility]) to avoid any delays in getting your medication refills. The physician at the [facility] will not able to order medications or refills after discharge -- Please understand that though we would like to help, it is simply not safe for our physician to order you a medication that we cannot monitor. 2. If any of the prescribed medications require a PRIOR AUTHORIZATION, contact your Primary Care Physician or specialist to EITHER complete prior authorizations and paperwork on your behalf OR prescribe an alternative medication. -- Please understand that though we would like to help, it is simply not safe for our physician to order you a medication that we cannot monitor. 3. If any of your prescriptions medications PRIOR TO ADMISSION TO [place] [address] needs a refill (and either the dosage, frequency or instructions was not changed), kindly contact your Primary Care Physician for refills.     -------------------------------------------------------------------------------------------------------------------

## 2022-01-20 NOTE — PROGRESS NOTES
Problem: Self Care Deficits Care Plan (Adult)  Goal: *Therapy Goal (Edit Goal, Insert Text)  Description: Occupational Therapy Goals   Long Term Goals  Initiated 1/1/22 and to be accomplished within 3 week(s), by 1/22/2022  1. Pt will perform self-feeding with independence. 2. Pt will perform grooming with independence. 3. Pt will perform UB bathing with supervision. 4. Pt will perform LB bathing with supervision. 5. Pt will perform tub/shower transfer with supervision. 6. Pt will perform UB dressing with Axel using compensatory strategies. 7. Pt will perform LB dressing with Axel and AE. 8. Pt will perform toileting task with Axel and AE. 9. Pt will perform toilet transfer with Axel and AE. Short Term Goals   Initiated 1/1/22 (reassessed on 1/7/2022) and to be accomplished within 7 day(s), to be reassessed by 1/14/2022  1. Pt will perform self-feeding with Axel. 2. Pt will perform grooming with Axel. 3. Pt will perform UB bathing with SBA. (Goal met 1/7/2022)      Upgraded goal: Pt will perform UB bathing with set-up/ supv. 4. Pt will perform LB bathing with Fidel and UE support. (Goal met 1/7/2022)      Upgraded goal: Pt will perform LB bathing with CGA using AE and/ or compensatory strategies as needed. 5. Pt will perform tub/shower transfer with Fidel and AE. 6. Pt will perform UB dressing with SBA using compensatory strategies. (Goal met 1/7/2022)      Upgraded goal: Pt will perform UB dressing with supv using compensatory strategies as needed. 7. Pt will perform LB dressing with Fidel and AE. 8. Pt will perform toileting task with Fidel and AE. (Goal met 1/7/2022; continue goal for consistency)  9. Pt will perform toilet transfer with Fidel and AE.    Outcome: Progressing Towards Goal  Goal: Interventions  Outcome: Progressing Towards Goal   OCCUPATIONAL THERAPY DISCHARGE    Patient: Duy Coyle (51 y.o. female)  Date: 1/20/2022    First Tx Session  Time In: 0900  Time Out[de-identified] 2650        Primary Diagnosis: CVA (cerebral vascular accident) ([facility] [address] [I63.9] Acute ischemic stroke [facility])    Precautions:   Fall    Barriers to Learning/Limitations: None  Compensate with: visual, verbal, tactile, kinesthetic cues/model     Patient identified with name and :yes    SUBJECTIVE:   Patient stated I have a call bell in my bathroom. I don't have any throw rugs. 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 Prior Level of Function/Home Situation: Independent with all self cares with exception of showering, independent with functional mobility  Home Situation  Home Environment: Independent living  One/Two Story Residence: One story  Living Alone: Yes  Support Systems: Child(jovany)  Patient Expects to be Discharged to[de-identified] Independent living facility  Current DME Used/Available at Home: Lynnda Leventhal, rollator  Tub or Shower Type: Shower  [x]     Right hand dominant   []     Left hand dominant    Therapeutic Exercise:  Pt performed BUE strengthening with yellow theraband chest pull 15 reps, 2 sets with supervision for sequencing. Pain:  Pt reports 0/10 pain or discomfort prior to treatment. Pt reports -/10 pain or discomfort post treatment.    Problem List:    Decreased strength B UE  [x]     Decreased strength trunk/core  []     Decreased AROM   []     Decreased PROM  []     Decreased balance sitting  []     Decreased balance standing  []     Decreased endurance  []     Pain  []       Functional Limitations:   Decreased independence with ADL  []     Decreased independence with functional transfers  []     Decreased independence with ambulation  []     Decreased independence with IADL  []       Outcome Measures:      MMT Initial Assessment   Right Upper Extremity  Left Upper Extremity    UE AROM WFL with exception of BUE shoulders due to prior rotator cuff injuries, ~5 degrees AROM shoulder flexion WFL with exception of BUE shoulders due to prior rotator cuff injuries, ~5 degrees AROM shoulder flexion   Shoulder flexion 2-/5 (chronic due to prior rotator cuff injuries) 2-/5 (chronic due to prior rotator cuff injuries)   Shoulder extension 2-/5 (chronic due to prior rotator cuff injuries) 2-/5 (chronic due to prior rotator cuff injuries)   Shoulder ABDuction     Shoulder ADDUction     Elbow Flexion 4-/5 3+/5   Elbow Extension 4-/5 3+/5   Wrist Extension/Flexion      3+/5 3+/5            MMT Discharge Assessment   Right Upper Extremity  Left Upper Extremity    UE AROM WFL with exception of BUE shoulders due to prior rotator cuff injuries, ~5 degrees AROM shoulder flexion WFL with exception of BUE shoulders due to prior rotator cuff injuries, ~5 degrees AROM shoulder flexion   Shoulder flexion 2-/5 (chronic due to prior rotator cuff injuries) 2-/5 (chronic due to prior rotator cuff injuries)   Shoulder extension 2-/5 (chronic due to prior rotator cuff injuries) 2-/5 (chronic due to prior rotator cuff injuries)   Shoulder ABDuction     Shoulder ADDUction     Elbow Flexion 4/5 4-/5   Elbow Extension 4/5 4-/5   Wrist Extension/Flexion      3+/5 3+/5       0/5 No palpable muscle contraction  1/5 Palpable muscle contraction, no joint movement  2-/5 Less than full range of motion in gravity eliminated position  2/5 Able to complete full range of motion in gravity eliminated position  2+/5 Able to initiate movement against gravity  3-/5 More than half but not full range of motion against gravity  3/5 Able to complete full range of motion against gravity  3+/5 Completes full range of motion against gravity with minimal resistance  4-/5 Completes full range of motion against gravity with minimal resistance  4/5 Completes full range of motion against gravity with moderate resistance  5/5 Completes full range of motion against gravity with maximum resistance    Coordination: WFL using compensatory strategies  Sensation: BUE impaired    FIM SCORES Initial Assessment Discharge Assessment   Eating 5 5   Grooming 5      Oral Hygiene FIM: 5    Bathing 4 5 (SBA for safety)      Upper Body Dressing 4 5 (setup)   Lower Body Dressing 2  5 (SBA for safety)    Sock and/or Shoe Management FIM: 5   Toileting 2 Toileting  Toileting Assistance (FIM Score): 5 (Supervision)  Adaptive Equipment: Elevated seat;Walker   Tub/Shower Transfer 0 (NT due to safety concerns) 5   Toilet Transfer 3 Functional Transfers  Toilet Transfer : Elevated seat;Stand pivot transfer with walker  Amount of Assistance Required: 5 (Supervision/setup)   Comprehension 5 Score: 6   Expression 6 Score: 6   Social Interaction 6 Score: 5   Problem Solving 5 Score: 4   Memory 6 Score: 5   Please see [facility] Interdisciplinary Eval: Coordination/Balance Section for details regarding FIM score description. Activity Tolerance:   Fair-good    ASSESSMENT:  Pt has made good progress toward goals and reduced pain, increased independence and safety with self cares and functional mobility as well as functional transfers for self cares. Pt now demonstrates ability to perform UB self cares with setup and LB self cares with supervision/SBA. Pt has met all STG's and 5/9 LTG's at this time.    Progression toward goals:  [x]      Improving appropriately and progressing toward goals  []      Improving slowly and progressing toward goals  []      Not making progress toward goals and plan of care will be adjusted     PLAN:  Pt would benefit from continued skilled occupational therapy in order to improve ADL function and IADL with use of least restrictive device. Interventions may include range of motion (AROM, PROM B UE/trunk), motor function (B UE/trunk strengthening/coordination), activity tolerance (vitals, oxygen saturation levels), ADL, balance activities, IADL, and functional transfer training. Discharge Recommendations: Home Health  Further Equipment Recommendations for Discharge: bedside commode and transfer bench       Please refer to the flow sheet for vital signs taken during this treatment. After treatment:   [x]  Patient left in no apparent distress sitting up in chair  []  Patient left in no apparent distress in bed  [x]  Call bell left within reach  []  Nursing notified  []  Caregiver present  []  Bed alarm activated    COMMUNICATION/EDUCATION:   Communication/Collaboration:  [x]      Home safety education was provided and the patient/caregiver indicated understanding. [x]      Patient/family have participated as able and agree with findings and recommendations. [x]      Patient is unable to participate in plan of care at this time.     Tosin Linda, OT  1/20/2022

## 2022-01-20 NOTE — PROGRESS NOTES
Problem: Mobility Impaired (Adult and Pediatric)  Goal: *Therapy Goal (Edit Goal, Insert Text)  Description: Physical Therapy Short Term Goals  Initiated 1/1/2022, updated 1/14/2022 and to be accomplished within 7 day(s) on 1/21/2022  1. Patient will move from supine to sit and sit to supine  in bed with minimal assistance/contact guard assist.  Goal met 1/7/2022  Updated goal: Patient will perform supine<->sit supervision level. Goal met 1/14/2022   2. Patient will transfer from bed to chair and chair to bed with minimal assistance using the least restrictive device. Goal met 1/7/2022  Updated goal: Patient will perform bed<->chair transfer with SBA using RW. Goal met 1/20/2022  3. Patient will perform sit to stand with minimal assistance. Goal met 1/7/2022  Updated goal: Patient will perform sit to stand with SBA. Goal met 1/20/2022  4. Patient will ambulate with minimal assistance/contact guard assist for 50 feet with the least restrictive device before needing seated rest. Goal ongoing 1/20/2022  5. Patient will participate in formal balance assessment Yane Dimmer Balance Scale/Postural Assessment Scale for Stroke). Goal ongoing as patient not able to consistently tolerate standing activity    Physical Therapy Long Term Goals  Initiated 1/1/2022 and to be accomplished within 21 day(s) on 1/22/2022  1. Patient will move from supine to sit and sit to supine , scoot up and down, and roll side to side in bed with modified independence. Goal met 1/20/2022  2. Patient will transfer from bed to chair and chair to bed with modified independence using the least restrictive device. 3.  Patient will perform sit to stand with modified independence. 4.  Patient will ambulate with modified independence for 150 feet with the least restrictive device. 5.  Patient will negotiate one curb step with CG/SBA , using appropriate AD for safe community mobility.    6.  Patient will perform w/c mobility with distant supervision for at least 150 ft over even surfaces for ease of mobility upon discharge home with support. Outcome: Resolved/ Partially Met   PHYSICAL THERAPY DISCHARGE NOTE    Patient: Gamal Miller (2 y.o. female)  Date: 2022  Diagnosis: CVA (cerebral vascular accident) [facility]) [I63.9] Acute ischemic stroke [facility])  Precautions: Fall  Chart, physical therapy assessment, plan of care and goals were reviewed. Time in:1008  Time ALEJANDRO:3651    Patient seen for: Patient education;Transfer training; Wheelchair mobility; Family training;Gait training    Pain:  Patient with chronic bilat knee pain but patient reporting feeling \"all right\" for therapy. Patient identified with name and : yes    SUBJECTIVE:     Patient agreeable to treatment with son present for caregiver education/training. OBJECTIVE DATA SUMMARY:     GROSS ASSESSMENT Discharge Assessment 2022   AROM Generally decreased, functional   Strength Generally decreased, functional (impaired functional endurance; left LE more impaired)   Coordination Within functional limits   Tone Normal   Sensation Impaired   PROM Generally decreased, functional       POSTURE Discharge Assessment 2022   Posture (WDL) Exceptions to WDL   Posture Assessment Forward head;Rounded shoulders; Increased;Trunk flexion;Kyphosis         BALANCE Discharge Assessment 2022    Sitting - Static: Good (unsupported)  Sitting - Dynamic: Good (unsupported)  Standing - Static: Fair  Standing - Dynamic : Impaired       BED/CHAIR/WHEELCHAIR TRANSFERS Initial Assessment Discharge Assessment   Rolling Right 5 (Stand-by assistance) 6 (Modified independent)   Rolling Left 5 (Stand-by assistance) 6 (Modified independent)   Supine to Sit 3 (Moderate assistance) 6 (Modified independent)   Sit to Stand Moderate assistance Stand-by assistance   Sit to Supine 4 (Minimal assistance) 6 (Modified independent)   Transfer Assistance Level 3 (Moderate assistance ) 5 (Stand-by assistance) Transfer Type Other Other: stand step with RW   Comments    Needing ongoing reminders for more upright trunk and gaze and stepping into middle of RW appropriately. Car Transfer Not tested (Patient feeling nauseous during second session)  (CGA with RW; reviewed verbally with patient and son)   Car Type NT car transfer simulator       WHEELCHAIR MOBILITY/MANAGEMENT Initial Assessment Discharge Assessment   Able to Propel 55 feet 150 feet (extra time, using bilat feet and occasionally bilat UE )   Assistance Level 2 (mod/max A using bilat feet, occasionally UE to propel) Supervision/mod I   Curbs/ramps assistance required 0 (Not tested) 0 (Not tested)   Wheelchair set up assistance required 2 (Maximal assistance) 5 (Supervision/setup)   Wheelchair management Manages left brake,Manages right brake (needing assistance) Manages left brake;Manages right brake (using brake extenders)       GAIT Discharge Assessment 1/20/2022   Gait Description (WDL) Exceptions to WDL   Gait Abnormalities Decreased step clearance;Shuffling gait; Step to gait (step to progressing to step through)       [facility] Initial Assessment Discharge Assessment   Assistive device Walker, rolling,Gait belt Walker, rolling;Gait belt   Ambulation assistance - level surface 3 (Moderate assistance) 5 (Stand-by assistance)   Distance 18 Feet (ft) 30 Feet (ft)   Comments    SBA with RW, cues for increased step clearance left LE and for    Ambulation assistance - unlevel surface  (NT)  (NT due to safety)       STEPS/STAIRS Initial Assessment Discharge Assessment   Steps/Stairs ambulated 0 (Not tested) 0   Rail Use  (NT )  (nt)   Assistance Level   NA (Not applicable)   Comments    NT due to safety concern and patient does not require negotiation of stairs at home   Curbs/Ramps  (NT)  NT due to safety concern     Neuro Re-Education:  Review of HEP for vestibular exercises with son and patient.  Written handouts provided    ASSESSMENT:  Patient has improved toward functional goals but continues to be at SBA level for transfers, limited gait distances. Continues to require wheelchair for household and community distances. LTGs: Met 1 LTG (bed mobility)     PLAN:  Pt would benefit from continued skilled physical therapy in order to improve independent functional mobility at [facility]/outpatient level with caregiver support. Interventions may include range of motion (AROM, PROM B LE/trunk), motor function (B LE/trunk strengthening/coordination), activity tolerance (vitals, oxygen saturation levels), bed mobility training, balance activities, gait training (progressive ambulation program), and functional transfer training.      Discharge Recommendations:  [place]  and outpatient, caregiver support for all mobility  Further Equipment Recommendations for Discharge:  RW, w/c       Activity Tolerance:   Fair due to fatigue  After treatment:   [] Patient left in no apparent distress in bed  [x] Patient left in no apparent distress sitting up in chair  [] Patient left in no apparent distress in w/c mobilizing under own power  [] Patient left in no apparent distress dining area  [] Patient left in no apparent distress mobilizing under own power  [x] Call bell left within reach  [x] Nursing notified  [] Caregiver present  [] Bed alarm activated   [x] Chair alarm activated      Michal Denver, PT  1/20/2022

## 2022-01-20 NOTE — PROGRESS NOTES
Stevan Vásquez 82 y.o. female was discharged home on 01/20/22. Patient's armband removed and shredded. Discharge instructions were reviewed with patient and caregiver, and she verbalized understanding. Pt alert and oriented, in stable condition and complaining of no pain at time of discharge. The patient was wheeled out to transportation vehicle by a staff member along with the patient's belongings. All personal belongings were taken out as well as the discharge paperwork. Transportation was provided by private vehicle. Pt escorted out via nurse and no distress was noted.       Hai Lopez RN

## 2022-01-20 NOTE — ROUTINE PROCESS
SHIFT CHANGE NOTE FOR [facility]    Bedside and Verbal shift change report given to Avril Schwartz RN (oncoming nurse) by Beto Keller RN (offgoing nurse). Report included the following information SBAR, Kardex, MAR and Recent Results.     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 ICD-10-CM: Z22.39  ICD-9-CM: V02.59  1/15/2022 Clinically Undetermined    Overview Signed 1/19/2022 11:31 AM by Salvador Rosenbaum MD     Urine culture (collected 1/15/2022, resulted 1/19/2022) yielded growth of >100,000 colonies/ml of Escherichia coli (ESBL)             Primary osteoarthritis of both knees (Chronic) ICD-10-CM: M17.0  ICD-9-CM: 715.16  Unknown Yes        Essential hypertension (Chronic) ICD-10-CM: I10  ICD-9-CM: 401.9  Unknown Yes        Hypercholesterolemia with hypertriglyceridemia (Chronic) ICD-10-CM: E78.2  ICD-9-CM: 272.2  12/27/2021 Yes    Overview Signed 1/4/2022 12:02 AM by Salvador Rosenbaum MD     Lipid profile (12/27/2021) showed , , HDL 48,              * (Principal) Acute ischemic stroke ([facility] [address] ICD-10-CM: I63.9  ICD-9-CM: 434.91  12/26/2021 Yes    Overview Signed 1/3/2022 11:59 PM by Salvador Rosenbaum MD     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             Impaired mobility and ADLs ICD-10-CM: Z74.09, Z78.9  ICD-9-CM: V49.89  12/26/2021 Yes        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 ([facility] [address] ICD-10-CM: I67.89, G81.94  ICD-9-CM: 436, 342.92 12/26/2021 Yes              Background:   Past Medical History:   Past Medical History:   Diagnosis Date    Colonization with multidrug-resistant bacteria 1/15/2022    Urine culture (collected 1/15/2022, resulted 1/19/2022) yielded growth of >100,000 colonies/ml of Escherichia coli (ESBL)    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 1/15/2022    Urine culture (collected 1/15/2022, resulted 1/19/2022) yielded growth of >100,000 colonies/ml of Escherichia coli (ESBL)    Primary osteoarthritis of both knees         Assessment:   Changes in Assessment throughout shift: No change to previous assessment     Patient has a central line: no Reasons if yes: na  Insertion date:na Last dressing date:na   Patient has Ovalle Cath: no Reasons if yes: na   Insertion date:na  Shift ovalle care completed: NO     Last Vitals:     Vitals:    01/18/22 1600 01/18/22 2127 01/19/22 0828 01/19/22 1623   BP: (!) 147/69 128/77 135/77 132/77   Pulse: 73 100 78 79   Resp: 18 18 16 18   Temp: 98.5 °F (36.9 °C) 97 °F (36.1 °C) 98.4 °F (36.9 °C) 98.5 °F (36.9 °C)   SpO2: 95% 100% 95% 94%   Weight:       Height:            PAIN    Pain Assessment    Pain Intensity 1: 0 (01/19/22 1623) Pain Intensity 1: 2 (12/29/14 1105)    Pain Location 1: Knee Pain Location 1: Abdomen    Pain Intervention(s) 1: Medication (see MAR) Pain Intervention(s) 1: Medication (see MAR)  Patient Stated Pain Goal: 0 Patient Stated Pain Goal: 0  o Intervention effective: yes  o Other actions taken for pain:       Skin Assessment  Skin color    Condition/Temperature    Integrity    Turgor    Weekly Pressure Ulcer Documentation  Pressure  Injury Documentation: No Pressure Injury Noted-Pressure Ulcer Prevention Initiated  Wound Prevention & Protection Methods  Orientation of wound Orientation of Wound Prevention: Posterior  Location of Prevention Location of Wound Prevention: Buttocks,Sacrum/Coccyx  Dressing Present Dressing Present : No  Dressing Status    Wound Offloading Wound Offloading (Prevention Methods): Bed, pressure redistribution/air,Repositioning     INTAKE/OUPUT  Date 01/18/22 1900 - 01/19/22 0659 01/19/22 0700 - 01/20/22 0659   Shift 3040-0707 24 Hour Total 6414-1812 4979-1494 24 Hour Total   INTAKE   P.O.  1200 1080  1080     P. O.  1200 1080  1080   Shift Total(mL/kg)  1200(19.1) 9108(68.1)  5294(29.6)   OUTPUT   Urine(mL/kg/hr)          Urine Occurrence(s) 3 x 6 x 4 x  4 x   Stool          Stool Occurrence(s) 0 x 1 x 0 x  0 x   Shift Total(mL/kg)        NET  1200 1080  1080   Weight (kg) 62.7 62.7 62.7 62.7 62.7       Recommendations:  1. Patient needs and requests: na    2. Pending tests/procedures: na     3. Functional Level/Equipment:   /      Fall Precautions:   Fall risk precautions were reinforced with the patient; she was instructed to call for help prior to getting up. The following fall risk precautions were continued: bed/ chair alarms, door signage, yellow bracelet and socks as well as update of the [employer] A[employer] tool in the patient's room. Yadira Score: 3    HEALS Safety Check    A safety check occurred in the patient's room between off going nurse and oncoming nurse listed above. The safety check included the below items  Area Items   H  High Alert Medications - Verify all high alert medication drips (heparin, PCA, etc.)   E  Equipment - Suction is set up for ALL patients (with yanker)  - Red plugs utilized for all equipment (IV pumps, etc.)  - WOWs wiped down at end of shift.  - Room stocked with oxygen, suction, and other unit-specific supplies   A  Alarms - Bed alarm is set for fall risk patients  - Ensure chair alarm is in place and activated if patient is up in a chair   L  Lines - Check IV for any infiltration  - Edwards bag is empty if patient has a Edwards   - Tubing and IV bags are labeled   S  Safety   - Room is clean, patient is clean, and equipment is clean. - Hallways are clear from equipment besides carts.    - Fall bracelet on for fall risk patients  - Ensure room is clear and free of clutter  - Suction is set up for ALL patients (with norah)  - Hallways are clear from equipment besides carts.    - Isolation precautions followed, supplies available outside room, sign posted     Jonna Monte RN

## 2022-02-02 ENCOUNTER — OFFICE VISIT (OUTPATIENT)
Dept: NEUROLOGY | Age: 87
End: 2022-02-02
Payer: MEDICARE

## 2022-02-02 ENCOUNTER — HOSPITAL ENCOUNTER (OUTPATIENT)
Dept: LAB | Age: 87
Discharge: HOME | End: 2022-02-02
Payer: MEDICARE

## 2022-02-02 VITALS
HEART RATE: 87 BPM | OXYGEN SATURATION: 95 % | SYSTOLIC BLOOD PRESSURE: 140 MMHG | RESPIRATION RATE: 16 BRPM | DIASTOLIC BLOOD PRESSURE: 78 MMHG

## 2022-02-02 DIAGNOSIS — I67.89 HEMIPARESIS OF LEFT NONDOMINANT SIDE DUE TO ACUTE CEREBROVASCULAR DISEASE (HCC): ICD-10-CM

## 2022-02-02 DIAGNOSIS — I63.411 CEREBRAL INFARCTION DUE TO EMBOLISM OF RIGHT MIDDLE CEREBRAL ARTERY (HCC): ICD-10-CM

## 2022-02-02 DIAGNOSIS — I63.9 ACUTE ISCHEMIC STROKE (HCC): ICD-10-CM

## 2022-02-02 DIAGNOSIS — H53.8 BLURRY VISION, BILATERAL: ICD-10-CM

## 2022-02-02 DIAGNOSIS — G81.94 HEMIPARESIS OF LEFT NONDOMINANT SIDE DUE TO ACUTE CEREBROVASCULAR DISEASE (HCC): ICD-10-CM

## 2022-02-02 LAB
CRP SERPL-MCNC: <0.3 MG/DL (ref 0–0.3)
ERYTHROCYTE [SEDIMENTATION RATE] IN BLOOD: 31 MM/HR (ref 0–30)

## 2022-02-02 PROCEDURE — G8754 DIAS BP LESS 90: HCPCS | Performed by: NURSE PRACTITIONER

## 2022-02-02 PROCEDURE — 36415 COLL VENOUS BLD VENIPUNCTURE: CPT

## 2022-02-02 PROCEDURE — G8427 DOCREV CUR MEDS BY ELIG CLIN: HCPCS | Performed by: NURSE PRACTITIONER

## 2022-02-02 PROCEDURE — 99205 OFFICE O/P NEW HI 60 MIN: CPT | Performed by: NURSE PRACTITIONER

## 2022-02-02 PROCEDURE — G8419 CALC BMI OUT NRM PARAM NOF/U: HCPCS | Performed by: NURSE PRACTITIONER

## 2022-02-02 PROCEDURE — 86140 C-REACTIVE PROTEIN: CPT

## 2022-02-02 PROCEDURE — G9717 DOC PT DX DEP/BP F/U NT REQ: HCPCS | Performed by: NURSE PRACTITIONER

## 2022-02-02 PROCEDURE — G8536 NO DOC ELDER MAL SCRN: HCPCS | Performed by: NURSE PRACTITIONER

## 2022-02-02 PROCEDURE — 85652 RBC SED RATE AUTOMATED: CPT

## 2022-02-02 PROCEDURE — 1111F DSCHRG MED/CURRENT MED MERGE: CPT | Performed by: NURSE PRACTITIONER

## 2022-02-02 PROCEDURE — G8753 SYS BP > OR = 140: HCPCS | Performed by: NURSE PRACTITIONER

## 2022-02-02 PROCEDURE — 1101F PT FALLS ASSESS-DOCD LE1/YR: CPT | Performed by: NURSE PRACTITIONER

## 2022-02-02 PROCEDURE — 1090F PRES/ABSN URINE INCON ASSESS: CPT | Performed by: NURSE PRACTITIONER

## 2022-02-02 PROCEDURE — G8400 PT W/DXA NO RESULTS DOC: HCPCS | Performed by: NURSE PRACTITIONER

## 2022-02-02 NOTE — PROGRESS NOTES
Tito Benitez is a 82 y.o. female who is in the office as a follow up    1. Have you been to the ER, urgent care clinic since your last visit? Hospitalized since your last visit? Yes When: Titi Laguerre. for stroke    2. Have you seen or consulted any other health care providers outside of the [address] [place] [address] since your last visit? Include any pap smears or colon screening.  No

## 2022-02-02 NOTE — PROGRESS NOTES
[facility]  [address] [facility], [address], [place], [address]  [address] [place], [address]  Office:  [phone]  Fax: [phone]    Referring: Dr. Ailyn Putnam    Chief Complaint   Patient presents with    Follow-up    Stroke       HPI:  Og Toney presents in evaluation for stroke. She was seen at [facility] in neurology consultation by Dr. Leticia Rodriguez on 12/27/2021. History is as follows. She has a medical history to include hypertension, arthritis, and depression. She was evaluated for chief complaint of unsteady gait. She was admitted on 12/26/2021 after she woke up around 830 and felt unsteady. She also had nausea and vertiginous symptoms. She has at the same time loss of hearing on the left side. No diplopia. Although she reported blurry vision. At baseline she had difficulty ambulating due to arthritis. CT head and CT perfusion negative for acute stroke. Blood pressure on admission was 211/99. Afebrile. Hemoglobin A1C 5.3, . She was not on aspirin, Plavix, or anticoagulation. Neurological exam revealed mild left upper and lower extremity weakness and coordination problems. She was given a loading dose of Plavix and started on aspirin 81 mg and Plavix 75 mg and Lipitor 80 mg. MRI showed small acute infarct right corona radiata. CTA head and neck showed severe stenoses right distal M2, moderate to focal stenosis right A2, mild stenosis of the left P2 and diffuse atherosclerotic disease. She worked with PT and OT who recommended rehab. It was noted that she is not safe to live independently particularly because she already has a left rotator cuff injury which will interfere with her ability to use a walker due to her new left-sided weakness. The assessment was etiology secondary to atherosclerotic disease, CTA significant for a severe stenosis right M2.   Dual antiplatelet therapy with aspirin 81 mg and Plavix 75 mg daily for 3 months and after that continue aspirin 325 mg. Lipitor 80 mg.  BP control with systolic blood pressure [phone]. After the hospitalization she was discharged to the [facility]. Her length of stay was 20 days and she was discharged on 1/20/2022. She was discharged on aspirin 325 mg daily, Plavix 75 mg daily, and atorvastatin 80 mg daily. She has antihypertensive medications. She was given meclizine. She presents today in evaluation. She is with her son. He assists with history. She lives at [facility] which is independent living. After discharge from rehab, she had him with her all the time but as of recently she is getting set up to be back to independent living as much as possible. She has [facility] OT there. She also has a CNA there now who helps with bathing, checks with her in the morning, checks to make sure she has taken her medication, checks her later in the day. She comes to help give showers. She has OT in the morning and PT in the afternoon. OT comes to her apartment and she goes to a fitness room for PT. She has severe arthritis in her knees. She used to ride the bicycle in the gym to loosen her knees. She also has rotator cuff injury. She is seen by rheumatologist Dr. Elyssa Preston at the [address] [place] [address]. She has gotten knee injections in the past.  She has not been able to raise her right arm overhead for years. She is in a wheelchair here. She ambulates at the apartment. She uses the walker for ambulation. They may be getting a Rollator. Since the hospitalization since working with PT, she has actually increased her ambulation. Usually her average daily trips would be about 80 feet. It has doubled, improving every day with PT, slowly but improving. Her  strength is improving since the stroke, not much of a difference between the left and the right. She reports that rehab went well. Reports that she is still having the dizziness.   She describes this as not actually dizzy, but it is just like a visual blurriness, more so on the left than the right. Not diplopia. It looks like a 'fog'. She has a hard time describing how it feels. When she covers up one eye at a time, it still looks foggy. This even affects the right eye. The onset was the time of the hospitalization/stroke. They tried an eye patch at rehab. She said it was not as bad at rehab, she thinks it got worse. Denies eye pain. They state she was having this when in the hospital at [facility]. She denies pain at all except her knee pain. Denies jaw claudication. Denies signs or symptoms of aspiration. He said maybe 1 or 2 instances of problems swallowing but nothing more than normal.  She is on a regular consistencies diet of food and drinks. Denies spinning sensation. Endorses a little lightheadedness, woozy sensation. It is off and on. The foggy sensation is not constant. It is intermittent. It is not affected by changes in position. She was given meclizine in rehab and did not notice any help. Blood pressure is 140/78 today. She has seen her PCP since the hospitalization. She was started on losartan which has been increased to twice daily. She has continued on aspirin 325 mg and Plavix 75 mg. Denies signs of easy bruising or blood in the stool, urine, or dark tarry stools. She was not on aspirin prior to the hospitalization. She is now on atorvastatin 80 mg daily. She has glaucoma in both eyes and is on Trusopt and Xalatan. They had to cancel last ophthalmology appointment (she sees ophthalmologist Dr. Omar Spear) due to being in rehab. Reports that she was found to have some hearing loss on the left side in the hospital, was not sure if this was prior to the stroke. He plans on getting her hearing checked. She is on sertraline 50 mg daily. Denies history of diabetes. She does not smoke. Denies headaches or fever.       Social History     Socioeconomic History    Marital status:      Spouse name: Not on file    Number of children: Not on file    Years of education: Not on file    Highest education level: Not on file   Occupational History    Not on file   Tobacco Use    Smoking status: Not on file    Smokeless tobacco: Not on file   Substance and Sexual Activity    Alcohol use: Not on file    Drug use: Not on file    Sexual activity: Not on file   Other Topics Concern    Not on file   Social History Narrative    Not on file     Social Determinants of Health     Financial Resource Strain:     Difficulty of Paying Living Expenses: Not on file   Food Insecurity:     Worried About Running Out of Food in the Last Year: Not on file    Eden of Food in the Last Year: Not on file   Transportation Needs:     Lack of Transportation (Medical): Not on file    Lack of Transportation (Non-Medical): Not on file   Physical Activity:     Days of Exercise per Week: Not on file    Minutes of Exercise per Session: Not on file   Stress:     Feeling of Stress : Not on file   Social Connections:     Frequency of Communication with Friends and Family: Not on file    Frequency of Social Gatherings with Friends and Family: Not on file    Attends Zoroastrianism Services: Not on file    Active Member of [address] [place] [address] e[employer] or Organizations: Not on file    Attends Club or Organization Meetings: Not on file    Marital Status: Not on file   Intimate Partner Violence:     Fear of Current or Ex-Partner: Not on file    Emotionally Abused: Not on file    Physically Abused: Not on file    Sexually Abused: Not on file   Housing Stability:     Unable to Pay for Housing in the Last Year: Not on file    Number of [place] in the Last Year: Not on file    Unstable Housing in the Last Year: Not on file       History reviewed. No pertinent family history.     Current Outpatient Medications   Medication Sig Dispense Refill    aspirin (ASPIRIN) 325 mg tablet Take 1 Tablet by mouth daily (with breakfast). Indications: acute thromboembolic stroke, prevention for a blood clot going to the brain 30 Tablet 0    atorvastatin (LIPITOR) 80 mg tablet Take 1 Tablet by mouth nightly. Indications: excessive fat in the blood, stroke prevention 30 Tablet 0    clopidogreL (PLAVIX) 75 mg tab Take 1 Tablet by mouth daily (with dinner). Indications: prevention for a blood clot going to the brain 30 Tablet 0    losartan (COZAAR) 50 mg tablet Take 1 Tablet by mouth daily. Indications: high blood pressure 30 Tablet 0    cholecalciferol (VITAMIN D3) (1000 Units /25 mcg) tablet Take 2 Tablets by mouth daily. Indications: prevention of vitamin D deficiency 60 Tablet 0    acetaminophen (TYLENOL) 325 mg tablet Take 2 Tablets by mouth every four (4) hours as needed (for fever or pain). Indications: fever, pain 60 Tablet 0    diclofenac (VOLTAREN) 1 % gel Apply 2 g to affected area four (4) times daily. [both knees]  Indications: osteoarthritis of the knee 1 Each 0    dorzolamide (TRUSOPT) 2 % ophthalmic solution Administer 1 Drop to both eyes three (3) times daily. 10 mL 0    latanoprost (XALATAN) 0.005 % ophthalmic solution Administer 1 Drop to both eyes nightly.  montelukast (SINGULAIR) 10 mg tablet Take 10 mg by mouth daily.  sertraline (ZOLOFT) 50 mg tablet Take 50 mg by mouth daily.  loperamide (IMODIUM) 2 mg capsule Take 2 mg by mouth as needed for Diarrhea.  oxybutynin chloride XL (DITROPAN XL) 10 mg CR tablet Take 10 mg by mouth nightly.  cetirizine (ZYRTEC) 10 mg tablet Take 10 mg by mouth daily.          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 +hearing loss on left. +vision blurriness/'fogginess' of each eye. Respiratory: denies shortness of breath  Cardiovascular: denies chest pain, dyspnea on exertion  Gastrointestinal: does not report nausea or vomiting  Genitourinary: does not report dysuria or incontinence  Musculoskeletal: +arthritis of knees, shoulders, hands. Endocrine: denies weight change  Hematology: denies easy bruising or bleeding   Neurological: as above in HPI      PHYSICAL EXAMINATION:      VITAL SIGNS:    Visit Vitals  BP (!) 140/78   Pulse 87   Resp 16   SpO2 95%       GENERAL: Well developed, well nourished, in no apparent distress. HEART: RR, no murmurs heard. LUNGS:                      Respirations regular and unlabored. EXTREMITIES: No clubbing, cyanosis, or edema is identified. HEAD:   Normocephalic, atraumatic. NEUROLOGIC EXAMINATION    MENTAL STATUS: Awake, alert, and oriented x 4. Attention and STM are grossly normal. There is no aphasia. Fund of knowledge is adequate. Mood and affect are appropriate. Son helps with history.    CRANIAL NERVES: Visual fields are full to confrontation. Pupils are reactive to light and accommodation. Extraocular movements are intact and there is no nystagmus. Facial sensation is normal.  Face is symmetrical.   Hearing is grossly intact. SCM/TPZ 5/5. Palate rises symmetrically. Tongue is in the midline. MOTOR:  Normal tone, bulk, and strength, 5/5 muscle strength throughout except mild LLE weakness. Limited by bilateral rotator cuff issues so cannot fully raise BUE, but appeared to have no drift. CEREBELLAR: Finger to nose was normal on the right, impaired on the left. No tremors. SENSORY: Normal LT and PP. Romberg deferred. DTRs: Brisk in the BUE, deferred BLE patellar. GAIT:   In wheelchair. Gait deferred. Uses walker- not present at this time. CBC:   Lab Results   Component Value Date/Time    WBC 7.7 01/19/2022 12:22 PM    RBC 4.49 01/19/2022 12:22 PM    HGB 12.9 01/19/2022 12:22 PM    HCT 38.9 01/19/2022 12:22 PM    PLATELET 230 09/29/6137 12:22 PM     BMP:   Lab Results   Component Value Date/Time    Glucose 93 01/19/2022 12:22 PM    Sodium 137 01/19/2022 12:22 PM    Potassium 4.2 01/19/2022 12:22 PM    Chloride 106 01/19/2022 12:22 PM    CO2 26 01/19/2022 12:22 PM    BUN 21 (H) 01/19/2022 12:22 PM    Creatinine 0.86 01/19/2022 12:22 PM    Calcium 9.4 01/19/2022 12:22 PM     CMP:   Lab Results   Component Value Date/Time    Glucose 93 01/19/2022 12:22 PM    Sodium 137 01/19/2022 12:22 PM    Potassium 4.2 01/19/2022 12:22 PM    Chloride 106 01/19/2022 12:22 PM    CO2 26 01/19/2022 12:22 PM    BUN 21 (H) 01/19/2022 12:22 PM    Creatinine 0.86 01/19/2022 12:22 PM    Calcium 9.4 01/19/2022 12:22 PM    Anion gap 5 01/19/2022 12:22 PM    BUN/Creatinine ratio 24 (H) 01/19/2022 12:22 PM    Alk.  phosphatase 89 01/02/2022 05:59 AM    Protein, total 6.5 01/02/2022 05:59 AM    Albumin 3.1 (L) 01/02/2022 05:59 AM    Globulin 3.4 01/02/2022 05:59 AM    A-G Ratio 0.9 01/02/2022 05:59 AM     Coagulation:   Lab Results   Component Value Date/Time    Prothrombin time 11.4 12/26/2021 10:35 AM    INR 1.0 12/26/2021 10:35 AM     Cardiac markers: No results found for: CPK, CKND1, GUSTABO  ABGs: No results found for: PH, PO2, PCO2, HCO3, BD, BE  Radiology review:     CT HEAD WO CONT 12/26/2021:  Study Result    Narrative & Impression   HEAD CT     INDICATION: L sided weakness LTW 9:30 pm last night. COMPARISON: 7/1/200754     TECHNIQUE: Transaxial images were obtained from the skull base to the vertex  without intravenous contrast with coronal and sagittal reformatted images.     All CT exams at this facility use one or more dose reduction techniques  including automatic exposure control, mA/kV adjustment per patient's size, or  iterative reconstruction technique.     FINDINGS:     There is no mass, midline shift, hydrocephalus, intracranial hemorrhage, acute  segmental infarct or extra axial fluid collection. There is moderate  generalized cortical atrophy. There is advanced chronic microvascular ischemic  change within the supratentorial white matter. Chronic subcentimeter lacunar  infarcts within the right caudate and left basal ganglia. Findings have  progressed when compared to prior exam.       The calvarium is intact. Moderate mucosal thickening of the left maxillary  sinus with associated cortical thickening.     IMPRESSION  IMPRESSION:     No acute intracranial findings.  Chronic ischemic changes.          CTA HEAD 12/26/2021:  Study Result    Narrative & Impression   CTA OF THE HEAD      INDICATION: Left-sided weakness     COMPARISON: No relevant studies.     TECHNIQUE: CTA of the head with  intravenous contrast. Multiplanar  two-dimensional and three-dimensional maximum intensity projection reformats  performed and reviewed.      All CT exams at this facility use one or more dose reduction techniques  including automatic exposure control, mA/kV adjustment per patient's size, or  iterative reconstruction technique.     FINDINGS:     The vertebral arteries are codominant. There is mild stenosis of the left P2  segment of the posterior cerebral artery seen best on axial image 33 series 14. The right posterior cerebral and basilar arteries appear patent.     Diffuse atherosclerotic changes are seen within the anterior circulation. Moderate focal stenosis of the right A2 seen on axial image 36. There is severe  stenosis of the right distal M2 segment seen on sagittal image 67 series 13 and  axial image 33 series 14. Left middle cerebral and anterior cerebral arteries  appear patent. Calcifications of the intracranial segments of the internal  carotid arteries without stenosis.     IMPRESSION  IMPRESSION:      1. Severe stenosis of the right distal M2  2. Moderate focal stenosis of the right A2.  3.  Mild stenosis of the left P2.  4.  Diffuse atherosclerotic changes. CTA NECK 12/26/2021:  Study Result    Narrative & Impression   CTA OF THE NECK WITH CONTRAST     INDICATION: Left-sided weakness     COMPARISON: No relevant studies.     TECHNIQUE: CTA of the neck was performed with nonionic intravenous contrast with  multiplanar 2-dimensional and maximum intensity projection three-dimensional  images.      All CT exams at this facility use one or more dose reduction techniques  including automatic exposure control, mA/kV adjustment per patient's size, or  iterative reconstruction technique.     FINDINGS:     Normal branching pattern of the great vessels. Calcifications of the right  brachiocephalic, origin of the left common carotid, and origin of the left  subclavian arteries without focal stenosis.     Right internal carotid demonstrates medial retropharyngeal deviation in the  retropharyngeal region.  Calcifications of the bilateral carotid bulbs without  focal hemodynamically significant stenosis.     Calcifications of the origins of the bilateral vertebral arteries without focal  hemodynamically significant stenosis.     Conventional, CT, or MR angiographic measurements of internal carotid artery  (ICA) stenoses are based on a ratio of artery diameters, with the ICA stenosis  as the numerator and the distal ICA, just beyond the stenosis, as the  denominator.     % Stenosis = (1 - narrowest diameter/diameter of distal artery) x100  Mild: < 50% stenosis  Moderate: 50-69% stenosis. Severe: 70-94% stenosis. Near occlusion: 95-99% stenosis. Occluded: 100% stenosis        IMPRESSION  IMPRESSION:      Scattered vascular calcifications without focal hemodynamically significant  stenosis.          MRI BRAIN WO CONT 12/27/2021:  Study Result    Narrative & Impression   EXAM:  MRI BRAIN WO CONT     INDICATION: stroke     COMPARISON: 12/26/2021     TECHNIQUE: Multiplanar T1 and T2-weighted images of the brain were acquired  without contrast. WORKSTATION ID: [id number]     FINDINGS:     No acute intracranial hemorrhage. No midline shift or focal mass identified. Small linear focus of restricted diffusion in the right corona radiata measuring  1.3 x 0.6 cm. This is compatible with a small acute infarct.      Mild diffuse parenchymal volume loss. No hydrocephalus. There is subcortical,   periventricular and deep white matter T2 hyperintensity as well as foci of T2  hyperintensity within the basal ganglia compatible with several patterns of  nonacute ischemic change. Bilateral small chronic lacunar infarcts are noted.      Orbits are unremarkable. Partially imaged chronic left maxillary sinusitis  unchanged.     Normal flow-void signal within the internal carotid arteries and vertebral  basilar system.     IMPRESSION  IMPRESSION:   1. Small acute infarct in the right corona radiata. 2. No acute intracranial hemorrhage.     Findings communicated to the nurse by phone on 12/27/2021 at 6:43 PM         Impression/Plan: This is an 82-year-old right-handed female who presents for evaluation for history of stroke.   She presented to the hospital approximately 1 month ago for unsteady gait and was found to have left hemiparesis and left-sided impaired coordination, found to have small right corona radiata stroke. She has risk factors including hypertension and hyperlipidemia. Etiology secondary to atherosclerotic disease, with CTA reporting severe stenosis right M2. Continue dual antiplatelet therapy with aspirin 325 mg and Plavix 75 mg for 2 more months. Will transition to single antiplatelet therapy thereafter with aspirin 325 mg if free from recurrence of stroke-like symptoms. Continue high-intensity statin for now then LDL goal less than 70. Blood pressure control with goal less than 130/80. She is recovering well and in PT and OT at her independent living facility. Recommended for ophthalmology evaluation due to visual disturbance. Will obtain sed rate and CRP, but low suspicion for GCA. Follow up in the end of March. Diagnoses and all orders for this visit:    1. Cerebral infarction due to embolism of right middle cerebral artery (HCC)   -     CRP, HIGH SENSITIVITY; Future    2. Blurry vision, bilateral  -     SED RATE (ESR); Future  -     CRP, HIGH SENSITIVITY; Future    3. Acute ischemic stroke ([address]    4. 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 Signed By: Lusi Holland NP              PLEASE NOTE:   Portions of this document may have been produced using voice recognition software. Unrecognized errors in transcription may be present.

## 2022-02-03 ENCOUNTER — TELEPHONE (OUTPATIENT)
Dept: NEUROLOGY | Age: 87
End: 2022-02-03

## 2022-03-18 PROBLEM — E78.2 HYPERCHOLESTEROLEMIA WITH HYPERTRIGLYCERIDEMIA: Status: ACTIVE | Noted: 2021-12-27

## 2022-03-18 PROBLEM — Z74.09 IMPAIRED MOBILITY AND ADLS: Status: ACTIVE | Noted: 2021-12-26

## 2022-03-18 PROBLEM — Z22.322 COLONIZATION WITH MULTIDRUG-RESISTANT BACTERIA: Status: ACTIVE | Noted: 2022-01-15

## 2022-03-18 PROBLEM — Z78.9 IMPAIRED MOBILITY AND ADLS: Status: ACTIVE | Noted: 2021-12-26

## 2022-03-19 PROBLEM — I63.9 ACUTE ISCHEMIC STROKE (HCC): Status: ACTIVE | Noted: 2021-12-26

## 2022-03-19 PROBLEM — Z22.39 EXTENDED SPECTRUM BETA-LACTAMASE (ESBL) ESCHERICHIA COLI CARRIER: Status: ACTIVE | Noted: 2022-01-15

## 2022-03-19 PROBLEM — Z22.358 EXTENDED SPECTRUM BETA-LACTAMASE (ESBL) ESCHERICHIA COLI CARRIER: Status: ACTIVE | Noted: 2022-01-15

## 2022-03-20 PROBLEM — I67.89 HEMIPARESIS OF LEFT NONDOMINANT SIDE DUE TO ACUTE CEREBROVASCULAR DISEASE (HCC): Status: ACTIVE | Noted: 2021-12-26

## 2022-03-20 PROBLEM — G81.94 HEMIPARESIS OF LEFT NONDOMINANT SIDE DUE TO ACUTE CEREBROVASCULAR DISEASE (HCC): Status: ACTIVE | Noted: 2021-12-26

## 2022-04-21 ENCOUNTER — OFFICE VISIT (OUTPATIENT)
Dept: NEUROLOGY | Age: 87
End: 2022-04-21
Payer: MEDICARE

## 2022-04-21 VITALS
RESPIRATION RATE: 14 BRPM | SYSTOLIC BLOOD PRESSURE: 122 MMHG | BODY MASS INDEX: 27.91 KG/M2 | WEIGHT: 138.2 LBS | DIASTOLIC BLOOD PRESSURE: 82 MMHG | OXYGEN SATURATION: 95 % | HEART RATE: 94 BPM

## 2022-04-21 DIAGNOSIS — I63.411 CEREBRAL INFARCTION DUE TO EMBOLISM OF RIGHT MIDDLE CEREBRAL ARTERY (HCC): Primary | ICD-10-CM

## 2022-04-21 DIAGNOSIS — R42 DIZZINESS: ICD-10-CM

## 2022-04-21 DIAGNOSIS — H53.8 BLURRY VISION, BILATERAL: ICD-10-CM

## 2022-04-21 PROCEDURE — G9717 DOC PT DX DEP/BP F/U NT REQ: HCPCS | Performed by: NURSE PRACTITIONER

## 2022-04-21 PROCEDURE — G8419 CALC BMI OUT NRM PARAM NOF/U: HCPCS | Performed by: NURSE PRACTITIONER

## 2022-04-21 PROCEDURE — 1101F PT FALLS ASSESS-DOCD LE1/YR: CPT | Performed by: NURSE PRACTITIONER

## 2022-04-21 PROCEDURE — G8752 SYS BP LESS 140: HCPCS | Performed by: NURSE PRACTITIONER

## 2022-04-21 PROCEDURE — G8400 PT W/DXA NO RESULTS DOC: HCPCS | Performed by: NURSE PRACTITIONER

## 2022-04-21 PROCEDURE — G8536 NO DOC ELDER MAL SCRN: HCPCS | Performed by: NURSE PRACTITIONER

## 2022-04-21 PROCEDURE — G8427 DOCREV CUR MEDS BY ELIG CLIN: HCPCS | Performed by: NURSE PRACTITIONER

## 2022-04-21 PROCEDURE — 99214 OFFICE O/P EST MOD 30 MIN: CPT | Performed by: NURSE PRACTITIONER

## 2022-04-21 PROCEDURE — G8754 DIAS BP LESS 90: HCPCS | Performed by: NURSE PRACTITIONER

## 2022-04-21 PROCEDURE — 1090F PRES/ABSN URINE INCON ASSESS: CPT | Performed by: NURSE PRACTITIONER

## 2022-04-21 RX ORDER — AMLODIPINE BESYLATE 5 MG/1
10 TABLET ORAL DAILY
COMMUNITY

## 2022-04-21 NOTE — PATIENT INSTRUCTIONS
Patient instructions:  -Continue aspirin 325 mg daily and stop clopidogrel / Plavix 75 mg.    -continue statin.   Follow up with primary care physician for monitoring lipid and blood pressure control.  -Vestibular therapy with physical therapy at the facility  -ophthalmology  -follow up in 6-8 weeks

## 2022-04-21 NOTE — PROGRESS NOTES
Killian Burgos is a 82 y.o. female who is in the office as follow up. 1. Have you been to the ER, urgent care clinic since your last visit? Hospitalized since your last visit? No    2. Have you seen or consulted any other health care providers outside of the [address] [place] [address] since your last visit? Include any pap smears or colon screening.  No

## 2022-04-21 NOTE — PROGRESS NOTES
[facility]  [address] [facility], [address], [place], [address]  [address] [place], [address]  Office:  [phone]  Fax: [phone]  Chief Complaint   Patient presents with    Follow-up       HPI: Nita Vasquez presents in follow-up for stroke. She was last seen here on 2/2/2022. She presented December 2021 with unsteady gait and found to have left hemiparesis and left-sided impaired coordination, found to have a small right corona radiata stroke. At the last visit, dual antiplatelet therapy with aspirin 325 mg and Plavix 75 mg was to be continued for 2 more months. Planned to transition to single antiplatelet therapy thereafter with rapid return to 5 mg if free from recurrence of strokelike symptoms. High intensity statin for now was to be continued then LDL goal less than 70. Blood pressure control. She was recovering well in PT and OT at her independent living facility. Recommended for ophthalmology evaluation for visual disturbance. Labs for sed rate and CRP were to be obtained, but low suspicion for GCA. Sed rate was 31 (range 0-30), and CRP was normal.  At the last visit she reported still having the dizziness which was not actually feeling dizzy, just like a visual blurriness, more so on the left than the right. No diplopia. It looks like a fog. The onset was at the time of the stroke. Denied eye pain. She sees a rheumatologist at the arthritis center. 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 She is on sertraline 50 mg daily. Denied headaches or fever. She presents today in follow-up. She is with her son. Still some problems with her eyes and her knees. Saw an ophthalmologist.  Will see Dr. Yanick Moreland tomorrow. She is having 'dizzy spells'. She has meclizine prescribed by PCP to take prn for dizziness. Now almost every morning she is dizzy and takes one, sometimes in the afternoon. Just finished PT and OT.   She is back where she was prior to the stroke, but has the dizziness. For example, she reports she can see the sign here in the room, but with head movement they move and stop. Reports L eye better than R. Sometimes one extremity feels stronger than the other; denies acute change; it is just how she feels that day. Denies eye pain. Diplopia if she moves her eyes too quickly. She has not been using the eye patch since rehab. Denies spinning sensation. Endorses sometimes lightheadedness, also allergies acting up. Denies headaches. Continues on aspirin and Plavix. He reports amlodipine was added for BP. /82 today. Working on staying independent at the facility. Denies pain except chronic knee pain/arthritis. Planning on going to see the arthritis doctor. She has the rollator today. Always walks with a walker. Walks to the dining room twice a day. Denies s/s of sleep apnea. 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 Current Outpatient Medications   Medication Sig Dispense Refill    amLODIPine (NORVASC) 5 mg tablet Take 5 mg by mouth daily.  meclizine HCl (MECLIZINE PO) Take  by mouth.  aspirin (ASPIRIN) 325 mg tablet Take 1 Tablet by mouth daily (with breakfast). Indications: acute thromboembolic stroke, prevention for a blood clot going to the brain 30 Tablet 0    atorvastatin (LIPITOR) 80 mg tablet Take 1 Tablet by mouth nightly.  Indications: excessive fat in the blood, stroke prevention 30 Tablet 0    losartan (COZAAR) 50 mg tablet Take 1 Tablet by mouth daily. Indications: high blood pressure 30 Tablet 0    cholecalciferol (VITAMIN D3) (1000 Units /25 mcg) tablet Take 2 Tablets by mouth daily. Indications: prevention of vitamin D deficiency 60 Tablet 0    diclofenac (VOLTAREN) 1 % gel Apply 2 g to affected area four (4) times daily. [both knees]  Indications: osteoarthritis of the knee 1 Each 0    dorzolamide (TRUSOPT) 2 % ophthalmic solution Administer 1 Drop to both eyes three (3) times daily. 10 mL 0    latanoprost (XALATAN) 0.005 % ophthalmic solution Administer 1 Drop to both eyes nightly.  montelukast (SINGULAIR) 10 mg tablet Take 10 mg by mouth daily.  sertraline (ZOLOFT) 50 mg tablet Take 50 mg by mouth daily.  loperamide (IMODIUM) 2 mg capsule Take 2 mg by mouth as needed for Diarrhea.  oxybutynin chloride XL (DITROPAN XL) 10 mg CR tablet Take 10 mg by mouth nightly.  cetirizine (ZYRTEC) 10 mg tablet Take 10 mg by mouth daily.  acetaminophen (TYLENOL) 325 mg tablet Take 2 Tablets by mouth every four (4) hours as needed (for fever or pain). Indications: fever, pain (Patient not taking: Reported on 4/21/2022) 60 Tablet 0        Allergies   Allergen Reactions    Novocain [Procaine] Swelling       Social History     Tobacco Use    Smoking status: Not on file    Smokeless tobacco: Not on file   Substance Use Topics    Alcohol use: Not on file    Drug use: Not on file       History reviewed. No pertinent family history. Review of Systems:  GENERAL: Denies fever or fatigue  CARDIAC: No CP or SOB  PULMONARY: No cough or SOB  MUSCULOSKELETAL: No new joint pain. +arthritis of knees, shoulders, hands. NEURO: SEE HPI    Physical Examination:  Visit Vitals  /82   Pulse 94   Resp 14   Wt 62.7 kg (138 lb 3.2 oz)   SpO2 95%   BMI 27.91 kg/m²       Alert, in NAD. Heart is regular. Oriented x3. Speech is fluent. Speech clear. EOMs are full, PERRL, VFFTC, no nystagmus. Binocular diplopia.   No facial asymmetry. Tongue is midline. Strength and tone are normal except mild left-sided weakness. No drift of the bilateral upper extremities. Fine finger movements symmetrical. FNF intact bilaterally. Gait is antalgic; used the rollator. Impression/Plan: This is an 31-year-old right-handed female who presents in follow-up for stroke. She presented in December 2021 with left hemiparesis and left sided impaired coordination, found to have a small right corona radiata stroke. She has risk factors including hypertension and hyperlipidemia. Etiology secondary to atherosclerotic disease, with CTA reporting severe stenosis right M2. She has been on dual antiplatelet therapy for over 3 months. Denies recurrence of signs or symptoms of stroke. We will transition to single antiplatelet therapy at this time with aspirin 325 mg daily and can stop clopidogrel/Plavix. Continue statin. LDL goal less than 70. She follows up with her PCP for vascular risk factor management. She had antihypertensive medication added. Blood pressure is controlled today at the visit. She will be seeing another ophthalmologist tomorrow. Her son will ask that records will be sent here for review. Consider review of medications related to visual complaints. Will refer for vestibular therapy with [facility] at the facility for dizziness with head movements. Follow up in 6 to 8 weeks. Diagnoses and all orders for this visit:    1. Cerebral infarction due to embolism of right middle cerebral artery ([address]     2. Blurry vision, bilateral  -     REFERRAL TO PHYSICAL THERAPY    3. Dizziness  -     REFERRAL TO PHYSICAL THERAPY      Total time 35 minutes with 20 minutes spent in counseling. Signed By: Pablo Jon NP        PLEASE NOTE:   Portions of this document may have been produced using voice recognition software. Unrecognized errors in transcription may be present.

## 2022-07-08 ENCOUNTER — OFFICE VISIT (OUTPATIENT)
Dept: NEUROLOGY | Age: 87
End: 2022-07-08
Payer: MEDICARE

## 2022-07-08 VITALS
SYSTOLIC BLOOD PRESSURE: 136 MMHG | WEIGHT: 146 LBS | OXYGEN SATURATION: 96 % | DIASTOLIC BLOOD PRESSURE: 80 MMHG | RESPIRATION RATE: 18 BRPM | HEART RATE: 85 BPM | BODY MASS INDEX: 29.43 KG/M2 | HEIGHT: 59 IN

## 2022-07-08 DIAGNOSIS — I63.411 CEREBRAL INFARCTION DUE TO EMBOLISM OF RIGHT MIDDLE CEREBRAL ARTERY (HCC): Primary | ICD-10-CM

## 2022-07-08 DIAGNOSIS — R42 DIZZINESS: ICD-10-CM

## 2022-07-08 DIAGNOSIS — H53.8 BLURRY VISION, BILATERAL: ICD-10-CM

## 2022-07-08 PROCEDURE — 1123F ACP DISCUSS/DSCN MKR DOCD: CPT | Performed by: NURSE PRACTITIONER

## 2022-07-08 PROCEDURE — G8752 SYS BP LESS 140: HCPCS | Performed by: NURSE PRACTITIONER

## 2022-07-08 PROCEDURE — G8427 DOCREV CUR MEDS BY ELIG CLIN: HCPCS | Performed by: NURSE PRACTITIONER

## 2022-07-08 PROCEDURE — 99214 OFFICE O/P EST MOD 30 MIN: CPT | Performed by: NURSE PRACTITIONER

## 2022-07-08 PROCEDURE — G8536 NO DOC ELDER MAL SCRN: HCPCS | Performed by: NURSE PRACTITIONER

## 2022-07-08 PROCEDURE — G8417 CALC BMI ABV UP PARAM F/U: HCPCS | Performed by: NURSE PRACTITIONER

## 2022-07-08 PROCEDURE — G9717 DOC PT DX DEP/BP F/U NT REQ: HCPCS | Performed by: NURSE PRACTITIONER

## 2022-07-08 PROCEDURE — 1090F PRES/ABSN URINE INCON ASSESS: CPT | Performed by: NURSE PRACTITIONER

## 2022-07-08 PROCEDURE — 1101F PT FALLS ASSESS-DOCD LE1/YR: CPT | Performed by: NURSE PRACTITIONER

## 2022-07-08 PROCEDURE — G8754 DIAS BP LESS 90: HCPCS | Performed by: NURSE PRACTITIONER

## 2022-07-08 PROCEDURE — G8400 PT W/DXA NO RESULTS DOC: HCPCS | Performed by: NURSE PRACTITIONER

## 2022-07-08 NOTE — PROGRESS NOTES
[facility]  [address] [facility], [address], [place], [address]  [address] [place], [address]  Office:  [phone]  Fax: [phone]  Chief Complaint   Patient presents with    Neurologic Problem     follow up       HPI: Jude Butler presents in follow-up for stroke. She presented in December 2021 with unsteady gait and found to have left hemiparesis and left-sided impaired coordination, found to have a small right corona radiata stroke. She was on dual antiplatelet therapy and planned to transition to single antiplatelet therapy after 3 months if free from recurrence of strokelike symptoms. She was last seen on 4/21/2022. She just completed PT and OT at her independent living facility. She reported problems with her eyes. She was going to see the ophthalmologist for visual disturbance. She reported she is having dizzy spells. She had been given meclizine prescribed by her PCP for dizziness. She reported almost every morning she is dizzy and takes one, sometimes in the afternoon. She reported that she was back to where she was prior to the stroke except still has the dizziness. Reported some visual disturbance such as reading a sign. Reported left eye better than right. Denied acute neurological changes. Reported diplopia if she moves her head too quickly. She has not been using the eye patch since rehab. Denied spinning sensation. Endorsed sometimes having lightheadedness, also allergies acting up. Denied headaches. She was working on staying independent at the facility. Denied pain except chronic knee pain/arthritis. She was going to see a doctor for arthritis. She uses the Rollator. Always ambulates with a walker. Walks to the dining room twice a day. Denies signs or symptoms of sleep apnea. At the last visit, was transitioned to single antiplatelet therapy with aspirin 325 mg daily and can stop Plavix.   Continue statin for LDL goal of less than 70. She was going to see the ophthalmologist the next day. He was referred for vestibular therapy with [facility] at the facility for dizziness with head movements. She presents today in follow-up. She is with her son. Per son, ophtho reported nothing wrong with her eyes other than glaucoma, probably related to stroke. Will monitor pressures in 6 months, last pressures were good. Dr. Ritesh Jo saw her, adjusted BP medications due to elevated BP. Now she has less dizziness, only had to take the meclizine once since the last visit. Her son is monitoring her BP now. Amlodipine changed from 5 to 10 mg, now in the evening and takes other BP med in the morning. /80 in office today. Still dealing with knee problems, shoulder arthritis. Vision is initially blurry when she looks at something, then she blinks then it will clear up. Denies diplopia. Just started vestibular therapy within the last week or 2. She was taking on OTC cetirizine in addition to Zyrtec. Taking 10 mg. Allergies acting up. She takes aspirin 325 mg in the morning. He thinks she takes clopidogrel at night, but not sure. He reports she's going outside more, answering phone, etc., doing better overall. 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 Current Outpatient Medications   Medication Sig Dispense Refill    amLODIPine (NORVASC) 5 mg tablet Take 10 mg by mouth daily.  meclizine HCl (MECLIZINE PO) Take  by mouth.       aspirin (ASPIRIN) 325 mg tablet Take 1 Tablet by mouth daily (with breakfast). Indications: acute thromboembolic stroke, prevention for a blood clot going to the brain 30 Tablet 0    atorvastatin (LIPITOR) 80 mg tablet Take 1 Tablet by mouth nightly. Indications: excessive fat in the blood, stroke prevention 30 Tablet 0    losartan (COZAAR) 50 mg tablet Take 1 Tablet by mouth daily. Indications: high blood pressure 30 Tablet 0    cholecalciferol (VITAMIN D3) (1000 Units /25 mcg) tablet Take 2 Tablets by mouth daily. Indications: prevention of vitamin D deficiency 60 Tablet 0    diclofenac (VOLTAREN) 1 % gel Apply 2 g to affected area four (4) times daily. [both knees]  Indications: osteoarthritis of the knee 1 Each 0    dorzolamide (TRUSOPT) 2 % ophthalmic solution Administer 1 Drop to both eyes three (3) times daily. 10 mL 0    latanoprost (XALATAN) 0.005 % ophthalmic solution Administer 1 Drop to both eyes nightly.  montelukast (SINGULAIR) 10 mg tablet Take 10 mg by mouth daily.  sertraline (ZOLOFT) 50 mg tablet Take 50 mg by mouth daily.  loperamide (IMODIUM) 2 mg capsule Take 2 mg by mouth as needed for Diarrhea.  oxybutynin chloride XL (DITROPAN XL) 10 mg CR tablet Take 10 mg by mouth nightly.  cetirizine (ZYRTEC) 10 mg tablet Take 10 mg by mouth daily.  acetaminophen (TYLENOL) 325 mg tablet Take 2 Tablets by mouth every four (4) hours as needed (for fever or pain). Indications: fever, pain (Patient not taking: Reported on 4/21/2022) 60 Tablet 0        Allergies   Allergen Reactions    Novocain [Procaine] Swelling       Social History     Tobacco Use    Smoking status: Never Smoker    Smokeless tobacco: Never Used   Substance Use Topics    Alcohol use: Not on file    Drug use: Not on file       History reviewed. No pertinent family history. Review of Systems:  GENERAL: Denies fever or fatigue  CARDIAC: No CP or SOB  PULMONARY: No cough or SOB  MUSCULOSKELETAL: No new joint pain.  +arthritis of knees, shoulders, hands.   NEURO: SEE HPI    Physical Examination:  Visit Vitals  /80   Pulse 85   Resp 18   Ht 4' 11\" (1.499 m)   Wt 66.2 kg (146 lb)   SpO2 96%   BMI 29.49 kg/m²       Alert, in NAD. Heart is regular. Oriented x3. Speech is fluent. Speech clear. EOMs are full, PERRL, VFFTC, no nystagmus. No diplopia. No facial asymmetry. Tongue is midline. Strength and tone are normal except mild left-sided weakness. No drift of the bilateral upper extremities. Fine finger movements symmetrical. FNF intact bilaterally. Left leg turns out when she ambulates, has knee pain, LLE edema. Gait antalgic with rollator. Impression/Plan: This is an 27-year-old right-handed female who presents in follow-up for stroke. She presented in December 2021 with left hemiparesis and left sided impaired coordination, found to have a small right corona radiata stroke. She has risk factors including hypertension and hyperlipidemia. Etiology secondary to atherosclerotic disease, with CTA reporting severe stenosis right M2. She was on dual antiplatelet therapy with aspirin and Plavix. We planned to transition to single antiplatelet therapy with aspirin 325 mg daily and stop clopidogrel/Plavix. Her son is not sure if she is still taking clopidogrel but advised to check on this and can stop if she is taking it; call with accurate med list.  Continue statin for LDL goal less than 70. Follow up with PCP for vascular risk factor management. Reports blood pressures have been improved. The dizziness is improving; not having daily episodes of the dizziness. She is now in vestibular therapy. Advised son to request the ophthalmology notes to be sent here. Consideration for neuro-ophthalmology if visual disturbance persists? Recommended Zyrtec dosing of 5 mg max in older adult. Follow up in 3 months. Diagnoses and all orders for this visit:    1. Cerebral infarction due to embolism of right middle cerebral artery ([address]     2. Blurry vision, bilateral    3. Dizziness      Total time 30 minutes with 20 minutes spent in counseling. Signed By: Paras Lim NP        PLEASE NOTE:   Portions of this document may have been produced using voice recognition software. Unrecognized errors in transcription may be present.

## 2022-10-10 ENCOUNTER — OFFICE VISIT (OUTPATIENT)
Dept: NEUROLOGY | Age: 87
End: 2022-10-10
Payer: MEDICARE

## 2022-10-10 VITALS
HEART RATE: 82 BPM | DIASTOLIC BLOOD PRESSURE: 80 MMHG | SYSTOLIC BLOOD PRESSURE: 130 MMHG | HEIGHT: 57 IN | OXYGEN SATURATION: 96 % | BODY MASS INDEX: 31.5 KG/M2 | RESPIRATION RATE: 18 BRPM | WEIGHT: 146 LBS

## 2022-10-10 DIAGNOSIS — I63.411 CEREBRAL INFARCTION DUE TO EMBOLISM OF RIGHT MIDDLE CEREBRAL ARTERY (HCC): Primary | ICD-10-CM

## 2022-10-10 PROCEDURE — G9717 DOC PT DX DEP/BP F/U NT REQ: HCPCS | Performed by: NURSE PRACTITIONER

## 2022-10-10 PROCEDURE — G8400 PT W/DXA NO RESULTS DOC: HCPCS | Performed by: NURSE PRACTITIONER

## 2022-10-10 PROCEDURE — 99213 OFFICE O/P EST LOW 20 MIN: CPT | Performed by: NURSE PRACTITIONER

## 2022-10-10 PROCEDURE — 1101F PT FALLS ASSESS-DOCD LE1/YR: CPT | Performed by: NURSE PRACTITIONER

## 2022-10-10 PROCEDURE — 1123F ACP DISCUSS/DSCN MKR DOCD: CPT | Performed by: NURSE PRACTITIONER

## 2022-10-10 PROCEDURE — G8536 NO DOC ELDER MAL SCRN: HCPCS | Performed by: NURSE PRACTITIONER

## 2022-10-10 PROCEDURE — G8754 DIAS BP LESS 90: HCPCS | Performed by: NURSE PRACTITIONER

## 2022-10-10 PROCEDURE — G8417 CALC BMI ABV UP PARAM F/U: HCPCS | Performed by: NURSE PRACTITIONER

## 2022-10-10 PROCEDURE — 1090F PRES/ABSN URINE INCON ASSESS: CPT | Performed by: NURSE PRACTITIONER

## 2022-10-10 PROCEDURE — G8427 DOCREV CUR MEDS BY ELIG CLIN: HCPCS | Performed by: NURSE PRACTITIONER

## 2022-10-10 PROCEDURE — G8752 SYS BP LESS 140: HCPCS | Performed by: NURSE PRACTITIONER

## 2022-10-10 NOTE — PROGRESS NOTES
[address] [place]. [facility], [address]  Office:  [phone]  Fax: [phone]  Chief Complaint   Patient presents with    Stroke       HPI: Jackie Ramon presents in follow-up for stroke. She presented in December 2021 with unsteady gait and found to have left hemiparesis and left-sided impaired coordination, found to have a small right corona radiata stroke. She was on dual antiplatelet therapy and planned to transition to single antiplatelet therapy after 3 months if free from recurrence of strokelike symptoms. She was seen here in April and had just completed PT and OT at her independent living facility. She reported problems with her eyes. She was going to see the ophthalmologist for visual disturbance. She was having dizzy spells. She had been given meclizine prescribed by her PCP for dizziness. Reported feeling dizzy every morning and takes 1, sometimes in the afternoon. She was back to where she was after the stroke, except for the dizziness. Some visual disturbance such as repeating a sign. Diplopia if she moves her head too quickly. Not using the eye patch since rehab. Denied spinning sensation. Sometimes lightheadedness. Sometimes allergies act up. Denied headaches. She was working on staying independent at the facility. She has arthritis in her knees. Uses the Rollator. Always uses a walker. Had been transitioned to single antiplatelet therapy. She was referred for vestibular therapy with [facility] at the facility for dizziness with head movement. She was last seen here on 7/8/2022. She was with her son. Told by ophthalmology that nothing was wrong with her eyes other than glaucoma, probably related to the stroke. She was going to have the pressures monitored in 6 months. The last pressures were good. She had BP medication adjustments due to elevated BP. Now having less dizziness.   Had only taken meclizine once since the prior visit. Vision can be initially blurry when she looks at something when she blinks and it will cleared it up. She had recently started vestibular therapy. Takes aspirin 325 mg. He was not sure if she was taking clopidogrel anymore. She was doing better overall. Continue vascular risk factor management. Single antiplatelet therapy with aspirin 325 mg daily and stop Plavix. Continue vestibular therapy. Consider neuro-ophthalmology if visual disturbance persists. She presents today in follow-up. She is with her son. Reports doing well. Just left leg swelling, had ultrasound, didn't find anything. Wears support hose. /64. Continues aspirin 325 mg daily. Continues atorvastatin. Reports her PCP may refer her to see ortho or [facility] for her both knees arthritis bone on bone. She gets labs at the independent living facility; her PCP goes there. Just finished therapy at [facility] (what sounds like cognitive therapy), did well with this. Still does vestibular therapy, has 1 more appointment. Had to take a meclizine today for dizziness. She wonders if it is her eyes throwing her off. Feels imbalance. Difficulty focusing initially when looking at something. Eye doctor said nothing wrong except glaucoma, same with second opinion. Meclizine helps. Takes it not every day, goes days without taking it. Someone at the facility helps with meds and bathing. 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     Current Outpatient Medications Medication Sig Dispense Refill    amLODIPine (NORVASC) 5 mg tablet Take 10 mg by mouth daily. meclizine HCl (MECLIZINE PO) Take  by mouth. aspirin (ASPIRIN) 325 mg tablet Take 1 Tablet by mouth daily (with breakfast). Indications: acute thromboembolic stroke, prevention for a blood clot going to the brain 30 Tablet 0    atorvastatin (LIPITOR) 80 mg tablet Take 1 Tablet by mouth nightly. Indications: excessive fat in the blood, stroke prevention 30 Tablet 0    losartan (COZAAR) 50 mg tablet Take 1 Tablet by mouth daily. Indications: high blood pressure 30 Tablet 0    cholecalciferol (VITAMIN D3) (1000 Units /25 mcg) tablet Take 2 Tablets by mouth daily. Indications: prevention of vitamin D deficiency 60 Tablet 0    diclofenac (VOLTAREN) 1 % gel Apply 2 g to affected area four (4) times daily. [both knees]  Indications: osteoarthritis of the knee 1 Each 0    dorzolamide (TRUSOPT) 2 % ophthalmic solution Administer 1 Drop to both eyes three (3) times daily. 10 mL 0    latanoprost (XALATAN) 0.005 % ophthalmic solution Administer 1 Drop to both eyes nightly. montelukast (SINGULAIR) 10 mg tablet Take 10 mg by mouth daily. sertraline (ZOLOFT) 50 mg tablet Take 50 mg by mouth daily. loperamide (IMODIUM) 2 mg capsule Take 2 mg by mouth as needed for Diarrhea. oxybutynin chloride XL (DITROPAN XL) 10 mg CR tablet Take 10 mg by mouth nightly. cetirizine (ZYRTEC) 10 mg tablet Take 10 mg by mouth daily. acetaminophen (TYLENOL) 325 mg tablet Take 2 Tablets by mouth every four (4) hours as needed (for fever or pain). Indications: fever, pain (Patient not taking: No sig reported) 60 Tablet 0        Allergies   Allergen Reactions    Novocain [Procaine] Swelling       Social History     Tobacco Use    Smoking status: Never    Smokeless tobacco: Never       No family history on file.     Review of Systems:  GENERAL: Denies fever or fatigue  CARDIAC: No CP or SOB  PULMONARY: No cough or SOB  MUSCULOSKELETAL: No new joint pain. +arthritis of knees, shoulders, hands. NEURO: SEE HPI    Physical Examination:  Visit Vitals  /80   Pulse 82   Resp 18   Ht 4' 9\" (1.448 m)   Wt 66.2 kg (146 lb)   SpO2 96%   BMI 31.59 kg/m²     Alert, in NAD. Heart is regular. Oriented x3. Speech is fluent. Speech clear. EOMs are full, PERRL, VFFTC, no nystagmus. No diplopia. No facial asymmetry. Tongue is midline. Strength and tone are normal except mild left-sided weakness. Slight pronation LUE. Fine finger movements symmetrical. FNF intact bilaterally. Left leg turns out when she ambulates, has knee pain, LLE edema. Gait antalgic with rollator. Impression/Plan: This is an 21-year-old right-handed female who presents in follow-up for stroke. She presented in December 2021 with left hemiparesis and left sided impaired coordination, found to have a small right corona radiata stroke. She has risk factors including hypertension and hyperlipidemia. Etiology secondary to atherosclerotic disease, with CTA reporting severe stenosis right M2. She now continues antiplatelet therapy with aspirin 325 mg and continues on atorvastatin for secondary prevention. LDL goal less than 70. Continue BP control (controlled at her visit). Follow up in 6 months. Diagnoses and all orders for this visit:    1. Cerebral infarction due to embolism of right middle cerebral artery (HCC)     Total time 28 minutes with 15 minutes spent in counseling. Signed By: Sagrario Lundberg NP        PLEASE NOTE:   Portions of this document may have been produced using voice recognition software. Unrecognized errors in transcription may be present.

## 2023-05-22 NOTE — ROUTINE PROCESS
[facility]    Bedside and Verbal shift change report given to Jessica Dao RN (oncoming nurse) by Haley Hoffman RN (offgoing nurse). Report included the following information SBAR, Kardex, MAR and Recent Results. Situation:   Code Status: Full Code   Hospital Day: 7   Problem List:   Hospital Problems  Date Reviewed: 1/7/2022          Codes Class Noted POA    Essential hypertension (Chronic) ICD-10-CM: I10  ICD-9-CM: 401.9  Unknown Yes        Hypercholesterolemia with hypertriglyceridemia (Chronic) ICD-10-CM: E78.2  ICD-9-CM: 272.2  12/27/2021 Yes    Overview Signed 1/4/2022 12:02 AM by Woodrow Downey MD     Lipid profile (12/27/2021) showed , , HDL 48,              * (Principal) Acute ischemic stroke ([facility] [facility] ICD-10-CM: I63.9  ICD-9-CM: 434.91  12/26/2021 Yes    Overview Signed 1/3/2022 11:59 PM by Woodrow Downey MD     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      Assessment:   Changes in Assessment throughout shift: No change to previous assessment     Patient has a central line: no Reasons if yes: na  Insertion date Last dressing date:na   Patient has Ovalle Cath: no Reasons if yes: na   Insertion date:na  Shift ovalle care completed: NO     Last Vitals:     Vitals:    01/06/22 1600 01/06/22 2100 01/07/22 0821 01/07/22 1653   BP: 114/69 108/69 125/82 128/81   Pulse: 90 82 83 76   Resp: 18 18 18 18   Temp: 97.5 °F (36.4 °C) 98.5 °F (36.9 °C) 98.8 °F (37.1 °C) 98.6 °F (37 °C)   SpO2: 96% 96% 98% 97%   Weight:       Height:            PAIN    Pain Assessment    Pain Intensity 1: 0 (01/07/22 9223) Pain Intensity 1: 2 (12/29/14 1105)    Pain Location 1: Knee Pain Location 1: Abdomen    Pain Intervention(s) 1: Medication (see MAR) Pain Intervention(s) 1: Medication (see MAR)  Patient Stated Pain Goal: 0 Patient Stated Pain Goal: 0  o Intervention effective: yes  o Other actions taken for pain:       Skin Assessment  Skin color    Condition/Temperature    Integrity    Turgor    Weekly Pressure Ulcer Documentation  Pressure  Injury Documentation: No Pressure Injury Noted-Pressure Ulcer Prevention Initiated  Wound Prevention & Protection Methods  Orientation of wound Orientation of Wound Prevention: Posterior  Location of Prevention Location of Wound Prevention: Buttocks,Sacrum/Coccyx  Dressing Present Dressing Present : No  Dressing Status    Wound Offloading Wound Offloading (Prevention Methods): Bed, pressure redistribution/air     INTAKE/OUPUT  Date 01/06/22 1900 - 01/07/22 0659 01/07/22 0700 - 01/08/22 0659   Shift 3901-6293 24 Hour Total 1180-0947 5688-8389 24 Hour Total   INTAKE   P.O.  1200 1200  1200     P. O.  1200 1200  1200   Shift Total(mL/kg)  1200(19.1) 1200(19.1)  1200(19.1)   OUTPUT   Urine(mL/kg/hr)          Urine Occurrence(s) 4 x 7 x 5 x  5 x   Stool          Stool Occurrence(s) 0 x 1 x 1 x  1 x   Shift Total(mL/kg)        NET  1200 1200  1200   Weight (kg) 62.7 62.7 62.7 62.7 62.7       Recommendations:  1. Patient needs and requests: na    2. Pending tests/procedures: na     3. Functional Level/Equipment:   /      Fall Precautions:   Fall risk precautions were reinforced with the patient; she was instructed to call for help prior to getting up. The following fall risk precautions were continued: bed/ chair alarms, door signage, yellow bracelet and socks as well as update of the [employer] tool in the patient's room. Yadira Score: 3    HEALS Safety Check    A safety check occurred in the patient's room between off going nurse and oncoming nurse listed above.     The safety check included the below items  Area Items   H  High Alert Medications - Verify all high alert medication drips (heparin, PCA, etc.)   E  Equipment - Suction is set up for ALL patients (with norah)  - Red plugs utilized for all equipment (IV pumps, etc.)  - WOWs wiped down at end of shift.  - Room stocked with oxygen, suction, and other unit-specific supplies   A  Alarms - Bed alarm is set for fall risk patients  - Ensure chair alarm is in place and activated if patient is up in a chair   L  Lines - Check IV for any infiltration  - Edwards bag is empty if patient has a Edwards   - Tubing and IV bags are labeled   S  Safety   - Room is clean, patient is clean, and equipment is clean. - Hallways are clear from equipment besides carts. - Fall bracelet on for fall risk patients  - Ensure room is clear and free of clutter  - Suction is set up for ALL patients (with norah)  - Hallways are clear from equipment besides carts.    - Isolation precautions followed, supplies available outside room, sign posted     Dexter Ortiz RN Dressing: pressure dressing with telfa

## 2023-06-28 ENCOUNTER — OFFICE VISIT (OUTPATIENT)
Age: 88
End: 2023-06-28

## 2023-06-28 VITALS
BODY MASS INDEX: 29.99 KG/M2 | WEIGHT: 139 LBS | SYSTOLIC BLOOD PRESSURE: 156 MMHG | HEIGHT: 57 IN | RESPIRATION RATE: 18 BRPM | OXYGEN SATURATION: 97 % | HEART RATE: 85 BPM | DIASTOLIC BLOOD PRESSURE: 80 MMHG

## 2023-06-28 DIAGNOSIS — R42 DIZZINESS: ICD-10-CM

## 2023-06-28 DIAGNOSIS — I95.1 ORTHOSTATIC HYPOTENSION: ICD-10-CM

## 2023-06-28 DIAGNOSIS — R01.1 MURMUR: ICD-10-CM

## 2023-06-28 DIAGNOSIS — Z86.73 HISTORY OF STROKE: ICD-10-CM

## 2023-06-28 DIAGNOSIS — H57.9 VISUAL COMPLAINT: ICD-10-CM

## 2023-07-18 ENCOUNTER — HOSPITAL ENCOUNTER (OUTPATIENT)
Facility: HOSPITAL | Age: 88
Discharge: HOME | End: 2023-07-21
Payer: MEDICARE

## 2023-07-18 DIAGNOSIS — R42 DIZZINESS: ICD-10-CM

## 2023-07-18 DIAGNOSIS — H57.9 VISUAL COMPLAINT: ICD-10-CM

## 2023-07-18 PROCEDURE — 70551 MRI BRAIN STEM W/O DYE: CPT

## 2023-07-28 ENCOUNTER — OFFICE VISIT (OUTPATIENT)
Age: 88
End: 2023-07-28
Payer: MEDICARE

## 2023-07-28 VITALS
SYSTOLIC BLOOD PRESSURE: 138 MMHG | BODY MASS INDEX: 30.63 KG/M2 | HEIGHT: 57 IN | DIASTOLIC BLOOD PRESSURE: 76 MMHG | HEART RATE: 95 BPM | WEIGHT: 142 LBS | OXYGEN SATURATION: 93 %

## 2023-07-28 DIAGNOSIS — Z86.73 HISTORY OF CVA (CEREBROVASCULAR ACCIDENT): ICD-10-CM

## 2023-07-28 DIAGNOSIS — I10 ESSENTIAL HYPERTENSION: ICD-10-CM

## 2023-07-28 DIAGNOSIS — I35.0 NONRHEUMATIC AORTIC VALVE STENOSIS: ICD-10-CM

## 2023-07-28 DIAGNOSIS — I50.9 CHRONIC CONGESTIVE HEART FAILURE, UNSPECIFIED HEART FAILURE TYPE (HCC): ICD-10-CM

## 2023-07-28 DIAGNOSIS — R42 DIZZINESS: Primary | ICD-10-CM

## 2023-07-28 DIAGNOSIS — E78.00 HYPERCHOLESTEREMIA: ICD-10-CM

## 2023-07-28 PROCEDURE — 3078F DIAST BP <80 MM HG: CPT | Performed by: INTERNAL MEDICINE

## 2023-07-28 PROCEDURE — 1123F ACP DISCUSS/DSCN MKR DOCD: CPT | Performed by: INTERNAL MEDICINE

## 2023-07-28 PROCEDURE — 99204 OFFICE O/P NEW MOD 45 MIN: CPT | Performed by: INTERNAL MEDICINE

## 2023-07-28 PROCEDURE — 3074F SYST BP LT 130 MM HG: CPT | Performed by: INTERNAL MEDICINE

## 2023-07-28 PROCEDURE — 93000 ELECTROCARDIOGRAM COMPLETE: CPT | Performed by: INTERNAL MEDICINE

## 2023-07-28 RX ORDER — FUROSEMIDE 20 MG/1
20 TABLET ORAL DAILY
Qty: 90 TABLET | Refills: 1 | Status: SHIPPED | OUTPATIENT
Start: 2023-07-28

## 2023-07-28 RX ORDER — LANOLIN ALCOHOL/MO/W.PET/CERES
1000 CREAM (GRAM) TOPICAL DAILY
COMMUNITY

## 2023-07-28 RX ORDER — AMLODIPINE BESYLATE 5 MG/1
5 TABLET ORAL DAILY
Qty: 90 TABLET | Refills: 3 | Status: SHIPPED | OUTPATIENT
Start: 2023-07-28

## 2023-07-28 ASSESSMENT — ENCOUNTER SYMPTOMS
EYES NEGATIVE: 1
RESPIRATORY NEGATIVE: 1
GASTROINTESTINAL NEGATIVE: 1

## 2023-07-28 NOTE — PROGRESS NOTES
Marvin Chandra is a 82y.o. year old female. 7/28/2023 seen as a new patient for dizziness and orthostatic hypotension. Dizziness has been present since she had a stroke in 2021. It happens when she is walking to the kitchen. She has never fallen or lost consciousness. Has left hemiparesis and uses walker. Has edema in the left leg since stroke but denies any chest pain or shortness of breath. Review of Systems   Constitutional: Negative. HENT: Negative. Eyes: Negative. Respiratory: Negative. Cardiovascular:  Positive for leg swelling. Gastrointestinal: Negative. Endocrine: Negative. Genitourinary: Negative. Musculoskeletal: Negative. Neurological: Negative. Psychiatric/Behavioral: Negative. All other systems reviewed and are negative. Physical Exam  Vitals and nursing note reviewed. Constitutional:       Appearance: Normal appearance. HENT:      Head: Normocephalic and atraumatic. Nose: Nose normal.   Eyes:      Conjunctiva/sclera: Conjunctivae normal.   Cardiovascular:      Rate and Rhythm: Normal rate and regular rhythm. Pulses: Normal pulses. Heart sounds: Murmur heard. Harsh early systolic murmur is present with a grade of 3/6 at the upper right sternal border radiating to the neck. Pulmonary:      Effort: Pulmonary effort is normal.      Breath sounds: Normal breath sounds. Abdominal:      General: Bowel sounds are normal.      Palpations: Abdomen is soft. Musculoskeletal:         General: Normal range of motion. Right lower leg: Edema (1) present. Left lower leg: Edema (2) present. Skin:     General: Skin is warm and dry. Neurological:      General: No focal deficit present. Mental Status: She is alert and oriented to person, place, and time.    Psychiatric:         Mood and Affect: Mood normal.         Behavior: Behavior normal.      Allergies   Allergen Reactions    Procaine Swelling       Past Medical

## 2023-08-12 LAB
ALBUMIN SERPL-MCNC: 3.8 G/DL (ref 3.7–4.7)
ALP SERPL-CCNC: 89 IU/L (ref 44–121)
ALT SERPL-CCNC: 14 IU/L (ref 0–32)
AST SERPL-CCNC: 19 IU/L (ref 0–40)
BILIRUB DIRECT SERPL-MCNC: 0.12 MG/DL (ref 0–0.4)
BILIRUB SERPL-MCNC: 0.3 MG/DL (ref 0–1.2)
BUN SERPL-MCNC: 14 MG/DL (ref 8–27)
BUN/CREAT SERPL: 16 (ref 12–28)
CALCIUM SERPL-MCNC: 9.4 MG/DL (ref 8.7–10.3)
CHLORIDE SERPL-SCNC: 105 MMOL/L (ref 96–106)
CHOLEST SERPL-MCNC: 127 MG/DL (ref 100–199)
CO2 SERPL-SCNC: 25 MMOL/L (ref 20–29)
CREAT SERPL-MCNC: 0.89 MG/DL (ref 0.57–1)
EGFRCR SERPLBLD CKD-EPI 2021: 63 ML/MIN/1.73
GLUCOSE SERPL-MCNC: 94 MG/DL (ref 70–99)
HDLC SERPL-MCNC: 56 MG/DL
LDLC SERPL CALC-MCNC: 48 MG/DL (ref 0–99)
POTASSIUM SERPL-SCNC: 5.3 MMOL/L (ref 3.5–5.2)
PROT SERPL-MCNC: 6.3 G/DL (ref 6–8.5)
SODIUM SERPL-SCNC: 144 MMOL/L (ref 134–144)
SPECIMEN STATUS REPORT: NORMAL
TRIGL SERPL-MCNC: 133 MG/DL (ref 0–149)
VLDLC SERPL CALC-MCNC: 23 MG/DL (ref 5–40)

## 2023-08-29 ENCOUNTER — OFFICE VISIT (OUTPATIENT)
Age: 88
End: 2023-08-29
Payer: MEDICARE

## 2023-08-29 VITALS
HEART RATE: 80 BPM | SYSTOLIC BLOOD PRESSURE: 128 MMHG | OXYGEN SATURATION: 96 % | DIASTOLIC BLOOD PRESSURE: 71 MMHG | BODY MASS INDEX: 30.42 KG/M2 | HEIGHT: 57 IN | WEIGHT: 141 LBS

## 2023-08-29 DIAGNOSIS — Z86.73 HISTORY OF CVA (CEREBROVASCULAR ACCIDENT): ICD-10-CM

## 2023-08-29 DIAGNOSIS — E78.00 HYPERCHOLESTEREMIA: ICD-10-CM

## 2023-08-29 DIAGNOSIS — R42 DIZZINESS: ICD-10-CM

## 2023-08-29 DIAGNOSIS — I50.9 CHRONIC CONGESTIVE HEART FAILURE, UNSPECIFIED HEART FAILURE TYPE (HCC): Primary | ICD-10-CM

## 2023-08-29 DIAGNOSIS — I10 ESSENTIAL HYPERTENSION: ICD-10-CM

## 2023-08-29 DIAGNOSIS — I35.0 NONRHEUMATIC AORTIC VALVE STENOSIS: ICD-10-CM

## 2023-08-29 PROCEDURE — 1123F ACP DISCUSS/DSCN MKR DOCD: CPT | Performed by: INTERNAL MEDICINE

## 2023-08-29 PROCEDURE — 3074F SYST BP LT 130 MM HG: CPT | Performed by: INTERNAL MEDICINE

## 2023-08-29 PROCEDURE — 99213 OFFICE O/P EST LOW 20 MIN: CPT | Performed by: INTERNAL MEDICINE

## 2023-08-29 PROCEDURE — 3078F DIAST BP <80 MM HG: CPT | Performed by: INTERNAL MEDICINE

## 2023-08-29 RX ORDER — CYCLOSPORINE 0.5 MG/ML
EMULSION OPHTHALMIC
COMMUNITY
Start: 2022-04-22

## 2023-08-29 RX ORDER — BRIMONIDINE TARTRATE 1 MG/ML
SOLUTION/ DROPS OPHTHALMIC
COMMUNITY

## 2023-08-29 ASSESSMENT — PATIENT HEALTH QUESTIONNAIRE - PHQ9
SUM OF ALL RESPONSES TO PHQ QUESTIONS 1-9: 0
DEPRESSION UNABLE TO ASSESS: FUNCTIONAL CAPACITY MOTIVATION LIMITS ACCURACY
SUM OF ALL RESPONSES TO PHQ QUESTIONS 1-9: 0
SUM OF ALL RESPONSES TO PHQ QUESTIONS 1-9: 0
1. LITTLE INTEREST OR PLEASURE IN DOING THINGS: 0
SUM OF ALL RESPONSES TO PHQ9 QUESTIONS 1 & 2: 0
SUM OF ALL RESPONSES TO PHQ QUESTIONS 1-9: 0
2. FEELING DOWN, DEPRESSED OR HOPELESS: 0

## 2023-08-29 ASSESSMENT — ENCOUNTER SYMPTOMS
RESPIRATORY NEGATIVE: 1
EYES NEGATIVE: 1
GASTROINTESTINAL NEGATIVE: 1

## 2023-08-29 NOTE — PROGRESS NOTES
1. Have you been to the ER, urgent care clinic since your last visit? Hospitalized since your last visit? No      2. Where do you normally have your labs drawn?   [facility]    3. Do you need any refills today? No    4. Which local pharmacy do you use (enter pharmacy)? On site [facility]    5. Which mail order pharmacy do you use (enter pharmacy)? No     6. Are you here for surgical clearance and if so who will be doing your     procedure/surgery (care team)?    No

## 2023-08-29 NOTE — PROGRESS NOTES
Praveena Mistry is a 82y.o. year old female. 7/28/2023 seen as a new patient for dizziness and orthostatic hypotension. Dizziness has been present since she had a stroke in 2021. It happens when she is walking to the kitchen. She has never fallen or lost consciousness. Has left hemiparesis and uses walker. Has edema in the left leg since stroke but denies any chest pain or shortness of breath.  8/29/2023 dizziness persists but it seems to be a certain position on an angle at which she sees. No falls, loss of consciousness, chest pain or shortness of breath. Edema persist specially in the left leg. Review of Systems   Constitutional: Negative. HENT: Negative. Eyes: Negative. Respiratory: Negative. Cardiovascular:  Positive for leg swelling. Gastrointestinal: Negative. Endocrine: Negative. Genitourinary: Negative. Musculoskeletal: Negative. Neurological:  Positive for dizziness. Psychiatric/Behavioral: Negative. All other systems reviewed and are negative. Physical Exam  Vitals and nursing note reviewed. Constitutional:       Appearance: Normal appearance. HENT:      Head: Normocephalic and atraumatic. Nose: Nose normal.   Eyes:      Conjunctiva/sclera: Conjunctivae normal.   Cardiovascular:      Rate and Rhythm: Normal rate and regular rhythm. Pulses: Normal pulses. Heart sounds: Murmur heard. Harsh early systolic murmur is present with a grade of 3/6 at the upper right sternal border radiating to the neck. Pulmonary:      Effort: Pulmonary effort is normal.      Breath sounds: Normal breath sounds. Abdominal:      General: Bowel sounds are normal.      Palpations: Abdomen is soft. Musculoskeletal:         General: Normal range of motion. Right lower leg: Edema (trace) present. Left lower leg: Edema (1-2) present. Skin:     General: Skin is warm and dry. Neurological:      General: No focal deficit present.

## 2023-08-30 NOTE — ROUTINE PROCESS
[facility]    Bedside and Verbal shift change report given to Calvin Brenner RN (oncoming nurse) by Rachel Chen RN (offgoing nurse). Report included the following information SBAR, Kardex, MAR and Recent Results. Situation:   Code Status: Full Code   Hospital Day: 13   Problem List:   Hospital Problems  Date Reviewed: 1/12/2022          Codes Class Noted POA    Essential hypertension (Chronic) ICD-10-CM: I10  ICD-9-CM: 401.9  Unknown Yes        Hypercholesterolemia with hypertriglyceridemia (Chronic) ICD-10-CM: E78.2  ICD-9-CM: 272.2  12/27/2021 Yes    Overview Signed 1/4/2022 12:02 AM by Terrence Bowie MD     Lipid profile (12/27/2021) showed , , HDL 48,              * (Principal) Acute ischemic stroke ([facility] [address] [facility] ICD-10-CM: I63.9  ICD-9-CM: 434.91  12/26/2021 Yes    Overview Signed 1/3/2022 11:59 PM by Terrence Bowie MD     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      Assessment:   Changes in Assessment throughout shift: No change to previous assessment     Patient has a central line: no Reasons if yes: na  Insertion date:na Last dressing date:na   Patient has Ovalle Cath: no Reasons if yes: na   Insertion date:na  Shift ovalle care completed: NO     Last Vitals:     Vitals:    01/11/22 2100 01/12/22 0728 01/12/22 1632 01/12/22 2100   BP: 118/68 (!) 148/79 126/80 115/60   Pulse: 76 78 78 74   Resp: 18 16 18 18   Temp: 98.5 °F (36.9 °C) 98.3 °F (36.8 °C) 98.2 °F (36.8 °C) 98.4 °F (36.9 °C)   SpO2: 94% 92% 96% 94%   Weight:       Height:            PAIN    Pain Assessment    Pain Intensity 1: 0 (01/13/22 0404) Pain Intensity 1: 2 (12/29/14 1105)    Pain Location 1: Knee Pain Location 1: Abdomen    Pain Intervention(s) 1: Medication (see MAR) Pain Intervention(s) 1: Medication (see MAR)  Patient Stated Pain Goal: 0 Patient Stated Pain Goal: 0  o Intervention effective: yes  o Other actions taken for pain:       Skin Assessment  Skin color    Condition/Temperature    Integrity    Turgor    Weekly Pressure Ulcer Documentation  Pressure  Injury Documentation: No Pressure Injury Noted-Pressure Ulcer Prevention Initiated  Wound Prevention & Protection Methods  Orientation of wound Orientation of Wound Prevention: Posterior  Location of Prevention Location of Wound Prevention: Buttocks,Sacrum/Coccyx  Dressing Present Dressing Present : No  Dressing Status    Wound Offloading Wound Offloading (Prevention Methods): Bed, pressure redistribution/air     INTAKE/OUPUT  Date 01/12/22 0700 - 01/13/22 0659 01/13/22 0700 - 01/14/22 0659   Shift 1579-3270 9010-3340 24 Hour Total 3592-1795 8584-0595 24 Hour Total   INTAKE   P.O. 1080  1080        P. O. 1080  1080      Shift Total(mL/kg) 7416(18.9)  1925(54.7)      OUTPUT   Urine(mL/kg/hr)           Urine Occurrence(s) 3 x 2 x 5 x      Stool           Stool Occurrence(s) 1 x 0 x 1 x      Shift Total(mL/kg)         NET 1080  1080      Weight (kg) 62.7 62.7 62.7 62.7 62.7 62.7       Recommendations:  1. Patient needs and requests:assist with toileting and ADL's    2. Pending tests/procedures: no labs    3. Functional Level/Equipment: Partial (one person) /      Fall Precautions:   Fall risk precautions were reinforced with the patient; she was instructed to call for help prior to getting up. The following fall risk precautions were continued: bed/ chair alarms, door signage, yellow bracelet and socks as well as update of the Prakash Or[employer] tool in the patient's room.    Yadira Score:      HEALS Safety Check    A safety check occurred in the patient's room between off going nurse and oncoming nurse listed above. The safety check included the below items  Area Items   H  High Alert Medications - Verify all high alert medication drips (heparin, PCA, etc.)   E  Equipment - Suction is set up for ALL patients (with norah)  - Red plugs utilized for all equipment (IV pumps, etc.)  - WOWs wiped down at end of shift.  - Room stocked with oxygen, suction, and other unit-specific supplies   A  Alarms - Bed alarm is set for fall risk patients  - Ensure chair alarm is in place and activated if patient is up in a chair   L  Lines - Check IV for any infiltration  - Edwards bag is empty if patient has a Edwards   - Tubing and IV bags are labeled   S  Safety   - Room is clean, patient is clean, and equipment is clean. - Hallways are clear from equipment besides carts. - Fall bracelet on for fall risk patients  - Ensure room is clear and free of clutter  - Suction is set up for ALL patients (with norah)  - Hallways are clear from equipment besides carts.    - Isolation precautions followed, supplies available outside room, sign posted     Severo Holts, RN Diet, Pureed (08-29-23 @ 14:22)

## 2023-09-05 NOTE — ROUTINE PROCESS
[facility]    Bedside and Verbal shift change report given to Eva Mancini RN (oncoming nurse) by Sally Maria (offgoing nurse). Report included the following information SBAR, Kardex, MAR and Recent Results.     Situation:   Code Status: Full Code   Hospital Day: 15   Problem List:   Hospital Problems  Date Reviewed: 1/14/2022          Codes Class Noted POA    Primary osteoarthritis of both knees (Chronic) ICD-10-CM: M17.0  ICD-9-CM: 715.16  Unknown Yes        Essential hypertension (Chronic) ICD-10-CM: I10  ICD-9-CM: 401.9  Unknown Yes        Hypercholesterolemia with hypertriglyceridemia (Chronic) ICD-10-CM: E78.2  ICD-9-CM: 272.2  12/27/2021 Yes    Overview Signed 1/4/2022 12:02 AM by Familia Gottlieb MD     Lipid profile (12/27/2021) showed , , HDL 48,              * (Principal) [facility] ICD-10-CM: I63.9  ICD-9-CM: 434.91  12/26/2021 Yes    Overview Signed 1/3/2022 11:59 PM by Familia Gottlieb MD     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) ICD-10-CM: I67.89, G81.94  ICD-9-CM: 436, 342.92  12/26/2021 Yes              Background:   Past Medical History:   Past Medical History:   Diagnosis Date    Primary osteoarthritis of both knees         Assessment:   Changes in Assessment throughout shift: No change to previous assessment    Patient has a central line: no   Patient has Edwards Cath: no      Last Vitals:     Vitals:    01/14/22 0821 01/14/22 1617 01/14/22 2054 01/15/22 0803   BP: 138/70 96/61 (!) 140/80 (!) 141/77   Pulse: 77 81 83 72   Resp: 18 16 16 16   Temp: 98.9 °F (37.2 °C) 98.3 °F (36.8 °C) 97 °F (36.1 °C) 97.7 °F (36.5 °C)   SpO2: 98% 96% 97% 95%   Weight:       Height:            PAIN    Pain Assessment    Pain Intensity 1: 0 (01/15/22 0727) Pain Intensity 1: 2 (12/29/14 1105)    Pain Location 1: Knee Pain Location 1: Abdomen    Pain Intervention(s) 1: Medication (see MAR) Pain Intervention(s) 1: Medication (see MAR)  Patient Stated Pain Goal: 0 Patient Stated Pain Goal: 0  o Intervention effective: Yes  o Other actions taken for pain:       Skin Assessment  Skin color    Condition/Temperature    Integrity    Turgor    Weekly Pressure Ulcer Documentation  Pressure  Injury Documentation: No Pressure Injury Noted-Pressure Ulcer Prevention Initiated  Wound Prevention & Protection Methods  Orientation of wound Orientation of Wound Prevention: Posterior  Location of Prevention Location of Wound Prevention: Sacrum/Coccyx  Dressing Present Dressing Present : No  Dressing Status    Wound Offloading Wound Offloading (Prevention Methods): Bed, pressure reduction mattress     INTAKE/OUPUT  Date 01/14/22 0700 - 01/15/22 0659 01/15/22 0700 - 01/16/22 0659   Shift 4983-0651 8610-1844 24 Hour Total 2064-3378 2830-3716 24 Hour Total   INTAKE   P.O. 480 480 960        P. O. 480 480 960      Shift Total(mL/kg) 480(7.7) 480(7.7) 960(15.3)      OUTPUT   Urine(mL/kg/hr)  120(0.2) 120(0.1)        Urine Voided  120 120        Urine Occurrence(s) 3 x 6 x 9 x 1 x  1 x   Stool           Stool Occurrence(s) 2 x 0 x 2 x 1 x  1 x   Shift Total(mL/kg)  120(1.9) 120(1.9)       360 840      Weight (kg) 62.7 62.7 62.7 62.7 62.7 62.7       Recommendations:  1. Patient needs and requests: None at this time    2. Pending tests/procedures: Routine labs, PVR q shift     3. Functional Level/Equipment: (P) Partial (one person) /      Fall Precautions:   Fall risk precautions were reinforced with the patient; she was instructed to call for help prior to getting up. The following fall risk precautions were continued: bed/ chair alarms, door signage, yellow bracelet and socks as well as update of the [employer] tool in the patient's room.    Yadira Score: 3    HEALS Safety Check    A safety check occurred in the patient's room between off going nurse and oncoming nurse listed above. The safety check included the below items  Area Items   H  High Alert Medications - Verify all high alert medication drips (heparin, PCA, etc.)   E  Equipment - Suction is set up for ALL patients (with norah)  - Red plugs utilized for all equipment (IV pumps, etc.)  - WOWs wiped down at end of shift.  - Room stocked with oxygen, suction, and other unit-specific supplies   A  Alarms - Bed alarm is set for fall risk patients  - Ensure chair alarm is in place and activated if patient is up in a chair   L  Lines - Check IV for any infiltration  - Edwards bag is empty if patient has a Edwards   - Tubing and IV bags are labeled   S  Safety   - Room is clean, patient is clean, and equipment is clean. - Hallways are clear from equipment besides carts. - Fall bracelet on for fall risk patients  - Ensure room is clear and free of clutter  - Suction is set up for ALL patients (with norah)  - Hallways are clear from equipment besides carts.    - Isolation precautions followed, supplies available outside room, sign posted     Nisa Carmona [Normal] : no respiratory distress, no accessory muscle use, normal respiratory rhythm and effort, lungs were clear to auscultation bilaterally [Epigastric] : epigastric

## 2023-11-11 DIAGNOSIS — I50.9 CHRONIC CONGESTIVE HEART FAILURE, UNSPECIFIED HEART FAILURE TYPE (HCC): ICD-10-CM

## 2023-11-13 RX ORDER — FUROSEMIDE 20 MG/1
20 TABLET ORAL DAILY
Qty: 90 TABLET | Refills: 1 | Status: SHIPPED | OUTPATIENT
Start: 2023-11-13

## 2024-02-23 ENCOUNTER — OFFICE VISIT (OUTPATIENT)
Age: 89
End: 2024-02-23
Payer: MEDICARE

## 2024-02-23 VITALS
SYSTOLIC BLOOD PRESSURE: 122 MMHG | BODY MASS INDEX: 29.99 KG/M2 | DIASTOLIC BLOOD PRESSURE: 72 MMHG | WEIGHT: 139 LBS | HEART RATE: 92 BPM | HEIGHT: 57 IN

## 2024-02-23 DIAGNOSIS — I35.0 NONRHEUMATIC AORTIC VALVE STENOSIS: ICD-10-CM

## 2024-02-23 DIAGNOSIS — E78.00 HYPERCHOLESTEREMIA: ICD-10-CM

## 2024-02-23 DIAGNOSIS — I10 ESSENTIAL HYPERTENSION: Primary | ICD-10-CM

## 2024-02-23 DIAGNOSIS — Z01.818 PRE-OP EXAM: ICD-10-CM

## 2024-02-23 DIAGNOSIS — Z86.73 HISTORY OF CVA (CEREBROVASCULAR ACCIDENT): ICD-10-CM

## 2024-02-23 PROCEDURE — 1123F ACP DISCUSS/DSCN MKR DOCD: CPT | Performed by: NURSE PRACTITIONER

## 2024-02-23 PROCEDURE — 99214 OFFICE O/P EST MOD 30 MIN: CPT | Performed by: NURSE PRACTITIONER

## 2024-02-23 PROCEDURE — 93000 ELECTROCARDIOGRAM COMPLETE: CPT | Performed by: NURSE PRACTITIONER

## 2024-02-23 ASSESSMENT — ENCOUNTER SYMPTOMS
RESPIRATORY NEGATIVE: 1
GASTROINTESTINAL NEGATIVE: 1
APNEA: 0
EYES NEGATIVE: 1
ABDOMINAL DISTENTION: 0
SHORTNESS OF BREATH: 0
BACK PAIN: 0
CHEST TIGHTNESS: 0
WHEEZING: 0
NAUSEA: 0
COUGH: 0
ABDOMINAL PAIN: 0

## 2024-02-23 ASSESSMENT — PATIENT HEALTH QUESTIONNAIRE - PHQ9
SUM OF ALL RESPONSES TO PHQ QUESTIONS 1-9: 0
2. FEELING DOWN, DEPRESSED OR HOPELESS: 0
SUM OF ALL RESPONSES TO PHQ QUESTIONS 1-9: 0
DEPRESSION UNABLE TO ASSESS: FUNCTIONAL CAPACITY MOTIVATION LIMITS ACCURACY
SUM OF ALL RESPONSES TO PHQ QUESTIONS 1-9: 0
SUM OF ALL RESPONSES TO PHQ QUESTIONS 1-9: 0

## 2024-02-23 NOTE — PROGRESS NOTES
1. Have you been to the ER, urgent care clinic since your last visit?  Hospitalized since your last visit?     No    2. Have you seen or consulted any other health care providers outside of the [facility] since your last visit?  Include any pap smears or colon screening.      No      
maintenance.    12/29/2021 echo  Normal size left ventricle, moderate concentric hypertrophy with normal  systolic function and a calculated ejection fraction of 61%. No regional wall  motion abnormalities.  Normal diastolic function.  Normal size right ventricle, normal systolic function.  Mild to moderate aortic stenosis with a mean gradient of 9mmHg, peak velocity  of 2.1m/sec and a Dimensionless index: 0.37.  Thickened mitral valve with good mobility.  Estimated right ventricular systolic pressure: 29mmHg.  Other findings are noted below.     Prior echo was done 2/24/2015 the EF: 60%, LA enlarged, RVSP: 28mmHg.  08/08/23    TRANSTHORACIC ECHOCARDIOGRAM (TTE) COMPLETE (CONTRAST/BUBBLE/3D PRN) 08/08/2023  1:50 PM (Final)    Interpretation Summary    Left Ventricle: Normal left ventricular systolic function with a visually estimated EF of 55 - 60%. Left ventricle size is normal. Increased wall thickness. Findings consistent with mild concentric hypertrophy. Normal wall motion. Grade II diastolic dysfunction with increased LAP.    Aortic Valve: Trileaflet valve. Calcified cusp. Mild stenosis of the aortic valve. AV mean gradient is 10 mmHg. AV peak gradient is 18 mmHg. LVOT:AV VTI Index is 0.36. AV area by continuity VTI is 1.1 cm2.    Mitral Valve: MV mean gradient is 5 mmHg. Thickened leaflet. Mild annular calcification of the mitral valve. Mildly thickened posterior subvalvular apparatus. Mild stenosis noted. MV mean gradient is 5 mmHg.    Left Atrium: Left atrium is mildly dilated. LA Vol Index A/L is 39 mL/m2.    Aorta: Normal sized aortic root. Mildly dilated ascending aorta. Ao ascending diameter is 3.7 cm.    Signed by: Von Sue MD on 8/8/2023  1:50 PM  08/08/23    NM STRESS TEST WITH MYOCARDIAL PERFUSION 08/10/2023  7:35 AM (Final)    Interpretation Summary    Stress Combined Conclusion: Normal pharmacological myocardial perfusion study. Findings suggest a low risk of cardiac events.    Stress

## 2024-05-01 DIAGNOSIS — I50.9 CHRONIC CONGESTIVE HEART FAILURE, UNSPECIFIED HEART FAILURE TYPE (HCC): ICD-10-CM

## 2024-05-01 RX ORDER — AMLODIPINE BESYLATE 5 MG/1
5 TABLET ORAL DAILY
Qty: 90 TABLET | Refills: 3 | Status: SHIPPED | OUTPATIENT
Start: 2024-05-01

## 2024-07-25 DIAGNOSIS — I50.9 CHRONIC CONGESTIVE HEART FAILURE, UNSPECIFIED HEART FAILURE TYPE (HCC): ICD-10-CM

## 2024-07-25 RX ORDER — AMLODIPINE BESYLATE 5 MG/1
5 TABLET ORAL DAILY
Qty: 90 TABLET | Refills: 1 | Status: SHIPPED | OUTPATIENT
Start: 2024-07-25

## 2024-07-25 RX ORDER — FUROSEMIDE 20 MG/1
20 TABLET ORAL DAILY
Qty: 90 TABLET | Refills: 1 | Status: SHIPPED | OUTPATIENT
Start: 2024-07-25

## 2024-08-22 ENCOUNTER — OFFICE VISIT (OUTPATIENT)
Age: 89
End: 2024-08-22

## 2024-08-22 VITALS
WEIGHT: 136 LBS | BODY MASS INDEX: 29.34 KG/M2 | OXYGEN SATURATION: 96 % | HEART RATE: 88 BPM | SYSTOLIC BLOOD PRESSURE: 123 MMHG | DIASTOLIC BLOOD PRESSURE: 64 MMHG | HEIGHT: 57 IN

## 2024-08-22 DIAGNOSIS — I35.0 NONRHEUMATIC AORTIC VALVE STENOSIS: ICD-10-CM

## 2024-08-22 DIAGNOSIS — Z86.73 HISTORY OF CVA (CEREBROVASCULAR ACCIDENT): ICD-10-CM

## 2024-08-22 DIAGNOSIS — E78.00 HYPERCHOLESTEREMIA: ICD-10-CM

## 2024-08-22 DIAGNOSIS — I50.9 CHRONIC CONGESTIVE HEART FAILURE, UNSPECIFIED HEART FAILURE TYPE (HCC): Primary | ICD-10-CM

## 2024-08-22 DIAGNOSIS — I10 ESSENTIAL HYPERTENSION: ICD-10-CM

## 2024-08-22 ASSESSMENT — ENCOUNTER SYMPTOMS
SHORTNESS OF BREATH: 0
RESPIRATORY NEGATIVE: 1
EYES NEGATIVE: 1
COUGH: 0
CHEST TIGHTNESS: 0
GASTROINTESTINAL NEGATIVE: 1
APNEA: 0
BACK PAIN: 0
ABDOMINAL PAIN: 0
WHEEZING: 0
ABDOMINAL DISTENTION: 0
NAUSEA: 0

## 2024-08-22 ASSESSMENT — PATIENT HEALTH QUESTIONNAIRE - PHQ9
1. LITTLE INTEREST OR PLEASURE IN DOING THINGS: NOT AT ALL
SUM OF ALL RESPONSES TO PHQ9 QUESTIONS 1 & 2: 0
2. FEELING DOWN, DEPRESSED OR HOPELESS: NOT AT ALL
DEPRESSION UNABLE TO ASSESS: FUNCTIONAL CAPACITY MOTIVATION LIMITS ACCURACY
SUM OF ALL RESPONSES TO PHQ QUESTIONS 1-9: 0

## 2024-08-22 NOTE — PROGRESS NOTES
1. Have you been to the ER, urgent care clinic since your last visit?  Hospitalized since your last visit?     No    2. Have you seen or consulted any other health care providers outside of the [facility] since your last visit?  Include any pap smears or colon screening.      No      
(LASIX) 20 MG tablet TAKE ONE TABLET BY MOUTH EVERY DAY 90 tablet 1    amLODIPine (NORVASC) 5 MG tablet TAKE ONE TABLET BY MOUTH EVERY DAY 90 tablet 1    brimonidine (ALPHAGAN P) 0.1 % SOLN       cycloSPORINE (RESTASIS) 0.05 % ophthalmic emulsion       Elastic Bandages & Supports (MEDICAL COMPRESSION STOCKINGS) MISC 1 each by Does not apply route daily as needed (Remove at night) Knee-high, moderate compression 1 each 0    vitamin B-12 (CYANOCOBALAMIN) 1000 MCG tablet Take 1 tablet by mouth daily      MECLIZINE HCL PO Take by mouth as needed      ACETAMINOPHEN PO Take 650 mg by mouth as needed      aspirin 325 MG tablet Take 1 tablet by mouth daily (with breakfast)      atorvastatin (LIPITOR) 80 MG tablet Take 1 tablet by mouth daily      cetirizine (ZYRTEC) 10 MG tablet Take 1 tablet by mouth daily      vitamin D (CHOLECALCIFEROL) 25 MCG (1000 UT) TABS tablet Take 2 tablets by mouth daily      diclofenac sodium (VOLTAREN) 1 % GEL Apply 2 g topically 4 times daily      dorzolamide (TRUSOPT) 2 % ophthalmic solution Apply 1 drop to eye 3 times daily      latanoprost (XALATAN) 0.005 % ophthalmic solution Apply 1 drop to eye      montelukast (SINGULAIR) 10 MG tablet Take 1 tablet by mouth daily      oxybutynin (DITROPAN-XL) 10 MG extended release tablet Take 1 tablet by mouth      sertraline (ZOLOFT) 50 MG tablet Take 1 tablet by mouth daily       No current facility-administered medications for this visit.        Past Surgical History:   Procedure Laterality Date    HYSTERECTOMY (CERVIX STATUS UNKNOWN)         Vitals:    08/22/24 1321 08/22/24 1328   BP: (!) 149/71 123/64   Site: Left Upper Arm Left Upper Arm   Position: Sitting Sitting   Cuff Size: Medium Adult Medium Adult   Pulse: 89 88   SpO2: 96% 96%   Weight: 61.7 kg (136 lb)    Height: 1.448 m (4' 9\")           Diagnostic Studies:  I have reviewed the relevant tests done on the patient and show as follows  EKG tracings reviewed by me today.      Ms. Coffey has

## 2024-10-20 PROBLEM — K92.2 UPPER GI BLEED: Status: ACTIVE | Noted: 2024-10-20

## 2024-10-24 PROBLEM — K44.9 HIATAL HERNIA: Status: ACTIVE | Noted: 2024-10-24

## 2024-10-24 PROBLEM — K22.11 ULCER OF ESOPHAGUS WITH BLEEDING: Status: ACTIVE | Noted: 2024-10-24

## 2025-01-13 ENCOUNTER — OFFICE VISIT (OUTPATIENT)
Age: 89
End: 2025-01-13
Payer: MEDICARE

## 2025-01-13 VITALS
DIASTOLIC BLOOD PRESSURE: 70 MMHG | SYSTOLIC BLOOD PRESSURE: 122 MMHG | BODY MASS INDEX: 29.12 KG/M2 | HEART RATE: 105 BPM | WEIGHT: 135 LBS | RESPIRATION RATE: 16 BRPM | OXYGEN SATURATION: 93 % | TEMPERATURE: 35.6 F | HEIGHT: 57 IN

## 2025-01-13 DIAGNOSIS — R13.10 DYSPHAGIA, UNSPECIFIED TYPE: ICD-10-CM

## 2025-01-13 DIAGNOSIS — I50.9 CHRONIC CONGESTIVE HEART FAILURE, UNSPECIFIED HEART FAILURE TYPE (HCC): ICD-10-CM

## 2025-01-13 DIAGNOSIS — K22.11 ULCER OF ESOPHAGUS WITH BLEEDING: ICD-10-CM

## 2025-01-13 DIAGNOSIS — K44.9 PARAESOPHAGEAL HERNIA: Primary | ICD-10-CM

## 2025-01-13 DIAGNOSIS — K21.00 GASTROESOPHAGEAL REFLUX DISEASE WITH ESOPHAGITIS, UNSPECIFIED WHETHER HEMORRHAGE: ICD-10-CM

## 2025-01-13 DIAGNOSIS — I35.0 NONRHEUMATIC AORTIC VALVE STENOSIS: ICD-10-CM

## 2025-01-13 DIAGNOSIS — I10 ESSENTIAL HYPERTENSION: ICD-10-CM

## 2025-01-13 PROBLEM — Z22.358 EXTENDED SPECTRUM BETA-LACTAMASE (ESBL) ESCHERICHIA COLI CARRIER: Status: ACTIVE | Noted: 2022-01-15

## 2025-01-13 PROBLEM — M17.12 PRIMARY OSTEOARTHRITIS OF LEFT KNEE: Status: ACTIVE | Noted: 2022-09-13

## 2025-01-13 PROBLEM — N91.2 AMENORRHEA: Status: ACTIVE | Noted: 2025-01-13

## 2025-01-13 PROBLEM — R42 VERTIGO: Status: ACTIVE | Noted: 2023-03-20

## 2025-01-13 PROBLEM — I87.2 VENOUS INSUFFICIENCY OF LEFT LEG: Status: ACTIVE | Noted: 2022-09-13

## 2025-01-13 PROBLEM — M79.89 LEFT LEG SWELLING: Status: ACTIVE | Noted: 2022-08-19

## 2025-01-13 PROBLEM — F32.A DEPRESSIVE DISORDER: Status: ACTIVE | Noted: 2025-01-13

## 2025-01-13 PROBLEM — E53.8 B12 DEFICIENCY: Status: ACTIVE | Noted: 2022-11-22

## 2025-01-13 PROBLEM — F32.1 CURRENT MODERATE EPISODE OF MAJOR DEPRESSIVE DISORDER WITHOUT PRIOR EPISODE (HCC): Status: ACTIVE | Noted: 2020-06-08

## 2025-01-13 PROBLEM — I69.354 HEMIPLEGIA AND HEMIPARESIS FOLLOWING CEREBRAL INFARCTION AFFECTING LEFT NON-DOMINANT SIDE (HCC): Status: ACTIVE | Noted: 2022-01-28

## 2025-01-13 PROBLEM — R30.0 DYSURIA: Status: ACTIVE | Noted: 2023-01-30

## 2025-01-13 PROBLEM — R26.9 UNSPECIFIED ABNORMALITIES OF GAIT AND MOBILITY: Status: ACTIVE | Noted: 2022-09-13

## 2025-01-13 PROBLEM — M19.90 ARTHRITIS: Status: ACTIVE | Noted: 2025-01-13

## 2025-01-13 PROBLEM — R22.42 LOCALIZED SWELLING OF LEFT LOWER LEG: Status: ACTIVE | Noted: 2022-08-19

## 2025-01-13 PROBLEM — M54.9 BACKACHE: Status: ACTIVE | Noted: 2025-01-13

## 2025-01-13 PROBLEM — R26.9 GAIT DISTURBANCE, POST-STROKE: Status: ACTIVE | Noted: 2022-09-13

## 2025-01-13 PROBLEM — Z22.322 COLONIZATION WITH MULTIDRUG-RESISTANT BACTERIA: Status: ACTIVE | Noted: 2022-01-15

## 2025-01-13 PROBLEM — K92.1 HEMATOCHEZIA: Status: ACTIVE | Noted: 2025-01-13

## 2025-01-13 PROBLEM — Z48.89 AFTERCARE FOLLOWING SURGERY TO BODY SYSTEM: Status: ACTIVE | Noted: 2024-03-27

## 2025-01-13 PROBLEM — N32.81 OVERACTIVE BLADDER: Status: ACTIVE | Noted: 2020-06-08

## 2025-01-13 PROBLEM — I63.9 ACUTE ISCHEMIC STROKE (HCC): Status: ACTIVE | Noted: 2021-12-26

## 2025-01-13 PROBLEM — I69.398 GAIT DISTURBANCE, POST-STROKE: Status: ACTIVE | Noted: 2022-09-13

## 2025-01-13 PROBLEM — Z96.652 STATUS POST LEFT KNEE REPLACEMENT: Status: ACTIVE | Noted: 2024-03-28

## 2025-01-13 PROBLEM — R35.1 NOCTURIA: Status: ACTIVE | Noted: 2025-01-13

## 2025-01-13 PROBLEM — N94.3 PREMENSTRUAL TENSION SYNDROME: Status: ACTIVE | Noted: 2025-01-13

## 2025-01-13 PROBLEM — H81.12 BENIGN PAROXYSMAL POSITIONAL VERTIGO OF LEFT EAR: Status: ACTIVE | Noted: 2022-06-03

## 2025-01-13 PROBLEM — N39.0 URINARY TRACT INFECTIOUS DISEASE: Status: ACTIVE | Noted: 2021-11-15

## 2025-01-13 PROBLEM — R53.82 CHRONIC FATIGUE: Status: ACTIVE | Noted: 2021-08-09

## 2025-01-13 PROBLEM — M41.9 SCOLIOSIS OF CERVICAL SPINE: Status: ACTIVE | Noted: 2025-01-13

## 2025-01-13 PROBLEM — J30.89 NON-SEASONAL ALLERGIC RHINITIS: Status: ACTIVE | Noted: 2020-09-17

## 2025-01-13 PROBLEM — E78.00 HYPERCHOLESTEROLEMIA: Status: ACTIVE | Noted: 2020-06-08

## 2025-01-13 PROCEDURE — 1123F ACP DISCUSS/DSCN MKR DOCD: CPT | Performed by: SURGERY

## 2025-01-13 PROCEDURE — 1160F RVW MEDS BY RX/DR IN RCRD: CPT | Performed by: SURGERY

## 2025-01-13 PROCEDURE — 1159F MED LIST DOCD IN RCRD: CPT | Performed by: SURGERY

## 2025-01-13 PROCEDURE — 99204 OFFICE O/P NEW MOD 45 MIN: CPT | Performed by: SURGERY

## 2025-01-13 RX ORDER — PSEUDOEPHEDRINE HCL 30 MG
TABLET ORAL
COMMUNITY
Start: 2024-04-04 | End: 2025-01-13

## 2025-01-13 RX ORDER — ALBUTEROL SULFATE 90 UG/1
INHALANT RESPIRATORY (INHALATION)
COMMUNITY
End: 2025-01-13

## 2025-01-13 RX ORDER — PANTOPRAZOLE SODIUM 40 MG/1
40 TABLET, DELAYED RELEASE ORAL 2 TIMES DAILY
Qty: 60 TABLET | Refills: 2 | Status: SHIPPED | OUTPATIENT
Start: 2025-01-13

## 2025-01-13 RX ORDER — FAMOTIDINE 20 MG/1
TABLET, FILM COATED ORAL
COMMUNITY
Start: 2024-04-04 | End: 2025-01-13

## 2025-01-13 NOTE — PROGRESS NOTES
Chief Complaint   Patient presents with    New Patient    Surgical Consult     Hiatal Hernia      
PAULO  Bariatric and General Surgeon  [facility]    January 13, 2025

## 2025-01-29 ENCOUNTER — OFFICE VISIT (OUTPATIENT)
Age: 89
End: 2025-01-29
Payer: MEDICARE

## 2025-01-29 VITALS
DIASTOLIC BLOOD PRESSURE: 73 MMHG | OXYGEN SATURATION: 99 % | BODY MASS INDEX: 28.91 KG/M2 | SYSTOLIC BLOOD PRESSURE: 133 MMHG | HEART RATE: 77 BPM | HEIGHT: 57 IN | WEIGHT: 134 LBS

## 2025-01-29 DIAGNOSIS — I35.0 NONRHEUMATIC AORTIC VALVE STENOSIS: ICD-10-CM

## 2025-01-29 DIAGNOSIS — I50.9 CHRONIC CONGESTIVE HEART FAILURE, UNSPECIFIED HEART FAILURE TYPE (HCC): Primary | ICD-10-CM

## 2025-01-29 DIAGNOSIS — Z01.818 PRE-OP EXAM: ICD-10-CM

## 2025-01-29 DIAGNOSIS — E78.2 MIXED HYPERLIPIDEMIA: ICD-10-CM

## 2025-01-29 DIAGNOSIS — I10 ESSENTIAL HYPERTENSION: ICD-10-CM

## 2025-01-29 DIAGNOSIS — Z86.73 HISTORY OF CVA (CEREBROVASCULAR ACCIDENT): ICD-10-CM

## 2025-01-29 PROCEDURE — 1123F ACP DISCUSS/DSCN MKR DOCD: CPT | Performed by: INTERNAL MEDICINE

## 2025-01-29 PROCEDURE — 99214 OFFICE O/P EST MOD 30 MIN: CPT | Performed by: INTERNAL MEDICINE

## 2025-01-29 PROCEDURE — 93000 ELECTROCARDIOGRAM COMPLETE: CPT | Performed by: INTERNAL MEDICINE

## 2025-01-29 PROCEDURE — 1126F AMNT PAIN NOTED NONE PRSNT: CPT | Performed by: INTERNAL MEDICINE

## 2025-01-29 RX ORDER — FUROSEMIDE 40 MG/1
40 TABLET ORAL DAILY
Qty: 90 TABLET | Refills: 1 | Status: SHIPPED | OUTPATIENT
Start: 2025-01-29

## 2025-01-29 RX ORDER — FERROUS SULFATE 325(65) MG
325 TABLET ORAL
COMMUNITY

## 2025-01-29 RX ORDER — AMLODIPINE BESYLATE 2.5 MG/1
2.5 TABLET ORAL DAILY
Qty: 90 TABLET | Refills: 1 | Status: SHIPPED | OUTPATIENT
Start: 2025-01-29

## 2025-01-29 ASSESSMENT — ENCOUNTER SYMPTOMS
WHEEZING: 0
CHEST TIGHTNESS: 0
APNEA: 0
SHORTNESS OF BREATH: 1
COUGH: 0
ABDOMINAL PAIN: 0
EYES NEGATIVE: 1
GASTROINTESTINAL NEGATIVE: 1
ABDOMINAL DISTENTION: 0
NAUSEA: 0
BACK PAIN: 0

## 2025-01-29 ASSESSMENT — PATIENT HEALTH QUESTIONNAIRE - PHQ9
SUM OF ALL RESPONSES TO PHQ QUESTIONS 1-9: 0
1. LITTLE INTEREST OR PLEASURE IN DOING THINGS: NOT AT ALL
2. FEELING DOWN, DEPRESSED OR HOPELESS: NOT AT ALL
SUM OF ALL RESPONSES TO PHQ QUESTIONS 1-9: 0
SUM OF ALL RESPONSES TO PHQ QUESTIONS 1-9: 0
SUM OF ALL RESPONSES TO PHQ9 QUESTIONS 1 & 2: 0
SUM OF ALL RESPONSES TO PHQ QUESTIONS 1-9: 0
DEPRESSION UNABLE TO ASSESS: FUNCTIONAL CAPACITY MOTIVATION LIMITS ACCURACY

## 2025-01-29 NOTE — PROGRESS NOTES
1. Have you been to the ER, urgent care clinic since your last visit?  Hospitalized since your last visit?     Yes, [facility]      2.  Where do you normally have your labs drawn?   [facility]    3. Do you need any refills today?   No    4. Which local pharmacy do you use (enter pharmacy)?   On[facility]site [facility]    5. Which mail order pharmacy do you use (enter pharmacy)?   No     6. Are you here for surgical clearance and if so who will be doing your     procedure/surgery (care team)?   Yes,       
(cerebrovascular accident)    Mixed hyperlipidemia  -     Hepatic Function Panel; Future  -     Lipid Panel; Future    Pre-op exam          See patient instructions also for other medical advice given    Medications Discontinued During This Encounter   Medication Reason    pantoprazole (PROTONIX) 40 MG tablet DUPLICATE    furosemide (LASIX) 20 MG tablet REORDER    amLODIPine (NORVASC) 5 MG tablet REORDER       Follow-up and Dispositions    Return in about 3 months (around 4/29/2025), or if symptoms worsen or fail to improve, for post test/procedure.           Return to ER if any significant CP not relieved by s/l NTG, severe SOB, severe palpitations, loss of consciousness    1/29/2025  Plan for medicines : Given CHF, increase Lasix.  Reduce amlodipine to avoid hypotension.  This may also help in edema.  Check echo to follow-up on aortic stenosis prior to clearing for surgery.  If echo does not show any moderate or severe aortic stenosis, she will have low risk for this percutaneous endoscopic surgery.  Exercise Plan: Stay active and walk as much as possible.  Diet plan: Avoid salty foods specially processed meats.

## 2025-01-31 ENCOUNTER — TELEPHONE (OUTPATIENT)
Age: 89
End: 2025-01-31

## 2025-01-31 NOTE — TELEPHONE ENCOUNTER
Paperwork for clearance has been faxed over to office. Patient caregiver was made aware of clearance.

## 2025-01-31 NOTE — TELEPHONE ENCOUNTER
----- Message from Dr. Von Sue MD sent at 1/31/2025 12:30 PM EST -----  Please send a clearance for hiatal hernia surgery with moderate risk.  Watch for hypotension and CHF during and after surgery.  ----- Message -----  From: Lesley Ariza  Sent: 1/31/2025   9:30 AM EST  To: oVn Sue MD    Please review echo for clearance.

## 2025-02-07 ENCOUNTER — TELEPHONE (OUTPATIENT)
Age: 89
End: 2025-02-07

## 2025-02-07 NOTE — TELEPHONE ENCOUNTER
Patient's son called regarding the clearance sent by the patient's cardiologist. He stated that the clearance has been sent and wants to proceed with scheduling the surgery. Thank you.

## 2025-02-19 ENCOUNTER — HOSPITAL ENCOUNTER (OUTPATIENT)
Facility: HOSPITAL | Age: 89
Discharge: HOME | End: 2025-02-22
Payer: MEDICARE

## 2025-02-19 DIAGNOSIS — I10 ESSENTIAL HYPERTENSION: ICD-10-CM

## 2025-02-19 DIAGNOSIS — K22.11 ULCER OF ESOPHAGUS WITH BLEEDING: ICD-10-CM

## 2025-02-19 DIAGNOSIS — I50.9 CHRONIC CONGESTIVE HEART FAILURE, UNSPECIFIED HEART FAILURE TYPE (HCC): ICD-10-CM

## 2025-02-19 DIAGNOSIS — K21.00 GASTROESOPHAGEAL REFLUX DISEASE WITH ESOPHAGITIS, UNSPECIFIED WHETHER HEMORRHAGE: ICD-10-CM

## 2025-02-19 DIAGNOSIS — K44.9 PARAESOPHAGEAL HERNIA: ICD-10-CM

## 2025-02-19 DIAGNOSIS — I35.0 NONRHEUMATIC AORTIC VALVE STENOSIS: ICD-10-CM

## 2025-02-19 DIAGNOSIS — R13.10 DYSPHAGIA, UNSPECIFIED TYPE: ICD-10-CM

## 2025-02-19 DIAGNOSIS — E78.2 MIXED HYPERLIPIDEMIA: ICD-10-CM

## 2025-02-19 LAB
ALBUMIN SERPL-MCNC: 3.2 G/DL (ref 3.4–5)
ALBUMIN SERPL-MCNC: 3.2 G/DL (ref 3.4–5)
ALBUMIN/GLOB SERPL: 0.8 (ref 0.8–1.7)
ALBUMIN/GLOB SERPL: 0.9 (ref 0.8–1.7)
ALP SERPL-CCNC: 108 U/L (ref 45–117)
ALP SERPL-CCNC: 113 U/L (ref 45–117)
ALT SERPL-CCNC: 22 U/L (ref 13–56)
ALT SERPL-CCNC: 23 U/L (ref 13–56)
ANION GAP SERPL CALC-SCNC: 7 MMOL/L (ref 3–18)
ANION GAP SERPL CALC-SCNC: 7 MMOL/L (ref 3–18)
AST SERPL-CCNC: 22 U/L (ref 10–38)
AST SERPL-CCNC: 22 U/L (ref 10–38)
BASOPHILS # BLD: 0.05 K/UL (ref 0–0.1)
BASOPHILS NFR BLD: 0.9 % (ref 0–2)
BILIRUB DIRECT SERPL-MCNC: 0.2 MG/DL (ref 0–0.2)
BILIRUB SERPL-MCNC: 0.5 MG/DL (ref 0.2–1)
BILIRUB SERPL-MCNC: 0.5 MG/DL (ref 0.2–1)
BUN SERPL-MCNC: 19 MG/DL (ref 7–18)
BUN SERPL-MCNC: 19 MG/DL (ref 7–18)
BUN/CREAT SERPL: 17 (ref 12–20)
BUN/CREAT SERPL: 18 (ref 12–20)
CALCIUM SERPL-MCNC: 8.7 MG/DL (ref 8.5–10.1)
CALCIUM SERPL-MCNC: 8.7 MG/DL (ref 8.5–10.1)
CHLORIDE SERPL-SCNC: 104 MMOL/L (ref 100–111)
CHLORIDE SERPL-SCNC: 104 MMOL/L (ref 100–111)
CHOLEST SERPL-MCNC: 168 MG/DL
CO2 SERPL-SCNC: 30 MMOL/L (ref 21–32)
CO2 SERPL-SCNC: 31 MMOL/L (ref 21–32)
CREAT SERPL-MCNC: 1.07 MG/DL (ref 0.6–1.3)
CREAT SERPL-MCNC: 1.11 MG/DL (ref 0.6–1.3)
DIFFERENTIAL METHOD BLD: ABNORMAL
EOSINOPHIL # BLD: 0.07 K/UL (ref 0–0.4)
EOSINOPHIL NFR BLD: 1.2 % (ref 0–5)
ERYTHROCYTE [DISTWIDTH] IN BLOOD BY AUTOMATED COUNT: 16.2 % (ref 11.6–14.5)
EST. AVERAGE GLUCOSE BLD GHB EST-MCNC: 128 MG/DL
FERRITIN SERPL-MCNC: 27 NG/ML (ref 8–388)
GLOBULIN SER CALC-MCNC: 3.7 G/DL (ref 2–4)
GLOBULIN SER CALC-MCNC: 3.8 G/DL (ref 2–4)
GLUCOSE SERPL-MCNC: 87 MG/DL (ref 74–99)
GLUCOSE SERPL-MCNC: 89 MG/DL (ref 74–99)
HBA1C MFR BLD: 6.1 % (ref 4.2–5.6)
HCT VFR BLD AUTO: 42.3 % (ref 35–45)
HDLC SERPL-MCNC: 58 MG/DL (ref 40–60)
HDLC SERPL: 2.9 (ref 0–5)
HGB BLD-MCNC: 13.1 G/DL (ref 12–16)
IMM GRANULOCYTES # BLD AUTO: 0.02 K/UL (ref 0–0.04)
IMM GRANULOCYTES NFR BLD AUTO: 0.3 % (ref 0–0.5)
IRON SATN MFR SERPL: 22 % (ref 20–50)
IRON SERPL-MCNC: 82 UG/DL (ref 50–175)
LDLC SERPL CALC-MCNC: 72.6 MG/DL (ref 0–100)
LIPID PANEL: ABNORMAL
LYMPHOCYTES # BLD: 0.97 K/UL (ref 0.9–3.6)
LYMPHOCYTES NFR BLD: 16.6 % (ref 21–52)
MCH RBC QN AUTO: 26.2 PG (ref 24–34)
MCHC RBC AUTO-ENTMCNC: 31 G/DL (ref 31–37)
MCV RBC AUTO: 84.6 FL (ref 78–100)
MONOCYTES # BLD: 0.63 K/UL (ref 0.05–1.2)
MONOCYTES NFR BLD: 10.8 % (ref 3–10)
NEUTS SEG # BLD: 4.1 K/UL (ref 1.8–8)
NEUTS SEG NFR BLD: 70.2 % (ref 40–73)
NRBC # BLD: 0 K/UL (ref 0–0.01)
NRBC BLD-RTO: 0 PER 100 WBC
PLATELET # BLD AUTO: 214 K/UL (ref 135–420)
PMV BLD AUTO: 12.3 FL (ref 9.2–11.8)
POTASSIUM SERPL-SCNC: 4 MMOL/L (ref 3.5–5.5)
POTASSIUM SERPL-SCNC: 4.3 MMOL/L (ref 3.5–5.5)
PROT SERPL-MCNC: 6.9 G/DL (ref 6.4–8.2)
PROT SERPL-MCNC: 7 G/DL (ref 6.4–8.2)
RBC # BLD AUTO: 5 M/UL (ref 4.2–5.3)
SODIUM SERPL-SCNC: 141 MMOL/L (ref 136–145)
SODIUM SERPL-SCNC: 142 MMOL/L (ref 136–145)
TIBC SERPL-MCNC: 368 UG/DL (ref 250–450)
TRIGL SERPL-MCNC: 187 MG/DL
VLDLC SERPL CALC-MCNC: 37.4 MG/DL
WBC # BLD AUTO: 5.8 K/UL (ref 4.6–13.2)

## 2025-02-19 PROCEDURE — 80061 LIPID PANEL: CPT

## 2025-02-19 PROCEDURE — 80053 COMPREHEN METABOLIC PANEL: CPT

## 2025-02-19 PROCEDURE — 82728 ASSAY OF FERRITIN: CPT

## 2025-02-19 PROCEDURE — 83550 IRON BINDING TEST: CPT

## 2025-02-19 PROCEDURE — 36415 COLL VENOUS BLD VENIPUNCTURE: CPT

## 2025-02-19 PROCEDURE — 80076 HEPATIC FUNCTION PANEL: CPT

## 2025-02-19 PROCEDURE — 83540 ASSAY OF IRON: CPT

## 2025-02-19 PROCEDURE — 85025 COMPLETE CBC W/AUTO DIFF WBC: CPT

## 2025-02-19 PROCEDURE — 83036 HEMOGLOBIN GLYCOSYLATED A1C: CPT

## 2025-02-24 ENCOUNTER — OFFICE VISIT (OUTPATIENT)
Age: 89
End: 2025-02-24
Payer: MEDICARE

## 2025-02-24 VITALS
HEART RATE: 80 BPM | WEIGHT: 136 LBS | SYSTOLIC BLOOD PRESSURE: 130 MMHG | HEIGHT: 60 IN | RESPIRATION RATE: 20 BRPM | BODY MASS INDEX: 26.7 KG/M2 | OXYGEN SATURATION: 95 % | DIASTOLIC BLOOD PRESSURE: 70 MMHG

## 2025-02-24 DIAGNOSIS — I10 ESSENTIAL HYPERTENSION: ICD-10-CM

## 2025-02-24 DIAGNOSIS — R73.03 PREDIABETES: ICD-10-CM

## 2025-02-24 DIAGNOSIS — R13.10 DYSPHAGIA, UNSPECIFIED TYPE: ICD-10-CM

## 2025-02-24 DIAGNOSIS — I50.9 CHRONIC CONGESTIVE HEART FAILURE, UNSPECIFIED HEART FAILURE TYPE (HCC): ICD-10-CM

## 2025-02-24 DIAGNOSIS — K44.9 PARAESOPHAGEAL HERNIA: Primary | ICD-10-CM

## 2025-02-24 DIAGNOSIS — K22.11 ULCER OF ESOPHAGUS WITH BLEEDING: ICD-10-CM

## 2025-02-24 DIAGNOSIS — K21.00 GASTROESOPHAGEAL REFLUX DISEASE WITH ESOPHAGITIS, UNSPECIFIED WHETHER HEMORRHAGE: ICD-10-CM

## 2025-02-24 PROCEDURE — 1126F AMNT PAIN NOTED NONE PRSNT: CPT | Performed by: SURGERY

## 2025-02-24 PROCEDURE — 1160F RVW MEDS BY RX/DR IN RCRD: CPT | Performed by: SURGERY

## 2025-02-24 PROCEDURE — 99214 OFFICE O/P EST MOD 30 MIN: CPT | Performed by: SURGERY

## 2025-02-24 PROCEDURE — 1123F ACP DISCUSS/DSCN MKR DOCD: CPT | Performed by: SURGERY

## 2025-02-24 PROCEDURE — 1159F MED LIST DOCD IN RCRD: CPT | Performed by: SURGERY

## 2025-02-24 NOTE — PROGRESS NOTES
Foregut Surgery Progress Note    CC: Preop appointment for surgery  Subjective:     Patient presents for a preop appointment.     REVIEW:     The patient presents as a consultation regarding large paraesophageal hernia, history of UGI bleed requiring admission, endoscopic epinephrine injection and cauterization of visible vessel (10/21/24). History of CVA with chronic aspirin use, but aspirin was stopped at the time. Sees Dr. Sue and Ebony William (cardiologist).     Reports early satiety and anorexia  Denies nausea  Denies vomiting  Denies dysphagia  Denies regurgitation  Reports history of heartburn treated with PRN TUMS, but not for months  Denies epigastric bloating  Reports sleeping at incline but not for symptoms related to GERD.  Denies SOB    Currently taking BID PPI     Denies diarrhea/constipation. Reports melanotic stools since hospitalization above.      Workup completed thus far:  EGD on 10/21 with Dr. Jose R Monet  FINDINGS: Esophagus --12 mm ulcer with visible vessel and adherent clot was seen in the distal esophagus at 30 cm. Ulcer was not actively bleeding. Ulcer was treated with dilute epinephrine injection (3 cc total, 1:10,000, four-quadrant fashion). Vessel wasablated with bipolar cautery (gold probe 7Fr). There was no bleeding seen following endoscopic treatment. Second smaller ulcer (6mm) was seen on opposite wall. Focal diffuse ulcerative esophagitis was also seen in the upper-midesophagus (20-24 cm). Giant hiatal hernia was seen, involving the majority of the stomach. Diaphragm impression was seen at 40 cm. GE junction was seen at 30 cm. Stomach - Gastric anatomy was deformed on account of a large hiatal hernia. The stomach was Jshaped,and the antrum/gastric outlet was narrowed due to acute angulation. With some difficulty, the endoscope was able to maneuver past the pylorus and into the duodenum. There was evidence of old hematin and few blood clots in the stomach which were cleared. No

## 2025-02-24 NOTE — PROGRESS NOTES
Chief Complaint   Patient presents with    Follow-up     Paraesophageal hernia    1. Have you been to the ER, urgent care clinic since your last visit?  Hospitalized since your last visit?No    2. Have you seen or consulted any other health care providers outside of the [facility] since your last visit?  Include any pap smears or colon screening. Yes cardiologist

## 2025-02-25 ENCOUNTER — PREP FOR PROCEDURE (OUTPATIENT)
Age: 89
End: 2025-02-25

## 2025-02-25 DIAGNOSIS — K44.9 PARAESOPHAGEAL HERNIA: ICD-10-CM

## 2025-02-25 DIAGNOSIS — R73.03 PREDIABETES: ICD-10-CM

## 2025-02-25 PROBLEM — K21.00 GASTROESOPHAGEAL REFLUX DISEASE WITH ESOPHAGITIS: Status: ACTIVE | Noted: 2025-02-25

## 2025-03-19 ENCOUNTER — TELEPHONE (OUTPATIENT)
Age: 89
End: 2025-03-19

## 2025-03-19 NOTE — PERIOP NOTE
Instructions for your surgery at [facility]      Today's Date:  3/19/2025      Patient's Name:  Marissa Coffey           Surgery Date:  3/26              Please enter the main entrance of the hospital and check-in at the front security desk located in the lobby. They will direct you to the area to report for your surgery.     Do NOT eat or drink anything, including candy, gum, or ice chips after midnight prior to your surgery, unless you have specific instructions from your surgeon or anesthesia provider to do so.  Brush your teeth before coming to the hospital. You may swish with water, but do not swallow.  No smoking/Vaping/E-Cigarettes 24 hours prior to the day of surgery.  No alcohol 24 hours prior to the day of surgery.  No recreational drugs for one week prior to the day of surgery.  Bring Photo ID, Insurance information, and Co-pay if required on day of surgery.  Bring in pertinent legal documents, such as, Medical Power of , DNR, Advance Directive, etc.  Leave all valuables, including money/purse, weapons at home.  Remove all jewelry, including ALL body piercings, nail polish, acrylic nails, and makeup (including mascara); no lotions, powders, deodorant, or perfume/cologne/after shave on the skin.  Follow instruction for Hibiclens washes and CHG wipes from surgeon's office.   Glasses and dentures may be worn to the hospital. They must be removed prior to surgery. Please bring case/container for glasses or dentures.   Contact lenses should not be worn on day of surgery.   Call your doctor's office if symptoms of a cold or illness develop within 24-48 hours prior to your surgery.  Call your doctor's office if you have any questions concerning insurance or co-pays.  15. AN ADULT (relative or friend 18 years or older) MUST DRIVE YOU HOME AFTER YOUR SURGERY.  16. Please make arrangements for a responsible adult (18 years or older) to be with you for 24 hours after your

## 2025-03-19 NOTE — TELEPHONE ENCOUNTER
Spoke to son who has questioned about crushing medications after surgery, explained everything will be discussed prior to discharge and he will purchase a pill  in preparation.

## 2025-03-25 ENCOUNTER — ANESTHESIA EVENT (OUTPATIENT)
Facility: HOSPITAL | Age: 89
End: 2025-03-25
Payer: MEDICARE

## 2025-03-25 ENCOUNTER — TELEPHONE (OUTPATIENT)
Age: 89
End: 2025-03-25

## 2025-03-25 NOTE — TELEPHONE ENCOUNTER
I called and I confirmed Arrival time @ 5:30 am @ [facility] for HH repair wit Dr. Forrester on 3/26/2025.    Violet

## 2025-03-26 ENCOUNTER — HOSPITAL ENCOUNTER (OUTPATIENT)
Facility: HOSPITAL | Age: 89
Setting detail: OBSERVATION
Discharge: HOME | End: 2025-03-27
Attending: SURGERY | Admitting: SURGERY
Payer: MEDICARE

## 2025-03-26 ENCOUNTER — ANESTHESIA (OUTPATIENT)
Facility: HOSPITAL | Age: 89
End: 2025-03-26
Payer: MEDICARE

## 2025-03-26 DIAGNOSIS — G89.18 ACUTE POST-OPERATIVE PAIN: Primary | ICD-10-CM

## 2025-03-26 LAB
ABO + RH BLD: NORMAL
ANION GAP SERPL CALC-SCNC: 12 MMOL/L (ref 3–18)
BLOOD GROUP ANTIBODIES SERPL: NORMAL
BUN SERPL-MCNC: 14 MG/DL (ref 7–18)
BUN/CREAT SERPL: 11 (ref 12–20)
CALCIUM SERPL-MCNC: 8.8 MG/DL (ref 8.5–10.1)
CHLORIDE SERPL-SCNC: 106 MMOL/L (ref 100–111)
CO2 SERPL-SCNC: 24 MMOL/L (ref 21–32)
CREAT SERPL-MCNC: 1.32 MG/DL (ref 0.6–1.3)
GLUCOSE SERPL-MCNC: 130 MG/DL (ref 74–99)
POTASSIUM SERPL-SCNC: 3.7 MMOL/L (ref 3.5–5.5)
SODIUM SERPL-SCNC: 142 MMOL/L (ref 136–145)
SPECIMEN EXP DATE BLD: NORMAL

## 2025-03-26 PROCEDURE — 6360000002 HC RX W HCPCS: Performed by: SURGERY

## 2025-03-26 PROCEDURE — 96372 THER/PROPH/DIAG INJ SC/IM: CPT

## 2025-03-26 PROCEDURE — 36415 COLL VENOUS BLD VENIPUNCTURE: CPT

## 2025-03-26 PROCEDURE — 88302 TISSUE EXAM BY PATHOLOGIST: CPT

## 2025-03-26 PROCEDURE — C1781 MESH (IMPLANTABLE): HCPCS | Performed by: SURGERY

## 2025-03-26 PROCEDURE — 2580000003 HC RX 258: Performed by: SURGERY

## 2025-03-26 PROCEDURE — S2900 ROBOTIC SURGICAL SYSTEM: HCPCS | Performed by: SURGERY

## 2025-03-26 PROCEDURE — 2500000003 HC RX 250 WO HCPCS: Performed by: SURGERY

## 2025-03-26 PROCEDURE — 6370000000 HC RX 637 (ALT 250 FOR IP): Performed by: SURGERY

## 2025-03-26 PROCEDURE — G0378 HOSPITAL OBSERVATION PER HR: HCPCS

## 2025-03-26 PROCEDURE — 3700000001 HC ADD 15 MINUTES (ANESTHESIA): Performed by: SURGERY

## 2025-03-26 PROCEDURE — 86901 BLOOD TYPING SEROLOGIC RH(D): CPT

## 2025-03-26 PROCEDURE — 2720000010 HC SURG SUPPLY STERILE: Performed by: SURGERY

## 2025-03-26 PROCEDURE — 2500000003 HC RX 250 WO HCPCS

## 2025-03-26 PROCEDURE — 86850 RBC ANTIBODY SCREEN: CPT

## 2025-03-26 PROCEDURE — 2580000003 HC RX 258: Performed by: NURSE ANESTHETIST, CERTIFIED REGISTERED

## 2025-03-26 PROCEDURE — 86900 BLOOD TYPING SEROLOGIC ABO: CPT

## 2025-03-26 PROCEDURE — 43282 LAP PARAESOPH HER RPR W/MESH: CPT | Performed by: SURGERY

## 2025-03-26 PROCEDURE — 3600000019 HC SURGERY ROBOT ADDTL 15MIN: Performed by: SURGERY

## 2025-03-26 PROCEDURE — 2709999900 HC NON-CHARGEABLE SUPPLY: Performed by: SURGERY

## 2025-03-26 PROCEDURE — 80048 BASIC METABOLIC PNL TOTAL CA: CPT

## 2025-03-26 PROCEDURE — 7100000000 HC PACU RECOVERY - FIRST 15 MIN: Performed by: SURGERY

## 2025-03-26 PROCEDURE — 43282 LAP PARAESOPH HER RPR W/MESH: CPT | Performed by: STUDENT IN AN ORGANIZED HEALTH CARE EDUCATION/TRAINING PROGRAM

## 2025-03-26 PROCEDURE — 7100000001 HC PACU RECOVERY - ADDTL 15 MIN: Performed by: SURGERY

## 2025-03-26 PROCEDURE — 6360000002 HC RX W HCPCS

## 2025-03-26 PROCEDURE — 3600000009 HC SURGERY ROBOT BASE: Performed by: SURGERY

## 2025-03-26 PROCEDURE — 3700000000 HC ANESTHESIA ATTENDED CARE: Performed by: SURGERY

## 2025-03-26 DEVICE — MESH SURG W7XL10CM SEPRA TECHNOLOGY RECT PHASIX: Type: IMPLANTABLE DEVICE | Site: ABDOMEN | Status: FUNCTIONAL

## 2025-03-26 RX ORDER — PHENYLEPHRINE HCL IN 0.9% NACL 1 MG/10 ML
SYRINGE (ML) INTRAVENOUS
Status: DISCONTINUED | OUTPATIENT
Start: 2025-03-26 | End: 2025-03-26 | Stop reason: SDUPTHER

## 2025-03-26 RX ORDER — FUROSEMIDE 40 MG/1
40 TABLET ORAL DAILY
Status: DISCONTINUED | OUTPATIENT
Start: 2025-03-26 | End: 2025-03-27 | Stop reason: HOSPADM

## 2025-03-26 RX ORDER — AMLODIPINE BESYLATE 2.5 MG/1
2.5 TABLET ORAL DAILY
Status: DISCONTINUED | OUTPATIENT
Start: 2025-03-27 | End: 2025-03-27 | Stop reason: HOSPADM

## 2025-03-26 RX ORDER — SODIUM CHLORIDE, SODIUM LACTATE, POTASSIUM CHLORIDE, CALCIUM CHLORIDE 600; 310; 30; 20 MG/100ML; MG/100ML; MG/100ML; MG/100ML
INJECTION, SOLUTION INTRAVENOUS CONTINUOUS
Status: DISCONTINUED | OUTPATIENT
Start: 2025-03-26 | End: 2025-03-26 | Stop reason: HOSPADM

## 2025-03-26 RX ORDER — OXYCODONE HYDROCHLORIDE 5 MG/1
5 TABLET ORAL EVERY 4 HOURS PRN
Status: DISCONTINUED | OUTPATIENT
Start: 2025-03-26 | End: 2025-03-27 | Stop reason: HOSPADM

## 2025-03-26 RX ORDER — METOCLOPRAMIDE HYDROCHLORIDE 5 MG/ML
10 INJECTION INTRAMUSCULAR; INTRAVENOUS EVERY 6 HOURS PRN
Status: DISCONTINUED | OUTPATIENT
Start: 2025-03-26 | End: 2025-03-27 | Stop reason: HOSPADM

## 2025-03-26 RX ORDER — NALOXONE HYDROCHLORIDE 0.4 MG/ML
INJECTION, SOLUTION INTRAMUSCULAR; INTRAVENOUS; SUBCUTANEOUS PRN
Status: DISCONTINUED | OUTPATIENT
Start: 2025-03-26 | End: 2025-03-26 | Stop reason: HOSPADM

## 2025-03-26 RX ORDER — PROMETHAZINE HYDROCHLORIDE 12.5 MG/1
12.5 TABLET ORAL EVERY 6 HOURS
Status: DISCONTINUED | OUTPATIENT
Start: 2025-03-26 | End: 2025-03-27 | Stop reason: HOSPADM

## 2025-03-26 RX ORDER — SODIUM CHLORIDE, SODIUM LACTATE, POTASSIUM CHLORIDE, CALCIUM CHLORIDE 600; 310; 30; 20 MG/100ML; MG/100ML; MG/100ML; MG/100ML
INJECTION, SOLUTION INTRAVENOUS CONTINUOUS
Status: DISCONTINUED | OUTPATIENT
Start: 2025-03-26 | End: 2025-03-27 | Stop reason: HOSPADM

## 2025-03-26 RX ORDER — PROCHLORPERAZINE EDISYLATE 5 MG/ML
5 INJECTION INTRAMUSCULAR; INTRAVENOUS
Status: DISCONTINUED | OUTPATIENT
Start: 2025-03-26 | End: 2025-03-26 | Stop reason: HOSPADM

## 2025-03-26 RX ORDER — FENTANYL CITRATE 50 UG/ML
50 INJECTION, SOLUTION INTRAMUSCULAR; INTRAVENOUS EVERY 5 MIN PRN
Status: DISCONTINUED | OUTPATIENT
Start: 2025-03-26 | End: 2025-03-26 | Stop reason: HOSPADM

## 2025-03-26 RX ORDER — SODIUM CHLORIDE 9 MG/ML
INJECTION, SOLUTION INTRAVENOUS PRN
Status: DISCONTINUED | OUTPATIENT
Start: 2025-03-26 | End: 2025-03-26 | Stop reason: HOSPADM

## 2025-03-26 RX ORDER — SCOPOLAMINE 1 MG/3D
1 PATCH, EXTENDED RELEASE TRANSDERMAL
Status: DISCONTINUED | OUTPATIENT
Start: 2025-03-26 | End: 2025-03-26

## 2025-03-26 RX ORDER — LIDOCAINE HYDROCHLORIDE 20 MG/ML
INJECTION, SOLUTION EPIDURAL; INFILTRATION; INTRACAUDAL; PERINEURAL
Status: DISCONTINUED | OUTPATIENT
Start: 2025-03-26 | End: 2025-03-26 | Stop reason: SDUPTHER

## 2025-03-26 RX ORDER — SODIUM CHLORIDE 9 MG/ML
INJECTION, SOLUTION INTRAVENOUS PRN
Status: DISCONTINUED | OUTPATIENT
Start: 2025-03-26 | End: 2025-03-27 | Stop reason: HOSPADM

## 2025-03-26 RX ORDER — PROMETHAZINE HYDROCHLORIDE 12.5 MG/1
12.5 TABLET ORAL ONCE
Status: COMPLETED | OUTPATIENT
Start: 2025-03-26 | End: 2025-03-26

## 2025-03-26 RX ORDER — ONDANSETRON 2 MG/ML
INJECTION INTRAMUSCULAR; INTRAVENOUS
Status: DISCONTINUED | OUTPATIENT
Start: 2025-03-26 | End: 2025-03-26 | Stop reason: SDUPTHER

## 2025-03-26 RX ORDER — SODIUM CHLORIDE 0.9 % (FLUSH) 0.9 %
5-40 SYRINGE (ML) INJECTION PRN
Status: DISCONTINUED | OUTPATIENT
Start: 2025-03-26 | End: 2025-03-27 | Stop reason: HOSPADM

## 2025-03-26 RX ORDER — SODIUM CHLORIDE 0.9 % (FLUSH) 0.9 %
5-40 SYRINGE (ML) INJECTION EVERY 12 HOURS SCHEDULED
Status: DISCONTINUED | OUTPATIENT
Start: 2025-03-26 | End: 2025-03-27 | Stop reason: HOSPADM

## 2025-03-26 RX ORDER — DEXAMETHASONE SODIUM PHOSPHATE 4 MG/ML
INJECTION, SOLUTION INTRA-ARTICULAR; INTRALESIONAL; INTRAMUSCULAR; INTRAVENOUS; SOFT TISSUE
Status: DISCONTINUED | OUTPATIENT
Start: 2025-03-26 | End: 2025-03-26 | Stop reason: SDUPTHER

## 2025-03-26 RX ORDER — HYOSCYAMINE SULFATE 0.12 MG/1
0.12 TABLET SUBLINGUAL EVERY 4 HOURS PRN
Status: DISCONTINUED | OUTPATIENT
Start: 2025-03-26 | End: 2025-03-27 | Stop reason: HOSPADM

## 2025-03-26 RX ORDER — GLYCOPYRROLATE 0.2 MG/ML
INJECTION INTRAMUSCULAR; INTRAVENOUS
Status: DISCONTINUED | OUTPATIENT
Start: 2025-03-26 | End: 2025-03-26 | Stop reason: SDUPTHER

## 2025-03-26 RX ORDER — PROCHLORPERAZINE EDISYLATE 5 MG/ML
10 INJECTION INTRAMUSCULAR; INTRAVENOUS EVERY 6 HOURS PRN
Status: DISCONTINUED | OUTPATIENT
Start: 2025-03-26 | End: 2025-03-27 | Stop reason: HOSPADM

## 2025-03-26 RX ORDER — SODIUM CHLORIDE 0.9 % (FLUSH) 0.9 %
5-40 SYRINGE (ML) INJECTION PRN
Status: DISCONTINUED | OUTPATIENT
Start: 2025-03-26 | End: 2025-03-26 | Stop reason: HOSPADM

## 2025-03-26 RX ORDER — PROPOFOL 10 MG/ML
INJECTION, EMULSION INTRAVENOUS
Status: DISCONTINUED | OUTPATIENT
Start: 2025-03-26 | End: 2025-03-26 | Stop reason: SDUPTHER

## 2025-03-26 RX ORDER — FENTANYL CITRATE 50 UG/ML
25 INJECTION, SOLUTION INTRAMUSCULAR; INTRAVENOUS EVERY 5 MIN PRN
Status: DISCONTINUED | OUTPATIENT
Start: 2025-03-26 | End: 2025-03-26 | Stop reason: HOSPADM

## 2025-03-26 RX ORDER — PROMETHAZINE HYDROCHLORIDE 12.5 MG/1
12.5 TABLET ORAL ONCE
Status: DISCONTINUED | OUTPATIENT
Start: 2025-03-26 | End: 2025-03-26 | Stop reason: HOSPADM

## 2025-03-26 RX ORDER — BUPIVACAINE HYDROCHLORIDE 2.5 MG/ML
INJECTION, SOLUTION EPIDURAL; INFILTRATION; INTRACAUDAL PRN
Status: DISCONTINUED | OUTPATIENT
Start: 2025-03-26 | End: 2025-03-26 | Stop reason: HOSPADM

## 2025-03-26 RX ORDER — ROCURONIUM BROMIDE 10 MG/ML
INJECTION, SOLUTION INTRAVENOUS
Status: DISCONTINUED | OUTPATIENT
Start: 2025-03-26 | End: 2025-03-26 | Stop reason: SDUPTHER

## 2025-03-26 RX ORDER — FENTANYL CITRATE 50 UG/ML
INJECTION, SOLUTION INTRAMUSCULAR; INTRAVENOUS
Status: DISCONTINUED | OUTPATIENT
Start: 2025-03-26 | End: 2025-03-26 | Stop reason: SDUPTHER

## 2025-03-26 RX ORDER — SODIUM CHLORIDE 0.9 % (FLUSH) 0.9 %
5-40 SYRINGE (ML) INJECTION EVERY 12 HOURS SCHEDULED
Status: DISCONTINUED | OUTPATIENT
Start: 2025-03-26 | End: 2025-03-26 | Stop reason: HOSPADM

## 2025-03-26 RX ORDER — DIPHENHYDRAMINE HCL 25 MG
25 CAPSULE ORAL EVERY 6 HOURS PRN
Status: DISCONTINUED | OUTPATIENT
Start: 2025-03-26 | End: 2025-03-27 | Stop reason: HOSPADM

## 2025-03-26 RX ORDER — HEPARIN SODIUM 5000 [USP'U]/ML
5000 INJECTION, SOLUTION INTRAVENOUS; SUBCUTANEOUS 2 TIMES DAILY
Status: DISCONTINUED | OUTPATIENT
Start: 2025-03-26 | End: 2025-03-27 | Stop reason: HOSPADM

## 2025-03-26 RX ORDER — ENOXAPARIN SODIUM 100 MG/ML
30 INJECTION SUBCUTANEOUS ONCE
Status: COMPLETED | OUTPATIENT
Start: 2025-03-26 | End: 2025-03-26

## 2025-03-26 RX ORDER — ACETAMINOPHEN 120 MG/1
SUPPOSITORY RECTAL PRN
Status: DISCONTINUED | OUTPATIENT
Start: 2025-03-26 | End: 2025-03-26 | Stop reason: HOSPADM

## 2025-03-26 RX ORDER — EPHEDRINE SULFATE/0.9% NACL/PF 25 MG/5 ML
SYRINGE (ML) INTRAVENOUS
Status: DISCONTINUED | OUTPATIENT
Start: 2025-03-26 | End: 2025-03-26 | Stop reason: SDUPTHER

## 2025-03-26 RX ORDER — ACETAMINOPHEN 325 MG/1
650 TABLET ORAL EVERY 6 HOURS
Status: DISCONTINUED | OUTPATIENT
Start: 2025-03-26 | End: 2025-03-27 | Stop reason: HOSPADM

## 2025-03-26 RX ORDER — SIMETHICONE 80 MG
80 TABLET,CHEWABLE ORAL EVERY 6 HOURS PRN
Status: DISCONTINUED | OUTPATIENT
Start: 2025-03-26 | End: 2025-03-27 | Stop reason: HOSPADM

## 2025-03-26 RX ORDER — LABETALOL HYDROCHLORIDE 5 MG/ML
INJECTION, SOLUTION INTRAVENOUS
Status: DISCONTINUED | OUTPATIENT
Start: 2025-03-26 | End: 2025-03-26 | Stop reason: SDUPTHER

## 2025-03-26 RX ORDER — METHOCARBAMOL 750 MG/1
750 TABLET, FILM COATED ORAL EVERY 8 HOURS PRN
Status: DISCONTINUED | OUTPATIENT
Start: 2025-03-26 | End: 2025-03-27 | Stop reason: HOSPADM

## 2025-03-26 RX ORDER — DIPHENHYDRAMINE HYDROCHLORIDE 50 MG/ML
25 INJECTION, SOLUTION INTRAMUSCULAR; INTRAVENOUS EVERY 6 HOURS PRN
Status: DISCONTINUED | OUTPATIENT
Start: 2025-03-26 | End: 2025-03-27 | Stop reason: HOSPADM

## 2025-03-26 RX ADMIN — ROCURONIUM BROMIDE 40 MG: 10 INJECTION INTRAVENOUS at 07:41

## 2025-03-26 RX ADMIN — EPHEDRINE SULFATE 10 MG: 5 INJECTION INTRAVENOUS at 08:23

## 2025-03-26 RX ADMIN — SUGAMMADEX 200 MG: 100 INJECTION, SOLUTION INTRAVENOUS at 12:01

## 2025-03-26 RX ADMIN — HEPARIN SODIUM 5000 UNITS: 5000 INJECTION INTRAVENOUS; SUBCUTANEOUS at 20:16

## 2025-03-26 RX ADMIN — PROMETHAZINE HYDROCHLORIDE 12.5 MG: 12.5 TABLET ORAL at 20:15

## 2025-03-26 RX ADMIN — GLYCOPYRROLATE 0.1 MG: 0.2 INJECTION INTRAMUSCULAR; INTRAVENOUS at 08:23

## 2025-03-26 RX ADMIN — EPHEDRINE SULFATE 5 MG: 5 INJECTION INTRAVENOUS at 09:25

## 2025-03-26 RX ADMIN — FENTANYL CITRATE 25 MCG: 50 INJECTION INTRAMUSCULAR; INTRAVENOUS at 12:07

## 2025-03-26 RX ADMIN — FAMOTIDINE 20 MG: 10 INJECTION INTRAVENOUS at 07:06

## 2025-03-26 RX ADMIN — Medication 100 MCG: at 10:53

## 2025-03-26 RX ADMIN — LABETALOL HYDROCHLORIDE 5 MG: 5 INJECTION, SOLUTION INTRAVENOUS at 07:50

## 2025-03-26 RX ADMIN — Medication 100 MCG: at 10:49

## 2025-03-26 RX ADMIN — FENTANYL CITRATE 25 MCG: 50 INJECTION INTRAMUSCULAR; INTRAVENOUS at 07:58

## 2025-03-26 RX ADMIN — LIDOCAINE HYDROCHLORIDE 60 MG: 20 INJECTION, SOLUTION EPIDURAL; INFILTRATION; INTRACAUDAL; PERINEURAL at 07:41

## 2025-03-26 RX ADMIN — Medication 100 MCG: at 10:33

## 2025-03-26 RX ADMIN — SODIUM CHLORIDE, SODIUM LACTATE, POTASSIUM CHLORIDE, AND CALCIUM CHLORIDE: 600; 310; 30; 20 INJECTION, SOLUTION INTRAVENOUS at 11:52

## 2025-03-26 RX ADMIN — ACETAMINOPHEN 650 MG: 325 TABLET ORAL at 16:30

## 2025-03-26 RX ADMIN — ROCURONIUM BROMIDE 10 MG: 10 INJECTION INTRAVENOUS at 08:30

## 2025-03-26 RX ADMIN — Medication 50 MCG: at 09:53

## 2025-03-26 RX ADMIN — Medication 100 MCG: at 10:31

## 2025-03-26 RX ADMIN — ENOXAPARIN SODIUM 30 MG: 100 INJECTION SUBCUTANEOUS at 07:06

## 2025-03-26 RX ADMIN — SODIUM CHLORIDE, SODIUM LACTATE, POTASSIUM CHLORIDE, AND CALCIUM CHLORIDE: .6; .31; .03; .02 INJECTION, SOLUTION INTRAVENOUS at 21:51

## 2025-03-26 RX ADMIN — DEXAMETHASONE SODIUM PHOSPHATE 4 MG: 4 INJECTION INTRA-ARTICULAR; INTRALESIONAL; INTRAMUSCULAR; INTRAVENOUS; SOFT TISSUE at 07:44

## 2025-03-26 RX ADMIN — EPHEDRINE SULFATE 10 MG: 5 INJECTION INTRAVENOUS at 08:26

## 2025-03-26 RX ADMIN — FENTANYL CITRATE 25 MCG: 50 INJECTION INTRAMUSCULAR; INTRAVENOUS at 12:11

## 2025-03-26 RX ADMIN — LIDOCAINE HYDROCHLORIDE 60 MG: 20 INJECTION, SOLUTION EPIDURAL; INFILTRATION; INTRACAUDAL; PERINEURAL at 12:01

## 2025-03-26 RX ADMIN — WATER 2000 MG: 1 INJECTION INTRAMUSCULAR; INTRAVENOUS; SUBCUTANEOUS at 07:44

## 2025-03-26 RX ADMIN — FENTANYL CITRATE 25 MCG: 50 INJECTION INTRAMUSCULAR; INTRAVENOUS at 07:41

## 2025-03-26 RX ADMIN — ROCURONIUM BROMIDE 10 MG: 10 INJECTION INTRAVENOUS at 09:30

## 2025-03-26 RX ADMIN — PROMETHAZINE HYDROCHLORIDE 12.5 MG: 12.5 TABLET ORAL at 07:06

## 2025-03-26 RX ADMIN — SODIUM CHLORIDE, PRESERVATIVE FREE 10 ML: 5 INJECTION INTRAVENOUS at 20:17

## 2025-03-26 RX ADMIN — Medication 100 MCG: at 08:43

## 2025-03-26 RX ADMIN — Medication 100 MCG: at 09:04

## 2025-03-26 RX ADMIN — PROPOFOL 90 MG: 10 INJECTION, EMULSION INTRAVENOUS at 07:41

## 2025-03-26 RX ADMIN — ROCURONIUM BROMIDE 10 MG: 10 INJECTION INTRAVENOUS at 10:15

## 2025-03-26 RX ADMIN — EPHEDRINE SULFATE 10 MG: 5 INJECTION INTRAVENOUS at 09:04

## 2025-03-26 RX ADMIN — FUROSEMIDE 40 MG: 40 TABLET ORAL at 16:31

## 2025-03-26 RX ADMIN — Medication 50 MCG: at 09:37

## 2025-03-26 RX ADMIN — ROCURONIUM BROMIDE 10 MG: 10 INJECTION INTRAVENOUS at 11:15

## 2025-03-26 RX ADMIN — SODIUM CHLORIDE, SODIUM LACTATE, POTASSIUM CHLORIDE, AND CALCIUM CHLORIDE: 600; 310; 30; 20 INJECTION, SOLUTION INTRAVENOUS at 07:37

## 2025-03-26 RX ADMIN — ONDANSETRON 4 MG: 2 INJECTION, SOLUTION INTRAMUSCULAR; INTRAVENOUS at 12:03

## 2025-03-26 RX ADMIN — Medication 100 MCG: at 09:25

## 2025-03-26 RX ADMIN — ACETAMINOPHEN 650 MG: 325 TABLET ORAL at 20:15

## 2025-03-26 ASSESSMENT — PAIN - FUNCTIONAL ASSESSMENT: PAIN_FUNCTIONAL_ASSESSMENT: 0-10

## 2025-03-26 ASSESSMENT — PAIN SCALES - GENERAL
PAINLEVEL_OUTOF10: 0
PAINLEVEL_OUTOF10: 2
PAINLEVEL_OUTOF10: 2
PAINLEVEL_OUTOF10: 0

## 2025-03-26 NOTE — OP NOTE
Operative Report    Patient: Marissa Coffey MRN: [id number]  SSN: [id number]    YOB: 1935  Age: 89 y.o.  Sex: female       Date of Surgery: 03/26/25     Preoperative Diagnosis:      * Paraesophageal hernia [K44.9]     * Ulcer of esophagus with bleeding [K22.11]     * Dysphagia, unspecified type [R13.10]     * Chronic congestive heart failure, unspecified heart failure type (HCC) [I50.9]     * Essential hypertension [I10]     * Gastroesophageal reflux disease with esophagitis, unspecified whether hemorrhage [K21.00]     * Prediabetes [R73.03]      Postoperative Diagnosis:      * Paraesophageal hernia [K44.9]     * Ulcer of esophagus with bleeding [K22.11]     * Dysphagia, unspecified type [R13.10]     * Chronic congestive heart failure, unspecified heart failure type (HCC) [I50.9]     * Essential hypertension [I10]     * Gastroesophageal reflux disease with esophagitis, unspecified whether hemorrhage [K21.00]     * Prediabetes [R73.03]      Surgeons and Role:     * Rich Forrester MD - Primary    Assistant: Efe Mckeon MD, Mindy Heller SA (No qualified resident was available for assistance; assistant was involved in port placement, camera operation, manipulation and retraction of tissue, removing the excised specimen, and skin closure)    Anesthesia: General     Procedure:   Laparoscopic paraesophageal hernia repair with right crural relaxing incision and Phasix ST biosynthetic mesh augmentation, Montez fundoplication, with robotic assist (22 modifier due to huge incarcerated paraesophageal hernia with intrathoracic stomach and omentum and very tedious dissection).    Findings:  Infection Present At Time Of Surgery (PATOS) (choose all levels that have infection present):  No infection present  Other Findings: Huge paraesophageal hernia with incarceration, entirely intrathoracic stomach and most of the omentum. Tedious mediastinal dissection, bilateral pleurotomies performed to allow

## 2025-03-26 NOTE — PLAN OF CARE
Problem: Safety - Adult  Goal: Free from fall injury  Outcome: Progressing  Flowsheets (Taken 3/26/2025 1524)  Free From Fall Injury: Instruct family/caregiver on patient safety     Problem: Pain  Goal: Verbalizes/displays adequate comfort level or baseline comfort level  Outcome: Progressing  Flowsheets (Taken 3/26/2025 1524)  Verbalizes/displays adequate comfort level or baseline comfort level:   Encourage patient to monitor pain and request assistance   Administer analgesics based on type and severity of pain and evaluate response   Consider cultural and social influences on pain and pain management   Assess pain using appropriate pain scale   Implement non-pharmacological measures as appropriate and evaluate response   Notify Licensed Independent Practitioner if interventions unsuccessful or patient reports new pain     Problem: Chronic Conditions and Co-morbidities  Goal: Patient's chronic conditions and co-morbidity symptoms are monitored and maintained or improved  Outcome: Progressing  Flowsheets (Taken 3/26/2025 1524)  Care Plan - Patient's Chronic Conditions and Co-Morbidity Symptoms are Monitored and Maintained or Improved: Monitor and assess patient's chronic conditions and comorbid symptoms for stability, deterioration, or improvement     Problem: Discharge Planning  Goal: Discharge to home or other facility with appropriate resources  Outcome: Progressing  Flowsheets (Taken 3/26/2025 1524)  Discharge to home or other facility with appropriate resources:   Identify barriers to discharge with patient and caregiver   Identify discharge learning needs (meds, wound care, etc)   Arrange for needed discharge resources and transportation as appropriate     Problem: ABCDS Injury Assessment  Goal: Absence of physical injury  Outcome: Progressing  Flowsheets (Taken 3/26/2025 1524)  Absence of Physical Injury: Implement safety measures based on patient assessment

## 2025-03-26 NOTE — ANESTHESIA POSTPROCEDURE EVALUATION
Department of Anesthesiology  Postprocedure Note    Patient: Marissa Coffey  MRN: [id number]  YOB: 1935  Date of evaluation: 3/26/2025    Procedure Summary       Date: 03/26/25 Room / Location: [facility] MAIN 04 / [facility] MAIN OR    Anesthesia Start: 0736 Anesthesia Stop: 1221    Procedures:       ROBOT ASSISTED LAPAROSCOPIC PARAESOPHAGEAL HERNIA REPAIR, MESH PLACEMENT (Abdomen)      INTRAOPERATIVE ESOPHAGOGASTRODUODENOSCOPY. (Upper GI Region) Diagnosis:       Paraesophageal hernia      Ulcer of esophagus with bleeding      Dysphagia, unspecified type      Chronic congestive heart failure, unspecified heart failure type (HCC)      Essential hypertension      Gastroesophageal reflux disease with esophagitis, unspecified whether hemorrhage      Prediabetes      (Paraesophageal hernia [K44.9])      (Ulcer of esophagus with bleeding [K22.11])      (Dysphagia, unspecified type [R13.10])      (Chronic congestive heart failure, unspecified heart failure type (HCC) [I50.9])      (Essential hypertension [I10])      (Gastroesophageal reflux disease with esophagitis, unspecified whether hemorrhage [K21.00])      (Prediabetes [R73.03])    Surgeons: Rich Forrester MD Responsible Provider: Naresh Magana MD    Anesthesia Type: General ASA Status: 3            Anesthesia Type: General    Jus Phase I: Jus Score: 10    Jus Phase II:      Anesthesia Post Evaluation    Patient location during evaluation: bedside  Patient participation: complete - patient participated  Level of consciousness: awake  Pain score: 0  Airway patency: patent  Nausea & Vomiting: no nausea  Cardiovascular status: hemodynamically stable  Respiratory status: acceptable  Hydration status: euvolemic  Pain management: satisfactory to patient        No notable events documented.

## 2025-03-26 NOTE — CARE COORDINATION
Chart reviewed met with pt and son Mike at bedside, pt lives at [facility], cm discussed Home health potential  son states [facility] is located inside patients  group home facility if needed, son also would like to be kept updated will call discharge planning needs.   03/26/25 0770   Service Assessment   Patient Orientation Alert and Oriented   Cognition Alert   History Provided By Patient;Child/Family  (son Mike Coffey)   Primary Caregiver Self   Accompanied By/Relationship eleanor Mckeon   Support Systems Children   Patient's Healthcare Decision Maker is: Patient Declined (Legal Next of Kin Remains as Decision Maker)   PCP Verified by CM Yes   Last Visit to PCP Within last 3 months   Prior Functional Level Assistance with the following:;Bathing;Dressing;Mobility;Shopping;Housework;Cooking   Current Functional Level Assistance with the following:   Can patient return to prior living arrangement Yes   Ability to make needs known: Good   Family able to assist with home care needs: Yes   Would you like for me to discuss the discharge plan with any other family members/significant others, and if so, who? No   Financial Resources Medicare   Community Resources   (group home independent living)   CM/SW Referral   (discharge planning)   Social/Functional History   Lives With Alone   Type of Home Apartment   Home Layout One level   Home Access Level entry   Bathroom Shower/Tub Tub/Shower unit   Bathroom Toilet Handicap height   Bathroom Equipment Grab bars in shower;Shower chair   Bathroom Accessibility Accessible   Home Equipment Walker - Rolling;Rollator;Electric scooter   Receives Help From Family   Prior Level of Assist for ADLs Needs assistance   Ambulation Assistance Needs assistance   Prior Level of Assist for Transfers Needs assistance   Active  No   Patient's  Info son-car   Mode of Transportation Car   Occupation Retired   Discharge Planning   Type of

## 2025-03-26 NOTE — INTERVAL H&P NOTE
Update History & Physical    The patient's History and Physical of February 24, history and procedure was reviewed with the patient and I examined the patient. There was no change. The surgical site was confirmed by the patient and me.     Plan: The risks, benefits, expected outcome, and alternative to the recommended procedure have been discussed with the patient. Patient understands and wants to proceed with the procedure.     Electronically signed by Rich Forrester MD on 3/26/2025 at 7:24 AM

## 2025-03-26 NOTE — FLOWSHEET NOTE
Received patient from PACU, vitals as follows. Lap sites viewed with FILEMON Benitez: closed with surgiglue, intact and no drainage noted. Family at bedside, patient in no acute distress, complains of burning upon urination. Patient oriented to room, instructed on use of call bell and incentive spirometer.     03/26/25 1453   Vital Signs   Temp 97.2 °F (36.2 °C)   Temp Source Oral   Pulse (!) 103   Heart Rate Source Monitor   Respirations 18   BP (!) 149/88   MAP (Calculated) 108   Opioid-Induced Sedation   POSS Score 1   RASS   Ward Agitation Sedation Scale (RASS) 0   Oxygen Therapy   SpO2 94 %   Pulse Oximetry Type Intermittent   O2 Device Nasal cannula   O2 Flow Rate (L/min) 3.5 L/min   Height and Weight   Height 1.524 m (5')   Weight - Scale 61.7 kg (136 lb)   BSA (Calculated - sq m) 1.62 sq meters   BMI (Calculated) 26.6   Patient Observation   Patient Observations arrived in room

## 2025-03-26 NOTE — ANESTHESIA PRE PROCEDURE
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 1/15/2022    Urine culture (collected 1/15/2022, resulted 1/19/2022) yielded growth of >100,000 colonies/ml of Escherichia coli (ESBL)   • Hyperlipidemia    • Hypertension    • Primary osteoarthritis of both knees        Past Surgical History:        Procedure Laterality Date   • CELIAC ARTERY STENT     • HYSTERECTOMY (CERVIX STATUS UNKNOWN)      vaginal approach   • UPPER GASTROINTESTINAL ENDOSCOPY N/A 10/21/2024    ESOPHAGOGASTRODUODENOSCOPY DIAGNOSTIC ONLY W/bx Injection w/epi, gold probe cautry w/7 fr performed by Jose R Monet MD at [facility] ENDOSCOPY       Social History:    Social History     Tobacco Use   • Smoking status: Never     Passive exposure: Past   • Smokeless tobacco: Never   Substance Use Topics   • Alcohol use: Never                                Counseling given: Not Answered      Vital Signs (Current):   Vitals:    03/19/25 0922 03/26/25 0642   BP:  (!) 171/73   Pulse:  70   Resp:  16   Temp:  97.5 °F (36.4 °C)   TempSrc:  Oral   SpO2:  98%   Weight: 63.5 kg (140 lb) 62 kg (136 lb 11.2 oz)   Height: 1.524 m (5') 1.524 m (5')                                              BP Readings from Last 3 Encounters:   03/26/25 (!) 171/73   02/24/25 130/70   01/31/25 133/73       NPO Status: Time of last liquid consumption: 1900                        Time of last solid consumption: 1900                        Date of last liquid consumption: 03/25/25                        Date of last solid food consumption: 03/25/25    BMI:   Wt Readings from Last 3 Encounters:   03/26/25 62 kg (136 lb 11.2 oz)   02/24/25 61.7 kg (136 lb)   01/31/25 60.8 kg (134 lb)     Body mass index is 26.7 kg/m².    CBC:   Lab Results   Component Value Date/Time    WBC 5.8 02/19/2025 09:25 AM    RBC 5.00 02/19/2025 09:25 AM    HGB 13.1 02/19/2025 09:25 AM    HCT 42.3

## 2025-03-26 NOTE — PROGRESS NOTES
4 Eyes Skin Assessment     NAME:  Marissa Coffey  YOB: 1935  MEDICAL RECORD NUMBER:  [id number]    The patient is being assessed for  Admission    I agree that at least one RN has performed a thorough Head to Toe Skin Assessment on the patient. ALL assessment sites listed below have been assessed.      Areas assessed by both nurses:    Head, Face, Ears, Shoulders, Back, Chest, Arms, Elbows, Hands, Sacrum. Buttock, Coccyx, Ischium, and Legs. Feet and Heels        Does the Patient have a Wound? No noted wound(s)       Mark Prevention initiated by RN: Yes  Wound Care Orders initiated by RN: No    Pressure Injury (Stage 3,4, Unstageable, DTI, NWPT, and Complex wounds) if present, place Wound referral order by RN under : No    New Ostomies, if present place, Ostomy referral order under : No     Nurse 1 eSignature: Electronically signed by Isabel Hester RN on 3/26/25 at 6:51 PM EDT    **SHARE this note so that the co-signing nurse can place an eSignature**    Nurse 2 eSignature: Electronically signed by YESSI Banegas on 3/27/25 at 6:32 AM EDT

## 2025-03-26 NOTE — CARE COORDINATION
03/26/25 1513   IMM Letter   Observation Status Letter date given: 03/26/25   Observation Status Letter time given: 4289   Observation Status Letter given to Patient/Family/Significant other/Guardian/POA/by: tierra matos

## 2025-03-27 VITALS
TEMPERATURE: 98.5 F | WEIGHT: 136 LBS | BODY MASS INDEX: 26.7 KG/M2 | OXYGEN SATURATION: 93 % | HEIGHT: 60 IN | HEART RATE: 68 BPM | SYSTOLIC BLOOD PRESSURE: 124 MMHG | DIASTOLIC BLOOD PRESSURE: 74 MMHG | RESPIRATION RATE: 14 BRPM

## 2025-03-27 PROBLEM — E44.0 MODERATE PROTEIN-CALORIE MALNUTRITION: Status: ACTIVE | Noted: 2025-03-27

## 2025-03-27 LAB
ANION GAP SERPL CALC-SCNC: 7 MMOL/L (ref 3–18)
BASOPHILS # BLD: 0.02 K/UL (ref 0–0.1)
BASOPHILS NFR BLD: 0.1 % (ref 0–2)
BUN SERPL-MCNC: 15 MG/DL (ref 7–18)
BUN/CREAT SERPL: 16 (ref 12–20)
CALCIUM SERPL-MCNC: 8.6 MG/DL (ref 8.5–10.1)
CHLORIDE SERPL-SCNC: 109 MMOL/L (ref 100–111)
CO2 SERPL-SCNC: 26 MMOL/L (ref 21–32)
CREAT SERPL-MCNC: 0.96 MG/DL (ref 0.6–1.3)
DIFFERENTIAL METHOD BLD: ABNORMAL
EOSINOPHIL # BLD: 0 K/UL (ref 0–0.4)
EOSINOPHIL NFR BLD: 0 % (ref 0–5)
ERYTHROCYTE [DISTWIDTH] IN BLOOD BY AUTOMATED COUNT: 16.5 % (ref 11.6–14.5)
GLUCOSE SERPL-MCNC: 103 MG/DL (ref 74–99)
HCT VFR BLD AUTO: 36.4 % (ref 35–45)
HGB BLD-MCNC: 11.7 G/DL (ref 12–16)
IMM GRANULOCYTES # BLD AUTO: 0.14 K/UL (ref 0–0.04)
IMM GRANULOCYTES NFR BLD AUTO: 1 % (ref 0–0.5)
LYMPHOCYTES # BLD: 1.14 K/UL (ref 0.9–3.6)
LYMPHOCYTES NFR BLD: 8.5 % (ref 21–52)
MCH RBC QN AUTO: 27.3 PG (ref 24–34)
MCHC RBC AUTO-ENTMCNC: 32.1 G/DL (ref 31–37)
MCV RBC AUTO: 84.8 FL (ref 78–100)
MONOCYTES # BLD: 1.63 K/UL (ref 0.05–1.2)
MONOCYTES NFR BLD: 12.1 % (ref 3–10)
NEUTS SEG # BLD: 10.49 K/UL (ref 1.8–8)
NEUTS SEG NFR BLD: 78.3 % (ref 40–73)
NRBC # BLD: 0 K/UL (ref 0–0.01)
NRBC BLD-RTO: 0 PER 100 WBC
PLATELET # BLD AUTO: 190 K/UL (ref 135–420)
PMV BLD AUTO: 11 FL (ref 9.2–11.8)
POTASSIUM SERPL-SCNC: 3.5 MMOL/L (ref 3.5–5.5)
RBC # BLD AUTO: 4.29 M/UL (ref 4.2–5.3)
SODIUM SERPL-SCNC: 142 MMOL/L (ref 136–145)
WBC # BLD AUTO: 13.4 K/UL (ref 4.6–13.2)

## 2025-03-27 PROCEDURE — 2700000000 HC OXYGEN THERAPY PER DAY

## 2025-03-27 PROCEDURE — 36415 COLL VENOUS BLD VENIPUNCTURE: CPT

## 2025-03-27 PROCEDURE — 85025 COMPLETE CBC W/AUTO DIFF WBC: CPT

## 2025-03-27 PROCEDURE — 6360000002 HC RX W HCPCS: Performed by: SURGERY

## 2025-03-27 PROCEDURE — 96374 THER/PROPH/DIAG INJ IV PUSH: CPT

## 2025-03-27 PROCEDURE — 2580000003 HC RX 258: Performed by: SURGERY

## 2025-03-27 PROCEDURE — 6370000000 HC RX 637 (ALT 250 FOR IP): Performed by: SURGERY

## 2025-03-27 PROCEDURE — 96372 THER/PROPH/DIAG INJ SC/IM: CPT

## 2025-03-27 PROCEDURE — 94761 N-INVAS EAR/PLS OXIMETRY MLT: CPT

## 2025-03-27 PROCEDURE — 80048 BASIC METABOLIC PNL TOTAL CA: CPT

## 2025-03-27 PROCEDURE — 2500000003 HC RX 250 WO HCPCS: Performed by: SURGERY

## 2025-03-27 PROCEDURE — G0378 HOSPITAL OBSERVATION PER HR: HCPCS

## 2025-03-27 RX ORDER — ASPIRIN 81 MG/1
81 TABLET ORAL DAILY
Qty: 30 TABLET | Refills: 0 | Status: SHIPPED | OUTPATIENT
Start: 2025-03-27

## 2025-03-27 RX ORDER — MULTIVITAMIN WITH IRON
1 TABLET ORAL DAILY
Status: DISCONTINUED | OUTPATIENT
Start: 2025-03-27 | End: 2025-03-27 | Stop reason: HOSPADM

## 2025-03-27 RX ORDER — ONDANSETRON 4 MG/1
4 TABLET, ORALLY DISINTEGRATING ORAL EVERY 8 HOURS PRN
Qty: 30 TABLET | Refills: 0 | Status: SHIPPED | OUTPATIENT
Start: 2025-03-27

## 2025-03-27 RX ORDER — OXYCODONE HYDROCHLORIDE 5 MG/1
5 TABLET ORAL EVERY 6 HOURS PRN
Qty: 10 TABLET | Refills: 0 | Status: SHIPPED | OUTPATIENT
Start: 2025-03-27 | End: 2025-03-30

## 2025-03-27 RX ORDER — PANTOPRAZOLE SODIUM 40 MG/1
40 TABLET, DELAYED RELEASE ORAL
Qty: 1 TABLET | Refills: 0
Start: 2025-03-27

## 2025-03-27 RX ADMIN — SODIUM CHLORIDE 40 MG: 9 INJECTION, SOLUTION INTRAMUSCULAR; INTRAVENOUS; SUBCUTANEOUS at 08:25

## 2025-03-27 RX ADMIN — ACETAMINOPHEN 650 MG: 325 TABLET ORAL at 03:58

## 2025-03-27 RX ADMIN — SODIUM CHLORIDE, PRESERVATIVE FREE 5 ML: 5 INJECTION INTRAVENOUS at 08:37

## 2025-03-27 RX ADMIN — FUROSEMIDE 40 MG: 40 TABLET ORAL at 08:25

## 2025-03-27 RX ADMIN — AMLODIPINE BESYLATE 2.5 MG: 2.5 TABLET ORAL at 08:25

## 2025-03-27 RX ADMIN — PROMETHAZINE HYDROCHLORIDE 12.5 MG: 12.5 TABLET ORAL at 03:58

## 2025-03-27 RX ADMIN — HEPARIN SODIUM 5000 UNITS: 5000 INJECTION INTRAVENOUS; SUBCUTANEOUS at 08:26

## 2025-03-27 RX ADMIN — PROMETHAZINE HYDROCHLORIDE 12.5 MG: 12.5 TABLET ORAL at 08:25

## 2025-03-27 RX ADMIN — ACETAMINOPHEN 650 MG: 325 TABLET ORAL at 08:25

## 2025-03-27 ASSESSMENT — PAIN SCALES - GENERAL
PAINLEVEL_OUTOF10: 0
PAINLEVEL_OUTOF10: 0

## 2025-03-27 NOTE — PROGRESS NOTES
DC instructions reviewed with patient and son, who demonstrated understanding. Patient in no acute distress, eager to go home.

## 2025-03-27 NOTE — PROGRESS NOTES
Pt ambulated in the hallway with walker.  No acute distress noted.  Son at bedside.  Dr. Forrester notified.

## 2025-03-27 NOTE — DISCHARGE SUMMARY
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]     * Ulcer of esophagus with bleeding [K22.11]     * Dysphagia, unspecified type [R13.10]     * Chronic congestive heart failure, unspecified heart failure type (HCC) [I50.9]     * Essential hypertension [I10]     * Gastroesophageal reflux disease with esophagitis, unspecified whether hemorrhage [K21.00]     * Prediabetes [R73.03]       Procedure:   Laparoscopic paraesophageal hernia repair with right crural relaxing incision and Phasix ST biosynthetic mesh augmentation, Montez fundoplication, with robotic assist     Postop Complications: none    Hospital Course:  Patient was admitted on 3/26/2025 for scheduled surgery.  Operation was without significant complication.  Patient admitted to the floor postoperatively, monitored as per protocol.  Diet sequentially advanced beginning POD 1, pain medications transitioned to oral during the hospital course. Currently the patient is afebrile, vital signs stable, tolerating a clear liquid diet with protein supplementation, voiding spontaneously, ambulatory with adequate pain control with oral medications and clear surgical sites without evidence of infection.    Discharge Diet:  Fundoplication diet (see RD note)    Discharge Medications:     Medication List        START taking these medications      aspirin 81 MG EC tablet  Take 1 tablet by mouth daily     ondansetron 4 MG disintegrating tablet  Commonly known as:

## 2025-03-27 NOTE — PLAN OF CARE
Problem: Safety - Adult  Goal: Free from fall injury  3/27/2025 0044 by Ilsa Ariza GN  Outcome: Progressing  Flowsheets (Taken 3/26/2025 1524 by Isabel Hester RN)  Free From Fall Injury: Instruct family/caregiver on patient safety  Note: Safety measures in place per protocol. Call bell within reach, bed in the lowest position, and bed alarm in place.    3/26/2025 1542 by Isabel Hester RN  Outcome: Progressing  3/26/2025 1524 by Isabel Hester RN  Outcome: Progressing  Flowsheets (Taken 3/26/2025 1524)  Free From Fall Injury: Instruct family/caregiver on patient safety     Problem: Pain  Goal: Verbalizes/displays adequate comfort level or baseline comfort level  3/27/2025 0044 by Ilsa Ariza GN  Outcome: Progressing  Flowsheets (Taken 3/26/2025 1524 by Isabel Hester RN)  Verbalizes/displays adequate comfort level or baseline comfort level:   Encourage patient to monitor pain and request assistance   Administer analgesics based on type and severity of pain and evaluate response   Consider cultural and social influences on pain and pain management   Assess pain using appropriate pain scale   Implement non-pharmacological measures as appropriate and evaluate response   Notify Licensed Independent Practitioner if interventions unsuccessful or patient reports new pain  Note: Pain is monitored and treated per orders.     3/26/2025 1542 by Isabel Hester RN  Outcome: Progressing  3/26/2025 1524 by Isabel Hester RN  Outcome: Progressing  Flowsheets (Taken 3/26/2025 1524)  Verbalizes/displays adequate comfort level or baseline comfort level:   Encourage patient to monitor pain and request assistance   Administer analgesics based on type and severity of pain and evaluate response   Consider cultural and social influences on pain and pain management   Assess pain using appropriate pain scale   Implement non-pharmacological measures as appropriate and evaluate response   Notify Licensed Independent

## 2025-03-27 NOTE — PROGRESS NOTES
Surgery Progress Note    3/27/2025    Admit Date: 3/26/2025    Subjective:     Patient reports pain is managed with current plan. Denies n/v and is tolerating PO well. She has been ambulating in the halls.     Objective:     Blood pressure 124/74, pulse 68, temperature 98.5 °F (36.9 °C), temperature source Oral, resp. rate 14, height 1.524 m (5'), weight 61.7 kg (136 lb), SpO2 93%.    No intake/output data recorded.    03/25 1901 - 03/27 0700  In: 2010 [P.O.:60; I.V.:1950]  Out: 145 [Urine:125]    EXAM: GENERAL: alert, pleasant, no distress   HEART: regular rate and rhythm   LUNGS: clear to auscultation   ABDOMEN:  Soft, appropriate incisional tenderness, +BS, non-distended, surgical incisions clean, dry, no erythema or drainage   EXTREMITIES: warm, well perfused    Data Review    Recent Results (from the past 24 hours)   Basic Metabolic Panel    Collection Time: 03/26/25  6:36 PM   Result Value Ref Range    Sodium 142 136 - 145 mmol/L    Potassium 3.7 3.5 - 5.5 mmol/L    Chloride 106 100 - 111 mmol/L    CO2 24 21 - 32 mmol/L    Anion Gap 12 3.0 - 18 mmol/L    Glucose 130 (H) 74 - 99 mg/dL    BUN 14 7.0 - 18 MG/DL    Creatinine 1.32 (H) 0.6 - 1.3 MG/DL    BUN/Creatinine Ratio 11 (L) 12 - 20      Est, Glom Filt Rate 39 (L) >60 ml/min/1.73m2    Calcium 8.8 8.5 - 10.1 MG/DL   CBC with Auto Differential    Collection Time: 03/27/25  4:55 AM   Result Value Ref Range    WBC 13.4 (H) 4.6 - 13.2 K/uL    RBC 4.29 4.20 - 5.30 M/uL    Hemoglobin 11.7 (L) 12.0 - 16.0 g/dL    Hematocrit 36.4 35.0 - 45.0 %    MCV 84.8 78.0 - 100.0 FL    MCH 27.3 24.0 - 34.0 PG    MCHC 32.1 31.0 - 37.0 g/dL    RDW 16.5 (H) 11.6 - 14.5 %    Platelets 190 135 - 420 K/uL    MPV 11.0 9.2 - 11.8 FL    Nucleated RBCs 0.0 0  WBC    nRBC 0.00 0.00 - 0.01 K/uL    Neutrophils % 78.3 (H) 40.0 - 73.0 %    Lymphocytes % 8.5 (L) 21.0 - 52.0 %    Monocytes % 12.1 (H) 3.0 - 10.0 %    Eosinophils % 0.0 0.0 - 5.0 %    Basophils % 0.1 0.0 - 2.0 %    Immature

## 2025-03-27 NOTE — CONSULTS
having early satiety x1-2 weeks which decreased po intake. Pt reports  lbs with 10 lbs recent/unintentional weight loss in the past x5 months.     Weight Hx: 135 lbs Wt change: +1 lb (<1%) x < 3 months - not significant.   Wt Readings from Last 10 Encounters:   03/26/25 61.7 kg (136 lb)   02/24/25 61.7 kg (136 lb)   01/31/25 60.8 kg (134 lb)   01/29/25 60.8 kg (134 lb)   01/13/25 61.2 kg (135 lb)   10/21/24 59.4 kg (130 lb 15.3 oz)   08/22/24 61.7 kg (136 lb)   02/23/24 63 kg (139 lb)   08/29/23 64 kg (141 lb)   08/08/23 64.4 kg (142 lb)     NFPE: NFPE detailed above. Food Allergies: NKFA    Nutrition Assessment:    Admitted for Paraesophageal hernia; s/p. Pt seen for - Positive Nutrition Screen: MST: unsure, MST: poor appetite, (MST noted unsure weight loss). Consult noted for fundoplication diet education. Pt seen awake and oriented; family present at time of RD visit. Per pt no change yet in appetite. Pt endorses consuming 50-75% of meals. Pt tolerating full liquid bariatric diet; no n/v or abdominal pain. Reviewed nutrition therapy and diet advancement; pt had previously received similar information from MD. Plan to order MVI d/t moderate malnutrition. Pt/family had no nutrition related questions/concerns at this time. Will continue to monitor per protocol.      Nutrition Related Findings:    Pertinent Meds:  Norvasc  Lasix  Heparin  Protonix  Phenergan  Ancef     Pertinent Labs: GFR 57, A1c 6.1% (2/19)  Recent Labs     03/26/25  1836 03/27/25  0455   GLUCOSE 130* 103*   BUN 14 15   CREATININE 1.32* 0.96    142   K 3.7 3.5    109   CO2 24 26   CALCIUM 8.8 8.6     No results for input(s): \"POCGLU\" in the last 72 hours.    Last BM: 3/26/24 per pt    Skin: Wound Type: Surgical Incision       Edema: Edema: Left lower extremity         Current Nutrition Intake & Therapies:    Average Meal Intake: 51-75%  Average Supplements Intake: %  BARIATRIC DIET; Bariatric Full Liquid    Anthropometric

## 2025-03-28 ENCOUNTER — TELEPHONE (OUTPATIENT)
Age: 89
End: 2025-03-28

## 2025-03-28 NOTE — TELEPHONE ENCOUNTER
Patients , Mike, calling to ask if patient can take a shower tomorrow. She had surgery with Dr. Forrester yesterday. Call back number is [phone].

## 2025-04-07 ENCOUNTER — OFFICE VISIT (OUTPATIENT)
Age: 89
End: 2025-04-07

## 2025-04-07 VITALS
BODY MASS INDEX: 26.21 KG/M2 | RESPIRATION RATE: 20 BRPM | WEIGHT: 130 LBS | HEIGHT: 59 IN | TEMPERATURE: 97 F | OXYGEN SATURATION: 95 % | HEART RATE: 78 BPM

## 2025-04-07 DIAGNOSIS — Z87.19 HISTORY OF REPAIR OF HIATAL HERNIA: Primary | ICD-10-CM

## 2025-04-07 DIAGNOSIS — Z98.890 HISTORY OF REPAIR OF HIATAL HERNIA: Primary | ICD-10-CM

## 2025-04-07 PROCEDURE — 99024 POSTOP FOLLOW-UP VISIT: CPT | Performed by: SURGERY

## 2025-04-07 NOTE — PROGRESS NOTES
Chief Complaint   Patient presents with    Post-Op Check     Laparoscopic paraesophageal hernia repair     1. Have you been to the ER, urgent care clinic since your last visit?  Hospitalized since your last visit?No    2. Have you seen or consulted any other health care providers outside of the [facility] since your last visit?  Include any pap smears or colon screening. No     Tolerating level 4 without difficulty   
without rash  Extremities: No edema or joint stiffness  Psych: Appropriate mood and affect    Assessment:     Encounter Diagnosis   Name Primary?    History of repair of hiatal hernia Yes     Plan:     Encouraged to adhere to the post operative diet and exercise program.  Lifting restriction x6 weeks postop for 15-20lbs  Ok for protein shakes PRN  Doing very well with a massive hernia and repair. Follow up 6 weeks.    Rich Forrester MD [facility]  Bariatric and General Surgeon  [facility]

## 2025-05-19 ENCOUNTER — OFFICE VISIT (OUTPATIENT)
Age: 89
End: 2025-05-19

## 2025-05-19 VITALS
BODY MASS INDEX: 25 KG/M2 | WEIGHT: 124 LBS | HEART RATE: 78 BPM | OXYGEN SATURATION: 96 % | SYSTOLIC BLOOD PRESSURE: 130 MMHG | TEMPERATURE: 97.2 F | RESPIRATION RATE: 18 BRPM | HEIGHT: 59 IN | DIASTOLIC BLOOD PRESSURE: 64 MMHG

## 2025-05-19 DIAGNOSIS — Z98.890 HISTORY OF REPAIR OF HIATAL HERNIA: Primary | ICD-10-CM

## 2025-05-19 DIAGNOSIS — Z87.19 HISTORY OF REPAIR OF HIATAL HERNIA: Primary | ICD-10-CM

## 2025-05-19 PROCEDURE — 99024 POSTOP FOLLOW-UP VISIT: CPT | Performed by: SURGERY

## 2025-05-19 NOTE — PROGRESS NOTES
Foregut Surgery Post Op Progress Note    CC: follow up r-PEHR with relaxing incision, mesh placement, partial fundoplication   Subjective:     Marissa Coffey  is a 89 y.o. female who presents for follow-up after above procedure.. She has lost a total of 15 pounds since surgery. Body mass index is 25.04 kg/m²..     Denies f/c/cp/sob/peripheral swelling    DIET:   Consuming regular consistency diet with small portions, 3 meals daily.    Nausea: No  Vomiting: No  Dysphagia: No  Reflux: No  Exercise: Yes    Changes in their medical history and medications have been reviewed.    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    Acute ischemic stroke (HCC)    Essential hypertension    Primary osteoarthritis of both knees    Depressive disorder    Dysuria    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 (HCC)    Chronic congestive heart failure (HCC)    Nonrheumatic aortic valve stenosis    Hypercholesterolemia    History of CVA (cerebrovascular accident)    Upper GI bleed    Hiatal hernia    Ulcer of esophagus with bleeding    Aftercare following surgery to body system    Amenorrhea    Arthritis    B12 deficiency    Backache    Benign paroxysmal positional vertigo of left ear    Chronic fatigue    Current moderate episode of major depressive disorder without prior episode (HCC)    Dysphagia    Gait disturbance, post-stroke    Hematochezia    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

## 2025-05-19 NOTE — PROGRESS NOTES
Chief Complaint   Patient presents with    Follow-up     S/P PEHR    1. Have you been to the ER, urgent care clinic since your last visit?  Hospitalized since your last visit?No    2. Have you seen or consulted any other health care providers outside of the [facility] since your last visit?  Include any pap smears or colon screening. No

## 2025-07-15 ENCOUNTER — OFFICE VISIT (OUTPATIENT)
Age: 89
End: 2025-07-15
Payer: MEDICARE

## 2025-07-15 ENCOUNTER — TELEPHONE (OUTPATIENT)
Age: 89
End: 2025-07-15

## 2025-07-15 VITALS
OXYGEN SATURATION: 91 % | HEIGHT: 59 IN | WEIGHT: 118 LBS | BODY MASS INDEX: 23.79 KG/M2 | HEART RATE: 69 BPM | SYSTOLIC BLOOD PRESSURE: 128 MMHG | DIASTOLIC BLOOD PRESSURE: 74 MMHG

## 2025-07-15 DIAGNOSIS — I10 ESSENTIAL HYPERTENSION: ICD-10-CM

## 2025-07-15 DIAGNOSIS — I35.0 NONRHEUMATIC AORTIC VALVE STENOSIS: ICD-10-CM

## 2025-07-15 DIAGNOSIS — Z86.73 HISTORY OF CVA (CEREBROVASCULAR ACCIDENT): ICD-10-CM

## 2025-07-15 DIAGNOSIS — E78.2 MIXED HYPERLIPIDEMIA: ICD-10-CM

## 2025-07-15 DIAGNOSIS — Z01.818 PRE-OP EXAM: ICD-10-CM

## 2025-07-15 DIAGNOSIS — I50.9 CHRONIC CONGESTIVE HEART FAILURE, UNSPECIFIED HEART FAILURE TYPE (HCC): Primary | ICD-10-CM

## 2025-07-15 PROCEDURE — 99214 OFFICE O/P EST MOD 30 MIN: CPT | Performed by: INTERNAL MEDICINE

## 2025-07-15 PROCEDURE — 1160F RVW MEDS BY RX/DR IN RCRD: CPT | Performed by: INTERNAL MEDICINE

## 2025-07-15 PROCEDURE — 1124F ACP DISCUSS-NO DSCNMKR DOCD: CPT | Performed by: INTERNAL MEDICINE

## 2025-07-15 PROCEDURE — 1159F MED LIST DOCD IN RCRD: CPT | Performed by: INTERNAL MEDICINE

## 2025-07-15 RX ORDER — FUROSEMIDE 20 MG/1
20 TABLET ORAL 2 TIMES DAILY PRN
Qty: 180 TABLET | Refills: 1 | Status: SHIPPED | OUTPATIENT
Start: 2025-07-15

## 2025-07-15 RX ORDER — ATORVASTATIN CALCIUM 80 MG/1
80 TABLET, FILM COATED ORAL DAILY
Qty: 90 TABLET | Refills: 3 | Status: SHIPPED | OUTPATIENT
Start: 2025-07-15

## 2025-07-15 ASSESSMENT — ENCOUNTER SYMPTOMS
APNEA: 0
GASTROINTESTINAL NEGATIVE: 1
BACK PAIN: 0
CHEST TIGHTNESS: 0
ABDOMINAL PAIN: 0
SHORTNESS OF BREATH: 1
NAUSEA: 0
WHEEZING: 0
COUGH: 0
EYES NEGATIVE: 1
ABDOMINAL DISTENTION: 0

## 2025-07-15 NOTE — PROGRESS NOTES
1. Have you been to the ER, urgent care clinic since your last visit?  Hospitalized since your last visit?     Yes When: 3/26/2025 Where: [facility] Reason for visit: Paraesophageal hernia       2.  Where do you normally have your labs drawn?       3. Do you need any refills today?   no    4. Which local pharmacy do you use (enter pharmacy)?       5. Which mail order pharmacy do you use (enter pharmacy)?   On-site      6. Are you here for surgical clearance and if so who will be doing your     procedure/surgery (care team)?       ARTHROPLASTY KNEE REPLACEMENT 8/1/2025  Brian Petty MD

## 2025-07-15 NOTE — PROGRESS NOTES
Marissa Coffey is a 89 y.o. year old female.    Follow-up of CHF, hypertension, aortic stenosis, hyperlipidemia  History of CVA, GI bleed      7/28/2023 seen as a new patient for dizziness and orthostatic hypotension. Dizziness has been present since she had a stroke in 2021.  It happens when she is walking to the kitchen.  She has never fallen or lost consciousness.  Has left hemiparesis and uses walker.  Has edema in the left leg since stroke but denies any chest pain or shortness of breath.  8/29/2023 dizziness persists but it seems to be a certain position on an angle at which she sees.  No falls, loss of consciousness, chest pain or shortness of breath.  Edema persist specially in the left leg.  2/2024  Patient seen for pre-op evaluation.  She is anticipating left knee replacement 3/27/2024.  She has chronic LLE edema.  Denies chest pain, shortness of breath or palpitations.    8/2024  S/P Left TKR.  C/O mild fatigue and LLE edema at times.  Denies chest pain, shortness of breath or palpitations.   1/29/2025 edema is persisting and chronic but reduces some with elevation.  No chest pain palpitations.  Dyspnea on exertion while walking in the hallway from her room to the dining savage in the facility where she lives.  Needs a hiatal hernia surgery.    7/15/2025 no significant chest pain.  Denies any dyspnea and can walk in the hallway though she gets a little tired.  Status post hernia surgery and now needs knee replacement.  Edema is still persisting and she is unable to comment on the intensity.            Review of Systems   Constitutional:  Positive for fatigue. Negative for activity change.   HENT: Negative.  Negative for congestion.    Eyes: Negative.  Negative for visual disturbance.   Respiratory:  Positive for shortness of breath. Negative for apnea, cough, chest tightness and wheezing.    Cardiovascular:  Positive for leg swelling. Negative for chest pain and palpitations.   Gastrointestinal:

## 2025-07-28 RX ORDER — PANTOPRAZOLE SODIUM 40 MG/1
40 TABLET, DELAYED RELEASE ORAL
Qty: 30 TABLET | Refills: 2 | OUTPATIENT
Start: 2025-07-28

## 2025-08-04 DIAGNOSIS — I50.9 CHRONIC CONGESTIVE HEART FAILURE, UNSPECIFIED HEART FAILURE TYPE (HCC): ICD-10-CM

## 2025-08-04 RX ORDER — AMLODIPINE BESYLATE 2.5 MG/1
2.5 TABLET ORAL DAILY
Qty: 90 TABLET | Refills: 3 | Status: SHIPPED | OUTPATIENT
Start: 2025-08-04

## (undated) DEVICE — SYRINGE MED 30ML STD CLR PLAS LUERLOCK TIP N CTRL DISP

## (undated) DEVICE — NEEDLE SPNL 20GA L3.5IN YEL HUB S STL REG WALL FIT STYL

## (undated) DEVICE — COLUMN DRAPE

## (undated) DEVICE — SYRINGE MED 25GA 3ML L5/8IN SUBQ PLAS W/ DETACH NDL SFTY

## (undated) DEVICE — BANDAGE,GAUZE,BULKEE II,4.5"X4.1YD,STRL: Brand: MEDLINE

## (undated) DEVICE — SYRINGE,TOOMEY,IRRIGATION,70CC,STERILE: Brand: MEDLINE

## (undated) DEVICE — CATHETER SUCT TR FL TIP 14FR W/ O CTRL

## (undated) DEVICE — ARM DRAPE

## (undated) DEVICE — UNDERPAD INCONT W23XL36IN STD BLU POLYPR BK FLUF SFT

## (undated) DEVICE — BITE BLOCK ENDOSCP UNIV AD 6 TO 9.4 MM

## (undated) DEVICE — DRAPE TOWEL: Brand: CONVERTORS

## (undated) DEVICE — VISIGI 3D®  CALIBRATION SYSTEM  SIZE 36FR STD W/ BULB: Brand: BOEHRINGER® VISIGI 3D™ SLEEVE GASTRECTOMY CALIBRATION SYSTEM, SIZE 36FR W/BULB

## (undated) DEVICE — CLEAN UP KIT: Brand: MEDLINE INDUSTRIES, INC.

## (undated) DEVICE — SYRINGE MED 10ML LUERLOCK TIP W/O SFTY DISP

## (undated) DEVICE — SEALANT TISS 10 ML FIBRIN VISTASEAL

## (undated) DEVICE — DECANTER FLD 9IN ST BG FOR ASEP TRNSF OF FLD

## (undated) DEVICE — BLADELESS OBTURATOR: Brand: WECK VISTA

## (undated) DEVICE — SOLUTION IRRIG 1000ML H2O STRL BLT

## (undated) DEVICE — VESSEL SEALER EXTEND: Brand: ENDOWRIST

## (undated) DEVICE — LINER SUCT CANSTR 3000CC PLAS SFT PRE ASSEMB W/OUT TBNG W/

## (undated) DEVICE — AIRSEAL BIFURCATED SMOKE EVAC FILTERED TUBE SET: Brand: AIRSEAL

## (undated) DEVICE — BASIN EMSIS 16OZ GRAPHITE PLAS KID SHP MOLD GRAD FOR ORAL

## (undated) DEVICE — ELECTRODE PT RET AD L9FT HI MOIST COND ADH HYDRGEL CORDED

## (undated) DEVICE — APPLICATOR MEDICATED 26 CC SOLUTION HI LT ORNG CHLORAPREP

## (undated) DEVICE — CANNULA NSL AD TBNG L14FT STD PVC O2 CRV CONN NONFLARED NSL

## (undated) DEVICE — Device

## (undated) DEVICE — YANKAUER,SMOOTH HANDLE,HIGH CAPACITY: Brand: MEDLINE INDUSTRIES, INC.

## (undated) DEVICE — INTENDED FOR TISSUE SEPARATION, AND OTHER PROCEDURES THAT REQUIRE A SHARP SURGICAL BLADE TO PUNCTURE OR CUT.: Brand: BARD-PARKER SAFETY BLADES SIZE 15, STERILE

## (undated) DEVICE — SOLUTION IRRIG 1000ML 0.9% SOD CHL USP POUR PLAS BTL

## (undated) DEVICE — ENDOSCOPY PUMP TUBING/ CAP SET: Brand: ERBE

## (undated) DEVICE — BOWL MED L 32OZ PLAS W/ MOLD GRAD EZ OPN PEEL PCH

## (undated) DEVICE — TAPE,CLOTH/SILK,CURAD,3"X10YD,LF,40/CS: Brand: CURAD

## (undated) DEVICE — THE DEVICE IS A DISPOSABLE, LIGATURE PASSING, SUTURING APPARATUS AND NEEDLE GUIDE FOR THE ABDOMINAL WALL WHICH IS NON-POWERED, HAND-HELD, AND HAND-MANIPULATED,INTENDED TO BE USED IN VARIOUS GENERAL SURGICAL PROCEDURES. THE DEVICE INCLUDES A LIGATURE CARRIER PATHWAY, NEEDLE GUIDE, TWO NEEDLES, REFERENCE PLANE T-BAR, AND A GUIDEWIRE. THE HANDLE OF THE DEVICE PROVIDES TWO DIAMETRICALLY OPPOSED ENCLOSED GUIDEWAYS FOR THE ADVANCEMENT AND RETRACTION OF THE NEEDLES UNDER MANUAL CONTROL OF A PLUNGER LOCATED AT THE PROXIMAL END OF THE DEVICE.AS PART OF THE M-CLOSE CONVENIENCE KIT, A NERVE BLOCK NEEDLE IS INCLUDED FOR THE ADMINISTRATION OF LOCAL ANESTHETIC AGENTS TO PROVIDE REGIONAL AND LOCAL ANESTHESIA.  A TELFA ANTIMICROBIAL, NON-ADHERENT PAD IS ALSO PROVIDED IN THE KIT FOR USE AS A PRIMARY DRESSING FOR THE SURGICAL INCISION.: Brand: M-CLOSE KIT

## (undated) DEVICE — SOLUTION ANTIFOG VIS SYS CLEARIFY LAPSCP

## (undated) DEVICE — DRAIN SURG PENROSE 0.25X12 IN CLOSED WND DRAINAGE PREM SIL

## (undated) DEVICE — GAUZE,SPONGE,4"X4",16PLY,STRL,LF,10/TRAY: Brand: MEDLINE

## (undated) DEVICE — INSUFFLATION NEEDLE TO ESTABLISH PNEUMOPERITONEUM.: Brand: INSUFFLATION NEEDLE

## (undated) DEVICE — TUBING, SUCTION, 3/16" X 12', STRAIGHT: Brand: MEDLINE

## (undated) DEVICE — SEAL

## (undated) DEVICE — SUTURE ETHIBOND EXCEL SZ 2-0 L36IN NONABSORBABLE GRN SH-1 X763H

## (undated) DEVICE — REDUCER: Brand: ENDOWRIST

## (undated) DEVICE — SUTURE MONOCRYL SZ 4-0 L27IN ABSRB UD L24MM PS-1 3/8 CIR PRIM Y935H

## (undated) DEVICE — SUTURE VICRYL SZ 2-0 L27IN ABSRB VLT L36MM CT-1 1/2 CIR J339H

## (undated) DEVICE — 2, DISPOSABLE SUCTION/IRRIGATOR WITH DISPOSABLE TIP: Brand: STRYKEFLOW

## (undated) DEVICE — SYRINGE MED 50ML LUERSLIP TIP

## (undated) DEVICE — FORCEPS BX L240CM JAW DIA2.4MM ORNG L CAP W/ NDL DISP RAD

## (undated) DEVICE — GLOVE ORTHO 7 1/2   MSG9475

## (undated) DEVICE — LIQUIBAND RAPID ADHESIVE 36/CS 0.8ML: Brand: MEDLINE